# Patient Record
Sex: MALE | Race: WHITE | NOT HISPANIC OR LATINO | URBAN - METROPOLITAN AREA
[De-identification: names, ages, dates, MRNs, and addresses within clinical notes are randomized per-mention and may not be internally consistent; named-entity substitution may affect disease eponyms.]

---

## 2018-04-21 ENCOUNTER — EMERGENCY (EMERGENCY)
Facility: HOSPITAL | Age: 58
LOS: 1 days | Discharge: HOME | End: 2018-04-21
Attending: EMERGENCY MEDICINE
Payer: MEDICARE

## 2018-04-21 VITALS
OXYGEN SATURATION: 97 % | SYSTOLIC BLOOD PRESSURE: 158 MMHG | HEART RATE: 61 BPM | RESPIRATION RATE: 20 BRPM | HEIGHT: 67 IN | WEIGHT: 179.9 LBS | TEMPERATURE: 98 F | DIASTOLIC BLOOD PRESSURE: 90 MMHG

## 2018-04-21 VITALS
HEART RATE: 56 BPM | SYSTOLIC BLOOD PRESSURE: 158 MMHG | TEMPERATURE: 98 F | DIASTOLIC BLOOD PRESSURE: 90 MMHG | OXYGEN SATURATION: 100 % | RESPIRATION RATE: 18 BRPM

## 2018-04-21 LAB
ALBUMIN SERPL ELPH-MCNC: 3.9 G/DL — SIGNIFICANT CHANGE UP (ref 3.3–5.2)
ALP SERPL-CCNC: 53 U/L — SIGNIFICANT CHANGE UP (ref 40–120)
ALT FLD-CCNC: 14 U/L — SIGNIFICANT CHANGE UP
ANION GAP SERPL CALC-SCNC: 10 MMOL/L — SIGNIFICANT CHANGE UP (ref 5–17)
AST SERPL-CCNC: 18 U/L — SIGNIFICANT CHANGE UP
BASOPHILS # BLD AUTO: 0 K/UL — SIGNIFICANT CHANGE UP (ref 0–0.2)
BASOPHILS NFR BLD AUTO: 0.3 % — SIGNIFICANT CHANGE UP (ref 0–2)
BILIRUB SERPL-MCNC: 0.3 MG/DL — LOW (ref 0.4–2)
BUN SERPL-MCNC: 9 MG/DL — SIGNIFICANT CHANGE UP (ref 8–20)
CALCIUM SERPL-MCNC: 9.7 MG/DL — SIGNIFICANT CHANGE UP (ref 8.6–10.2)
CHLORIDE SERPL-SCNC: 101 MMOL/L — SIGNIFICANT CHANGE UP (ref 98–107)
CO2 SERPL-SCNC: 29 MMOL/L — SIGNIFICANT CHANGE UP (ref 22–29)
CREAT SERPL-MCNC: 1.13 MG/DL — SIGNIFICANT CHANGE UP (ref 0.5–1.3)
EOSINOPHIL # BLD AUTO: 0.1 K/UL — SIGNIFICANT CHANGE UP (ref 0–0.5)
EOSINOPHIL NFR BLD AUTO: 1.6 % — SIGNIFICANT CHANGE UP (ref 0–5)
GLUCOSE SERPL-MCNC: 87 MG/DL — SIGNIFICANT CHANGE UP (ref 70–115)
HCT VFR BLD CALC: 38.6 % — LOW (ref 42–52)
HGB BLD-MCNC: 13.1 G/DL — LOW (ref 14–18)
LYMPHOCYTES # BLD AUTO: 2 K/UL — SIGNIFICANT CHANGE UP (ref 1–4.8)
LYMPHOCYTES # BLD AUTO: 29 % — SIGNIFICANT CHANGE UP (ref 20–55)
MAGNESIUM SERPL-MCNC: 1.5 MG/DL — LOW (ref 1.6–2.6)
MCHC RBC-ENTMCNC: 31.5 PG — HIGH (ref 27–31)
MCHC RBC-ENTMCNC: 33.9 G/DL — SIGNIFICANT CHANGE UP (ref 32–36)
MCV RBC AUTO: 92.8 FL — SIGNIFICANT CHANGE UP (ref 80–94)
MONOCYTES # BLD AUTO: 0.7 K/UL — SIGNIFICANT CHANGE UP (ref 0–0.8)
MONOCYTES NFR BLD AUTO: 9.8 % — SIGNIFICANT CHANGE UP (ref 3–10)
NEUTROPHILS # BLD AUTO: 4 K/UL — SIGNIFICANT CHANGE UP (ref 1.8–8)
NEUTROPHILS NFR BLD AUTO: 58.9 % — SIGNIFICANT CHANGE UP (ref 37–73)
PLATELET # BLD AUTO: 169 K/UL — SIGNIFICANT CHANGE UP (ref 150–400)
POTASSIUM SERPL-MCNC: 4.5 MMOL/L — SIGNIFICANT CHANGE UP (ref 3.5–5.3)
POTASSIUM SERPL-SCNC: 4.5 MMOL/L — SIGNIFICANT CHANGE UP (ref 3.5–5.3)
PROT SERPL-MCNC: 6.2 G/DL — LOW (ref 6.6–8.7)
RBC # BLD: 4.16 M/UL — LOW (ref 4.6–6.2)
RBC # FLD: 12.7 % — SIGNIFICANT CHANGE UP (ref 11–15.6)
SODIUM SERPL-SCNC: 140 MMOL/L — SIGNIFICANT CHANGE UP (ref 135–145)
TSH SERPL-MCNC: 1.3 UIU/ML — SIGNIFICANT CHANGE UP (ref 0.27–4.2)
WBC # BLD: 6.7 K/UL — SIGNIFICANT CHANGE UP (ref 4.8–10.8)
WBC # FLD AUTO: 6.7 K/UL — SIGNIFICANT CHANGE UP (ref 4.8–10.8)

## 2018-04-21 PROCEDURE — 36415 COLL VENOUS BLD VENIPUNCTURE: CPT

## 2018-04-21 PROCEDURE — 99285 EMERGENCY DEPT VISIT HI MDM: CPT

## 2018-04-21 PROCEDURE — 73110 X-RAY EXAM OF WRIST: CPT

## 2018-04-21 PROCEDURE — 84443 ASSAY THYROID STIM HORMONE: CPT

## 2018-04-21 PROCEDURE — 73110 X-RAY EXAM OF WRIST: CPT | Mod: 26,RT

## 2018-04-21 PROCEDURE — 85027 COMPLETE CBC AUTOMATED: CPT

## 2018-04-21 PROCEDURE — 70450 CT HEAD/BRAIN W/O DYE: CPT

## 2018-04-21 PROCEDURE — 83735 ASSAY OF MAGNESIUM: CPT

## 2018-04-21 PROCEDURE — 80053 COMPREHEN METABOLIC PANEL: CPT

## 2018-04-21 PROCEDURE — 99284 EMERGENCY DEPT VISIT MOD MDM: CPT

## 2018-04-21 PROCEDURE — 70450 CT HEAD/BRAIN W/O DYE: CPT | Mod: 26

## 2018-04-21 NOTE — ED PROVIDER NOTE - PMH
Anxiety    BPH (benign prostatic hyperplasia)    GERD (gastroesophageal reflux disease)    HTN (hypertension)    Hyperlipidemia    Mood disorder    Psychosis

## 2018-04-21 NOTE — ED ADULT NURSE NOTE - OBJECTIVE STATEMENT
58 yom presents to ed complaining of left arm numbness denies cp denies sob. denies weakness. moves all extremities denies changes in vision pt fell 3 days ago with dropped wrist.

## 2018-04-21 NOTE — ED PROVIDER NOTE - MUSCULOSKELETAL, MLM
R wrist [id number] weak, however LEANDRO wrist flexes, deltic, triceps, and biceps normal ROM. Spine appears normal, range of motion is not limited, no muscle or joint tenderness R wrist extension weak 2/5, however left wrist extension normal; LEANDRO wrist flexion, deltoids, triceps, and biceps normal strength 5/5. Spine appears normal, range of motion is not limited, no muscle or joint tenderness

## 2018-04-21 NOTE — ED ADULT TRIAGE NOTE - CHIEF COMPLAINT QUOTE
Patient brought in by ambulance A/Ox3, sent from [facility] for adults for right arm pain and numbness/tingling to right finger tips.

## 2018-04-21 NOTE — ED PROVIDER NOTE - OBJECTIVE STATEMENT
57 y/o M pt with hx of GERD, HLD, HTN, BPH, and mood disorder presents to ED BIBA sent from [facility] for adults for right wrist numbness and tingling for the past 2 days. Pt at first thought it was from a stroke. He reports that he fell of his bed and hit floor a few days ago, but no pain was noted. His ROM is limited according to him, he does feel any touch. Denies dizziness. No further complaints at this time. 59 y/o M pt with hx of GERD, HLD, HTN, BPH, and mood disorder presents to ED BIBA sent from [facility] for adults for right wrist numbness and tingling for the past 2 days. He reports that he fell of his bed and hit floor a few days ago, but no pain was noted. His ROM is limited according to him, he does feel any touch. Denies dizziness. No further complaints at this time. 59 y/o M pt with hx of GERD, HLD, HTN, BPH, and mood disorder presents to ED BIBA sent from [facility] for adults for right wrist numbness and tingling for the past 2 days. He reports that he fell of his bed and hit floor a few days ago, but no pain was noted. His ROM is limited according to him, he does feel all touch. Denies dizziness. No further complaints at this time.

## 2018-04-21 NOTE — ED ADULT NURSE NOTE - CHIEF COMPLAINT QUOTE
Patient brought in by ambulance A/Ox3, sent from [facility] for adults for right arm pain and numbness/tingling to right finger tips.

## 2018-04-21 NOTE — ED PROCEDURE NOTE - NS ED PERI NEURO POS
pre-and post right wrist drop/Pre-application: Motor, sensory, and vascular responses NOT intact in the injured extremity.

## 2018-04-21 NOTE — ED ADULT NURSE NOTE - NSSISCREENINGQ4_ED_A_ED
Chief Complaint   Patient presents with     Recheck Medication     schizoaffective disorder, depressive type     Reviewed allergies, smoking status, and pharmacy preference  Administered abuse screening questions   Obtained weight, blood pressure and heart rate      No

## 2018-04-21 NOTE — ED PROVIDER NOTE - MEDICAL DECISION MAKING DETAILS
Pt order basic labs, and CT scan, more likely wrist drop, will put splint in place, and discuss neuro followup.

## 2019-07-18 ENCOUNTER — EMERGENCY (EMERGENCY)
Facility: HOSPITAL | Age: 59
LOS: 1 days | Discharge: OTHER INSTITUTION | End: 2019-07-18
Attending: EMERGENCY MEDICINE
Payer: MEDICARE

## 2019-07-18 VITALS
HEIGHT: 68 IN | OXYGEN SATURATION: 97 % | WEIGHT: 160.06 LBS | RESPIRATION RATE: 18 BRPM | DIASTOLIC BLOOD PRESSURE: 88 MMHG | SYSTOLIC BLOOD PRESSURE: 142 MMHG | TEMPERATURE: 99 F | HEART RATE: 68 BPM

## 2019-07-18 DIAGNOSIS — F20.0 PARANOID SCHIZOPHRENIA: ICD-10-CM

## 2019-07-18 DIAGNOSIS — R69 ILLNESS, UNSPECIFIED: ICD-10-CM

## 2019-07-18 LAB
ALBUMIN SERPL ELPH-MCNC: 4.5 G/DL — SIGNIFICANT CHANGE UP (ref 3.3–5.2)
ALP SERPL-CCNC: 67 U/L — SIGNIFICANT CHANGE UP (ref 40–120)
ALT FLD-CCNC: 31 U/L — SIGNIFICANT CHANGE UP
AMPHET UR-MCNC: NEGATIVE — SIGNIFICANT CHANGE UP
ANION GAP SERPL CALC-SCNC: 12 MMOL/L — SIGNIFICANT CHANGE UP (ref 5–17)
APAP SERPL-MCNC: <7.5 UG/ML — LOW (ref 10–26)
APPEARANCE UR: CLEAR — SIGNIFICANT CHANGE UP
AST SERPL-CCNC: 29 U/L — SIGNIFICANT CHANGE UP
BARBITURATES UR SCN-MCNC: NEGATIVE — SIGNIFICANT CHANGE UP
BENZODIAZ UR-MCNC: NEGATIVE — SIGNIFICANT CHANGE UP
BILIRUB SERPL-MCNC: 0.4 MG/DL — SIGNIFICANT CHANGE UP (ref 0.4–2)
BILIRUB UR-MCNC: NEGATIVE — SIGNIFICANT CHANGE UP
BUN SERPL-MCNC: 8 MG/DL — SIGNIFICANT CHANGE UP (ref 8–20)
CALCIUM SERPL-MCNC: 10.4 MG/DL — HIGH (ref 8.6–10.2)
CHLORIDE SERPL-SCNC: 100 MMOL/L — SIGNIFICANT CHANGE UP (ref 98–107)
CO2 SERPL-SCNC: 25 MMOL/L — SIGNIFICANT CHANGE UP (ref 22–29)
COCAINE METAB.OTHER UR-MCNC: NEGATIVE — SIGNIFICANT CHANGE UP
COLOR SPEC: YELLOW — SIGNIFICANT CHANGE UP
CREAT SERPL-MCNC: 1.21 MG/DL — SIGNIFICANT CHANGE UP (ref 0.5–1.3)
DIFF PNL FLD: NEGATIVE — SIGNIFICANT CHANGE UP
ETHANOL SERPL-MCNC: <10 MG/DL — SIGNIFICANT CHANGE UP
GLUCOSE SERPL-MCNC: 75 MG/DL — SIGNIFICANT CHANGE UP (ref 70–115)
GLUCOSE UR QL: NEGATIVE MG/DL — SIGNIFICANT CHANGE UP
HCT VFR BLD CALC: 40.2 % — SIGNIFICANT CHANGE UP (ref 39–50)
HGB BLD-MCNC: 13.5 G/DL — SIGNIFICANT CHANGE UP (ref 13–17)
KETONES UR-MCNC: NEGATIVE — SIGNIFICANT CHANGE UP
LEUKOCYTE ESTERASE UR-ACNC: NEGATIVE — SIGNIFICANT CHANGE UP
MCHC RBC-ENTMCNC: 31.3 PG — SIGNIFICANT CHANGE UP (ref 27–34)
MCHC RBC-ENTMCNC: 33.6 GM/DL — SIGNIFICANT CHANGE UP (ref 32–36)
MCV RBC AUTO: 93.3 FL — SIGNIFICANT CHANGE UP (ref 80–100)
METHADONE UR-MCNC: NEGATIVE — SIGNIFICANT CHANGE UP
NITRITE UR-MCNC: NEGATIVE — SIGNIFICANT CHANGE UP
OPIATES UR-MCNC: NEGATIVE — SIGNIFICANT CHANGE UP
PCP SPEC-MCNC: SIGNIFICANT CHANGE UP
PCP UR-MCNC: NEGATIVE — SIGNIFICANT CHANGE UP
PH UR: 6 — SIGNIFICANT CHANGE UP (ref 5–8)
PLATELET # BLD AUTO: 153 K/UL — SIGNIFICANT CHANGE UP (ref 150–400)
POTASSIUM SERPL-MCNC: 4.3 MMOL/L — SIGNIFICANT CHANGE UP (ref 3.5–5.3)
POTASSIUM SERPL-SCNC: 4.3 MMOL/L — SIGNIFICANT CHANGE UP (ref 3.5–5.3)
PROT SERPL-MCNC: 7 G/DL — SIGNIFICANT CHANGE UP (ref 6.6–8.7)
PROT UR-MCNC: NEGATIVE MG/DL — SIGNIFICANT CHANGE UP
RBC # BLD: 4.31 M/UL — SIGNIFICANT CHANGE UP (ref 4.2–5.8)
RBC # FLD: 12.1 % — SIGNIFICANT CHANGE UP (ref 10.3–14.5)
SALICYLATES SERPL-MCNC: <0.6 MG/DL — LOW (ref 10–20)
SODIUM SERPL-SCNC: 137 MMOL/L — SIGNIFICANT CHANGE UP (ref 135–145)
SP GR SPEC: 1.01 — SIGNIFICANT CHANGE UP (ref 1.01–1.02)
THC UR QL: NEGATIVE — SIGNIFICANT CHANGE UP
UROBILINOGEN FLD QL: NEGATIVE MG/DL — SIGNIFICANT CHANGE UP
WBC # BLD: 5.44 K/UL — SIGNIFICANT CHANGE UP (ref 3.8–10.5)
WBC # FLD AUTO: 5.44 K/UL — SIGNIFICANT CHANGE UP (ref 3.8–10.5)

## 2019-07-18 PROCEDURE — 93005 ELECTROCARDIOGRAM TRACING: CPT

## 2019-07-18 PROCEDURE — 93010 ELECTROCARDIOGRAM REPORT: CPT

## 2019-07-18 PROCEDURE — 85027 COMPLETE CBC AUTOMATED: CPT

## 2019-07-18 PROCEDURE — 99285 EMERGENCY DEPT VISIT HI MDM: CPT

## 2019-07-18 PROCEDURE — 80307 DRUG TEST PRSMV CHEM ANLYZR: CPT

## 2019-07-18 PROCEDURE — 80053 COMPREHEN METABOLIC PANEL: CPT

## 2019-07-18 PROCEDURE — 81003 URINALYSIS AUTO W/O SCOPE: CPT

## 2019-07-18 PROCEDURE — 36415 COLL VENOUS BLD VENIPUNCTURE: CPT

## 2019-07-18 RX ORDER — CLONAZEPAM 1 MG
0.5 TABLET ORAL ONCE
Refills: 0 | Status: DISCONTINUED | OUTPATIENT
Start: 2019-07-18 | End: 2019-07-18

## 2019-07-18 RX ADMIN — Medication 0.5 MILLIGRAM(S): at 16:03

## 2019-07-18 RX ADMIN — Medication 1 MILLIGRAM(S): at 20:40

## 2019-07-18 NOTE — ED ADULT NURSE NOTE - OBJECTIVE STATEMENT
pt awake, alert and oriented x3 c/o voices in his head telling him to jump in front of the train.  pt states he often hears voices telling him to do this although he has never acted upon them.  pt states he believes he is having these voices in his head because he is afraid of his constant urge to use derogatory terms such as the "n" word and the "s" word and is afraid that he will say it to someone who will hurt him.  pt lives in adult home who helps to manage his medications.  pt admits to drinking one can of beer per day and smoking "reefer" every two weeks.  denies smoking cigarettes. one to one at bedside for SI.  Denies HI.

## 2019-07-18 NOTE — ED BEHAVIORAL HEALTH NOTE - BEHAVIORAL HEALTH NOTE
SERENA NOTE: Pt accepted to [facility] by Dr. Hernandez on 9.37 status. Bed offered by [facility]. Pt has medicare and medicaid - auth is not required. RN notified. SW will arrange transport

## 2019-07-18 NOTE — ED PROVIDER NOTE - OBJECTIVE STATEMENT
58 y/o M with h/o htn, dementia, hld , schizophrenia, elevated lft, bph sent rom group home for suicidal ideation and pt states that he wants to kill himself by jumping in front of the car. Pt has prior suicidal attempts and oversode on apsiirn 5 yrs ago. Pt smokes weed and smokes cigarette and had one can of beer today . No other drugs.

## 2019-07-18 NOTE — ED ADULT NURSE NOTE - CHIEF COMPLAINT QUOTE
Patient BIBA to ED today from adult group [facility] with c/o hallucinations that are telling him to jump in front of a car.  Patient reports he smoked "pot" today and drank alcohol also.

## 2019-07-18 NOTE — ED PROVIDER NOTE - CLINICAL SUMMARY MEDICAL DECISION MAKING FREE TEXT BOX
pt is clinically sober, will check for labs, call psych consult, no medical intervention required at this time

## 2019-07-18 NOTE — ED BEHAVIORAL HEALTH ASSESSMENT NOTE - CURRENT MEDICATION
Metoprolol 25mg bid, Gabapentin 100 mg tid, Cogentin 1 mg bid, Flomax 0.4 mg hs, perphenazine, 16 mg bid, Lexapro 10 mg hs, Lipitor 10 mg daily, Zyprexa 20 mg hs, Zyprexa 10 mg daily, ASA 81 mg daily, Creon 1200 units daily

## 2019-07-18 NOTE — ED ADULT NURSE REASSESSMENT NOTE - NS ED NURSE REASSESS COMMENT FT1
received pt in room laying in bed watching tv.  no c/o offered.  pt stating I think I am going to an outpt.  program. plan of care explained to pt.  pt made aware to go in pt.  awaiting soh return call for conformation.  pt states well it is getting late pt made aware transferred do occur all night.  pt stating but I am tired.  redirected pt. offered night snack and stating Oreo cookies and milk would be good right.   snack provided pt smiling .

## 2019-07-18 NOTE — ED BEHAVIORAL HEALTH ASSESSMENT NOTE - HPI (INCLUDE ILLNESS QUALITY, SEVERITY, DURATION, TIMING, CONTEXT, MODIFYING FACTORS, ASSOCIATED SIGNS AND SYMPTOMS)
Patient is a 60 y/o single male with PMh of HDL, dementia, BPH, elevated LFT's PPH of schizophrenia in current treatment with [facility], many previous inpatient psychiatric hospitalizations ( last [facility] several weeks ago), no known suicide attempts, h/o of Cannabis use, biba from adult home for suicidal ideation with a plan to jump in front of a car.   Patient reports worsening A/H making him yell at people and scream derogatory statements at people. Patient reports he was recently discharged at [facility] and at time of discharge told the staff he was no longer suicidal because he wanted to be discharge. Since discharge he has been experiencing increased A/H which is triggering his suicidality. Patient stated, " If I didn't hear these voices I would feel better." Patient also reports paranoia stating " I feel like people are out to get me." Patient reports he drinks 1 beer daily, and smokes Cannabis every two weeks ( last 2 weeks ago). He denies thoughts of hurting others. Patient reports hypersomnia and decreased appetite. He attends a day program and is eligible to attend daily however states he goes once a week " because they are all crazy there." Patient reports if discharged today he will attempt suicide by jumping in front of a car.  Received collateral from brother Shoaib Godoy who received a call from patient this morning stating he was paranoid and suicidal. Patient brother then spoke to staff at residence and advised them to call 911. He feels his brother has an addiction to Cannabis and would benefit from rehab once psychiatrically stable.   Received professional collateral from [facility]. Patient has been hospitalized several times in the past several months for suicidal ideation. Patient has no known suicide attempts and often feels better in several days. [facility] did not change antipsychotics during last visit and discontinued Klonopin.   Received additional collateral from house supervisor Noe [phone] who feels patient has decompensated since discharge from [facility] and has concerns he may hurt himself. Yesterday patient reported worsening A/H and suicidal ideation  to staff and stated, " I am going to try to stay out of the hospital." Today patient asked to be brought to the hospital stating, " I want to kill myslef."

## 2019-07-18 NOTE — ED BEHAVIORAL HEALTH ASSESSMENT NOTE - DETAILS
discharged from [facility] several weeks ago. tremor treated with cogentin n/a see hpi accepting facility will receive handoff attending

## 2019-07-18 NOTE — ED BEHAVIORAL HEALTH ASSESSMENT NOTE - RISK ASSESSMENT
high risk, although patient has been recently discharged from [facility] he reports worsening suicidal ideation in the context of A/H. He is not able to engage in safety plan.

## 2019-07-18 NOTE — ED ADULT TRIAGE NOTE - CHIEF COMPLAINT QUOTE
Patient BIBA to ED today from adult group [facility] with c/o hallucinations that are telling him to jump in front of a car.  Patient reports he smoked "pot" today and drank alcohol also.

## 2019-07-18 NOTE — ED BEHAVIORAL HEALTH ASSESSMENT NOTE - SUMMARY
Patient is a 58 y/o single male with PMh of HDL, dementia, BPH, elevated LFT's PPH of schizophrenia in current treatment with [facility], many previous inpatient psychiatric hospitalizations ( last [facility] several weeks ago), no known suicide attempts, h/o of Cannabis use, biba from adult home for suicidal ideation with a plan to jump in front of a car.   Patient seen and evaluated, reports A/H, and suicidal ideation with plan. Patient is preoccupied with thought blocking. Collateral supports hospitalizations  for safety and sx medication management.   Patient will be admitted on a voluntary status.

## 2019-07-19 VITALS
SYSTOLIC BLOOD PRESSURE: 131 MMHG | RESPIRATION RATE: 18 BRPM | DIASTOLIC BLOOD PRESSURE: 76 MMHG | TEMPERATURE: 98 F | HEART RATE: 60 BPM | OXYGEN SATURATION: 98 %

## 2019-08-05 ENCOUNTER — INPATIENT (INPATIENT)
Facility: HOSPITAL | Age: 59
LOS: 23 days | Discharge: HOME | End: 2019-08-29
Attending: PSYCHIATRY & NEUROLOGY | Admitting: PSYCHIATRY & NEUROLOGY
Payer: MEDICARE

## 2019-08-05 ENCOUNTER — EMERGENCY (EMERGENCY)
Facility: HOSPITAL | Age: 59
LOS: 1 days | Discharge: OTHER INSTITUTION | End: 2019-08-05
Attending: EMERGENCY MEDICINE
Payer: MEDICARE

## 2019-08-05 VITALS — RESPIRATION RATE: 18 BRPM | TEMPERATURE: 98 F | OXYGEN SATURATION: 98 %

## 2019-08-05 VITALS
HEART RATE: 56 BPM | TEMPERATURE: 98 F | OXYGEN SATURATION: 98 % | RESPIRATION RATE: 18 BRPM | DIASTOLIC BLOOD PRESSURE: 94 MMHG | SYSTOLIC BLOOD PRESSURE: 160 MMHG

## 2019-08-05 VITALS
TEMPERATURE: 99 F | OXYGEN SATURATION: 98 % | RESPIRATION RATE: 18 BRPM | SYSTOLIC BLOOD PRESSURE: 152 MMHG | DIASTOLIC BLOOD PRESSURE: 89 MMHG | HEART RATE: 84 BPM

## 2019-08-05 DIAGNOSIS — R45.851 SUICIDAL IDEATIONS: ICD-10-CM

## 2019-08-05 LAB
ALBUMIN SERPL ELPH-MCNC: 4.3 G/DL — SIGNIFICANT CHANGE UP (ref 3.3–5.2)
ALP SERPL-CCNC: 60 U/L — SIGNIFICANT CHANGE UP (ref 40–120)
ALT FLD-CCNC: 26 U/L — SIGNIFICANT CHANGE UP
AMPHET UR-MCNC: NEGATIVE — SIGNIFICANT CHANGE UP
ANION GAP SERPL CALC-SCNC: 10 MMOL/L — SIGNIFICANT CHANGE UP (ref 5–17)
APAP SERPL-MCNC: <7.5 UG/ML — LOW (ref 10–26)
APPEARANCE UR: CLEAR — SIGNIFICANT CHANGE UP
AST SERPL-CCNC: 23 U/L — SIGNIFICANT CHANGE UP
BARBITURATES UR SCN-MCNC: NEGATIVE — SIGNIFICANT CHANGE UP
BASOPHILS # BLD AUTO: 0.03 K/UL — SIGNIFICANT CHANGE UP (ref 0–0.2)
BASOPHILS NFR BLD AUTO: 0.6 % — SIGNIFICANT CHANGE UP (ref 0–2)
BENZODIAZ UR-MCNC: NEGATIVE — SIGNIFICANT CHANGE UP
BILIRUB SERPL-MCNC: 0.3 MG/DL — LOW (ref 0.4–2)
BILIRUB UR-MCNC: NEGATIVE — SIGNIFICANT CHANGE UP
BUN SERPL-MCNC: 7 MG/DL — LOW (ref 8–20)
CALCIUM SERPL-MCNC: 8.9 MG/DL — SIGNIFICANT CHANGE UP (ref 8.6–10.2)
CHLORIDE SERPL-SCNC: 101 MMOL/L — SIGNIFICANT CHANGE UP (ref 98–107)
CO2 SERPL-SCNC: 25 MMOL/L — SIGNIFICANT CHANGE UP (ref 22–29)
COCAINE METAB.OTHER UR-MCNC: NEGATIVE — SIGNIFICANT CHANGE UP
COLOR SPEC: YELLOW — SIGNIFICANT CHANGE UP
CREAT SERPL-MCNC: 1.02 MG/DL — SIGNIFICANT CHANGE UP (ref 0.5–1.3)
DIFF PNL FLD: NEGATIVE — SIGNIFICANT CHANGE UP
EOSINOPHIL # BLD AUTO: 0.14 K/UL — SIGNIFICANT CHANGE UP (ref 0–0.5)
EOSINOPHIL NFR BLD AUTO: 2.8 % — SIGNIFICANT CHANGE UP (ref 0–6)
GLUCOSE SERPL-MCNC: 66 MG/DL — LOW (ref 70–115)
GLUCOSE UR QL: NEGATIVE MG/DL — SIGNIFICANT CHANGE UP
HCT VFR BLD CALC: 37.6 % — LOW (ref 39–50)
HGB BLD-MCNC: 12.7 G/DL — LOW (ref 13–17)
IMM GRANULOCYTES NFR BLD AUTO: 0.2 % — SIGNIFICANT CHANGE UP (ref 0–1.5)
KETONES UR-MCNC: NEGATIVE — SIGNIFICANT CHANGE UP
LEUKOCYTE ESTERASE UR-ACNC: NEGATIVE — SIGNIFICANT CHANGE UP
LYMPHOCYTES # BLD AUTO: 1.68 K/UL — SIGNIFICANT CHANGE UP (ref 1–3.3)
LYMPHOCYTES # BLD AUTO: 33.1 % — SIGNIFICANT CHANGE UP (ref 13–44)
MCHC RBC-ENTMCNC: 31.1 PG — SIGNIFICANT CHANGE UP (ref 27–34)
MCHC RBC-ENTMCNC: 33.8 GM/DL — SIGNIFICANT CHANGE UP (ref 32–36)
MCV RBC AUTO: 92.2 FL — SIGNIFICANT CHANGE UP (ref 80–100)
METHADONE UR-MCNC: NEGATIVE — SIGNIFICANT CHANGE UP
MONOCYTES # BLD AUTO: 0.62 K/UL — SIGNIFICANT CHANGE UP (ref 0–0.9)
MONOCYTES NFR BLD AUTO: 12.2 % — SIGNIFICANT CHANGE UP (ref 2–14)
NEUTROPHILS # BLD AUTO: 2.6 K/UL — SIGNIFICANT CHANGE UP (ref 1.8–7.4)
NEUTROPHILS NFR BLD AUTO: 51.1 % — SIGNIFICANT CHANGE UP (ref 43–77)
NITRITE UR-MCNC: NEGATIVE — SIGNIFICANT CHANGE UP
OPIATES UR-MCNC: NEGATIVE — SIGNIFICANT CHANGE UP
PCP SPEC-MCNC: SIGNIFICANT CHANGE UP
PCP UR-MCNC: NEGATIVE — SIGNIFICANT CHANGE UP
PH UR: 6.5 — SIGNIFICANT CHANGE UP (ref 5–8)
PLATELET # BLD AUTO: 150 K/UL — SIGNIFICANT CHANGE UP (ref 150–400)
POTASSIUM SERPL-MCNC: 4.2 MMOL/L — SIGNIFICANT CHANGE UP (ref 3.5–5.3)
POTASSIUM SERPL-SCNC: 4.2 MMOL/L — SIGNIFICANT CHANGE UP (ref 3.5–5.3)
PROT SERPL-MCNC: 6.4 G/DL — LOW (ref 6.6–8.7)
PROT UR-MCNC: NEGATIVE MG/DL — SIGNIFICANT CHANGE UP
RBC # BLD: 4.08 M/UL — LOW (ref 4.2–5.8)
RBC # FLD: 12 % — SIGNIFICANT CHANGE UP (ref 10.3–14.5)
SALICYLATES SERPL-MCNC: <0.6 MG/DL — LOW (ref 10–20)
SODIUM SERPL-SCNC: 136 MMOL/L — SIGNIFICANT CHANGE UP (ref 135–145)
SP GR SPEC: 1.01 — SIGNIFICANT CHANGE UP (ref 1.01–1.02)
THC UR QL: NEGATIVE — SIGNIFICANT CHANGE UP
UROBILINOGEN FLD QL: NEGATIVE MG/DL — SIGNIFICANT CHANGE UP
WBC # BLD: 5.08 K/UL — SIGNIFICANT CHANGE UP (ref 3.8–10.5)
WBC # FLD AUTO: 5.08 K/UL — SIGNIFICANT CHANGE UP (ref 3.8–10.5)

## 2019-08-05 PROCEDURE — 99285 EMERGENCY DEPT VISIT HI MDM: CPT

## 2019-08-05 PROCEDURE — 85027 COMPLETE CBC AUTOMATED: CPT

## 2019-08-05 PROCEDURE — 81003 URINALYSIS AUTO W/O SCOPE: CPT

## 2019-08-05 PROCEDURE — 93005 ELECTROCARDIOGRAM TRACING: CPT

## 2019-08-05 PROCEDURE — 80307 DRUG TEST PRSMV CHEM ANLYZR: CPT

## 2019-08-05 PROCEDURE — 80053 COMPREHEN METABOLIC PANEL: CPT

## 2019-08-05 PROCEDURE — 36415 COLL VENOUS BLD VENIPUNCTURE: CPT

## 2019-08-05 PROCEDURE — 93010 ELECTROCARDIOGRAM REPORT: CPT

## 2019-08-05 RX ORDER — GABAPENTIN 400 MG/1
300 CAPSULE ORAL ONCE
Refills: 0 | Status: COMPLETED | OUTPATIENT
Start: 2019-08-05 | End: 2019-08-05

## 2019-08-05 RX ORDER — THIAMINE MONONITRATE (VIT B1) 100 MG
100 TABLET ORAL DAILY
Refills: 0 | Status: DISCONTINUED | OUTPATIENT
Start: 2019-08-05 | End: 2019-08-29

## 2019-08-05 RX ORDER — OLANZAPINE 15 MG/1
5 TABLET, FILM COATED ORAL EVERY 6 HOURS
Refills: 0 | Status: DISCONTINUED | OUTPATIENT
Start: 2019-08-05 | End: 2019-08-29

## 2019-08-05 RX ORDER — GABAPENTIN 400 MG/1
300 CAPSULE ORAL THREE TIMES A DAY
Refills: 0 | Status: DISCONTINUED | OUTPATIENT
Start: 2019-08-05 | End: 2019-08-14

## 2019-08-05 RX ORDER — NICOTINE POLACRILEX 2 MG
1 GUM BUCCAL DAILY
Refills: 0 | Status: DISCONTINUED | OUTPATIENT
Start: 2019-08-05 | End: 2019-08-29

## 2019-08-05 RX ORDER — METOPROLOL TARTRATE 50 MG
25 TABLET ORAL DAILY
Refills: 0 | Status: DISCONTINUED | OUTPATIENT
Start: 2019-08-05 | End: 2019-08-12

## 2019-08-05 RX ORDER — LIPASE/PROTEASE/AMYLASE 16-48-48K
1 CAPSULE,DELAYED RELEASE (ENTERIC COATED) ORAL
Refills: 0 | Status: DISCONTINUED | OUTPATIENT
Start: 2019-08-05 | End: 2019-08-29

## 2019-08-05 RX ORDER — GABAPENTIN 400 MG/1
1 CAPSULE ORAL
Qty: 0 | Refills: 0 | DISCHARGE

## 2019-08-05 RX ORDER — FOLIC ACID 0.8 MG
1 TABLET ORAL DAILY
Refills: 0 | Status: DISCONTINUED | OUTPATIENT
Start: 2019-08-05 | End: 2019-08-29

## 2019-08-05 RX ORDER — BENZTROPINE MESYLATE 1 MG
1 TABLET ORAL
Refills: 0 | Status: DISCONTINUED | OUTPATIENT
Start: 2019-08-05 | End: 2019-08-29

## 2019-08-05 RX ORDER — TAMSULOSIN HYDROCHLORIDE 0.4 MG/1
0.4 CAPSULE ORAL AT BEDTIME
Refills: 0 | Status: DISCONTINUED | OUTPATIENT
Start: 2019-08-05 | End: 2019-08-29

## 2019-08-05 RX ORDER — BENZTROPINE MESYLATE 1 MG
1 TABLET ORAL ONCE
Refills: 0 | Status: COMPLETED | OUTPATIENT
Start: 2019-08-05 | End: 2019-08-05

## 2019-08-05 RX ORDER — ESCITALOPRAM OXALATE 10 MG/1
10 TABLET, FILM COATED ORAL AT BEDTIME
Refills: 0 | Status: DISCONTINUED | OUTPATIENT
Start: 2019-08-05 | End: 2019-08-06

## 2019-08-05 RX ORDER — ATORVASTATIN CALCIUM 80 MG/1
10 TABLET, FILM COATED ORAL DAILY
Refills: 0 | Status: DISCONTINUED | OUTPATIENT
Start: 2019-08-05 | End: 2019-08-06

## 2019-08-05 RX ORDER — TAMSULOSIN HYDROCHLORIDE 0.4 MG/1
1 CAPSULE ORAL
Qty: 0 | Refills: 0 | DISCHARGE

## 2019-08-05 RX ORDER — DIVALPROEX SODIUM 500 MG/1
500 TABLET, DELAYED RELEASE ORAL
Refills: 0 | Status: DISCONTINUED | OUTPATIENT
Start: 2019-08-05 | End: 2019-08-29

## 2019-08-05 RX ORDER — METOPROLOL TARTRATE 50 MG
25 TABLET ORAL
Refills: 0 | Status: DISCONTINUED | OUTPATIENT
Start: 2019-08-05 | End: 2019-08-29

## 2019-08-05 RX ORDER — BENZTROPINE MESYLATE 1 MG
1 TABLET ORAL
Qty: 0 | Refills: 0 | DISCHARGE

## 2019-08-05 RX ORDER — NICOTINE POLACRILEX 2 MG
4 GUM BUCCAL
Refills: 0 | Status: DISCONTINUED | OUTPATIENT
Start: 2019-08-05 | End: 2019-08-29

## 2019-08-05 RX ORDER — ASPIRIN/CALCIUM CARB/MAGNESIUM 324 MG
81 TABLET ORAL DAILY
Refills: 0 | Status: DISCONTINUED | OUTPATIENT
Start: 2019-08-05 | End: 2019-08-29

## 2019-08-05 RX ORDER — DIVALPROEX SODIUM 500 MG/1
500 TABLET, DELAYED RELEASE ORAL ONCE
Refills: 0 | Status: COMPLETED | OUTPATIENT
Start: 2019-08-05 | End: 2019-08-05

## 2019-08-05 RX ORDER — OLANZAPINE 15 MG/1
20 TABLET, FILM COATED ORAL AT BEDTIME
Refills: 0 | Status: DISCONTINUED | OUTPATIENT
Start: 2019-08-05 | End: 2019-08-08

## 2019-08-05 RX ORDER — ALBUTEROL 90 UG/1
2 AEROSOL, METERED ORAL EVERY 4 HOURS
Refills: 0 | Status: DISCONTINUED | OUTPATIENT
Start: 2019-08-05 | End: 2019-08-29

## 2019-08-05 RX ADMIN — OLANZAPINE 20 MILLIGRAM(S): 15 TABLET, FILM COATED ORAL at 22:42

## 2019-08-05 RX ADMIN — Medication 25 MILLIGRAM(S): at 20:00

## 2019-08-05 RX ADMIN — Medication 1 APPLICATION(S): at 23:39

## 2019-08-05 RX ADMIN — Medication 1 MILLIGRAM(S): at 20:00

## 2019-08-05 RX ADMIN — DIVALPROEX SODIUM 500 MILLIGRAM(S): 500 TABLET, DELAYED RELEASE ORAL at 20:00

## 2019-08-05 RX ADMIN — TAMSULOSIN HYDROCHLORIDE 0.4 MILLIGRAM(S): 0.4 CAPSULE ORAL at 22:42

## 2019-08-05 RX ADMIN — GABAPENTIN 300 MILLIGRAM(S): 400 CAPSULE ORAL at 22:42

## 2019-08-05 RX ADMIN — GABAPENTIN 300 MILLIGRAM(S): 400 CAPSULE ORAL at 20:00

## 2019-08-05 NOTE — ED BEHAVIORAL HEALTH ASSESSMENT NOTE - CURRENT MEDICATION
Metoprolol 25mg bid, Gabapentin 100 mg tid, Cogentin 1 mg bid, Flomax 0.4 mg hs, perphenazine, 16 mg bid, Lexapro 10 mg hs, Lipitor 10 mg daily, Zyprexa 20 mg hs, Zyprexa 10 mg daily, ASA 81 mg daily, Creon 1200 units daily Metoprolol 25mg bid, Gabapentin 3 00 mg tid (increased during last hospitalization), Cogentin 1 mg bid,  depakote 500 mg BID, Flomax 0.4 mg hs, Lexapro 10 mg hs, Lipitor 10 mg daily, Zyprexa 20 mg hs,  ASA 81 mg daily, Creon 1200 units daily, Thiamine 100mg daily, MVI daily, Folic acid 1 mg dialy, Ventolin 90 mcg 2 puff Q 4hrs PRN SOB (confirmed with group home on 8/5/19)

## 2019-08-05 NOTE — ED BEHAVIORAL HEALTH ASSESSMENT NOTE - DIFFERENTIAL
Faxed refill request for Triamterene-HCTZ 37.5- 25 mg. Chart review shows pt last visit was 7/29/16. Will send in 1 month w/ 1 refill and attempt to reach pt to schedule /reinforce need for follow up.
r/o schizoaffective,

## 2019-08-05 NOTE — ED BEHAVIORAL HEALTH ASSESSMENT NOTE - DESCRIPTION
Patient was sitting in chair, exhibited bilateral tremor, he was calm.  He did not appear to be in any physical distress. ABEL see hpi, brother supportive, never , childless

## 2019-08-05 NOTE — ED BEHAVIORAL HEALTH ASSESSMENT NOTE - SUMMARY
Patient is a 60 y/o single male with PMh of HDL, dementia, BPH, elevated LFT's PPH of schizophrenia in current treatment with [facility], many previous inpatient psychiatric hospitalizations ( discharged from [facility] last week), no known suicide attempts, h/o of Cannabis and ETOH use, biba from adult home for suicidal ideation with a plan to overdose on Aspirin and alcohol.   Patient seen and evaluated, initially disclosed A/H telling him to hurt himself and then denied.  Patient feels he was discharged too early from hospital.   He did report feeling depressed with suicidal ideation intent and plan  to overdose on ASA and ETOH with plan to do so today if he returned home.  He did disclosed some paranoia about gangs after and appeared to have some thought blocking at times.   a Collateral supports hospitalizations  for safety and sx medication management.  Patient will be admitted on a voluntary status.

## 2019-08-05 NOTE — ED ADULT NURSE NOTE - PMH
Anxiety    BPH (benign prostatic hyperplasia)    GERD (gastroesophageal reflux disease)    HTN (hypertension)    Hyperlipidemia    Mood disorder    Paranoid schizophrenia    Psychosis Anxiety    BPH (benign prostatic hyperplasia)    Elevated liver enzymes    GERD (gastroesophageal reflux disease)    HTN (hypertension)    Hyperlipidemia    Mood disorder    Paranoid schizophrenia    Psychosis

## 2019-08-05 NOTE — ED BEHAVIORAL HEALTH ASSESSMENT NOTE - RISK ASSESSMENT
high risk, although patient has been recently discharged from [facility] he reports worsening suicidal ideation with intent and plan, possible AH (pt intially endorsed than denies), some paranoia and he is not able to engage in safety plan.

## 2019-08-05 NOTE — ED ADULT NURSE NOTE - NSSUSCREENINGQ5_ED_ALL_ED
Joe Grimaldo MD, FAASM, [facility]  Abby Tsang, MSN, RN, CNP     [address] [place] [address] SLEEP MEDICINE  [address] [place] [id number]  Dept: [phone]  Dept Fax: : Maira [phone] [facility] 18 [facility]  [address] [facility] [id number] [phone]    Subjective:     Patient ID: Minh Miranda is a 67 y.o. male. Chief Complaint   Patient presents with    Sleep Apnea       HPI:      Machine Present today:  Yes    Machine Modem/Download Info:  Compliance (hours/night): 6.5 hrs/night  Download AHI (/hour): 2.2 /HR     Average IPAP Pressure: 19.3 cmH2O  Average EPAP Pressure: 14.4 cmH2O         AUTO BIPAP - Settings (Gurdeep)  IPAP Max: 25 cmH2O  EPAP Min: 14 cmH2O  Pressure Support Min: 4  Pressure Support Max: 6             Comfort Settings  Humidity Level (0-8): 3  Heated Tubing (Yes/No): Yes  Flex/EPR (0-3): 3 PAP Mask  Mask Type: Full Face mask  Mask Model: Theodora View  Mask Size: M     [place] - Total score: 3    Follow-up :     Last Visit : May 2018      Patient reports the listed chronic Co-morbidities: Obesity, HTN   are well controlled and stable at this time. Subjective Health Changes: None     Over Night Oximetry: [] Yes  [] No  [x] NA   Using O2: [] Yes  [] No  [x] NA   Patient is compliant with the machine  [x] Yes  [] No   Feeling rested when using the machine   [x] Yes  [] No     Pressure is comfortable with inspiration and expiration  [x] Yes  [] No     Noticed changes in pressure   [] Yes  [] No  [x] NA   Mask is fitting well  [x] Yes  [] No   Noting Mask Air Leak  [] Yes  [x] No   Having painful Aerophagia  [] Yes  [x] No   Nocturia   0-2  per night.    Having  HA upon waking  [] Yes  [x] No   Dry mouth upon waking   Dry Nose  Dry Eyes  [x] Yes  [] No   Congestion upon waking   [] Yes  [x] No    Nose Bleeds  [] Yes  [x] No   Using Sleep Aides    [x] NA Understands how to change humidification and/or tubing temperature for comfort while at home  [x] Yes  [] No     Difficulties falling asleep  [] Yes  [x] No   Difficulties staying asleep  [] Yes  [x] No   Approximate time to bed  11pm-12:30am   Approximate wake time  5-7am   Taking Naps  no   If taking naps usual length    [x] NA   If taking naps using the machine  [] Yes  [] No  [x] NA   Drowsy when driving  [] Yes  [x] No     Does patient carry a DOT/CDL  [] Yes  [x] No     Does patient carry FAA/Pilots License   [] Yes  [x] No      Any concerns noted with the machine at this time  [] Yes  [x] No        Diagnosis Orders   1. Obstructive sleep apnea     2. Essential hypertension     3. Class 2 obesity with alveolar hypoventilation without serious comorbidity with body mass index (BMI) of 37.0 to 37.9 in [facility])         The chronic medical conditions listed are directly related to the primary diagnosis listed above. The management of the primary diagnosis affects the secondary diagnosis and vice versa. Review of Systems   Constitutional: Negative for appetite change, chills, fatigue and fever. HENT: Negative for congestion, nosebleeds, rhinorrhea and sinus pressure. Eyes: Negative for pain and redness. Respiratory: Negative for apnea, cough and shortness of breath. Cardiovascular: Negative for chest pain and palpitations. Gastrointestinal: Negative for abdominal distention and abdominal pain. Neurological: Negative for dizziness and headaches. Psychiatric/Behavioral: Negative for sleep disturbance.        Social History     Socioeconomic History    Marital status:      Spouse name: Not on file    Number of children: Not on file    Years of education: Not on file    Highest education level: Not on file   Occupational History    Not on file   Social Needs    Financial resource strain: Not on file    Food insecurity:     Worry: Not on file     Inability: Not on file   [place] Transportation needs:     Medical: Not on file     Non-medical: Not on file   Tobacco Use    Smoking status: Former Smoker     Packs/day: 1.00     Years: 25.00     Pack years: 25.00     Last attempt to quit: 1983     Years since quittin.0    Smokeless tobacco: Never Used   Substance and Sexual Activity    Alcohol use: Yes     Comment: rare    Drug use: No    Sexual activity: Not on file   Lifestyle    Physical activity:     Days per week: Not on file     Minutes per session: Not on file    Stress: Not on file   Relationships    Social connections:     Talks on phone: Not on file     Gets together: Not on file     Attends Nondenominational service: Not on file     Active member of club or organization: Not on file     Attends meetings of clubs or organizations: Not on file     Relationship status: Not on file    Intimate partner violence:     Fear of current or ex partner: Not on file     Emotionally abused: Not on file     Physically abused: Not on file     Forced sexual activity: Not on file   Other Topics Concern    Not on file   Social History Narrative    Not on file       Prior to Admission medications    Medication Sig Start Date End Date Taking? Authorizing Provider   lisinopril-hydrochlorothiazide (PRINZIDE;ZESTORETIC) 20-12.5 MG per tablet Take 1 tablet by mouth daily 18  Yes Lydia Love MD   Multiple Vitamins-Minerals (THERAPEUTIC MULTIVITAMIN-MINERALS) tablet Take 1 tablet by mouth daily. Yes Historical Provider, MD   aspirin 81 MG EC tablet Take 81 mg by mouth daily.      Yes Historical Provider, MD       Allergies as of 2019 - Review Complete 2019   Allergen Reaction Noted    Codeine  2016       Patient Active Problem List   Diagnosis    Hypertension    Osteoarthritis of left knee    Obstructive sleep apnea    Class 2 obesity with alveolar hypoventilation without serious comorbidity with body mass index (BMI) of 37.0 to 37.9 in adult [facility])       Past Medical History:   Diagnosis Date    Abdominal pain, RUQ     Cervical strain     Gastrointestinal bleed     History of shingles     dx Robles Lyons CNP    Hypertension     Obstructive apnea     Obstructive sleep apnea (adult) (pediatric)     Screening PSA (prostate specific antigen) 10/22/2010    Stool blood 9/18/2009       Past Surgical History:   Procedure Laterality Date    HERNIA REPAIR      KNEE SURGERY  06/2016    TOOTH EXTRACTION  10/25/2016       Family History   Problem Relation Age of Onset    Heart Disease Mother     Cancer Father        Vitals:  Weight BMI   Wt Readings from Last 3 Encounters:   05/20/19 259 lb (117.5 kg)   12/27/18 263 lb (119.3 kg)   06/25/18 267 lb (121.1 kg)    Body mass index is 37.7 kg/m². BP HR SaO2   BP Readings from Last 3 Encounters:   05/20/19 118/70   12/27/18 122/60   06/25/18 130/68    Pulse Readings from Last 3 Encounters:   05/20/19 61   12/27/18 72   06/25/18 72    SpO2 Readings from Last 3 Encounters:   05/20/19 98%   05/14/18 95%   05/15/17 97%        Assessment/Plan:     Hypertension  Chronic- Stable. Cont meds per PCP and other physicians. Class 2 obesity with alveolar hypoventilation without serious comorbidity with body mass index (BMI) of 37.0 to 37.9 in adult ([facility])  Chronic-Stable. Encouraged him to work on weight loss through diet and exercise. Obstructive sleep apnea  Reviewed compliance download with pt. Supplies and parts as needed for his machine. These are medically necessary. Continue medications per his PCP and other physicians. Limit caffeine use after 3pm.  Encouraged him to work on weight loss through diet and exercise. Diagnoses of Essential hypertension and Class 2 obesity with alveolar hypoventilation without serious comorbidity with body mass index (BMI) of 37.0 to 37.9 in adult [facility]) were pertinent to this visit. The chronic medical conditions listed are directly related to the primary diagnosis listed above.   The management of the primary diagnosis affects the secondary diagnosis and vice versa. The primary encounter diagnosis was Obstructive sleep apnea. Diagnoses of Essential hypertension and Class 2 obesity with alveolar hypoventilation without serious comorbidity with body mass index (BMI) of 37.0 to 37.9 in [facility]) were also pertinent to this visit. The chronic medical conditions listed are directly related to the primary diagnosis listed above. The management of the primary diagnosis affects the secondary diagnosis and vice versa. - Educated patient and reviewed compliance download with pt.    -Supplies and parts as needed for his machine, these are medically necessary.    - Patient using [employer] for supplies  -Continue medications per his PCP and other physicians.   -Limit caffeine use after 3pm.    -Encouraged him to work on weight loss through diet and exercise. -F/U: 12 month. No orders of the defined types were placed in this encounter. No orders of the defined types were placed in this encounter. No orders of the defined types were placed in this encounter.       Ingrid Manriquez, MSN, RN, CNP No

## 2019-08-05 NOTE — ED ADULT TRIAGE NOTE - CHIEF COMPLAINT QUOTE
Pt brought in by ambulance from [facility] home for eval of auditory hallucinations and suicidal ideations. Pt states that the voices are telling him to harm himself and he has a plan to take pills and alcohol. Pt sates that he had 1 beer this am before 9am. Pt currently calm and cooperative.

## 2019-08-05 NOTE — ED PROVIDER NOTE - PHYSICAL EXAMINATION
Gen: Well appearing in NAD  Head: NC/AT  Neck: trachea midline  CV: RRR  Resp:  No distress, CTAB  Ext: no deformities  Neuro:  A&O appears non focal  Skin:  Warm and dry as visualized  Psych:  +SI

## 2019-08-05 NOTE — ED PROVIDER NOTE - OBJECTIVE STATEMENT
59M h/o schizophrenia, anxiety, HTN presents from group home for suicidal ideations with plan to "take alcohol and aspirin". Pt drank one can of beer this morning. Was recently in [facility] for SI but believes he was discharged too early. Denies drug use, CP, SOB, nausea, vomiting, hallucinations.

## 2019-08-05 NOTE — ED BEHAVIORAL HEALTH NOTE - BEHAVIORAL HEALTH NOTE
SERENA Note: Plan is to transfer pt for inpt psychiatric tx. Called [facility], spoke with Parul [phone]. Pt has no inpt medicare days left. Current ins for inpt tx is straight medicaid. Pt can not be referred to [facility] or [facility]. Left [facility] after hour referral form for Dr. Littlejohn to complete. Also still waiting medical clearance, EKG pending.  RN aware EKG needed. SW to follow

## 2019-08-05 NOTE — ED BEHAVIORAL HEALTH ASSESSMENT NOTE - DETAILS
discharged from [facility] last week. n/a accepting facility will receive handoff attending unavailable see hpi tremor treated with cogentin

## 2019-08-05 NOTE — ED ADULT NURSE REASSESSMENT NOTE - NS ED NURSE REASSESS COMMENT FT1
pt received awake alert and oriented x3 resting on stretcher. pt c/o depression with suicidal ideations, pt has a blunted affect and is calm and cooperative, pt contracts for safety and was made aware of the plan of care to transfer for inpatient tx, pt has been accepted by [facility] [place].

## 2019-08-05 NOTE — ED ADULT NURSE NOTE - CHIEF COMPLAINT QUOTE
Pt brought in by ambulance from [facility] home for eval of auditory hallucinations and suicidal ideations. Pt states that the voices are telling him to harm himself and he has a plan to take pills and alcohol. Pt sates that he had 1 beer this am before 9am. Pt currently calm and cooperative.

## 2019-08-05 NOTE — ED BEHAVIORAL HEALTH NOTE - BEHAVIORAL HEALTH NOTE
SW NOTE: Pt accepted to [facility] by JD Oliver. Pt accepted to [facility] unit. SW will arrange transport. Pt will not require auth. SW will let RN know to complete report to [facility] unit.

## 2019-08-05 NOTE — ED ADULT NURSE NOTE - OBJECTIVE STATEMENT
pt reports he has been depressed for a few months. reports he was recently hospitalized for this issue and feels he was discharged to soon. 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 pt reports his plan is to overdose with aspirin and alcohol. pt reports hx of 2 suicide attempts in past, reports he overdose on similar drugs. a and o x3. calm and cooperative. pt does not make eye contact when he speaks. breathing even and unlabored. pt reports he lives in group home. reports one beer today, denies drug use. pt reports he regularly drinks alcohol. denies a/v/t hallucinations. will continue to monitor.

## 2019-08-06 PROBLEM — F20.0 PARANOID SCHIZOPHRENIA: Chronic | Status: ACTIVE | Noted: 2019-08-05

## 2019-08-06 LAB
CHOLEST SERPL-MCNC: 119 MG/DL — LOW (ref 120–199)
HBA1C BLD-MCNC: 5.8 % — HIGH (ref 4–5.6)
HDLC SERPL-MCNC: 35 MG/DL — SIGNIFICANT CHANGE UP (ref 35–55)
LIPID PNL WITH DIRECT LDL SERPL: 59 MG/DL — SIGNIFICANT CHANGE UP
TRIGL SERPL-MCNC: 148 MG/DL — SIGNIFICANT CHANGE UP (ref 10–149)
TSH SERPL-MCNC: 2.17 UIU/ML — SIGNIFICANT CHANGE UP (ref 0.27–4.2)

## 2019-08-06 PROCEDURE — 99232 SBSQ HOSP IP/OBS MODERATE 35: CPT

## 2019-08-06 RX ORDER — LANOLIN ALCOHOL/MO/W.PET/CERES
3 CREAM (GRAM) TOPICAL ONCE
Refills: 0 | Status: COMPLETED | OUTPATIENT
Start: 2019-08-06 | End: 2019-08-06

## 2019-08-06 RX ORDER — ATORVASTATIN CALCIUM 80 MG/1
10 TABLET, FILM COATED ORAL AT BEDTIME
Refills: 0 | Status: DISCONTINUED | OUTPATIENT
Start: 2019-08-07 | End: 2019-08-29

## 2019-08-06 RX ORDER — ESCITALOPRAM OXALATE 10 MG/1
10 TABLET, FILM COATED ORAL DAILY
Refills: 0 | Status: DISCONTINUED | OUTPATIENT
Start: 2019-08-06 | End: 2019-08-07

## 2019-08-06 RX ADMIN — Medication 1 PATCH: at 08:58

## 2019-08-06 RX ADMIN — Medication 25 MILLIGRAM(S): at 20:56

## 2019-08-06 RX ADMIN — TAMSULOSIN HYDROCHLORIDE 0.4 MILLIGRAM(S): 0.4 CAPSULE ORAL at 20:55

## 2019-08-06 RX ADMIN — Medication 1 PATCH: at 20:38

## 2019-08-06 RX ADMIN — ATORVASTATIN CALCIUM 10 MILLIGRAM(S): 80 TABLET, FILM COATED ORAL at 08:58

## 2019-08-06 RX ADMIN — OLANZAPINE 20 MILLIGRAM(S): 15 TABLET, FILM COATED ORAL at 20:55

## 2019-08-06 RX ADMIN — DIVALPROEX SODIUM 500 MILLIGRAM(S): 500 TABLET, DELAYED RELEASE ORAL at 20:56

## 2019-08-06 RX ADMIN — Medication 100 MILLIGRAM(S): at 08:58

## 2019-08-06 RX ADMIN — Medication 1 MILLIGRAM(S): at 08:58

## 2019-08-06 RX ADMIN — Medication 81 MILLIGRAM(S): at 08:58

## 2019-08-06 RX ADMIN — Medication 25 MILLIGRAM(S): at 08:58

## 2019-08-06 RX ADMIN — ESCITALOPRAM OXALATE 10 MILLIGRAM(S): 10 TABLET, FILM COATED ORAL at 08:58

## 2019-08-06 RX ADMIN — Medication 1 MILLIGRAM(S): at 20:55

## 2019-08-06 RX ADMIN — Medication 1 CAPSULE(S): at 16:24

## 2019-08-06 RX ADMIN — GABAPENTIN 300 MILLIGRAM(S): 400 CAPSULE ORAL at 08:58

## 2019-08-06 RX ADMIN — GABAPENTIN 300 MILLIGRAM(S): 400 CAPSULE ORAL at 20:56

## 2019-08-06 RX ADMIN — Medication 1 TABLET(S): at 08:58

## 2019-08-06 RX ADMIN — DIVALPROEX SODIUM 500 MILLIGRAM(S): 500 TABLET, DELAYED RELEASE ORAL at 08:58

## 2019-08-06 RX ADMIN — GABAPENTIN 300 MILLIGRAM(S): 400 CAPSULE ORAL at 12:10

## 2019-08-06 RX ADMIN — Medication 1 APPLICATION(S): at 08:59

## 2019-08-06 RX ADMIN — Medication 1 CAPSULE(S): at 08:46

## 2019-08-06 NOTE — CHART NOTE - NSCHARTNOTEFT_GEN_A_CORE
Screening Medical Evaluation  Patient Admitted from: [facility] ED    [facility] admitting diagnosis:  Suicidal ideation    PAST MEDICAL & SURGICAL HISTORY:  Elevated liver enzymes  Paranoid schizophrenia  Hyperlipidemia  BPH (benign prostatic hyperplasia)  GERD (gastroesophageal reflux disease)  HTN (hypertension)  Psychosis  Mood disorder  Anxiety  No significant past surgical history        Allergies    haloperidol (Other)    Intolerances        Social History:     FAMILY HISTORY:      MEDICATIONS  (STANDING):  aspirin  chewable 81 milliGRAM(s) Oral daily  atorvastatin 10 milliGRAM(s) Oral daily  benztropine 1 milliGRAM(s) Oral two times a day  betamethasone valerate 0.1% Cream 1 Application(s) Topical two times a day  diVALproex  milliGRAM(s) Oral two times a day  escitalopram 10 milliGRAM(s) Oral at bedtime  folic acid 1 milliGRAM(s) Oral daily  gabapentin 300 milliGRAM(s) Oral three times a day  metoprolol tartrate 25 milliGRAM(s) Oral two times a day  multivitamin 1 Tablet(s) Oral daily  nicotine - 21 mG/24Hr(s) Patch 1 patch Transdermal daily  OLANZapine 20 milliGRAM(s) Oral at bedtime  pancrelipase  (CREON 12,000 Lipase Units) 1 Capsule(s) Oral two times a day with meals  tamsulosin 0.4 milliGRAM(s) Oral at bedtime  thiamine 100 milliGRAM(s) Oral daily    MEDICATIONS  (PRN):  ALBUTerol    90 MICROgram(s) HFA Inhaler 2 Puff(s) Inhalation every 4 hours PRN shortness of breath/wheezing  LORazepam     Tablet 2 milliGRAM(s) Oral every 2 hours PRN CIWA score increase by 2 points and current CIWA score GREATER THAN 9  LORazepam   Injectable 2 milliGRAM(s) IntraMuscular every 2 hours PRN CIWA score increase by 2 points and current CIWA score GREATER THAN 9  nicotine  Polacrilex Gum 4 milliGRAM(s) Oral every 2 hours PRN nicotine replacement  OLANZapine 5 milliGRAM(s) Oral every 6 hours PRN agitaiton  OLANZapine Injectable 5 milliGRAM(s) IntraMuscular every 6 hours PRN agitation      Vital Signs Last 24 Hrs  T(C): 36.5 (05 Aug 2019 21:15), Max: 37 (05 Aug 2019 19:12)  T(F): 97.7 (05 Aug 2019 21:15), Max: 98.6 (05 Aug 2019 19:12)  HR: 84 (05 Aug 2019 19:12) (56 - 86)  BP: 152/89 (05 Aug 2019 19:12) (152/89 - 160/94)  BP(mean): --  RR: 18 (05 Aug 2019 21:15) (18 - 20)  SpO2: 98% (05 Aug 2019 21:15) (98% - 100%)  CAPILLARY BLOOD GLUCOSE            PHYSICAL EXAM:  GENERAL: NAD, well-developed  HEAD:  Atraumatic, Normocephalic  EYES: EOMI, PERRLA, conjunctiva and sclera clear  NECK: Supple, No JVD  CHEST/LUNG: Clear to auscultation bilaterally; No wheeze  HEART: Regular rate and rhythm; No murmurs, rubs, or gallops  ABDOMEN: Soft, Nontender, Nondistended; Bowel sounds present  EXTREMITIES:  2+ Peripheral Pulses, No cyanosis, or edema  PSYCH: AAOx3  NEUROLOGY: non-focal  SKIN: see hpi.    LABS:                        12.7   5.08  )-----------( 150      ( 05 Aug 2019 12:10 )             37.6     08-05    136  |  101  |  7.0<L>  ----------------------------<  66<L>  4.2   |  25.0  |  1.02    Ca    8.9      05 Aug 2019 12:10    TPro  6.4<L>  /  Alb  4.3  /  TBili  0.3<L>  /  DBili  x   /  AST  23  /  ALT  26  /  AlkPhos  60  08-05          Urinalysis Basic - ( 05 Aug 2019 12:36 )    Color: Yellow / Appearance: Clear / S.010 / pH: x  Gluc: x / Ketone: Negative  / Bili: Negative / Urobili: Negative mg/dL   Blood: x / Protein: Negative mg/dL / Nitrite: Negative   Leuk Esterase: Negative / RBC: x / WBC x   Sq Epi: x / Non Sq Epi: x / Bacteria: x        RADIOLOGY & ADDITIONAL TESTS:    Assessment and Plan:  59 year old male presenting today from [facility] ED to [facility] with admitting diagnosis of Suicidal ideation with PMH of asthma, HTN, HLD, pancreatic insufficiency, and BPH. Pt noted to have dry, scaly plaques consistent with psoriasis on upper back and chest. Denies any fever, chills, headache, chest pain, SOB, abdominal pain, N/V/D/C, dysuria.  1) Psoriasis: Continue Valisone 0.1% Cream BID.  2) HLD: Continue Lipitor 10mg oral daily.  3) HTN: Continue Lopressor 25mg BID.  4) BPH: Continue Flomax 0.4mg oral at bedtime.  5) Pancreatic insufficiency: Continue Creon 12,000 Lipase units oral BID.  6) Suicidal ideation: Follow care plan as per primary psych team.

## 2019-08-07 PROCEDURE — 99232 SBSQ HOSP IP/OBS MODERATE 35: CPT

## 2019-08-07 RX ORDER — ESCITALOPRAM OXALATE 10 MG/1
20 TABLET, FILM COATED ORAL DAILY
Refills: 0 | Status: DISCONTINUED | OUTPATIENT
Start: 2019-08-07 | End: 2019-08-29

## 2019-08-07 RX ADMIN — Medication 25 MILLIGRAM(S): at 08:11

## 2019-08-07 RX ADMIN — Medication 1 PATCH: at 06:23

## 2019-08-07 RX ADMIN — Medication 1 PATCH: at 08:12

## 2019-08-07 RX ADMIN — GABAPENTIN 300 MILLIGRAM(S): 400 CAPSULE ORAL at 12:36

## 2019-08-07 RX ADMIN — Medication 25 MILLIGRAM(S): at 20:53

## 2019-08-07 RX ADMIN — Medication 1 PATCH: at 19:27

## 2019-08-07 RX ADMIN — ATORVASTATIN CALCIUM 10 MILLIGRAM(S): 80 TABLET, FILM COATED ORAL at 20:53

## 2019-08-07 RX ADMIN — Medication 1 CAPSULE(S): at 08:49

## 2019-08-07 RX ADMIN — Medication 1 PATCH: at 08:11

## 2019-08-07 RX ADMIN — Medication 100 MILLIGRAM(S): at 08:49

## 2019-08-07 RX ADMIN — OLANZAPINE 20 MILLIGRAM(S): 15 TABLET, FILM COATED ORAL at 20:53

## 2019-08-07 RX ADMIN — Medication 1 CAPSULE(S): at 16:38

## 2019-08-07 RX ADMIN — Medication 81 MILLIGRAM(S): at 08:11

## 2019-08-07 RX ADMIN — Medication 1 MILLIGRAM(S): at 08:11

## 2019-08-07 RX ADMIN — ESCITALOPRAM OXALATE 20 MILLIGRAM(S): 10 TABLET, FILM COATED ORAL at 08:51

## 2019-08-07 RX ADMIN — DIVALPROEX SODIUM 500 MILLIGRAM(S): 500 TABLET, DELAYED RELEASE ORAL at 08:11

## 2019-08-07 RX ADMIN — GABAPENTIN 300 MILLIGRAM(S): 400 CAPSULE ORAL at 20:25

## 2019-08-07 RX ADMIN — TAMSULOSIN HYDROCHLORIDE 0.4 MILLIGRAM(S): 0.4 CAPSULE ORAL at 20:54

## 2019-08-07 RX ADMIN — Medication 3 MILLIGRAM(S): at 00:09

## 2019-08-07 RX ADMIN — Medication 1 MILLIGRAM(S): at 20:53

## 2019-08-07 RX ADMIN — Medication 1 TABLET(S): at 08:11

## 2019-08-07 RX ADMIN — GABAPENTIN 300 MILLIGRAM(S): 400 CAPSULE ORAL at 08:11

## 2019-08-07 RX ADMIN — DIVALPROEX SODIUM 500 MILLIGRAM(S): 500 TABLET, DELAYED RELEASE ORAL at 20:53

## 2019-08-08 PROCEDURE — 99232 SBSQ HOSP IP/OBS MODERATE 35: CPT

## 2019-08-08 RX ORDER — OLANZAPINE 15 MG/1
25 TABLET, FILM COATED ORAL AT BEDTIME
Refills: 0 | Status: DISCONTINUED | OUTPATIENT
Start: 2019-08-08 | End: 2019-08-09

## 2019-08-08 RX ADMIN — GABAPENTIN 300 MILLIGRAM(S): 400 CAPSULE ORAL at 12:53

## 2019-08-08 RX ADMIN — Medication 1 PATCH: at 09:11

## 2019-08-08 RX ADMIN — Medication 1 CAPSULE(S): at 09:11

## 2019-08-08 RX ADMIN — ESCITALOPRAM OXALATE 20 MILLIGRAM(S): 10 TABLET, FILM COATED ORAL at 09:13

## 2019-08-08 RX ADMIN — Medication 1 MILLIGRAM(S): at 21:17

## 2019-08-08 RX ADMIN — Medication 1 CAPSULE(S): at 17:21

## 2019-08-08 RX ADMIN — Medication 1 PATCH: at 08:11

## 2019-08-08 RX ADMIN — DIVALPROEX SODIUM 500 MILLIGRAM(S): 500 TABLET, DELAYED RELEASE ORAL at 21:17

## 2019-08-08 RX ADMIN — Medication 100 MILLIGRAM(S): at 09:10

## 2019-08-08 RX ADMIN — Medication 1 TABLET(S): at 09:13

## 2019-08-08 RX ADMIN — Medication 81 MILLIGRAM(S): at 09:13

## 2019-08-08 RX ADMIN — Medication 1 MILLIGRAM(S): at 09:13

## 2019-08-08 RX ADMIN — TAMSULOSIN HYDROCHLORIDE 0.4 MILLIGRAM(S): 0.4 CAPSULE ORAL at 21:17

## 2019-08-08 RX ADMIN — DIVALPROEX SODIUM 500 MILLIGRAM(S): 500 TABLET, DELAYED RELEASE ORAL at 09:13

## 2019-08-08 RX ADMIN — Medication 25 MILLIGRAM(S): at 09:13

## 2019-08-08 RX ADMIN — GABAPENTIN 300 MILLIGRAM(S): 400 CAPSULE ORAL at 09:13

## 2019-08-08 RX ADMIN — ATORVASTATIN CALCIUM 10 MILLIGRAM(S): 80 TABLET, FILM COATED ORAL at 21:17

## 2019-08-08 RX ADMIN — OLANZAPINE 25 MILLIGRAM(S): 15 TABLET, FILM COATED ORAL at 21:17

## 2019-08-08 RX ADMIN — Medication 1 PATCH: at 20:35

## 2019-08-08 RX ADMIN — GABAPENTIN 300 MILLIGRAM(S): 400 CAPSULE ORAL at 21:17

## 2019-08-08 RX ADMIN — Medication 25 MILLIGRAM(S): at 21:17

## 2019-08-08 RX ADMIN — Medication 1 PATCH: at 07:28

## 2019-08-09 PROCEDURE — 99232 SBSQ HOSP IP/OBS MODERATE 35: CPT

## 2019-08-09 RX ORDER — OLANZAPINE 15 MG/1
5 TABLET, FILM COATED ORAL AT BEDTIME
Refills: 0 | Status: DISCONTINUED | OUTPATIENT
Start: 2019-08-09 | End: 2019-08-12

## 2019-08-09 RX ADMIN — Medication 1 MILLIGRAM(S): at 09:49

## 2019-08-09 RX ADMIN — GABAPENTIN 300 MILLIGRAM(S): 400 CAPSULE ORAL at 12:55

## 2019-08-09 RX ADMIN — Medication 81 MILLIGRAM(S): at 09:49

## 2019-08-09 RX ADMIN — DIVALPROEX SODIUM 500 MILLIGRAM(S): 500 TABLET, DELAYED RELEASE ORAL at 09:49

## 2019-08-09 RX ADMIN — DIVALPROEX SODIUM 500 MILLIGRAM(S): 500 TABLET, DELAYED RELEASE ORAL at 20:35

## 2019-08-09 RX ADMIN — Medication 100 MILLIGRAM(S): at 09:51

## 2019-08-09 RX ADMIN — Medication 1 PATCH: at 20:54

## 2019-08-09 RX ADMIN — Medication 1 CAPSULE(S): at 17:34

## 2019-08-09 RX ADMIN — ESCITALOPRAM OXALATE 20 MILLIGRAM(S): 10 TABLET, FILM COATED ORAL at 09:49

## 2019-08-09 RX ADMIN — TAMSULOSIN HYDROCHLORIDE 0.4 MILLIGRAM(S): 0.4 CAPSULE ORAL at 20:35

## 2019-08-09 RX ADMIN — Medication 1 TABLET(S): at 09:50

## 2019-08-09 RX ADMIN — GABAPENTIN 300 MILLIGRAM(S): 400 CAPSULE ORAL at 19:37

## 2019-08-09 RX ADMIN — Medication 25 MILLIGRAM(S): at 09:49

## 2019-08-09 RX ADMIN — Medication 1 PATCH: at 09:11

## 2019-08-09 RX ADMIN — Medication 25 MILLIGRAM(S): at 22:54

## 2019-08-09 RX ADMIN — Medication 1 CAPSULE(S): at 09:50

## 2019-08-09 RX ADMIN — Medication 1 PATCH: at 09:50

## 2019-08-09 RX ADMIN — OLANZAPINE 5 MILLIGRAM(S): 15 TABLET, FILM COATED ORAL at 20:35

## 2019-08-09 RX ADMIN — GABAPENTIN 300 MILLIGRAM(S): 400 CAPSULE ORAL at 09:49

## 2019-08-09 RX ADMIN — ATORVASTATIN CALCIUM 10 MILLIGRAM(S): 80 TABLET, FILM COATED ORAL at 20:35

## 2019-08-09 RX ADMIN — Medication 1 MILLIGRAM(S): at 20:35

## 2019-08-10 PROCEDURE — 99232 SBSQ HOSP IP/OBS MODERATE 35: CPT

## 2019-08-10 RX ADMIN — ESCITALOPRAM OXALATE 20 MILLIGRAM(S): 10 TABLET, FILM COATED ORAL at 08:48

## 2019-08-10 RX ADMIN — Medication 1 MILLIGRAM(S): at 20:34

## 2019-08-10 RX ADMIN — Medication 100 MILLIGRAM(S): at 08:49

## 2019-08-10 RX ADMIN — Medication 1 MILLIGRAM(S): at 08:48

## 2019-08-10 RX ADMIN — DIVALPROEX SODIUM 500 MILLIGRAM(S): 500 TABLET, DELAYED RELEASE ORAL at 08:48

## 2019-08-10 RX ADMIN — OLANZAPINE 5 MILLIGRAM(S): 15 TABLET, FILM COATED ORAL at 20:34

## 2019-08-10 RX ADMIN — DIVALPROEX SODIUM 500 MILLIGRAM(S): 500 TABLET, DELAYED RELEASE ORAL at 20:34

## 2019-08-10 RX ADMIN — GABAPENTIN 300 MILLIGRAM(S): 400 CAPSULE ORAL at 12:16

## 2019-08-10 RX ADMIN — ATORVASTATIN CALCIUM 10 MILLIGRAM(S): 80 TABLET, FILM COATED ORAL at 20:34

## 2019-08-10 RX ADMIN — Medication 1 PATCH: at 08:49

## 2019-08-10 RX ADMIN — Medication 25 MILLIGRAM(S): at 20:34

## 2019-08-10 RX ADMIN — Medication 1 TABLET(S): at 08:49

## 2019-08-10 RX ADMIN — Medication 1 CAPSULE(S): at 17:06

## 2019-08-10 RX ADMIN — Medication 25 MILLIGRAM(S): at 08:49

## 2019-08-10 RX ADMIN — TAMSULOSIN HYDROCHLORIDE 0.4 MILLIGRAM(S): 0.4 CAPSULE ORAL at 20:34

## 2019-08-10 RX ADMIN — Medication 1 PATCH: at 21:26

## 2019-08-10 RX ADMIN — Medication 81 MILLIGRAM(S): at 08:48

## 2019-08-10 RX ADMIN — GABAPENTIN 300 MILLIGRAM(S): 400 CAPSULE ORAL at 08:48

## 2019-08-10 RX ADMIN — GABAPENTIN 300 MILLIGRAM(S): 400 CAPSULE ORAL at 20:34

## 2019-08-10 RX ADMIN — Medication 1 CAPSULE(S): at 08:49

## 2019-08-10 RX ADMIN — Medication 1 PATCH: at 08:28

## 2019-08-11 PROCEDURE — 99232 SBSQ HOSP IP/OBS MODERATE 35: CPT

## 2019-08-11 RX ADMIN — Medication 25 MILLIGRAM(S): at 20:29

## 2019-08-11 RX ADMIN — Medication 1 PATCH: at 09:16

## 2019-08-11 RX ADMIN — Medication 25 MILLIGRAM(S): at 09:15

## 2019-08-11 RX ADMIN — Medication 100 MILLIGRAM(S): at 09:15

## 2019-08-11 RX ADMIN — OLANZAPINE 5 MILLIGRAM(S): 15 TABLET, FILM COATED ORAL at 22:17

## 2019-08-11 RX ADMIN — GABAPENTIN 300 MILLIGRAM(S): 400 CAPSULE ORAL at 09:15

## 2019-08-11 RX ADMIN — Medication 1 PATCH: at 20:29

## 2019-08-11 RX ADMIN — ESCITALOPRAM OXALATE 20 MILLIGRAM(S): 10 TABLET, FILM COATED ORAL at 09:15

## 2019-08-11 RX ADMIN — GABAPENTIN 300 MILLIGRAM(S): 400 CAPSULE ORAL at 12:25

## 2019-08-11 RX ADMIN — Medication 1 CAPSULE(S): at 16:56

## 2019-08-11 RX ADMIN — Medication 1 MILLIGRAM(S): at 09:15

## 2019-08-11 RX ADMIN — TAMSULOSIN HYDROCHLORIDE 0.4 MILLIGRAM(S): 0.4 CAPSULE ORAL at 22:17

## 2019-08-11 RX ADMIN — DIVALPROEX SODIUM 500 MILLIGRAM(S): 500 TABLET, DELAYED RELEASE ORAL at 20:29

## 2019-08-11 RX ADMIN — Medication 1 TABLET(S): at 09:15

## 2019-08-11 RX ADMIN — Medication 81 MILLIGRAM(S): at 09:15

## 2019-08-11 RX ADMIN — ATORVASTATIN CALCIUM 10 MILLIGRAM(S): 80 TABLET, FILM COATED ORAL at 20:29

## 2019-08-11 RX ADMIN — GABAPENTIN 300 MILLIGRAM(S): 400 CAPSULE ORAL at 20:18

## 2019-08-11 RX ADMIN — DIVALPROEX SODIUM 500 MILLIGRAM(S): 500 TABLET, DELAYED RELEASE ORAL at 09:15

## 2019-08-11 RX ADMIN — Medication 1 MILLIGRAM(S): at 20:29

## 2019-08-11 RX ADMIN — Medication 1 CAPSULE(S): at 09:15

## 2019-08-12 LAB — VALPROATE SERPL-MCNC: 54.6 UG/ML — SIGNIFICANT CHANGE UP (ref 50–100)

## 2019-08-12 PROCEDURE — 99232 SBSQ HOSP IP/OBS MODERATE 35: CPT

## 2019-08-12 RX ORDER — OLANZAPINE 15 MG/1
10 TABLET, FILM COATED ORAL AT BEDTIME
Refills: 0 | Status: DISCONTINUED | OUTPATIENT
Start: 2019-08-12 | End: 2019-08-16

## 2019-08-12 RX ADMIN — DIVALPROEX SODIUM 500 MILLIGRAM(S): 500 TABLET, DELAYED RELEASE ORAL at 09:26

## 2019-08-12 RX ADMIN — Medication 1 CAPSULE(S): at 17:53

## 2019-08-12 RX ADMIN — Medication 81 MILLIGRAM(S): at 09:26

## 2019-08-12 RX ADMIN — Medication 1 MILLIGRAM(S): at 09:26

## 2019-08-12 RX ADMIN — Medication 1 PATCH: at 09:27

## 2019-08-12 RX ADMIN — Medication 100 MILLIGRAM(S): at 09:26

## 2019-08-12 RX ADMIN — Medication 25 MILLIGRAM(S): at 09:26

## 2019-08-12 RX ADMIN — ESCITALOPRAM OXALATE 20 MILLIGRAM(S): 10 TABLET, FILM COATED ORAL at 09:26

## 2019-08-12 RX ADMIN — Medication 1 PATCH: at 09:28

## 2019-08-12 RX ADMIN — Medication 1 MILLIGRAM(S): at 22:00

## 2019-08-12 RX ADMIN — ATORVASTATIN CALCIUM 10 MILLIGRAM(S): 80 TABLET, FILM COATED ORAL at 22:00

## 2019-08-12 RX ADMIN — Medication 25 MILLIGRAM(S): at 22:00

## 2019-08-12 RX ADMIN — Medication 1 CAPSULE(S): at 09:26

## 2019-08-12 RX ADMIN — GABAPENTIN 300 MILLIGRAM(S): 400 CAPSULE ORAL at 12:54

## 2019-08-12 RX ADMIN — Medication 1 TABLET(S): at 09:26

## 2019-08-12 RX ADMIN — GABAPENTIN 300 MILLIGRAM(S): 400 CAPSULE ORAL at 09:26

## 2019-08-12 RX ADMIN — GABAPENTIN 300 MILLIGRAM(S): 400 CAPSULE ORAL at 22:00

## 2019-08-12 RX ADMIN — DIVALPROEX SODIUM 500 MILLIGRAM(S): 500 TABLET, DELAYED RELEASE ORAL at 22:00

## 2019-08-12 RX ADMIN — OLANZAPINE 10 MILLIGRAM(S): 15 TABLET, FILM COATED ORAL at 21:59

## 2019-08-12 RX ADMIN — TAMSULOSIN HYDROCHLORIDE 0.4 MILLIGRAM(S): 0.4 CAPSULE ORAL at 21:59

## 2019-08-13 PROCEDURE — 99232 SBSQ HOSP IP/OBS MODERATE 35: CPT

## 2019-08-13 RX ADMIN — Medication 1 MILLIGRAM(S): at 08:52

## 2019-08-13 RX ADMIN — Medication 1 CAPSULE(S): at 08:52

## 2019-08-13 RX ADMIN — Medication 1 PATCH: at 09:01

## 2019-08-13 RX ADMIN — Medication 1 PATCH: at 20:45

## 2019-08-13 RX ADMIN — ESCITALOPRAM OXALATE 20 MILLIGRAM(S): 10 TABLET, FILM COATED ORAL at 08:52

## 2019-08-13 RX ADMIN — Medication 1 CAPSULE(S): at 17:10

## 2019-08-13 RX ADMIN — DIVALPROEX SODIUM 500 MILLIGRAM(S): 500 TABLET, DELAYED RELEASE ORAL at 08:52

## 2019-08-13 RX ADMIN — Medication 1 TABLET(S): at 08:52

## 2019-08-13 RX ADMIN — GABAPENTIN 300 MILLIGRAM(S): 400 CAPSULE ORAL at 08:52

## 2019-08-13 RX ADMIN — ATORVASTATIN CALCIUM 10 MILLIGRAM(S): 80 TABLET, FILM COATED ORAL at 22:08

## 2019-08-13 RX ADMIN — TAMSULOSIN HYDROCHLORIDE 0.4 MILLIGRAM(S): 0.4 CAPSULE ORAL at 20:45

## 2019-08-13 RX ADMIN — Medication 81 MILLIGRAM(S): at 08:52

## 2019-08-13 RX ADMIN — Medication 100 MILLIGRAM(S): at 08:53

## 2019-08-13 RX ADMIN — DIVALPROEX SODIUM 500 MILLIGRAM(S): 500 TABLET, DELAYED RELEASE ORAL at 22:08

## 2019-08-13 RX ADMIN — Medication 1 PATCH: at 09:00

## 2019-08-13 RX ADMIN — GABAPENTIN 300 MILLIGRAM(S): 400 CAPSULE ORAL at 19:55

## 2019-08-13 RX ADMIN — OLANZAPINE 10 MILLIGRAM(S): 15 TABLET, FILM COATED ORAL at 20:45

## 2019-08-13 RX ADMIN — GABAPENTIN 300 MILLIGRAM(S): 400 CAPSULE ORAL at 13:08

## 2019-08-13 RX ADMIN — Medication 1 MILLIGRAM(S): at 22:08

## 2019-08-13 RX ADMIN — Medication 1 PATCH: at 08:52

## 2019-08-13 RX ADMIN — Medication 25 MILLIGRAM(S): at 08:52

## 2019-08-13 RX ADMIN — Medication 25 MILLIGRAM(S): at 22:08

## 2019-08-14 PROCEDURE — 99232 SBSQ HOSP IP/OBS MODERATE 35: CPT

## 2019-08-14 PROCEDURE — 99223 1ST HOSP IP/OBS HIGH 75: CPT

## 2019-08-14 RX ORDER — GABAPENTIN 400 MG/1
400 CAPSULE ORAL THREE TIMES A DAY
Refills: 0 | Status: DISCONTINUED | OUTPATIENT
Start: 2019-08-14 | End: 2019-08-29

## 2019-08-14 RX ADMIN — Medication 1 PATCH: at 18:53

## 2019-08-14 RX ADMIN — OLANZAPINE 10 MILLIGRAM(S): 15 TABLET, FILM COATED ORAL at 20:46

## 2019-08-14 RX ADMIN — GABAPENTIN 400 MILLIGRAM(S): 400 CAPSULE ORAL at 20:46

## 2019-08-14 RX ADMIN — Medication 1 PATCH: at 07:47

## 2019-08-14 RX ADMIN — Medication 1 PATCH: at 09:48

## 2019-08-14 RX ADMIN — Medication 1 MILLIGRAM(S): at 09:47

## 2019-08-14 RX ADMIN — DIVALPROEX SODIUM 500 MILLIGRAM(S): 500 TABLET, DELAYED RELEASE ORAL at 20:46

## 2019-08-14 RX ADMIN — Medication 81 MILLIGRAM(S): at 09:47

## 2019-08-14 RX ADMIN — Medication 25 MILLIGRAM(S): at 20:46

## 2019-08-14 RX ADMIN — Medication 25 MILLIGRAM(S): at 09:47

## 2019-08-14 RX ADMIN — DIVALPROEX SODIUM 500 MILLIGRAM(S): 500 TABLET, DELAYED RELEASE ORAL at 09:47

## 2019-08-14 RX ADMIN — Medication 100 MILLIGRAM(S): at 09:48

## 2019-08-14 RX ADMIN — TAMSULOSIN HYDROCHLORIDE 0.4 MILLIGRAM(S): 0.4 CAPSULE ORAL at 20:46

## 2019-08-14 RX ADMIN — GABAPENTIN 400 MILLIGRAM(S): 400 CAPSULE ORAL at 13:15

## 2019-08-14 RX ADMIN — ATORVASTATIN CALCIUM 10 MILLIGRAM(S): 80 TABLET, FILM COATED ORAL at 20:46

## 2019-08-14 RX ADMIN — Medication 1 CAPSULE(S): at 17:50

## 2019-08-14 RX ADMIN — GABAPENTIN 400 MILLIGRAM(S): 400 CAPSULE ORAL at 09:47

## 2019-08-14 RX ADMIN — ESCITALOPRAM OXALATE 20 MILLIGRAM(S): 10 TABLET, FILM COATED ORAL at 09:47

## 2019-08-14 RX ADMIN — Medication 1 CAPSULE(S): at 09:48

## 2019-08-14 RX ADMIN — Medication 1 TABLET(S): at 09:47

## 2019-08-14 RX ADMIN — Medication 1 MILLIGRAM(S): at 20:46

## 2019-08-14 NOTE — CONSULT NOTE ADULT - SUBJECTIVE AND OBJECTIVE BOX
59 M with HTN, BPH currently at [facility] was asked to see for red face,. unclear from staff how long thi is going on for. pt states that he always had a flushed look on his face, is not sure what is the cause but he feels no diff than his usual. he denies swelling, difficulty breathing or swallowing is resting comfortably. he is allergic to haldol but hasn't received any and doesn't report any other allergy. denies any rash or chronic skin problems on other parts of his body, redness on face is not very itchy but is dry, skin doesn't come off.     Allergies: Haldol    PMHX: BPH, HTN  Social Hx: denies  FHX: nc as relates to this encounter    ROS:  No HA/DZ  No Vision changes   No CP, SOB  No N/V/D  No Edema  NO weakness, numbness    MEDICATIONS  (STANDING):  aspirin  chewable 81 milliGRAM(s) Oral daily  atorvastatin 10 milliGRAM(s) Oral at bedtime  benztropine 1 milliGRAM(s) Oral two times a day  diVALproex  milliGRAM(s) Oral two times a day  escitalopram 20 milliGRAM(s) Oral daily  folic acid 1 milliGRAM(s) Oral daily  gabapentin 400 milliGRAM(s) Oral three times a day  metoprolol tartrate 25 milliGRAM(s) Oral two times a day  multivitamin 1 Tablet(s) Oral daily  nicotine - 21 mG/24Hr(s) Patch 1 patch Transdermal daily  OLANZapine 10 milliGRAM(s) Oral at bedtime  pancrelipase  (CREON 12,000 Lipase Units) 1 Capsule(s) Oral two times a day with meals  tamsulosin 0.4 milliGRAM(s) Oral at bedtime  thiamine 100 milliGRAM(s) Oral daily    MEDICATIONS  (PRN):  ALBUTerol    90 MICROgram(s) HFA Inhaler 2 Puff(s) Inhalation every 4 hours PRN shortness of breath/wheezing  nicotine  Polacrilex Gum 4 milliGRAM(s) Oral every 2 hours PRN nicotine replacement  OLANZapine 5 milliGRAM(s) Oral every 6 hours PRN agitaiton  OLANZapine Injectable 5 milliGRAM(s) IntraMuscular every 6 hours PRN agitation      T(C): 37 (08-14-19 @ 08:07)  HR: --70  BP: --104/71  RR: 12 (08-13-19 @ 20:27)  SpO2: --  CAPILLARY BLOOD GLUCOSE        I&O's Summary      PHYSICAL EXAM:  GENERAL: NAD, well-developed, AOx3  HEAD:  Atraumatic, Normocephalic  EYES: EOMI, PERRL, conjunctiva and sclera clear  NECK: Supple, No JVD  CHEST/LUNG: Clear to auscultation bilaterally  HEART: Regular rate and rhythm; No murmurs, rubs, or gallops, No Edema  ABDOMEN: Soft, Nontender, Nondistended; Bowel sounds present  EXTREMITIES:  2+ Peripheral Pulses, No clubbing, cyanosis  PSYCH: No SI/HI  NEUROLOGY: non-focal  SKIN: macular rash nasal folds, cheek bones, forehead - eczema     LABS:            Cr 1.02            RADIOLOGY & ADDITIONAL TESTS:    Imaging Personally Reviewed:    Consultant(s) Notes Reviewed:      Care Discussed with Consultants/Other Providers: Psych - Yousuf

## 2019-08-14 NOTE — CONSULT NOTE ADULT - ASSESSMENT
59 M with BPH, HTN, with likely chronic facial eczema    1- rash - appears to be eczema and pt reports chronic - only new med started is Neurontin today - can have low incidence of facial rash / flushed skin. will defer to psych on continuing. consider steroid cream if pt desires so    2- BPH - asymptomatic, cw Flomax     3 - HTN - controlled     4- psych - per primary team - monitor QTC on psychotropics

## 2019-08-15 PROCEDURE — 99232 SBSQ HOSP IP/OBS MODERATE 35: CPT

## 2019-08-15 RX ADMIN — Medication 1 CAPSULE(S): at 08:32

## 2019-08-15 RX ADMIN — TAMSULOSIN HYDROCHLORIDE 0.4 MILLIGRAM(S): 0.4 CAPSULE ORAL at 20:42

## 2019-08-15 RX ADMIN — ESCITALOPRAM OXALATE 20 MILLIGRAM(S): 10 TABLET, FILM COATED ORAL at 08:33

## 2019-08-15 RX ADMIN — Medication 1 PATCH: at 20:42

## 2019-08-15 RX ADMIN — Medication 1 PATCH: at 08:33

## 2019-08-15 RX ADMIN — Medication 25 MILLIGRAM(S): at 08:33

## 2019-08-15 RX ADMIN — GABAPENTIN 400 MILLIGRAM(S): 400 CAPSULE ORAL at 08:33

## 2019-08-15 RX ADMIN — GABAPENTIN 400 MILLIGRAM(S): 400 CAPSULE ORAL at 20:42

## 2019-08-15 RX ADMIN — Medication 100 MILLIGRAM(S): at 08:32

## 2019-08-15 RX ADMIN — Medication 1 TABLET(S): at 08:33

## 2019-08-15 RX ADMIN — Medication 1 MILLIGRAM(S): at 08:33

## 2019-08-15 RX ADMIN — Medication 1 MILLIGRAM(S): at 20:41

## 2019-08-15 RX ADMIN — Medication 81 MILLIGRAM(S): at 08:33

## 2019-08-15 RX ADMIN — Medication 1 CAPSULE(S): at 17:02

## 2019-08-15 RX ADMIN — ATORVASTATIN CALCIUM 10 MILLIGRAM(S): 80 TABLET, FILM COATED ORAL at 20:41

## 2019-08-15 RX ADMIN — Medication 25 MILLIGRAM(S): at 20:42

## 2019-08-15 RX ADMIN — GABAPENTIN 400 MILLIGRAM(S): 400 CAPSULE ORAL at 13:21

## 2019-08-15 RX ADMIN — OLANZAPINE 10 MILLIGRAM(S): 15 TABLET, FILM COATED ORAL at 20:42

## 2019-08-15 RX ADMIN — Medication 1 PATCH: at 08:32

## 2019-08-15 RX ADMIN — DIVALPROEX SODIUM 500 MILLIGRAM(S): 500 TABLET, DELAYED RELEASE ORAL at 08:33

## 2019-08-15 RX ADMIN — DIVALPROEX SODIUM 500 MILLIGRAM(S): 500 TABLET, DELAYED RELEASE ORAL at 20:41

## 2019-08-16 PROCEDURE — 99232 SBSQ HOSP IP/OBS MODERATE 35: CPT

## 2019-08-16 RX ORDER — OLANZAPINE 15 MG/1
15 TABLET, FILM COATED ORAL AT BEDTIME
Refills: 0 | Status: DISCONTINUED | OUTPATIENT
Start: 2019-08-16 | End: 2019-08-19

## 2019-08-16 RX ADMIN — Medication 1 PATCH: at 09:57

## 2019-08-16 RX ADMIN — Medication 1 CAPSULE(S): at 17:12

## 2019-08-16 RX ADMIN — TAMSULOSIN HYDROCHLORIDE 0.4 MILLIGRAM(S): 0.4 CAPSULE ORAL at 20:41

## 2019-08-16 RX ADMIN — Medication 1 MILLIGRAM(S): at 20:41

## 2019-08-16 RX ADMIN — Medication 81 MILLIGRAM(S): at 09:59

## 2019-08-16 RX ADMIN — Medication 25 MILLIGRAM(S): at 09:57

## 2019-08-16 RX ADMIN — Medication 100 MILLIGRAM(S): at 09:59

## 2019-08-16 RX ADMIN — Medication 25 MILLIGRAM(S): at 20:41

## 2019-08-16 RX ADMIN — GABAPENTIN 400 MILLIGRAM(S): 400 CAPSULE ORAL at 14:08

## 2019-08-16 RX ADMIN — Medication 1 MILLIGRAM(S): at 09:59

## 2019-08-16 RX ADMIN — DIVALPROEX SODIUM 500 MILLIGRAM(S): 500 TABLET, DELAYED RELEASE ORAL at 20:41

## 2019-08-16 RX ADMIN — Medication 1 TABLET(S): at 09:57

## 2019-08-16 RX ADMIN — Medication 1 PATCH: at 09:59

## 2019-08-16 RX ADMIN — GABAPENTIN 400 MILLIGRAM(S): 400 CAPSULE ORAL at 20:35

## 2019-08-16 RX ADMIN — Medication 1 CAPSULE(S): at 09:53

## 2019-08-16 RX ADMIN — DIVALPROEX SODIUM 500 MILLIGRAM(S): 500 TABLET, DELAYED RELEASE ORAL at 09:59

## 2019-08-16 RX ADMIN — OLANZAPINE 15 MILLIGRAM(S): 15 TABLET, FILM COATED ORAL at 20:41

## 2019-08-16 RX ADMIN — ESCITALOPRAM OXALATE 20 MILLIGRAM(S): 10 TABLET, FILM COATED ORAL at 09:57

## 2019-08-16 RX ADMIN — Medication 1 MILLIGRAM(S): at 09:57

## 2019-08-16 RX ADMIN — GABAPENTIN 400 MILLIGRAM(S): 400 CAPSULE ORAL at 09:57

## 2019-08-16 RX ADMIN — Medication 1 PATCH: at 20:42

## 2019-08-16 RX ADMIN — ATORVASTATIN CALCIUM 10 MILLIGRAM(S): 80 TABLET, FILM COATED ORAL at 20:41

## 2019-08-17 RX ADMIN — GABAPENTIN 400 MILLIGRAM(S): 400 CAPSULE ORAL at 21:17

## 2019-08-17 RX ADMIN — DIVALPROEX SODIUM 500 MILLIGRAM(S): 500 TABLET, DELAYED RELEASE ORAL at 09:50

## 2019-08-17 RX ADMIN — Medication 81 MILLIGRAM(S): at 09:50

## 2019-08-17 RX ADMIN — Medication 1 PATCH: at 09:46

## 2019-08-17 RX ADMIN — ESCITALOPRAM OXALATE 20 MILLIGRAM(S): 10 TABLET, FILM COATED ORAL at 09:50

## 2019-08-17 RX ADMIN — Medication 25 MILLIGRAM(S): at 09:50

## 2019-08-17 RX ADMIN — OLANZAPINE 15 MILLIGRAM(S): 15 TABLET, FILM COATED ORAL at 21:17

## 2019-08-17 RX ADMIN — GABAPENTIN 400 MILLIGRAM(S): 400 CAPSULE ORAL at 09:50

## 2019-08-17 RX ADMIN — Medication 1 PATCH: at 09:49

## 2019-08-17 RX ADMIN — TAMSULOSIN HYDROCHLORIDE 0.4 MILLIGRAM(S): 0.4 CAPSULE ORAL at 21:17

## 2019-08-17 RX ADMIN — Medication 100 MILLIGRAM(S): at 09:49

## 2019-08-17 RX ADMIN — Medication 1 MILLIGRAM(S): at 09:50

## 2019-08-17 RX ADMIN — Medication 1 CAPSULE(S): at 09:49

## 2019-08-17 RX ADMIN — Medication 25 MILLIGRAM(S): at 21:17

## 2019-08-17 RX ADMIN — GABAPENTIN 400 MILLIGRAM(S): 400 CAPSULE ORAL at 12:52

## 2019-08-17 RX ADMIN — Medication 1 MILLIGRAM(S): at 21:17

## 2019-08-17 RX ADMIN — Medication 1 TABLET(S): at 09:50

## 2019-08-17 RX ADMIN — DIVALPROEX SODIUM 500 MILLIGRAM(S): 500 TABLET, DELAYED RELEASE ORAL at 21:17

## 2019-08-17 RX ADMIN — Medication 1 CAPSULE(S): at 17:22

## 2019-08-17 RX ADMIN — ATORVASTATIN CALCIUM 10 MILLIGRAM(S): 80 TABLET, FILM COATED ORAL at 21:17

## 2019-08-18 RX ADMIN — OLANZAPINE 15 MILLIGRAM(S): 15 TABLET, FILM COATED ORAL at 20:48

## 2019-08-18 RX ADMIN — Medication 1 MILLIGRAM(S): at 08:38

## 2019-08-18 RX ADMIN — GABAPENTIN 400 MILLIGRAM(S): 400 CAPSULE ORAL at 12:35

## 2019-08-18 RX ADMIN — Medication 1 CAPSULE(S): at 08:39

## 2019-08-18 RX ADMIN — GABAPENTIN 400 MILLIGRAM(S): 400 CAPSULE ORAL at 08:38

## 2019-08-18 RX ADMIN — Medication 1 PATCH: at 08:39

## 2019-08-18 RX ADMIN — Medication 81 MILLIGRAM(S): at 08:38

## 2019-08-18 RX ADMIN — ESCITALOPRAM OXALATE 20 MILLIGRAM(S): 10 TABLET, FILM COATED ORAL at 08:38

## 2019-08-18 RX ADMIN — Medication 1 PATCH: at 08:38

## 2019-08-18 RX ADMIN — DIVALPROEX SODIUM 500 MILLIGRAM(S): 500 TABLET, DELAYED RELEASE ORAL at 08:38

## 2019-08-18 RX ADMIN — Medication 1 TABLET(S): at 08:38

## 2019-08-18 RX ADMIN — Medication 1 CAPSULE(S): at 16:54

## 2019-08-18 RX ADMIN — ATORVASTATIN CALCIUM 10 MILLIGRAM(S): 80 TABLET, FILM COATED ORAL at 20:48

## 2019-08-18 RX ADMIN — Medication 100 MILLIGRAM(S): at 08:39

## 2019-08-18 RX ADMIN — Medication 1 PATCH: at 20:49

## 2019-08-18 RX ADMIN — Medication 25 MILLIGRAM(S): at 20:48

## 2019-08-18 RX ADMIN — GABAPENTIN 400 MILLIGRAM(S): 400 CAPSULE ORAL at 20:48

## 2019-08-18 RX ADMIN — TAMSULOSIN HYDROCHLORIDE 0.4 MILLIGRAM(S): 0.4 CAPSULE ORAL at 20:48

## 2019-08-18 RX ADMIN — Medication 1 MILLIGRAM(S): at 20:48

## 2019-08-18 RX ADMIN — DIVALPROEX SODIUM 500 MILLIGRAM(S): 500 TABLET, DELAYED RELEASE ORAL at 20:48

## 2019-08-19 PROCEDURE — 90853 GROUP PSYCHOTHERAPY: CPT

## 2019-08-19 RX ORDER — OLANZAPINE 15 MG/1
20 TABLET, FILM COATED ORAL AT BEDTIME
Refills: 0 | Status: DISCONTINUED | OUTPATIENT
Start: 2019-08-19 | End: 2019-08-29

## 2019-08-19 RX ADMIN — TAMSULOSIN HYDROCHLORIDE 0.4 MILLIGRAM(S): 0.4 CAPSULE ORAL at 20:33

## 2019-08-19 RX ADMIN — Medication 1 PATCH: at 08:17

## 2019-08-19 RX ADMIN — Medication 1 CAPSULE(S): at 16:40

## 2019-08-19 RX ADMIN — Medication 100 MILLIGRAM(S): at 08:17

## 2019-08-19 RX ADMIN — Medication 25 MILLIGRAM(S): at 08:16

## 2019-08-19 RX ADMIN — ESCITALOPRAM OXALATE 20 MILLIGRAM(S): 10 TABLET, FILM COATED ORAL at 08:16

## 2019-08-19 RX ADMIN — GABAPENTIN 400 MILLIGRAM(S): 400 CAPSULE ORAL at 20:33

## 2019-08-19 RX ADMIN — Medication 1 CAPSULE(S): at 08:17

## 2019-08-19 RX ADMIN — GABAPENTIN 400 MILLIGRAM(S): 400 CAPSULE ORAL at 08:16

## 2019-08-19 RX ADMIN — Medication 1 PATCH: at 08:16

## 2019-08-19 RX ADMIN — Medication 1 MILLIGRAM(S): at 08:16

## 2019-08-19 RX ADMIN — Medication 1 MILLIGRAM(S): at 20:33

## 2019-08-19 RX ADMIN — Medication 1 PATCH: at 22:17

## 2019-08-19 RX ADMIN — ATORVASTATIN CALCIUM 10 MILLIGRAM(S): 80 TABLET, FILM COATED ORAL at 20:33

## 2019-08-19 RX ADMIN — OLANZAPINE 20 MILLIGRAM(S): 15 TABLET, FILM COATED ORAL at 20:33

## 2019-08-19 RX ADMIN — Medication 25 MILLIGRAM(S): at 20:33

## 2019-08-19 RX ADMIN — DIVALPROEX SODIUM 500 MILLIGRAM(S): 500 TABLET, DELAYED RELEASE ORAL at 20:33

## 2019-08-19 RX ADMIN — DIVALPROEX SODIUM 500 MILLIGRAM(S): 500 TABLET, DELAYED RELEASE ORAL at 08:16

## 2019-08-19 RX ADMIN — Medication 81 MILLIGRAM(S): at 08:16

## 2019-08-19 RX ADMIN — Medication 1 TABLET(S): at 08:16

## 2019-08-19 RX ADMIN — GABAPENTIN 400 MILLIGRAM(S): 400 CAPSULE ORAL at 12:22

## 2019-08-20 PROCEDURE — 99232 SBSQ HOSP IP/OBS MODERATE 35: CPT

## 2019-08-20 RX ADMIN — Medication 1 PATCH: at 08:10

## 2019-08-20 RX ADMIN — Medication 25 MILLIGRAM(S): at 21:30

## 2019-08-20 RX ADMIN — Medication 1 PATCH: at 09:25

## 2019-08-20 RX ADMIN — Medication 1 TABLET(S): at 09:25

## 2019-08-20 RX ADMIN — GABAPENTIN 400 MILLIGRAM(S): 400 CAPSULE ORAL at 09:25

## 2019-08-20 RX ADMIN — Medication 1 MILLIGRAM(S): at 09:25

## 2019-08-20 RX ADMIN — ATORVASTATIN CALCIUM 10 MILLIGRAM(S): 80 TABLET, FILM COATED ORAL at 21:30

## 2019-08-20 RX ADMIN — Medication 81 MILLIGRAM(S): at 09:24

## 2019-08-20 RX ADMIN — ESCITALOPRAM OXALATE 20 MILLIGRAM(S): 10 TABLET, FILM COATED ORAL at 09:24

## 2019-08-20 RX ADMIN — Medication 1 PATCH: at 21:31

## 2019-08-20 RX ADMIN — Medication 1 CAPSULE(S): at 09:25

## 2019-08-20 RX ADMIN — Medication 1 MILLIGRAM(S): at 09:24

## 2019-08-20 RX ADMIN — TAMSULOSIN HYDROCHLORIDE 0.4 MILLIGRAM(S): 0.4 CAPSULE ORAL at 21:30

## 2019-08-20 RX ADMIN — GABAPENTIN 400 MILLIGRAM(S): 400 CAPSULE ORAL at 21:30

## 2019-08-20 RX ADMIN — Medication 100 MILLIGRAM(S): at 09:25

## 2019-08-20 RX ADMIN — Medication 1 CAPSULE(S): at 17:06

## 2019-08-20 RX ADMIN — Medication 25 MILLIGRAM(S): at 09:25

## 2019-08-20 RX ADMIN — GABAPENTIN 400 MILLIGRAM(S): 400 CAPSULE ORAL at 12:30

## 2019-08-20 RX ADMIN — DIVALPROEX SODIUM 500 MILLIGRAM(S): 500 TABLET, DELAYED RELEASE ORAL at 09:24

## 2019-08-20 RX ADMIN — Medication 1 MILLIGRAM(S): at 21:30

## 2019-08-20 RX ADMIN — DIVALPROEX SODIUM 500 MILLIGRAM(S): 500 TABLET, DELAYED RELEASE ORAL at 21:30

## 2019-08-20 RX ADMIN — OLANZAPINE 20 MILLIGRAM(S): 15 TABLET, FILM COATED ORAL at 21:30

## 2019-08-21 PROCEDURE — 99232 SBSQ HOSP IP/OBS MODERATE 35: CPT

## 2019-08-21 RX ADMIN — DIVALPROEX SODIUM 500 MILLIGRAM(S): 500 TABLET, DELAYED RELEASE ORAL at 22:01

## 2019-08-21 RX ADMIN — Medication 1 PATCH: at 05:31

## 2019-08-21 RX ADMIN — Medication 1 TABLET(S): at 09:53

## 2019-08-21 RX ADMIN — Medication 1 MILLIGRAM(S): at 09:53

## 2019-08-21 RX ADMIN — TAMSULOSIN HYDROCHLORIDE 0.4 MILLIGRAM(S): 0.4 CAPSULE ORAL at 22:01

## 2019-08-21 RX ADMIN — Medication 81 MILLIGRAM(S): at 09:53

## 2019-08-21 RX ADMIN — OLANZAPINE 20 MILLIGRAM(S): 15 TABLET, FILM COATED ORAL at 22:01

## 2019-08-21 RX ADMIN — Medication 1 CAPSULE(S): at 18:03

## 2019-08-21 RX ADMIN — Medication 25 MILLIGRAM(S): at 09:53

## 2019-08-21 RX ADMIN — Medication 1 MILLIGRAM(S): at 22:01

## 2019-08-21 RX ADMIN — Medication 1 PATCH: at 09:53

## 2019-08-21 RX ADMIN — DIVALPROEX SODIUM 500 MILLIGRAM(S): 500 TABLET, DELAYED RELEASE ORAL at 09:53

## 2019-08-21 RX ADMIN — ESCITALOPRAM OXALATE 20 MILLIGRAM(S): 10 TABLET, FILM COATED ORAL at 09:53

## 2019-08-21 RX ADMIN — Medication 100 MILLIGRAM(S): at 09:54

## 2019-08-21 RX ADMIN — GABAPENTIN 400 MILLIGRAM(S): 400 CAPSULE ORAL at 12:42

## 2019-08-21 RX ADMIN — ATORVASTATIN CALCIUM 10 MILLIGRAM(S): 80 TABLET, FILM COATED ORAL at 22:01

## 2019-08-21 RX ADMIN — GABAPENTIN 400 MILLIGRAM(S): 400 CAPSULE ORAL at 22:01

## 2019-08-21 RX ADMIN — GABAPENTIN 400 MILLIGRAM(S): 400 CAPSULE ORAL at 09:53

## 2019-08-21 RX ADMIN — Medication 1 CAPSULE(S): at 09:54

## 2019-08-21 RX ADMIN — Medication 1 PATCH: at 22:01

## 2019-08-21 RX ADMIN — Medication 25 MILLIGRAM(S): at 22:01

## 2019-08-22 PROCEDURE — 99232 SBSQ HOSP IP/OBS MODERATE 35: CPT

## 2019-08-22 RX ADMIN — Medication 1 CAPSULE(S): at 16:24

## 2019-08-22 RX ADMIN — TAMSULOSIN HYDROCHLORIDE 0.4 MILLIGRAM(S): 0.4 CAPSULE ORAL at 21:20

## 2019-08-22 RX ADMIN — Medication 1 MILLIGRAM(S): at 21:20

## 2019-08-22 RX ADMIN — GABAPENTIN 400 MILLIGRAM(S): 400 CAPSULE ORAL at 21:20

## 2019-08-22 RX ADMIN — Medication 81 MILLIGRAM(S): at 08:30

## 2019-08-22 RX ADMIN — ATORVASTATIN CALCIUM 10 MILLIGRAM(S): 80 TABLET, FILM COATED ORAL at 21:20

## 2019-08-22 RX ADMIN — Medication 1 PATCH: at 08:30

## 2019-08-22 RX ADMIN — Medication 25 MILLIGRAM(S): at 21:20

## 2019-08-22 RX ADMIN — Medication 1 MILLIGRAM(S): at 08:30

## 2019-08-22 RX ADMIN — Medication 1 PATCH: at 19:21

## 2019-08-22 RX ADMIN — DIVALPROEX SODIUM 500 MILLIGRAM(S): 500 TABLET, DELAYED RELEASE ORAL at 08:30

## 2019-08-22 RX ADMIN — Medication 25 MILLIGRAM(S): at 08:30

## 2019-08-22 RX ADMIN — DIVALPROEX SODIUM 500 MILLIGRAM(S): 500 TABLET, DELAYED RELEASE ORAL at 21:20

## 2019-08-22 RX ADMIN — GABAPENTIN 400 MILLIGRAM(S): 400 CAPSULE ORAL at 13:19

## 2019-08-22 RX ADMIN — GABAPENTIN 400 MILLIGRAM(S): 400 CAPSULE ORAL at 08:30

## 2019-08-22 RX ADMIN — OLANZAPINE 20 MILLIGRAM(S): 15 TABLET, FILM COATED ORAL at 21:20

## 2019-08-22 RX ADMIN — ESCITALOPRAM OXALATE 20 MILLIGRAM(S): 10 TABLET, FILM COATED ORAL at 08:30

## 2019-08-22 RX ADMIN — Medication 1 PATCH: at 08:31

## 2019-08-22 RX ADMIN — Medication 1 TABLET(S): at 08:30

## 2019-08-22 RX ADMIN — Medication 100 MILLIGRAM(S): at 08:31

## 2019-08-22 RX ADMIN — Medication 1 CAPSULE(S): at 08:31

## 2019-08-23 PROCEDURE — 99232 SBSQ HOSP IP/OBS MODERATE 35: CPT

## 2019-08-23 RX ADMIN — OLANZAPINE 20 MILLIGRAM(S): 15 TABLET, FILM COATED ORAL at 20:38

## 2019-08-23 RX ADMIN — Medication 25 MILLIGRAM(S): at 08:05

## 2019-08-23 RX ADMIN — Medication 1 PATCH: at 20:40

## 2019-08-23 RX ADMIN — DIVALPROEX SODIUM 500 MILLIGRAM(S): 500 TABLET, DELAYED RELEASE ORAL at 08:05

## 2019-08-23 RX ADMIN — Medication 25 MILLIGRAM(S): at 20:38

## 2019-08-23 RX ADMIN — Medication 1 PATCH: at 08:06

## 2019-08-23 RX ADMIN — Medication 1 MILLIGRAM(S): at 08:05

## 2019-08-23 RX ADMIN — Medication 81 MILLIGRAM(S): at 08:05

## 2019-08-23 RX ADMIN — GABAPENTIN 400 MILLIGRAM(S): 400 CAPSULE ORAL at 20:38

## 2019-08-23 RX ADMIN — DIVALPROEX SODIUM 500 MILLIGRAM(S): 500 TABLET, DELAYED RELEASE ORAL at 20:38

## 2019-08-23 RX ADMIN — Medication 100 MILLIGRAM(S): at 08:07

## 2019-08-23 RX ADMIN — Medication 1 MILLIGRAM(S): at 20:38

## 2019-08-23 RX ADMIN — TAMSULOSIN HYDROCHLORIDE 0.4 MILLIGRAM(S): 0.4 CAPSULE ORAL at 20:38

## 2019-08-23 RX ADMIN — GABAPENTIN 400 MILLIGRAM(S): 400 CAPSULE ORAL at 13:07

## 2019-08-23 RX ADMIN — GABAPENTIN 400 MILLIGRAM(S): 400 CAPSULE ORAL at 08:05

## 2019-08-23 RX ADMIN — Medication 1 CAPSULE(S): at 17:08

## 2019-08-23 RX ADMIN — ESCITALOPRAM OXALATE 20 MILLIGRAM(S): 10 TABLET, FILM COATED ORAL at 08:05

## 2019-08-23 RX ADMIN — Medication 1 CAPSULE(S): at 08:07

## 2019-08-23 RX ADMIN — ATORVASTATIN CALCIUM 10 MILLIGRAM(S): 80 TABLET, FILM COATED ORAL at 20:38

## 2019-08-23 RX ADMIN — Medication 1 PATCH: at 08:05

## 2019-08-23 RX ADMIN — Medication 1 TABLET(S): at 08:05

## 2019-08-24 RX ADMIN — Medication 25 MILLIGRAM(S): at 21:18

## 2019-08-24 RX ADMIN — Medication 1 PATCH: at 10:16

## 2019-08-24 RX ADMIN — DIVALPROEX SODIUM 500 MILLIGRAM(S): 500 TABLET, DELAYED RELEASE ORAL at 10:16

## 2019-08-24 RX ADMIN — Medication 1 CAPSULE(S): at 17:13

## 2019-08-24 RX ADMIN — DIVALPROEX SODIUM 500 MILLIGRAM(S): 500 TABLET, DELAYED RELEASE ORAL at 21:18

## 2019-08-24 RX ADMIN — Medication 1 MILLIGRAM(S): at 10:16

## 2019-08-24 RX ADMIN — GABAPENTIN 400 MILLIGRAM(S): 400 CAPSULE ORAL at 10:16

## 2019-08-24 RX ADMIN — OLANZAPINE 20 MILLIGRAM(S): 15 TABLET, FILM COATED ORAL at 21:18

## 2019-08-24 RX ADMIN — GABAPENTIN 400 MILLIGRAM(S): 400 CAPSULE ORAL at 21:18

## 2019-08-24 RX ADMIN — Medication 100 MILLIGRAM(S): at 10:17

## 2019-08-24 RX ADMIN — Medication 81 MILLIGRAM(S): at 10:16

## 2019-08-24 RX ADMIN — Medication 1 CAPSULE(S): at 10:17

## 2019-08-24 RX ADMIN — Medication 1 MILLIGRAM(S): at 21:18

## 2019-08-24 RX ADMIN — Medication 1 TABLET(S): at 10:16

## 2019-08-24 RX ADMIN — Medication 1 PATCH: at 21:18

## 2019-08-24 RX ADMIN — ESCITALOPRAM OXALATE 20 MILLIGRAM(S): 10 TABLET, FILM COATED ORAL at 10:16

## 2019-08-24 RX ADMIN — ATORVASTATIN CALCIUM 10 MILLIGRAM(S): 80 TABLET, FILM COATED ORAL at 21:17

## 2019-08-24 RX ADMIN — TAMSULOSIN HYDROCHLORIDE 0.4 MILLIGRAM(S): 0.4 CAPSULE ORAL at 21:18

## 2019-08-24 RX ADMIN — GABAPENTIN 400 MILLIGRAM(S): 400 CAPSULE ORAL at 12:08

## 2019-08-24 RX ADMIN — Medication 1 PATCH: at 10:17

## 2019-08-25 RX ADMIN — Medication 1 CAPSULE(S): at 08:59

## 2019-08-25 RX ADMIN — GABAPENTIN 400 MILLIGRAM(S): 400 CAPSULE ORAL at 09:00

## 2019-08-25 RX ADMIN — Medication 25 MILLIGRAM(S): at 09:00

## 2019-08-25 RX ADMIN — GABAPENTIN 400 MILLIGRAM(S): 400 CAPSULE ORAL at 12:21

## 2019-08-25 RX ADMIN — Medication 1 TABLET(S): at 09:00

## 2019-08-25 RX ADMIN — Medication 1 PATCH: at 08:59

## 2019-08-25 RX ADMIN — Medication 100 MILLIGRAM(S): at 08:59

## 2019-08-25 RX ADMIN — Medication 1 CAPSULE(S): at 17:15

## 2019-08-25 RX ADMIN — GABAPENTIN 400 MILLIGRAM(S): 400 CAPSULE ORAL at 21:40

## 2019-08-25 RX ADMIN — Medication 1 PATCH: at 06:07

## 2019-08-25 RX ADMIN — Medication 1 MILLIGRAM(S): at 09:00

## 2019-08-25 RX ADMIN — DIVALPROEX SODIUM 500 MILLIGRAM(S): 500 TABLET, DELAYED RELEASE ORAL at 09:00

## 2019-08-25 RX ADMIN — Medication 25 MILLIGRAM(S): at 21:40

## 2019-08-25 RX ADMIN — Medication 1 MILLIGRAM(S): at 21:40

## 2019-08-25 RX ADMIN — Medication 1 PATCH: at 10:19

## 2019-08-25 RX ADMIN — DIVALPROEX SODIUM 500 MILLIGRAM(S): 500 TABLET, DELAYED RELEASE ORAL at 21:40

## 2019-08-25 RX ADMIN — Medication 81 MILLIGRAM(S): at 09:00

## 2019-08-25 RX ADMIN — Medication 1 PATCH: at 21:27

## 2019-08-25 RX ADMIN — ATORVASTATIN CALCIUM 10 MILLIGRAM(S): 80 TABLET, FILM COATED ORAL at 21:40

## 2019-08-25 RX ADMIN — OLANZAPINE 20 MILLIGRAM(S): 15 TABLET, FILM COATED ORAL at 21:40

## 2019-08-25 RX ADMIN — ESCITALOPRAM OXALATE 20 MILLIGRAM(S): 10 TABLET, FILM COATED ORAL at 09:00

## 2019-08-25 RX ADMIN — TAMSULOSIN HYDROCHLORIDE 0.4 MILLIGRAM(S): 0.4 CAPSULE ORAL at 21:40

## 2019-08-26 PROCEDURE — 90853 GROUP PSYCHOTHERAPY: CPT

## 2019-08-26 PROCEDURE — 99232 SBSQ HOSP IP/OBS MODERATE 35: CPT

## 2019-08-26 RX ADMIN — Medication 1 PATCH: at 08:20

## 2019-08-26 RX ADMIN — Medication 1 TABLET(S): at 08:20

## 2019-08-26 RX ADMIN — OLANZAPINE 20 MILLIGRAM(S): 15 TABLET, FILM COATED ORAL at 20:51

## 2019-08-26 RX ADMIN — Medication 25 MILLIGRAM(S): at 20:51

## 2019-08-26 RX ADMIN — GABAPENTIN 400 MILLIGRAM(S): 400 CAPSULE ORAL at 12:30

## 2019-08-26 RX ADMIN — Medication 81 MILLIGRAM(S): at 08:20

## 2019-08-26 RX ADMIN — Medication 1 PATCH: at 19:03

## 2019-08-26 RX ADMIN — GABAPENTIN 400 MILLIGRAM(S): 400 CAPSULE ORAL at 08:20

## 2019-08-26 RX ADMIN — ATORVASTATIN CALCIUM 10 MILLIGRAM(S): 80 TABLET, FILM COATED ORAL at 20:51

## 2019-08-26 RX ADMIN — ESCITALOPRAM OXALATE 20 MILLIGRAM(S): 10 TABLET, FILM COATED ORAL at 08:20

## 2019-08-26 RX ADMIN — Medication 1 CAPSULE(S): at 08:21

## 2019-08-26 RX ADMIN — Medication 1 MILLIGRAM(S): at 08:20

## 2019-08-26 RX ADMIN — Medication 100 MILLIGRAM(S): at 10:00

## 2019-08-26 RX ADMIN — Medication 1 PATCH: at 08:21

## 2019-08-26 RX ADMIN — DIVALPROEX SODIUM 500 MILLIGRAM(S): 500 TABLET, DELAYED RELEASE ORAL at 08:20

## 2019-08-26 RX ADMIN — Medication 25 MILLIGRAM(S): at 08:20

## 2019-08-26 RX ADMIN — GABAPENTIN 400 MILLIGRAM(S): 400 CAPSULE ORAL at 20:51

## 2019-08-26 RX ADMIN — TAMSULOSIN HYDROCHLORIDE 0.4 MILLIGRAM(S): 0.4 CAPSULE ORAL at 20:51

## 2019-08-26 RX ADMIN — DIVALPROEX SODIUM 500 MILLIGRAM(S): 500 TABLET, DELAYED RELEASE ORAL at 20:51

## 2019-08-26 RX ADMIN — Medication 1 CAPSULE(S): at 17:59

## 2019-08-26 RX ADMIN — Medication 1 MILLIGRAM(S): at 20:51

## 2019-08-27 PROCEDURE — 99232 SBSQ HOSP IP/OBS MODERATE 35: CPT

## 2019-08-27 RX ADMIN — GABAPENTIN 400 MILLIGRAM(S): 400 CAPSULE ORAL at 08:30

## 2019-08-27 RX ADMIN — Medication 1 PATCH: at 19:03

## 2019-08-27 RX ADMIN — Medication 1 PATCH: at 08:31

## 2019-08-27 RX ADMIN — Medication 81 MILLIGRAM(S): at 08:30

## 2019-08-27 RX ADMIN — Medication 1 MILLIGRAM(S): at 21:03

## 2019-08-27 RX ADMIN — Medication 1 CAPSULE(S): at 08:31

## 2019-08-27 RX ADMIN — ESCITALOPRAM OXALATE 20 MILLIGRAM(S): 10 TABLET, FILM COATED ORAL at 08:30

## 2019-08-27 RX ADMIN — DIVALPROEX SODIUM 500 MILLIGRAM(S): 500 TABLET, DELAYED RELEASE ORAL at 08:30

## 2019-08-27 RX ADMIN — GABAPENTIN 400 MILLIGRAM(S): 400 CAPSULE ORAL at 21:03

## 2019-08-27 RX ADMIN — GABAPENTIN 400 MILLIGRAM(S): 400 CAPSULE ORAL at 12:23

## 2019-08-27 RX ADMIN — Medication 1 MILLIGRAM(S): at 08:30

## 2019-08-27 RX ADMIN — Medication 100 MILLIGRAM(S): at 08:31

## 2019-08-27 RX ADMIN — Medication 25 MILLIGRAM(S): at 21:03

## 2019-08-27 RX ADMIN — TAMSULOSIN HYDROCHLORIDE 0.4 MILLIGRAM(S): 0.4 CAPSULE ORAL at 21:03

## 2019-08-27 RX ADMIN — ATORVASTATIN CALCIUM 10 MILLIGRAM(S): 80 TABLET, FILM COATED ORAL at 21:02

## 2019-08-27 RX ADMIN — DIVALPROEX SODIUM 500 MILLIGRAM(S): 500 TABLET, DELAYED RELEASE ORAL at 21:03

## 2019-08-27 RX ADMIN — Medication 1 CAPSULE(S): at 16:34

## 2019-08-27 RX ADMIN — OLANZAPINE 20 MILLIGRAM(S): 15 TABLET, FILM COATED ORAL at 21:03

## 2019-08-27 RX ADMIN — Medication 25 MILLIGRAM(S): at 08:36

## 2019-08-27 RX ADMIN — Medication 1 TABLET(S): at 08:31

## 2019-08-28 PROCEDURE — 99232 SBSQ HOSP IP/OBS MODERATE 35: CPT

## 2019-08-28 PROCEDURE — 90853 GROUP PSYCHOTHERAPY: CPT

## 2019-08-28 RX ORDER — TAMSULOSIN HYDROCHLORIDE 0.4 MG/1
1 CAPSULE ORAL
Qty: 30 | Refills: 0
Start: 2019-08-28 | End: 2019-09-26

## 2019-08-28 RX ORDER — ESCITALOPRAM OXALATE 10 MG/1
1 TABLET, FILM COATED ORAL
Qty: 30 | Refills: 0
Start: 2019-08-28 | End: 2019-09-26

## 2019-08-28 RX ORDER — METOPROLOL TARTRATE 50 MG
1 TABLET ORAL
Qty: 0 | Refills: 0 | DISCHARGE

## 2019-08-28 RX ORDER — TAMSULOSIN HYDROCHLORIDE 0.4 MG/1
1 CAPSULE ORAL
Qty: 0 | Refills: 0 | DISCHARGE

## 2019-08-28 RX ORDER — ESCITALOPRAM OXALATE 10 MG/1
1 TABLET, FILM COATED ORAL
Qty: 0 | Refills: 0 | DISCHARGE

## 2019-08-28 RX ORDER — OLANZAPINE 15 MG/1
1 TABLET, FILM COATED ORAL
Qty: 30 | Refills: 0
Start: 2019-08-28 | End: 2019-09-26

## 2019-08-28 RX ORDER — FOLIC ACID 0.8 MG
1 TABLET ORAL
Qty: 30 | Refills: 0
Start: 2019-08-28 | End: 2019-09-26

## 2019-08-28 RX ORDER — THIAMINE MONONITRATE (VIT B1) 100 MG
1 TABLET ORAL
Qty: 0 | Refills: 0 | DISCHARGE

## 2019-08-28 RX ORDER — ASPIRIN/CALCIUM CARB/MAGNESIUM 324 MG
1 TABLET ORAL
Qty: 30 | Refills: 0
Start: 2019-08-28 | End: 2019-09-26

## 2019-08-28 RX ORDER — BENZTROPINE MESYLATE 1 MG
1 TABLET ORAL
Qty: 0 | Refills: 0 | DISCHARGE

## 2019-08-28 RX ORDER — LIPASE/PROTEASE/AMYLASE 16-48-48K
1 CAPSULE,DELAYED RELEASE (ENTERIC COATED) ORAL
Qty: 0 | Refills: 0 | DISCHARGE

## 2019-08-28 RX ORDER — LIPASE/PROTEASE/AMYLASE 16-48-48K
1 CAPSULE,DELAYED RELEASE (ENTERIC COATED) ORAL
Qty: 60 | Refills: 0
Start: 2019-08-28 | End: 2019-09-26

## 2019-08-28 RX ORDER — FOLIC ACID 0.8 MG
1 TABLET ORAL
Qty: 0 | Refills: 0 | DISCHARGE

## 2019-08-28 RX ORDER — ATORVASTATIN CALCIUM 80 MG/1
1 TABLET, FILM COATED ORAL
Qty: 30 | Refills: 0
Start: 2019-08-28 | End: 2019-09-26

## 2019-08-28 RX ORDER — GABAPENTIN 400 MG/1
3 CAPSULE ORAL
Qty: 0 | Refills: 0 | DISCHARGE

## 2019-08-28 RX ORDER — METOPROLOL TARTRATE 50 MG
1 TABLET ORAL
Qty: 60 | Refills: 0
Start: 2019-08-28 | End: 2019-09-26

## 2019-08-28 RX ORDER — BENZTROPINE MESYLATE 1 MG
1 TABLET ORAL
Qty: 60 | Refills: 0
Start: 2019-08-28 | End: 2019-09-26

## 2019-08-28 RX ORDER — DIVALPROEX SODIUM 500 MG/1
1 TABLET, DELAYED RELEASE ORAL
Qty: 60 | Refills: 0
Start: 2019-08-28 | End: 2019-09-26

## 2019-08-28 RX ORDER — OLANZAPINE 15 MG/1
1 TABLET, FILM COATED ORAL
Qty: 0 | Refills: 0 | DISCHARGE

## 2019-08-28 RX ORDER — DIVALPROEX SODIUM 500 MG/1
1 TABLET, DELAYED RELEASE ORAL
Qty: 0 | Refills: 0 | DISCHARGE

## 2019-08-28 RX ORDER — THIAMINE MONONITRATE (VIT B1) 100 MG
1 TABLET ORAL
Qty: 30 | Refills: 0
Start: 2019-08-28 | End: 2019-09-26

## 2019-08-28 RX ORDER — ALBUTEROL 90 UG/1
2 AEROSOL, METERED ORAL
Qty: 0 | Refills: 0 | DISCHARGE

## 2019-08-28 RX ORDER — GABAPENTIN 400 MG/1
1 CAPSULE ORAL
Qty: 90 | Refills: 0
Start: 2019-08-28 | End: 2019-09-26

## 2019-08-28 RX ADMIN — Medication 81 MILLIGRAM(S): at 08:50

## 2019-08-28 RX ADMIN — Medication 1 MILLIGRAM(S): at 20:58

## 2019-08-28 RX ADMIN — Medication 1 PATCH: at 09:16

## 2019-08-28 RX ADMIN — DIVALPROEX SODIUM 500 MILLIGRAM(S): 500 TABLET, DELAYED RELEASE ORAL at 08:50

## 2019-08-28 RX ADMIN — Medication 25 MILLIGRAM(S): at 20:58

## 2019-08-28 RX ADMIN — Medication 1 CAPSULE(S): at 08:51

## 2019-08-28 RX ADMIN — GABAPENTIN 400 MILLIGRAM(S): 400 CAPSULE ORAL at 20:58

## 2019-08-28 RX ADMIN — GABAPENTIN 400 MILLIGRAM(S): 400 CAPSULE ORAL at 08:50

## 2019-08-28 RX ADMIN — Medication 1 CAPSULE(S): at 16:08

## 2019-08-28 RX ADMIN — ATORVASTATIN CALCIUM 10 MILLIGRAM(S): 80 TABLET, FILM COATED ORAL at 20:58

## 2019-08-28 RX ADMIN — ESCITALOPRAM OXALATE 20 MILLIGRAM(S): 10 TABLET, FILM COATED ORAL at 08:50

## 2019-08-28 RX ADMIN — OLANZAPINE 20 MILLIGRAM(S): 15 TABLET, FILM COATED ORAL at 20:58

## 2019-08-28 RX ADMIN — DIVALPROEX SODIUM 500 MILLIGRAM(S): 500 TABLET, DELAYED RELEASE ORAL at 20:58

## 2019-08-28 RX ADMIN — Medication 1 MILLIGRAM(S): at 08:50

## 2019-08-28 RX ADMIN — Medication 1 PATCH: at 19:48

## 2019-08-28 RX ADMIN — TAMSULOSIN HYDROCHLORIDE 0.4 MILLIGRAM(S): 0.4 CAPSULE ORAL at 20:58

## 2019-08-28 RX ADMIN — Medication 25 MILLIGRAM(S): at 08:50

## 2019-08-28 RX ADMIN — Medication 100 MILLIGRAM(S): at 08:51

## 2019-08-28 RX ADMIN — GABAPENTIN 400 MILLIGRAM(S): 400 CAPSULE ORAL at 12:07

## 2019-08-28 RX ADMIN — Medication 1 TABLET(S): at 08:50

## 2019-08-29 VITALS — TEMPERATURE: 98 F

## 2019-08-29 PROCEDURE — 99238 HOSP IP/OBS DSCHRG MGMT 30/<: CPT

## 2019-08-29 RX ORDER — LANOLIN ALCOHOL/MO/W.PET/CERES
3 CREAM (GRAM) TOPICAL ONCE
Refills: 0 | Status: COMPLETED | OUTPATIENT
Start: 2019-08-29 | End: 2019-08-29

## 2019-08-29 RX ADMIN — Medication 1 MILLIGRAM(S): at 08:24

## 2019-08-29 RX ADMIN — Medication 1 PATCH: at 07:24

## 2019-08-29 RX ADMIN — Medication 100 MILLIGRAM(S): at 08:25

## 2019-08-29 RX ADMIN — Medication 25 MILLIGRAM(S): at 08:24

## 2019-08-29 RX ADMIN — Medication 1 PATCH: at 08:24

## 2019-08-29 RX ADMIN — DIVALPROEX SODIUM 500 MILLIGRAM(S): 500 TABLET, DELAYED RELEASE ORAL at 08:24

## 2019-08-29 RX ADMIN — Medication 3 MILLIGRAM(S): at 01:30

## 2019-08-29 RX ADMIN — ESCITALOPRAM OXALATE 20 MILLIGRAM(S): 10 TABLET, FILM COATED ORAL at 08:24

## 2019-08-29 RX ADMIN — Medication 81 MILLIGRAM(S): at 08:24

## 2019-08-29 RX ADMIN — GABAPENTIN 400 MILLIGRAM(S): 400 CAPSULE ORAL at 08:24

## 2019-08-29 RX ADMIN — Medication 1 TABLET(S): at 08:24

## 2019-08-29 RX ADMIN — Medication 1 CAPSULE(S): at 08:24

## 2019-09-10 ENCOUNTER — EMERGENCY (EMERGENCY)
Facility: HOSPITAL | Age: 59
LOS: 1 days | Discharge: OTHER INSTITUTION | End: 2019-09-10
Attending: EMERGENCY MEDICINE
Payer: MEDICARE

## 2019-09-10 VITALS
WEIGHT: 175.05 LBS | TEMPERATURE: 98 F | DIASTOLIC BLOOD PRESSURE: 85 MMHG | RESPIRATION RATE: 15 BRPM | OXYGEN SATURATION: 98 % | HEART RATE: 76 BPM | SYSTOLIC BLOOD PRESSURE: 170 MMHG | HEIGHT: 66 IN

## 2019-09-10 VITALS
DIASTOLIC BLOOD PRESSURE: 94 MMHG | RESPIRATION RATE: 18 BRPM | SYSTOLIC BLOOD PRESSURE: 172 MMHG | HEART RATE: 81 BPM | TEMPERATURE: 98 F

## 2019-09-10 PROBLEM — R74.8 ABNORMAL LEVELS OF OTHER SERUM ENZYMES: Chronic | Status: ACTIVE | Noted: 2019-08-05

## 2019-09-10 LAB
ALBUMIN SERPL ELPH-MCNC: 4.4 G/DL — SIGNIFICANT CHANGE UP (ref 3.3–5.2)
ALP SERPL-CCNC: 78 U/L — SIGNIFICANT CHANGE UP (ref 40–120)
ALT FLD-CCNC: 28 U/L — SIGNIFICANT CHANGE UP
AMPHET UR-MCNC: NEGATIVE — SIGNIFICANT CHANGE UP
ANION GAP SERPL CALC-SCNC: 15 MMOL/L — SIGNIFICANT CHANGE UP (ref 5–17)
APAP SERPL-MCNC: <7.5 UG/ML — LOW (ref 10–26)
APPEARANCE UR: CLEAR — SIGNIFICANT CHANGE UP
AST SERPL-CCNC: 23 U/L — SIGNIFICANT CHANGE UP
BARBITURATES UR SCN-MCNC: NEGATIVE — SIGNIFICANT CHANGE UP
BASOPHILS # BLD AUTO: 0.03 K/UL — SIGNIFICANT CHANGE UP (ref 0–0.2)
BASOPHILS NFR BLD AUTO: 0.5 % — SIGNIFICANT CHANGE UP (ref 0–2)
BENZODIAZ UR-MCNC: NEGATIVE — SIGNIFICANT CHANGE UP
BILIRUB SERPL-MCNC: 0.4 MG/DL — SIGNIFICANT CHANGE UP (ref 0.4–2)
BILIRUB UR-MCNC: NEGATIVE — SIGNIFICANT CHANGE UP
BUN SERPL-MCNC: 8 MG/DL — SIGNIFICANT CHANGE UP (ref 8–20)
CALCIUM SERPL-MCNC: 9.9 MG/DL — SIGNIFICANT CHANGE UP (ref 8.6–10.2)
CHLORIDE SERPL-SCNC: 102 MMOL/L — SIGNIFICANT CHANGE UP (ref 98–107)
CO2 SERPL-SCNC: 24 MMOL/L — SIGNIFICANT CHANGE UP (ref 22–29)
COCAINE METAB.OTHER UR-MCNC: NEGATIVE — SIGNIFICANT CHANGE UP
COLOR SPEC: YELLOW — SIGNIFICANT CHANGE UP
CREAT SERPL-MCNC: 1.14 MG/DL — SIGNIFICANT CHANGE UP (ref 0.5–1.3)
DIFF PNL FLD: NEGATIVE — SIGNIFICANT CHANGE UP
EOSINOPHIL # BLD AUTO: 0.06 K/UL — SIGNIFICANT CHANGE UP (ref 0–0.5)
EOSINOPHIL NFR BLD AUTO: 1 % — SIGNIFICANT CHANGE UP (ref 0–6)
ETHANOL SERPL-MCNC: <10 MG/DL — SIGNIFICANT CHANGE UP
GLUCOSE SERPL-MCNC: 143 MG/DL — HIGH (ref 70–115)
GLUCOSE UR QL: NEGATIVE MG/DL — SIGNIFICANT CHANGE UP
HCT VFR BLD CALC: 39.2 % — SIGNIFICANT CHANGE UP (ref 39–50)
HGB BLD-MCNC: 13.2 G/DL — SIGNIFICANT CHANGE UP (ref 13–17)
IMM GRANULOCYTES NFR BLD AUTO: 0.3 % — SIGNIFICANT CHANGE UP (ref 0–1.5)
KETONES UR-MCNC: NEGATIVE — SIGNIFICANT CHANGE UP
LEUKOCYTE ESTERASE UR-ACNC: NEGATIVE — SIGNIFICANT CHANGE UP
LYMPHOCYTES # BLD AUTO: 1.65 K/UL — SIGNIFICANT CHANGE UP (ref 1–3.3)
LYMPHOCYTES # BLD AUTO: 28.4 % — SIGNIFICANT CHANGE UP (ref 13–44)
MCHC RBC-ENTMCNC: 32 PG — SIGNIFICANT CHANGE UP (ref 27–34)
MCHC RBC-ENTMCNC: 33.7 GM/DL — SIGNIFICANT CHANGE UP (ref 32–36)
MCV RBC AUTO: 95.1 FL — SIGNIFICANT CHANGE UP (ref 80–100)
METHADONE UR-MCNC: NEGATIVE — SIGNIFICANT CHANGE UP
MONOCYTES # BLD AUTO: 0.62 K/UL — SIGNIFICANT CHANGE UP (ref 0–0.9)
MONOCYTES NFR BLD AUTO: 10.7 % — SIGNIFICANT CHANGE UP (ref 2–14)
NEUTROPHILS # BLD AUTO: 3.43 K/UL — SIGNIFICANT CHANGE UP (ref 1.8–7.4)
NEUTROPHILS NFR BLD AUTO: 59.1 % — SIGNIFICANT CHANGE UP (ref 43–77)
NITRITE UR-MCNC: NEGATIVE — SIGNIFICANT CHANGE UP
OPIATES UR-MCNC: NEGATIVE — SIGNIFICANT CHANGE UP
PCP SPEC-MCNC: SIGNIFICANT CHANGE UP
PCP UR-MCNC: NEGATIVE — SIGNIFICANT CHANGE UP
PH UR: 6 — SIGNIFICANT CHANGE UP (ref 5–8)
PLATELET # BLD AUTO: 204 K/UL — SIGNIFICANT CHANGE UP (ref 150–400)
POTASSIUM SERPL-MCNC: 4.6 MMOL/L — SIGNIFICANT CHANGE UP (ref 3.5–5.3)
POTASSIUM SERPL-SCNC: 4.6 MMOL/L — SIGNIFICANT CHANGE UP (ref 3.5–5.3)
PROT SERPL-MCNC: 7.2 G/DL — SIGNIFICANT CHANGE UP (ref 6.6–8.7)
PROT UR-MCNC: NEGATIVE MG/DL — SIGNIFICANT CHANGE UP
RBC # BLD: 4.12 M/UL — LOW (ref 4.2–5.8)
RBC # FLD: 12.6 % — SIGNIFICANT CHANGE UP (ref 10.3–14.5)
SALICYLATES SERPL-MCNC: <0.6 MG/DL — LOW (ref 10–20)
SODIUM SERPL-SCNC: 141 MMOL/L — SIGNIFICANT CHANGE UP (ref 135–145)
SP GR SPEC: 1 — LOW (ref 1.01–1.02)
THC UR QL: NEGATIVE — SIGNIFICANT CHANGE UP
UROBILINOGEN FLD QL: NEGATIVE MG/DL — SIGNIFICANT CHANGE UP
VALPROATE SERPL-MCNC: 83.2 UG/ML — SIGNIFICANT CHANGE UP (ref 50–100)
WBC # BLD: 5.81 K/UL — SIGNIFICANT CHANGE UP (ref 3.8–10.5)
WBC # FLD AUTO: 5.81 K/UL — SIGNIFICANT CHANGE UP (ref 3.8–10.5)

## 2019-09-10 PROCEDURE — 93010 ELECTROCARDIOGRAM REPORT: CPT

## 2019-09-10 PROCEDURE — 99285 EMERGENCY DEPT VISIT HI MDM: CPT

## 2019-09-10 PROCEDURE — 85027 COMPLETE CBC AUTOMATED: CPT

## 2019-09-10 PROCEDURE — 81003 URINALYSIS AUTO W/O SCOPE: CPT

## 2019-09-10 PROCEDURE — 80053 COMPREHEN METABOLIC PANEL: CPT

## 2019-09-10 PROCEDURE — 80164 ASSAY DIPROPYLACETIC ACD TOT: CPT

## 2019-09-10 PROCEDURE — 36415 COLL VENOUS BLD VENIPUNCTURE: CPT

## 2019-09-10 PROCEDURE — 93005 ELECTROCARDIOGRAM TRACING: CPT

## 2019-09-10 PROCEDURE — 80307 DRUG TEST PRSMV CHEM ANLYZR: CPT

## 2019-09-10 RX ORDER — LIPASE/PROTEASE/AMYLASE 16-48-48K
1 CAPSULE,DELAYED RELEASE (ENTERIC COATED) ORAL
Refills: 0 | Status: DISCONTINUED | OUTPATIENT
Start: 2019-09-10 | End: 2019-09-15

## 2019-09-10 RX ORDER — ATORVASTATIN CALCIUM 80 MG/1
20 TABLET, FILM COATED ORAL AT BEDTIME
Refills: 0 | Status: DISCONTINUED | OUTPATIENT
Start: 2019-09-10 | End: 2019-09-15

## 2019-09-10 RX ORDER — DIVALPROEX SODIUM 500 MG/1
500 TABLET, DELAYED RELEASE ORAL
Refills: 0 | Status: DISCONTINUED | OUTPATIENT
Start: 2019-09-10 | End: 2019-09-15

## 2019-09-10 RX ORDER — TAMSULOSIN HYDROCHLORIDE 0.4 MG/1
0.4 CAPSULE ORAL AT BEDTIME
Refills: 0 | Status: DISCONTINUED | OUTPATIENT
Start: 2019-09-10 | End: 2019-09-15

## 2019-09-10 RX ORDER — GABAPENTIN 400 MG/1
400 CAPSULE ORAL THREE TIMES A DAY
Refills: 0 | Status: DISCONTINUED | OUTPATIENT
Start: 2019-09-10 | End: 2019-09-15

## 2019-09-10 RX ORDER — OLANZAPINE 15 MG/1
10 TABLET, FILM COATED ORAL AT BEDTIME
Refills: 0 | Status: DISCONTINUED | OUTPATIENT
Start: 2019-09-10 | End: 2019-09-15

## 2019-09-10 RX ORDER — BENZTROPINE MESYLATE 1 MG
1 TABLET ORAL
Refills: 0 | Status: DISCONTINUED | OUTPATIENT
Start: 2019-09-10 | End: 2019-09-15

## 2019-09-10 RX ADMIN — Medication 1 CAPSULE(S): at 17:01

## 2019-09-10 RX ADMIN — DIVALPROEX SODIUM 500 MILLIGRAM(S): 500 TABLET, DELAYED RELEASE ORAL at 17:01

## 2019-09-10 RX ADMIN — Medication 1 MILLIGRAM(S): at 17:01

## 2019-09-10 RX ADMIN — ATORVASTATIN CALCIUM 20 MILLIGRAM(S): 80 TABLET, FILM COATED ORAL at 17:01

## 2019-09-10 RX ADMIN — TAMSULOSIN HYDROCHLORIDE 0.4 MILLIGRAM(S): 0.4 CAPSULE ORAL at 17:01

## 2019-09-10 RX ADMIN — GABAPENTIN 400 MILLIGRAM(S): 400 CAPSULE ORAL at 17:01

## 2019-09-10 NOTE — ED ADULT NURSE NOTE - NSIMPLEMENTINTERV_GEN_ALL_ED
Implemented All Universal Safety Interventions:  [place] to call system. Call bell, personal items and telephone within reach. Instruct patient to call for assistance. Room bathroom lighting operational. Non-slip footwear when patient is off stretcher. Physically safe environment: no spills, clutter or unnecessary equipment. Stretcher in lowest position, wheels locked, appropriate side rails in place.

## 2019-09-10 NOTE — ED BEHAVIORAL HEALTH ASSESSMENT NOTE - DETAILS
accepting facility will receive handoff NA unavailable discharged from [facility] last week n/a 20 years ago took pills & 7 years ago took bottle of aspirin tremor treated with cogentin mom had what was described as "nervous breakdown" tremors

## 2019-09-10 NOTE — ED ADULT NURSE NOTE - NSFALLRSKASSESASSIST_ED_ALL_ED
no
For information on Fall & Injury Prevention, visit [email or web address]

## 2019-09-10 NOTE — ED BEHAVIORAL HEALTH ASSESSMENT NOTE - HPI (INCLUDE ILLNESS QUALITY, SEVERITY, DURATION, TIMING, CONTEXT, MODIFYING FACTORS, ASSOCIATED SIGNS AND SYMPTOMS)
Patient seen after being brought by ambulance from [facility] for suicidal ideation. He states that after breakup with girlfriend and being discharged from [facility] a little over a week ago, he has not felt himself. Patient stated that he is thinking of "throwing himself in front of a car" and having difficulty coping since discharge. Past history or multiple psychiatric hospitalizations and two prior suicide attempts. Patient diagnosed with Paranoid Schizophrenia. Denies thoughts of harming others and current hallucinations. Patient asking to be admitted for inpatient treatment.

## 2019-09-10 NOTE — ED ADULT NURSE REASSESSMENT NOTE - CONDITION
unchanged/Pt in and out of room a few times this afternoon. Ate dinner, ambulated well to rest room. Makes eye contact with staff and is pleasant on approach.  Denies difficulties. Has  agreed to admit voluntarily to [facility] for treatment. Awaiting transport

## 2019-09-10 NOTE — ED BEHAVIORAL HEALTH ASSESSMENT NOTE - DIFFERENTIAL
C-SSRS Screener  1. Have you ever wished to be dead or wished you could go to sleep and not wake up?  [ x ]Yes, [  ]No, [  ]Unable to Assess  Details: wants to "jump in front of car"  2. Have you actually had any thoughts of killing yourself?   [x  ]Yes, [  ]No, [  ]Unable to Assess  Details: see above   If answer is “No” for 1 and 2, stop here. If answer is “Yes” to 1 or 2, proceed to 3.  3. Have you been thinking about how you might kill yourself?  [ x ]Yes, [  ]No, [  ]Unable to Assess  Details: jump in front of car   4. Have you had these thoughts and had some intention of acting on them?  [x  ]Yes, [  ]No, [  ]Unable to Assess  Details: actively thinking of suicide   5. Have you started to work out or worked out the details of how to kill yourself? Do you intend to carry out this plan?  [ x ]Yes, [  ]No, [  ]Unable to Assess  Details: currently suicidal    6. Have you ever done anything, started to do anything, or prepared to do anything to end your life? If so, was it in the past 3 months?  [ x ]Yes, [  ]No, [  ]Unable to Assess  Details: 2 prior suicide attempts  Additional Suicide Risk Factors (select all that apply)  [  ]Access to lethal means including firearms  [  ]Family history of suicide  [  ]Impulsivity  [  ] Current or past mood disorder  [ x ] Current or past psychotic disorder  [  ] Current or past PTSD  [  ] Current or past ADHD  [  ] Current or past TBI  [  ] Current or past cluster B personality disorder or traits  [  ] Current or past conduct problems  [  x] Recent onset of current or past psychiatric disorder  [  ] Family history of psychiatric diagnoses requiring hospitalization  Additional Activating Events (select all that apply)  [  ]Perceived burden on family or others  [  ]Current sexual or physical abuse  [  ]Substance intoxication or withdrawal  [  ]Inadequate social supports  [  ]Hopeless about or dissatisfied with current provider or treatment   Additional Protective Factors (select all that apply)  [  ] Future plans  [  ] Holiness beliefs  [  ] Beloved pets

## 2019-09-10 NOTE — ED ADULT TRIAGE NOTE - CHIEF COMPLAINT QUOTE
Pt BIBA from group home for hearing voices and suicidal ideation. Pt also c/o depression after his girlfriend broke up with him. Pt cooperative in triage.

## 2019-09-10 NOTE — ED ADULT NURSE NOTE - NSSUSCREENINGSIGNS_ED_ALL_ED
purposeless- no reason for living/anxiety/agitation/hopelessness/talking about suicide/command hallucinations to hurt self/triggering event  (ex. pending homelessness / incarceration)

## 2019-09-10 NOTE — ED PROVIDER NOTE - OBJECTIVE STATEMENT
59 year old male with PMH schizophrenia presents with depression and paranoid thoughts. pt states that he was just discharged from [facility] approximately 2 weeks ago, and states that ever since then he has been feeling progressively more depressed and paranoid, and now stating that he is suicidal and wishes to kil himself. He reports drinking 1 beer per day, no illicit drug use, no HI, no fever, chills, headache, SOB, chest pain, nausea, vomiting.

## 2019-09-10 NOTE — ED BEHAVIORAL HEALTH ASSESSMENT NOTE - RISK ASSESSMENT
high risk, patient has been recently discharged from [facility], he reports suicidal ideation with intent and plan

## 2019-09-10 NOTE — ED BEHAVIORAL HEALTH ASSESSMENT NOTE - CURRENT MEDICATION
Metoprolol 25mg bid, Gabapentin 3 00 mg tid (increased during last hospitalization), Cogentin 1 mg bid,  depakote 500 mg BID, Flomax 0.4 mg hs, Lexapro 10 mg hs, Lipitor 10 mg daily, Zyprexa 20 mg hs,  ASA 81 mg daily, Creon 1200 units daily, Thiamine 100mg daily, MVI daily, Folic acid 1 mg dialy, Ventolin 90 mcg 2 puff Q 4hrs PRN SOB (confirmed with group home on 8/5/19)

## 2019-09-10 NOTE — ED BEHAVIORAL HEALTH ASSESSMENT NOTE - SUMMARY
58 yo male with history of prior suicide attempts and diagnosis of Paranoid Schizophrenia admitting to current suicidal ideation and plan. States was "anxious to leave" when last admitted for inpatient treatment because of roommate "urinating on the floor," but feels discharge was premature. Patient signed for voluntary inpatient treatment. SW aware, looking to place patient.

## 2019-09-10 NOTE — ED PROVIDER NOTE - NEUROLOGICAL, MLM
resting tremor, no fasiculations, Alert and oriented, no focal deficits, no motor or sensory deficits.

## 2019-09-10 NOTE — ED BEHAVIORAL HEALTH NOTE - BEHAVIORAL HEALTH NOTE
SW met with patient at bedside to gain further information on admission. Patient currently resides in an adult home. Patient has hx of [facility] admissions and previously attended [facility] inpatient. Patient reported he has suicidal ideation. Patient stated he and his girlfriend have recently broken up which has contributed to his depression. The patient currently lives in the home with his girlfriend which has been difficult for him. Patient has attempted suicide in the past by taking an excessive amount of aspirin. Patient stated he will drink one to two beers a day. Patient denies current substance use. Patient reported having a pleasant experience at [facility] and prefers to go to the facility if recommended for inpatient treatment. Patient's discharge plan is pending Psychiatry recommendations.

## 2019-09-10 NOTE — ED BEHAVIORAL HEALTH ASSESSMENT NOTE - OTHER PAST PSYCHIATRIC HISTORY (INCLUDE DETAILS REGARDING ONSET, COURSE OF ILLNESS, INPATIENT/OUTPATIENT TREATMENT)
Multiple prior hospitalizations (pt reports seven).  [facility]

## 2019-09-10 NOTE — ED PROVIDER NOTE - PMH
Anxiety    BPH (benign prostatic hyperplasia)    Elevated liver enzymes    GERD (gastroesophageal reflux disease)    HTN (hypertension)    Hyperlipidemia    Mood disorder    Paranoid schizophrenia    Psychosis

## 2019-09-10 NOTE — ED ADULT NURSE NOTE - OBJECTIVE STATEMENT
Pt reports his girlfriend broke up with him last week. Since that time he is paranoid , feeling suicidal and hopeless. Pt presents with tremors of the hands he says always occurs with high anxiety. Pt further states he is hearing voices. Pt states he was recently discharged he was in [facility] but thinks he may have been discharged too early

## 2019-09-10 NOTE — ED BEHAVIORAL HEALTH ASSESSMENT NOTE - DESCRIPTION
T(C): 36.9 (10 Sep 2019 15:30), Max: 36.9 (10 Sep 2019 15:30)  T(F): 98.5 (10 Sep 2019 15:30), Max: 98.5 (10 Sep 2019 15:30)  HR: 71 (10 Sep 2019 15:30) (62 - 76)  BP: 145/89 (10 Sep 2019 15:30) (145/89 - 170/85)  BP(mean): --  RR: 18 (10 Sep 2019 15:30) (15 - 18)  SpO2: 99% (10 Sep 2019 15:30) (98% - 100%)                   13.2   5.81  )-----------( 204      ( 10 Sep 2019 14:56 )             39.2   09-10  141  |  102  |  8.0  ----------------------------<  143<H>  4.6   |  24.0  |  1.14  Ca    9.9      10 Sep 2019 14:56  TPro  7.2  /  Alb  4.4  /  TBili  0.4  /  DBili  x   /  AST  23  /  ALT  28  /  AlkPhos  78  09-10  LIVER FUNCTIONS - ( 10 Sep 2019 14:56 )  Alb: 4.4 g/dL / Pro: 7.2 g/dL / ALK PHOS: 78 U/L / ALT: 28 U/L / AST: 23 U/L / GGT: x         Urinalysis Basic - ( 10 Sep 2019 13:55 )  Color: Yellow / Appearance: Clear / S.005 / pH: x  Gluc: x / Ketone: Negative  / Bili: Negative / Urobili: Negative mg/dL   Blood: x / Protein: Negative mg/dL / Nitrite: Negative   Leuk Esterase: Negative / RBC: x / WBC x   Sq Epi: x / Non Sq Epi: x / Bacteria: x ABEL 58 yo male never  no children

## 2019-09-27 ENCOUNTER — EMERGENCY (EMERGENCY)
Facility: HOSPITAL | Age: 59
LOS: 1 days | Discharge: OTHER INSTITUTION | End: 2019-09-27
Attending: EMERGENCY MEDICINE
Payer: MEDICARE

## 2019-09-27 VITALS
TEMPERATURE: 98 F | HEART RATE: 60 BPM | RESPIRATION RATE: 18 BRPM | SYSTOLIC BLOOD PRESSURE: 172 MMHG | OXYGEN SATURATION: 98 % | DIASTOLIC BLOOD PRESSURE: 89 MMHG

## 2019-09-27 VITALS
OXYGEN SATURATION: 100 % | HEIGHT: 67 IN | DIASTOLIC BLOOD PRESSURE: 89 MMHG | WEIGHT: 164.91 LBS | HEART RATE: 62 BPM | TEMPERATURE: 98 F | SYSTOLIC BLOOD PRESSURE: 155 MMHG | RESPIRATION RATE: 18 BRPM

## 2019-09-27 LAB
AMPHET UR-MCNC: NEGATIVE — SIGNIFICANT CHANGE UP
ANION GAP SERPL CALC-SCNC: 10 MMOL/L — SIGNIFICANT CHANGE UP (ref 5–17)
APPEARANCE UR: CLEAR — SIGNIFICANT CHANGE UP
BARBITURATES UR SCN-MCNC: NEGATIVE — SIGNIFICANT CHANGE UP
BASOPHILS # BLD AUTO: 0.04 K/UL — SIGNIFICANT CHANGE UP (ref 0–0.2)
BASOPHILS NFR BLD AUTO: 0.6 % — SIGNIFICANT CHANGE UP (ref 0–2)
BENZODIAZ UR-MCNC: NEGATIVE — SIGNIFICANT CHANGE UP
BILIRUB UR-MCNC: NEGATIVE — SIGNIFICANT CHANGE UP
BUN SERPL-MCNC: 7 MG/DL — LOW (ref 8–20)
CALCIUM SERPL-MCNC: 10.1 MG/DL — SIGNIFICANT CHANGE UP (ref 8.6–10.2)
CHLORIDE SERPL-SCNC: 103 MMOL/L — SIGNIFICANT CHANGE UP (ref 98–107)
CO2 SERPL-SCNC: 28 MMOL/L — SIGNIFICANT CHANGE UP (ref 22–29)
COCAINE METAB.OTHER UR-MCNC: NEGATIVE — SIGNIFICANT CHANGE UP
COLOR SPEC: YELLOW — SIGNIFICANT CHANGE UP
CREAT SERPL-MCNC: 0.97 MG/DL — SIGNIFICANT CHANGE UP (ref 0.5–1.3)
DIFF PNL FLD: NEGATIVE — SIGNIFICANT CHANGE UP
EOSINOPHIL # BLD AUTO: 0.13 K/UL — SIGNIFICANT CHANGE UP (ref 0–0.5)
EOSINOPHIL NFR BLD AUTO: 2 % — SIGNIFICANT CHANGE UP (ref 0–6)
ETHANOL SERPL-MCNC: <10 MG/DL — SIGNIFICANT CHANGE UP
GLUCOSE SERPL-MCNC: 96 MG/DL — SIGNIFICANT CHANGE UP (ref 70–115)
GLUCOSE UR QL: NEGATIVE MG/DL — SIGNIFICANT CHANGE UP
HCT VFR BLD CALC: 40.2 % — SIGNIFICANT CHANGE UP (ref 39–50)
HGB BLD-MCNC: 13.6 G/DL — SIGNIFICANT CHANGE UP (ref 13–17)
IMM GRANULOCYTES NFR BLD AUTO: 0.2 % — SIGNIFICANT CHANGE UP (ref 0–1.5)
KETONES UR-MCNC: NEGATIVE — SIGNIFICANT CHANGE UP
LEUKOCYTE ESTERASE UR-ACNC: NEGATIVE — SIGNIFICANT CHANGE UP
LYMPHOCYTES # BLD AUTO: 2.18 K/UL — SIGNIFICANT CHANGE UP (ref 1–3.3)
LYMPHOCYTES # BLD AUTO: 34 % — SIGNIFICANT CHANGE UP (ref 13–44)
MCHC RBC-ENTMCNC: 32 PG — SIGNIFICANT CHANGE UP (ref 27–34)
MCHC RBC-ENTMCNC: 33.8 GM/DL — SIGNIFICANT CHANGE UP (ref 32–36)
MCV RBC AUTO: 94.6 FL — SIGNIFICANT CHANGE UP (ref 80–100)
METHADONE UR-MCNC: NEGATIVE — SIGNIFICANT CHANGE UP
MONOCYTES # BLD AUTO: 0.67 K/UL — SIGNIFICANT CHANGE UP (ref 0–0.9)
MONOCYTES NFR BLD AUTO: 10.5 % — SIGNIFICANT CHANGE UP (ref 2–14)
NEUTROPHILS # BLD AUTO: 3.38 K/UL — SIGNIFICANT CHANGE UP (ref 1.8–7.4)
NEUTROPHILS NFR BLD AUTO: 52.7 % — SIGNIFICANT CHANGE UP (ref 43–77)
NITRITE UR-MCNC: NEGATIVE — SIGNIFICANT CHANGE UP
OPIATES UR-MCNC: NEGATIVE — SIGNIFICANT CHANGE UP
PCP SPEC-MCNC: SIGNIFICANT CHANGE UP
PCP UR-MCNC: NEGATIVE — SIGNIFICANT CHANGE UP
PH UR: 6.5 — SIGNIFICANT CHANGE UP (ref 5–8)
PLATELET # BLD AUTO: 201 K/UL — SIGNIFICANT CHANGE UP (ref 150–400)
POTASSIUM SERPL-MCNC: 5 MMOL/L — SIGNIFICANT CHANGE UP (ref 3.5–5.3)
POTASSIUM SERPL-SCNC: 5 MMOL/L — SIGNIFICANT CHANGE UP (ref 3.5–5.3)
PROT UR-MCNC: NEGATIVE MG/DL — SIGNIFICANT CHANGE UP
RBC # BLD: 4.25 M/UL — SIGNIFICANT CHANGE UP (ref 4.2–5.8)
RBC # FLD: 12.5 % — SIGNIFICANT CHANGE UP (ref 10.3–14.5)
SODIUM SERPL-SCNC: 141 MMOL/L — SIGNIFICANT CHANGE UP (ref 135–145)
SP GR SPEC: 1 — LOW (ref 1.01–1.02)
THC UR QL: NEGATIVE — SIGNIFICANT CHANGE UP
UROBILINOGEN FLD QL: NEGATIVE MG/DL — SIGNIFICANT CHANGE UP
VALPROATE SERPL-MCNC: 78.8 UG/ML — SIGNIFICANT CHANGE UP (ref 50–100)
WBC # BLD: 6.41 K/UL — SIGNIFICANT CHANGE UP (ref 3.8–10.5)
WBC # FLD AUTO: 6.41 K/UL — SIGNIFICANT CHANGE UP (ref 3.8–10.5)

## 2019-09-27 PROCEDURE — 99285 EMERGENCY DEPT VISIT HI MDM: CPT

## 2019-09-27 PROCEDURE — 80048 BASIC METABOLIC PNL TOTAL CA: CPT

## 2019-09-27 PROCEDURE — 36415 COLL VENOUS BLD VENIPUNCTURE: CPT

## 2019-09-27 PROCEDURE — 80307 DRUG TEST PRSMV CHEM ANLYZR: CPT

## 2019-09-27 PROCEDURE — 80164 ASSAY DIPROPYLACETIC ACD TOT: CPT

## 2019-09-27 PROCEDURE — 85027 COMPLETE CBC AUTOMATED: CPT

## 2019-09-27 PROCEDURE — 93005 ELECTROCARDIOGRAM TRACING: CPT

## 2019-09-27 PROCEDURE — 93010 ELECTROCARDIOGRAM REPORT: CPT

## 2019-09-27 PROCEDURE — 81003 URINALYSIS AUTO W/O SCOPE: CPT

## 2019-09-27 RX ORDER — GABAPENTIN 400 MG/1
400 CAPSULE ORAL ONCE
Refills: 0 | Status: COMPLETED | OUTPATIENT
Start: 2019-09-27 | End: 2019-09-27

## 2019-09-27 RX ADMIN — GABAPENTIN 400 MILLIGRAM(S): 400 CAPSULE ORAL at 16:08

## 2019-09-27 RX ADMIN — Medication 0.1 MILLIGRAM(S): at 21:36

## 2019-09-27 NOTE — ED PROVIDER NOTE - CLINICAL SUMMARY MEDICAL DECISION MAKING FREE TEXT BOX
SI after break up with girlfriend  + past SI and psych admission as per pt  psych eval sig needs admission  awaiting bed

## 2019-09-27 NOTE — ED BEHAVIORAL HEALTH ASSESSMENT NOTE - OTHER PAST PSYCHIATRIC HISTORY (INCLUDE DETAILS REGARDING ONSET, COURSE OF ILLNESS, INPATIENT/OUTPATIENT TREATMENT)
Multiple prior hospitalizations (pt reports eight).  Most recently discharged from [facility].

## 2019-09-27 NOTE — ED ADULT NURSE REASSESSMENT NOTE - CONDITION
unchanged/Pt received in . Tremelous. Reports he is suicidal due to his girlfriend leaving him. Pt states he drinks 1/2 can a day, every day. Drank this am. Pt reports Neurotin helps him relax and he is due for tid afternoon dose. ARNP made aware.

## 2019-09-27 NOTE — ED BEHAVIORAL HEALTH ASSESSMENT NOTE - HPI (INCLUDE ILLNESS QUALITY, SEVERITY, DURATION, TIMING, CONTEXT, MODIFYING FACTORS, ASSOCIATED SIGNS AND SYMPTOMS)
Patient seen after being brought by ambulance from [facility] for suicidal ideation. He states that after being discharged from [facility] a little over a week ago, he has been severely depressed. Feels he was not ready to leave. He reports that break up with girlfriend was initial trigger earlier this month; they reconciled but she left him again. He reports that appetite has declined. He is sleeping well. He endorses CAH to jump in front of a moving vehicle. He is fairly related. Affect blunted. Reports general paranoia that "nobody likes me, the whole world hates me". He has history of multiple psychiatric hospitalizations and two prior suicide attempts. Patient diagnosed with Paranoid Schizophrenia. Denies thoughts of harming others. Patient asking to be admitted for inpatient treatment.

## 2019-09-27 NOTE — ED ADULT NURSE NOTE - NS ED BHA OTHER STREET DRUGS MEDICATION
Edmundo Pitts is a 68 year old male presenting for   Chief Complaint   Patient presents with   • Other     DMV paperwork for license      Denies Latex allergy or sensitivity.    Medication verified and med list updated  Refills needed today: No    Health Maintenance Summary     Topic Due On Due Status Completed On    Colorectal Cancer Screening - Blood in Stool Test Mar 8, 2017 Overdue Mar 8, 2016    Immunization-Zoster  Completed Oct 1, 2010    Immunization - Pneumococcal  Completed Feb 29, 2016    Medicare Wellness Visit Sep 15, 2017 Due Soon Sep 15, 2016    IMMUNIZATION - DTaP/Tdap/Td Sep 23, 2025 Not Due Sep 23, 2015    Immunization-Influenza  Completed Sep 15, 2016        Patient is due for topics as listed above, he wishes to proceed at this time, order (s) placed and patient given information.    Last lab results:   No results found for: HGBA1C  CHOLESTEROL (mg/dL)   Date Value   09/17/2016 139     HDL (mg/dL)   Date Value   09/17/2016 42     TRIGLYCERIDE (mg/dL)   Date Value   09/17/2016 62     CALCULATED LDL (mg/dL)   Date Value   09/17/2016 85     No results found for: URMIC, UCR, MALBCR  Lab Results   Component Value Date/Time    IFOB NEGATIVE 03/08/2016 02:48 PM            None known

## 2019-09-27 NOTE — ED BEHAVIORAL HEALTH NOTE - BEHAVIORAL HEALTH NOTE
SWNote: pt seen by psych NP(Shaneka) found to benefit from inpatient hospitalization . [facility] called ([facility]) bed available ,paperwork will be faxed asap. SW to follow.

## 2019-09-27 NOTE — ED BEHAVIORAL HEALTH NOTE - BEHAVIORAL HEALTH NOTE
SWNote: p/c from [facility]) pt accepted by Dr Hernandez on a 9.27 status. Worker called [facility] ([facility]) macie arranged, ETA 60-90 min. No auth needed pt is [facility] and [facility] recipient. No other concerns reported at this moment.

## 2019-09-27 NOTE — ED ADULT NURSE REASSESSMENT NOTE - GENERAL PATIENT STATE
comfortable appearance/cooperative/doing word search puzzle
Goal met. 4. Patient will return to ADLs without increased symptoms or restriction. - Progressing towards goal  5. Patient will report working with 2/10 or less pain. - Progressing towards goal    Progression Towards Functional goals:   [x] Patient is progressing as expected towards functional goals listed. [] Progression is slowed due to complexities listed. [] Progression has been slowed due to co-morbidities. [] Plan just implemented, too soon to assess goals progression  [] Other:     ASSESSMENT:      Treatment/Activity Tolerance:   [x] Patient tolerated treatment well [] Patient limited by fatique  [] Patient limited by pain  [] Patient limited by other medical complications  [] Other: Patient tolerated session well. Patient able to perform exercises without pain today. Patient noting that todays exercises helped decrease pain. Patient recommended to continue skilled physical therapy to improve ROM, trunk control, and functional mobility. 7/18    Patient education: Patient education on PT and plan of care including diagnosis, prognosis, treatment goals and options. Patient agrees with discussed POC and treatment and is aware of rehab process. 6/4      Prognosis: [x] Good [] Fair  [] Poor    Patient Requires Follow-up: [x] Yes  [] No    Next Session:     PLAN: 2x/week (7/1-7/29)  [x] Continue per plan of care [] Alter current plan (see comments)  [] Plan of care initiated [] Hold pending MD visit [] Discharge    Electronically signed by: Kristopher Muller, Physical Therapist Student  Therapist was present, directed the patient's care, made skilled judgement, and was responsible for assessment and treatment of the patient.       Mikayla Arango PT, DPT

## 2019-09-27 NOTE — ED PROVIDER NOTE - OBJECTIVE STATEMENT
60 y/o M with PMHx of Anxiety, BPH, Mood disorder, Paranoid schizophrenia, Psychosis, GERD, HTN, HLD presents to the ED c/o suicidal thoughts. Pt started having suicidal thoughts 1x month ago after breaking up with his girlfriend. Pt does not have a plan. Pt states that he is depressed and want to speak to a psychotic. Pt smokes 2x pack of cigarets a day and drinks half a beer. No medical complaints and denies pain.

## 2019-09-27 NOTE — ED ADULT NURSE NOTE - OBJECTIVE STATEMENT
Pt reports his girlfriend broke up with him . Since that time he is paranoid , feeling suicidal and hopeless. Pt presents with tremors of the hands he says always occurs with high anxiety. Pt further states he is hearing voices. Pt states he was recently discharged he was in [facility] but thinks he may have been discharged too early

## 2019-09-27 NOTE — ED BEHAVIORAL HEALTH ASSESSMENT NOTE - DETAILS
tremors unavailable discharged from [facility] last week n/a 20 years ago took pills & 7 years ago took bottle of aspirin; recently contemplating running into traffic tremor treated with congentin mom had what was described as "nervous breakdown" to harm self accepting facility will receive handoff NA

## 2019-09-27 NOTE — ED BEHAVIORAL HEALTH ASSESSMENT NOTE - RISK ASSESSMENT
high risk, patient has been recently discharged from [facility], he reports suicidal ideation with intent and plan High Acute Suicide Risk

## 2019-09-27 NOTE — ED ADULT NURSE NOTE - EXTENSIONS OF SELF_ADULT
None
Notify MD if experiencing fever, nausea, vomiting, increased pain, bleeding, redness or drainage from incision site.

## 2019-09-27 NOTE — ED PROVIDER NOTE - CARE PLAN
Principal Discharge DX:	Psychosis, unspecified psychosis type  Secondary Diagnosis:	Paranoid schizophrenia

## 2019-09-27 NOTE — ED BEHAVIORAL HEALTH ASSESSMENT NOTE - CURRENT MEDICATION
Metoprolol 25mg bid, Gabapentin 300 mg tid (increased during last hospitalization), Cogentin 1 mg bid,  depakote 500 mg BID, Flomax 0.4 mg hs, Lexapro 10 mg hs, Lipitor 10 mg daily, Zyprexa 20 mg hs,  ASA 81 mg daily, Creon 1200 units daily, Thiamine 100mg daily, MVI daily, Folic acid 1 mg dialy, Ventolin 90 mcg 2 puff Q 4hrs PRN SOB (confirmed with group home on 8/5/19)

## 2019-09-27 NOTE — ED ADULT TRIAGE NOTE - CHIEF COMPLAINT QUOTE
pt states he is hear voicing telling to kill himself jump in front of a car. paranoid depressed. pt reports 1/2 can or beer today. pt denies HI.

## 2019-09-27 NOTE — ED ADULT NURSE NOTE - HPI (INCLUDE ILLNESS QUALITY, SEVERITY, DURATION, TIMING, CONTEXT, MODIFYING FACTORS, ASSOCIATED SIGNS AND SYMPTOMS)
Flat intense affect. Maintains eye contact. Multiple hospitalizations. Reports suicidal ideations. no specific plan

## 2019-09-27 NOTE — ED BEHAVIORAL HEALTH ASSESSMENT NOTE - SUMMARY
60 yo male with history of prior suicide attempts and diagnosis of Paranoid Schizophrenia admitting to current suicidal ideation and plan. He has hx of suicide attempts before. He feels discharge from [facility] last week was premature. Patient signed for voluntary inpatient treatment. SW aware, looking to place patient.

## 2019-10-24 ENCOUNTER — EMERGENCY (EMERGENCY)
Facility: HOSPITAL | Age: 59
LOS: 1 days | Discharge: OTHER INSTITUTION | End: 2019-10-24
Attending: EMERGENCY MEDICINE
Payer: MEDICARE

## 2019-10-24 ENCOUNTER — INPATIENT (INPATIENT)
Facility: HOSPITAL | Age: 59
LOS: 18 days | Discharge: HOME | End: 2019-11-12
Attending: PSYCHIATRY & NEUROLOGY | Admitting: PSYCHIATRY & NEUROLOGY
Payer: MEDICARE

## 2019-10-24 VITALS
TEMPERATURE: 98 F | RESPIRATION RATE: 18 BRPM | OXYGEN SATURATION: 98 % | DIASTOLIC BLOOD PRESSURE: 88 MMHG | SYSTOLIC BLOOD PRESSURE: 143 MMHG | HEART RATE: 68 BPM

## 2019-10-24 VITALS
OXYGEN SATURATION: 99 % | HEIGHT: 66 IN | WEIGHT: 160.06 LBS | DIASTOLIC BLOOD PRESSURE: 95 MMHG | TEMPERATURE: 98 F | HEART RATE: 53 BPM | RESPIRATION RATE: 18 BRPM | SYSTOLIC BLOOD PRESSURE: 172 MMHG

## 2019-10-24 DIAGNOSIS — F20.9 SCHIZOPHRENIA, UNSPECIFIED: ICD-10-CM

## 2019-10-24 LAB
AMPHET UR-MCNC: NEGATIVE — SIGNIFICANT CHANGE UP
ANION GAP SERPL CALC-SCNC: 11 MMOL/L — SIGNIFICANT CHANGE UP (ref 5–17)
APAP SERPL-MCNC: <7.5 UG/ML — LOW (ref 10–26)
APPEARANCE UR: CLEAR — SIGNIFICANT CHANGE UP
BARBITURATES UR SCN-MCNC: NEGATIVE — SIGNIFICANT CHANGE UP
BASOPHILS # BLD AUTO: 0.03 K/UL — SIGNIFICANT CHANGE UP (ref 0–0.2)
BASOPHILS NFR BLD AUTO: 0.5 % — SIGNIFICANT CHANGE UP (ref 0–2)
BENZODIAZ UR-MCNC: NEGATIVE — SIGNIFICANT CHANGE UP
BILIRUB UR-MCNC: NEGATIVE — SIGNIFICANT CHANGE UP
BUN SERPL-MCNC: 10 MG/DL — SIGNIFICANT CHANGE UP (ref 8–20)
CALCIUM SERPL-MCNC: 10.2 MG/DL — SIGNIFICANT CHANGE UP (ref 8.6–10.2)
CHLORIDE SERPL-SCNC: 103 MMOL/L — SIGNIFICANT CHANGE UP (ref 98–107)
CO2 SERPL-SCNC: 30 MMOL/L — HIGH (ref 22–29)
COCAINE METAB.OTHER UR-MCNC: NEGATIVE — SIGNIFICANT CHANGE UP
COLOR SPEC: YELLOW — SIGNIFICANT CHANGE UP
CREAT SERPL-MCNC: 1.06 MG/DL — SIGNIFICANT CHANGE UP (ref 0.5–1.3)
DIFF PNL FLD: NEGATIVE — SIGNIFICANT CHANGE UP
EOSINOPHIL # BLD AUTO: 0.13 K/UL — SIGNIFICANT CHANGE UP (ref 0–0.5)
EOSINOPHIL NFR BLD AUTO: 2.2 % — SIGNIFICANT CHANGE UP (ref 0–6)
ETHANOL SERPL-MCNC: <10 MG/DL — SIGNIFICANT CHANGE UP
GLUCOSE SERPL-MCNC: 80 MG/DL — SIGNIFICANT CHANGE UP (ref 70–115)
GLUCOSE UR QL: NEGATIVE MG/DL — SIGNIFICANT CHANGE UP
HCT VFR BLD CALC: 42.8 % — SIGNIFICANT CHANGE UP (ref 39–50)
HGB BLD-MCNC: 13.9 G/DL — SIGNIFICANT CHANGE UP (ref 13–17)
IMM GRANULOCYTES NFR BLD AUTO: 0.3 % — SIGNIFICANT CHANGE UP (ref 0–1.5)
KETONES UR-MCNC: NEGATIVE — SIGNIFICANT CHANGE UP
LEUKOCYTE ESTERASE UR-ACNC: NEGATIVE — SIGNIFICANT CHANGE UP
LYMPHOCYTES # BLD AUTO: 2 K/UL — SIGNIFICANT CHANGE UP (ref 1–3.3)
LYMPHOCYTES # BLD AUTO: 33.8 % — SIGNIFICANT CHANGE UP (ref 13–44)
MCHC RBC-ENTMCNC: 31.4 PG — SIGNIFICANT CHANGE UP (ref 27–34)
MCHC RBC-ENTMCNC: 32.5 GM/DL — SIGNIFICANT CHANGE UP (ref 32–36)
MCV RBC AUTO: 96.6 FL — SIGNIFICANT CHANGE UP (ref 80–100)
METHADONE UR-MCNC: NEGATIVE — SIGNIFICANT CHANGE UP
MONOCYTES # BLD AUTO: 0.62 K/UL — SIGNIFICANT CHANGE UP (ref 0–0.9)
MONOCYTES NFR BLD AUTO: 10.5 % — SIGNIFICANT CHANGE UP (ref 2–14)
NEUTROPHILS # BLD AUTO: 3.12 K/UL — SIGNIFICANT CHANGE UP (ref 1.8–7.4)
NEUTROPHILS NFR BLD AUTO: 52.7 % — SIGNIFICANT CHANGE UP (ref 43–77)
NITRITE UR-MCNC: NEGATIVE — SIGNIFICANT CHANGE UP
OPIATES UR-MCNC: NEGATIVE — SIGNIFICANT CHANGE UP
PCP SPEC-MCNC: SIGNIFICANT CHANGE UP
PCP UR-MCNC: NEGATIVE — SIGNIFICANT CHANGE UP
PH UR: 6.5 — SIGNIFICANT CHANGE UP (ref 5–8)
PLATELET # BLD AUTO: 186 K/UL — SIGNIFICANT CHANGE UP (ref 150–400)
POTASSIUM SERPL-MCNC: 4.2 MMOL/L — SIGNIFICANT CHANGE UP (ref 3.5–5.3)
POTASSIUM SERPL-SCNC: 4.2 MMOL/L — SIGNIFICANT CHANGE UP (ref 3.5–5.3)
PROT UR-MCNC: NEGATIVE MG/DL — SIGNIFICANT CHANGE UP
RBC # BLD: 4.43 M/UL — SIGNIFICANT CHANGE UP (ref 4.2–5.8)
RBC # FLD: 12.2 % — SIGNIFICANT CHANGE UP (ref 10.3–14.5)
SALICYLATES SERPL-MCNC: <0.6 MG/DL — LOW (ref 10–20)
SODIUM SERPL-SCNC: 144 MMOL/L — SIGNIFICANT CHANGE UP (ref 135–145)
SP GR SPEC: 1 — LOW (ref 1.01–1.02)
THC UR QL: NEGATIVE — SIGNIFICANT CHANGE UP
UROBILINOGEN FLD QL: NEGATIVE MG/DL — SIGNIFICANT CHANGE UP
WBC # BLD: 5.92 K/UL — SIGNIFICANT CHANGE UP (ref 3.8–10.5)
WBC # FLD AUTO: 5.92 K/UL — SIGNIFICANT CHANGE UP (ref 3.8–10.5)

## 2019-10-24 PROCEDURE — 93005 ELECTROCARDIOGRAM TRACING: CPT

## 2019-10-24 PROCEDURE — 85027 COMPLETE CBC AUTOMATED: CPT

## 2019-10-24 PROCEDURE — 81003 URINALYSIS AUTO W/O SCOPE: CPT

## 2019-10-24 PROCEDURE — 99285 EMERGENCY DEPT VISIT HI MDM: CPT

## 2019-10-24 PROCEDURE — 80307 DRUG TEST PRSMV CHEM ANLYZR: CPT

## 2019-10-24 PROCEDURE — 80048 BASIC METABOLIC PNL TOTAL CA: CPT

## 2019-10-24 PROCEDURE — 36415 COLL VENOUS BLD VENIPUNCTURE: CPT

## 2019-10-24 PROCEDURE — 93010 ELECTROCARDIOGRAM REPORT: CPT

## 2019-10-24 RX ORDER — ALBUTEROL 90 UG/1
2 AEROSOL, METERED ORAL EVERY 4 HOURS
Refills: 0 | Status: DISCONTINUED | OUTPATIENT
Start: 2019-10-24 | End: 2019-11-12

## 2019-10-24 RX ORDER — OLANZAPINE 15 MG/1
10 TABLET, FILM COATED ORAL
Refills: 0 | Status: DISCONTINUED | OUTPATIENT
Start: 2019-10-24 | End: 2019-11-04

## 2019-10-24 RX ORDER — TAMSULOSIN HYDROCHLORIDE 0.4 MG/1
0.4 CAPSULE ORAL AT BEDTIME
Refills: 0 | Status: DISCONTINUED | OUTPATIENT
Start: 2019-10-24 | End: 2019-11-12

## 2019-10-24 RX ORDER — DIVALPROEX SODIUM 500 MG/1
500 TABLET, DELAYED RELEASE ORAL
Refills: 0 | Status: DISCONTINUED | OUTPATIENT
Start: 2019-10-24 | End: 2019-11-12

## 2019-10-24 RX ORDER — ESCITALOPRAM OXALATE 10 MG/1
10 TABLET, FILM COATED ORAL DAILY
Refills: 0 | Status: DISCONTINUED | OUTPATIENT
Start: 2019-10-24 | End: 2019-11-04

## 2019-10-24 RX ORDER — FOLIC ACID 0.8 MG
1 TABLET ORAL DAILY
Refills: 0 | Status: DISCONTINUED | OUTPATIENT
Start: 2019-10-24 | End: 2019-11-12

## 2019-10-24 RX ORDER — GABAPENTIN 400 MG/1
300 CAPSULE ORAL THREE TIMES A DAY
Refills: 0 | Status: DISCONTINUED | OUTPATIENT
Start: 2019-10-24 | End: 2019-11-12

## 2019-10-24 RX ORDER — ESCITALOPRAM OXALATE 10 MG/1
10 TABLET, FILM COATED ORAL DAILY
Refills: 0 | Status: DISCONTINUED | OUTPATIENT
Start: 2019-10-24 | End: 2019-11-12

## 2019-10-24 RX ORDER — BENZTROPINE MESYLATE 1 MG
1 TABLET ORAL AT BEDTIME
Refills: 0 | Status: DISCONTINUED | OUTPATIENT
Start: 2019-10-24 | End: 2019-11-04

## 2019-10-24 RX ORDER — DIVALPROEX SODIUM 500 MG/1
500 TABLET, DELAYED RELEASE ORAL
Refills: 0 | Status: DISCONTINUED | OUTPATIENT
Start: 2019-10-24 | End: 2019-11-04

## 2019-10-24 RX ORDER — LIPASE/PROTEASE/AMYLASE 16-48-48K
1 CAPSULE,DELAYED RELEASE (ENTERIC COATED) ORAL
Refills: 0 | Status: DISCONTINUED | OUTPATIENT
Start: 2019-10-24 | End: 2019-11-12

## 2019-10-24 RX ORDER — BENZTROPINE MESYLATE 1 MG
1 TABLET ORAL
Refills: 0 | Status: DISCONTINUED | OUTPATIENT
Start: 2019-10-24 | End: 2019-11-12

## 2019-10-24 RX ORDER — OLANZAPINE 15 MG/1
5 TABLET, FILM COATED ORAL EVERY 6 HOURS
Refills: 0 | Status: DISCONTINUED | OUTPATIENT
Start: 2019-10-24 | End: 2019-11-12

## 2019-10-24 RX ORDER — ASPIRIN/CALCIUM CARB/MAGNESIUM 324 MG
81 TABLET ORAL DAILY
Refills: 0 | Status: DISCONTINUED | OUTPATIENT
Start: 2019-10-24 | End: 2019-11-12

## 2019-10-24 RX ORDER — METOPROLOL TARTRATE 50 MG
25 TABLET ORAL
Refills: 0 | Status: DISCONTINUED | OUTPATIENT
Start: 2019-10-24 | End: 2019-11-12

## 2019-10-24 RX ORDER — THIAMINE MONONITRATE (VIT B1) 100 MG
100 TABLET ORAL DAILY
Refills: 0 | Status: DISCONTINUED | OUTPATIENT
Start: 2019-10-24 | End: 2019-11-12

## 2019-10-24 RX ORDER — OLANZAPINE 15 MG/1
10 TABLET, FILM COATED ORAL
Refills: 0 | Status: DISCONTINUED | OUTPATIENT
Start: 2019-10-24 | End: 2019-11-12

## 2019-10-24 RX ORDER — ATORVASTATIN CALCIUM 80 MG/1
10 TABLET, FILM COATED ORAL AT BEDTIME
Refills: 0 | Status: DISCONTINUED | OUTPATIENT
Start: 2019-10-24 | End: 2019-11-12

## 2019-10-24 RX ORDER — OLANZAPINE 15 MG/1
5 TABLET, FILM COATED ORAL ONCE
Refills: 0 | Status: DISCONTINUED | OUTPATIENT
Start: 2019-10-24 | End: 2019-11-12

## 2019-10-24 RX ORDER — HYDRALAZINE HCL 50 MG
10 TABLET ORAL ONCE
Refills: 0 | Status: COMPLETED | OUTPATIENT
Start: 2019-10-24 | End: 2019-10-24

## 2019-10-24 RX ORDER — GABAPENTIN 400 MG/1
300 CAPSULE ORAL THREE TIMES A DAY
Refills: 0 | Status: DISCONTINUED | OUTPATIENT
Start: 2019-10-24 | End: 2019-11-04

## 2019-10-24 RX ADMIN — GABAPENTIN 300 MILLIGRAM(S): 400 CAPSULE ORAL at 21:10

## 2019-10-24 RX ADMIN — Medication 1 MILLIGRAM(S): at 21:10

## 2019-10-24 RX ADMIN — OLANZAPINE 10 MILLIGRAM(S): 15 TABLET, FILM COATED ORAL at 19:01

## 2019-10-24 RX ADMIN — DIVALPROEX SODIUM 500 MILLIGRAM(S): 500 TABLET, DELAYED RELEASE ORAL at 19:01

## 2019-10-24 RX ADMIN — Medication 10 MILLIGRAM(S): at 14:57

## 2019-10-24 NOTE — ED ADULT NURSE REASSESSMENT NOTE - GENERAL PATIENT STATE
comfortable appearance/cooperative
comfortable appearance/cooperative
resting/sleeping/comfortable appearance/cooperative

## 2019-10-24 NOTE — ED BEHAVIORAL HEALTH ASSESSMENT NOTE - RISK ASSESSMENT
high risk, patient has been recently discharged from [facility], he reports suicidal ideation with intent and plan High Acute Suicide Risk

## 2019-10-24 NOTE — ED ADULT NURSE NOTE - SUICIDE RISK FACTORS
Hopelessness or despair/Mood Disorder current/past/Current mood episode/Access to lethal methods (pills, firearm, etc.: Ask specifically about presence or absence of a firearm in the home or ease of accessing/Psychotic disorder current/past

## 2019-10-24 NOTE — ED BEHAVIORAL HEALTH ASSESSMENT NOTE - DESCRIPTION (FIRST USE, LAST USE, QUANTITY, FREQUENCY, DURATION)
2 packs per day drinks beer, last beer earlier today smokes a few times a month reports drinking a half of can of beer yesterday.

## 2019-10-24 NOTE — ED PROVIDER NOTE - CLINICAL SUMMARY MEDICAL DECISION MAKING FREE TEXT BOX
60 yo M p/w suicidal ideation, will do blood work, ekg, libium 50 mg PO, and psych consult. 58 yo M p/w suicidal ideation,  blood work wnl, ekg without ischemic changes, libium 50 mg PO. Patient seen by psych, will  need inpatient transfer.

## 2019-10-24 NOTE — ED BEHAVIORAL HEALTH ASSESSMENT NOTE - OTHER PAST PSYCHIATRIC HISTORY (INCLUDE DETAILS REGARDING ONSET, COURSE OF ILLNESS, INPATIENT/OUTPATIENT TREATMENT)
Multiple prior hospitalizations (pt reports eight).  Most recently discharged from [facility].

## 2019-10-24 NOTE — ED BEHAVIORAL HEALTH NOTE - BEHAVIORAL HEALTH NOTE
SERENA Note: Per psych team, pt requiring inpatient psych trx. SERENA paged [facility] and spoke to EARL Cedillo. Nguyen reports there are beds available, and advised SERENA to fax clinicals to [facility]. [facility] Garcia accepting pt. Pt going to [facility] 6 unit, MD is Dr. Mcnamara. pt has medicaid insurance, no auth needed. SW arranged transport. no further SW services identified at this time.

## 2019-10-24 NOTE — ED BEHAVIORAL HEALTH ASSESSMENT NOTE - CURRENT MEDICATION
Metoprolol 25mg bid, Gabapentin 300 mg tid (increased during last hospitalization), Cogentin 1 mg bid,  depakote 500 mg BID, Flomax 0.4 mg hs, Lexapro 10 mg hs, Lipitor 10 mg daily, Zyprexa 20 mg hs,  ASA 81 mg daily, Creon 1200 units daily, Thiamine 100mg daily, MVI daily, Folic acid 1 mg dialy, Ventolin 90 mcg 2 puff Q 4hrs PRN SOB (confirmed with group home on 8/5/19) lopressor  25mg bid, Gabapentin 300 mg tid (increased during last hospitalization), Cogentin 1 mg bid,  depakote 500 mg BID, Flomax 0.4 mg hs, Lexapro 10 mg hs, Lipitor 10 mg daily, Zyprexa 10 mg bid,  ASA 81 mg daily, Creon ER 1200 units bid,  MVI daily, Folic acid 1 mg daily, Benadryl 50 mg hs. Ventolin 90 mcg 2 puff Q 4hrs PRN SOB (confirmed with group home on 8/5/19)

## 2019-10-24 NOTE — ED BEHAVIORAL HEALTH ASSESSMENT NOTE - DETAILS
discharged from 3 weeks ago n/a 20 years ago took pills & 7 years ago took bottle of aspirin; recently contemplating running into traffic tremor treated with congentin mom had what was described as "nervous breakdown" tremors to harm self accepting facility will receive handoff NA unavailable discharged from [facility] 3 weeks ago 20 years ago took p & 6 years ago took bottle of aspirin; Current plan to overdose on ASA tremor treated with cogentin

## 2019-10-24 NOTE — ED PROVIDER NOTE - NS_EDPROVIDERDISPOUSERTYPE_ED_A_ED
Attending Attestation (For Attendings USE Only)... risk reduction/prevention/anticipated discharge recommendation/impairments found/therapy frequency/predicted duration of therapy intervention/functional limitations in following categories/rehab potential/anticipated equipment needs at discharge

## 2019-10-24 NOTE — ED ADULT NURSE NOTE - OBJECTIVE STATEMENT
Patient presented in  area dressed in yellow gowns.  Patient seeking inpatient hospitalization secondary having suicidal ideations with a plan to overdose on ASA and ETOH.  Patient reports he didn't feel well when he was discharged from [facility] 3 weeks ago and depression has worsened each day since.  Patient reports life long feeling of paranoid thoughts that people don't like him.  Denies homicidal ideations, auditory and visual hallucinations.  Patient reports he is drinking 1/2 can of beer daily and smoking 2 packs of cigarettes a day.

## 2019-10-24 NOTE — ED BEHAVIORAL HEALTH ASSESSMENT NOTE - HPI (INCLUDE ILLNESS QUALITY, SEVERITY, DURATION, TIMING, CONTEXT, MODIFYING FACTORS, ASSOCIATED SIGNS AND SYMPTOMS)
Patient is a 58 y/o single male domiciled at [facility] in [place], PMH of BPH, Gerd, HTN, and HLD, PPH of schizophrenia, mutiple inpatient psychiatric hospitalizations, multiple suicide attempts, unknown h/o of substance abuse, biba, call by group home with worsening suicidal ideation and plan to drink alcohol and take 50 asa.  He states that after being discharged from [facility] a little over 3  weeks ago, he has been severely depressed with worsening suicidal ideation. Patient reports if discharged home today he will drink alcohol and take 50 aspirin. Patient identifies current residence and girl friend as a stressor. He also reports increased paranoia stating " I think everyone is out to get me." Patient denies current or recent A/V/H. He reports good sleep but recent weight loss stating " I am afraid to eat in front of people." Patient is a 60 y/o single male domiciled at [facility] in [place], PMH of BPH, Gerd, HTN, and HLD, PPH of schizophrenia, mutiple inpatient psychiatric hospitalizations, multiple suicide attempts, unknown h/o of substance abuse, biba, call by group home with worsening suicidal ideation and plan to drink alcohol and take 50 asa. Patient reports he will walk to the local store and buy aspirin.  He states that after being discharged from [facility] a little over 3  weeks ago, he has been severely depressed with worsening suicidal ideation. Patient reports if discharged home today he will drink alcohol and take 50 aspirin. Patient identifies current residence and girl friend as a stressor. He also reports increased paranoia stating " I think everyone is out to get me." Patient denies current or recent A/V/H. He reports good sleep but recent weight loss stating " I am afraid to eat in front of people."   Received collateral from Dr Russell who reports patient has h/o reported suicidal ideation and then claims he is no longer suicidal once admitted. Patient is diagnosed with schizophrenia and does not like his current residence. Patient is a 60 y/o single male domiciled at [facility] in [place], PMH of BPH, Gerd, HTN, and HLD, PPH of schizophrenia, mutiple inpatient psychiatric hospitalizations, multiple suicide attempts, unknown h/o of substance abuse, biba, call by group home with worsening suicidal ideation and plan to drink alcohol and take 50 asa. Patient reports he will walk to the local store and buy aspirin.  He states that after being discharged from [facility] a little over 3  weeks ago, he has been severely depressed with worsening suicidal ideation. Patient reports if discharged home today he will drink alcohol and take 50 aspirin. Patient identifies current residence and girl friend as a stressor. He also reports increased paranoia stating " I think everyone is out to get me." Patient denies current or recent A/V/H. He reports good sleep but recent weight loss stating " I am afraid to eat in front of people."   Received collateral from Dr Russell who reports patient has h/o reported suicidal ideation and then claims he is no longer suicidal once admitted. Patient is diagnosed with schizophrenia and does not like his current residence. Dr Farris recommends holding in ED and reevaluating in AM.

## 2019-10-24 NOTE — ED BEHAVIORAL HEALTH ASSESSMENT NOTE - THOUGHT CONTENT
Suicidality/Hopelessness/Ruminations/Preoccupations Preoccupations/Ruminations/Delusions/Hopelessness/Suicidality

## 2019-10-24 NOTE — ED BEHAVIORAL HEALTH ASSESSMENT NOTE - SUICIDE RISK FACTORS
Unable to engage in safety planning/Hopelessness or despair Impulsivity/Hopelessness or despair/Access to lethal methods (pills, firearm, etc.: Ask specifically about presence or absence of a firearm in the home or ease of accessing/Unable to engage in safety planning/Current mood episode

## 2019-10-24 NOTE — ED ADULT NURSE REASSESSMENT NOTE - NS ED NURSE REASSESS COMMENT FT1
received report.  received pt resting quietly in room.  when asked how he is feeling states still the same as I came in.  when asked what that meant pt stating I am still suicidal.  pt is comfortable appearing cooperative at this time. awaiting transfer. pt aware of plan of care.
Patient ate 100% of his dinner.  No attempts to harm self or others and safety maintained.
Patient resting in his room sitting on bed and working on word searches.  Patient agrees with plan of care for pending transfer.  No attempts to harm self or others and safety maintained.

## 2019-10-24 NOTE — ED ADULT NURSE NOTE - CHIEF COMPLAINT QUOTE
a&ox3 BIBA from [place] for SI/ST. pt. states he is hearing voices. pt. states he is going to take "aspirins and liquids" to hurt himself. pt. states he has been having paranoia for x3 weeks and just got out of [place] x3 weeks ago. denies alcohol/ drug use today. MD. Tolentino called to bedside.

## 2019-10-24 NOTE — ED PROVIDER NOTE - NS ED ROS FT
Review of Systems  •	CONSTITUTIONAL - no  fever, no diaphoresis, no weight change  •	SKIN - no rash  •	HEMATOLOGIC - no bleeding, no bruising  •	EYES - no eye pain, no blurred vision  •	ENT - no change in hearing, no pain  •	RESPIRATORY - no shortness of breath, no cough  •	CARDIAC - no chest pain, no palpitations  •	GI - no abd pain, no nausea, no vomiting, no diarrhea, no constipation, no bleeding  •	GENITO-URINARY - no discharge, no dysuria; no hematuria,   •	ENDO - no polydipsia, no polyuria, no heat/no cold intolerance  •	MUSCULOSKELETAL - no joint pain, no swelling, no redness  •	NEUROLOGIC - no weakness, no headache, no anesthesia, no paresthesias  •	PSYCH - no anxiety, (+)  suicidal, non homicidal, no hallucination, no depression

## 2019-10-24 NOTE — ED ADULT TRIAGE NOTE - CHIEF COMPLAINT QUOTE
a&ox3 BIBA from [place] [facility] for SI/ST. pt. states he is hearing voices. pt. states he is going to take "aspirins and liquids" to hurt himself. pt. states he has been having paranoia for x3 weeks and just got out of [place] x3 weeks ago. denies alcohol/ drug use today. a&ox3 BIBA from [place] [facility] for SI/ST. pt. states he is hearing voices. pt. states he is going to take "aspirins and liquids" to hurt himself. pt. states he has been having paranoia for x3 weeks and just got out of [facility] x3 weeks ago. denies alcohol/ drug use today. MD. Tolentino called to bedside.

## 2019-10-24 NOTE — ED BEHAVIORAL HEALTH ASSESSMENT NOTE - SUMMARY
58 yo male with history of prior suicide attempts and diagnosis of Paranoid Schizophrenia admitting to current suicidal ideation and plan. He has hx of suicide attempts before. He feels discharge from [facility] last week was premature. Patient signed for voluntary inpatient treatment. SW aware, looking to place patient. 60 yo male with history of prior suicide attempts and diagnosis of Paranoid Schizophrenia admitting to current suicidal ideation and plan. He has hx of suicide attempts before. He feels discharge from [facility] 3weeks ago was premature. Patient will sign for voluntary inpatient treatment. SW made aware. 58 yo male with history of prior suicide attempts and diagnosis of Paranoid Schizophrenia admitting to current suicidal ideation and plan. He has hx of suicide attempts before. He feels discharge from [facility] 3weeks ago was premature. Patient will sign for voluntary inpatient treatment. SW made aware. Discussed discussion with Dr. Garner and patient states he " will attempt suicide if discharged home."

## 2019-10-24 NOTE — ED PROVIDER NOTE - OBJECTIVE STATEMENT
60 yo M hx of Anxiety, BPH, Mood disorder, Paranoid schizophrenia, Psychosis, GERD, HTN, HLD presents to the ED c/o suicidal thoughts. He was discharged from [facility] 3 weeks prior. now report he is suicidal, plans to take asa 50 pills with vodka. He denies taking pills today. He report drinking "half a beer" daily. Last alcohol intact was yesterday. Denies chest pain, sob, abdominal pain.

## 2019-10-25 VITALS — TEMPERATURE: 98 F | HEIGHT: 66 IN | WEIGHT: 315 LBS

## 2019-10-25 PROCEDURE — 99232 SBSQ HOSP IP/OBS MODERATE 35: CPT

## 2019-10-25 RX ORDER — NICOTINE POLACRILEX 2 MG
1 GUM BUCCAL DAILY
Refills: 0 | Status: DISCONTINUED | OUTPATIENT
Start: 2019-10-25 | End: 2019-10-25

## 2019-10-25 RX ORDER — NICOTINE POLACRILEX 2 MG
1 GUM BUCCAL DAILY
Refills: 0 | Status: DISCONTINUED | OUTPATIENT
Start: 2019-10-25 | End: 2019-11-12

## 2019-10-25 RX ADMIN — GABAPENTIN 300 MILLIGRAM(S): 400 CAPSULE ORAL at 09:02

## 2019-10-25 RX ADMIN — OLANZAPINE 10 MILLIGRAM(S): 15 TABLET, FILM COATED ORAL at 09:03

## 2019-10-25 RX ADMIN — Medication 1 MILLIGRAM(S): at 09:02

## 2019-10-25 RX ADMIN — DIVALPROEX SODIUM 500 MILLIGRAM(S): 500 TABLET, DELAYED RELEASE ORAL at 09:02

## 2019-10-25 RX ADMIN — TAMSULOSIN HYDROCHLORIDE 0.4 MILLIGRAM(S): 0.4 CAPSULE ORAL at 20:35

## 2019-10-25 RX ADMIN — ATORVASTATIN CALCIUM 10 MILLIGRAM(S): 80 TABLET, FILM COATED ORAL at 20:35

## 2019-10-25 RX ADMIN — ESCITALOPRAM OXALATE 10 MILLIGRAM(S): 10 TABLET, FILM COATED ORAL at 09:02

## 2019-10-25 RX ADMIN — Medication 1 TABLET(S): at 09:02

## 2019-10-25 RX ADMIN — Medication 1 MILLIGRAM(S): at 20:35

## 2019-10-25 RX ADMIN — OLANZAPINE 10 MILLIGRAM(S): 15 TABLET, FILM COATED ORAL at 20:35

## 2019-10-25 RX ADMIN — Medication 1 CAPSULE(S): at 09:03

## 2019-10-25 RX ADMIN — DIVALPROEX SODIUM 500 MILLIGRAM(S): 500 TABLET, DELAYED RELEASE ORAL at 20:35

## 2019-10-25 RX ADMIN — Medication 100 MILLIGRAM(S): at 09:03

## 2019-10-25 RX ADMIN — Medication 25 MILLIGRAM(S): at 09:02

## 2019-10-25 RX ADMIN — Medication 81 MILLIGRAM(S): at 09:02

## 2019-10-25 RX ADMIN — Medication 25 MILLIGRAM(S): at 20:35

## 2019-10-25 RX ADMIN — Medication 1 PATCH: at 13:17

## 2019-10-25 RX ADMIN — GABAPENTIN 300 MILLIGRAM(S): 400 CAPSULE ORAL at 20:35

## 2019-10-25 NOTE — CHART NOTE - NSCHARTNOTEFT_GEN_A_CORE
Screening Medical Evaluation  Patient Admitted from: [facility] ED    [facility] admitting diagnosis: Schizophrenia    PAST MEDICAL & SURGICAL HISTORY:  Elevated liver enzymes  Paranoid schizophrenia  Hyperlipidemia  BPH (benign prostatic hyperplasia)  GERD (gastroesophageal reflux disease)  HTN (hypertension)  Psychosis  Mood disorder  Anxiety  No significant past surgical history        Allergies    haloperidol (Other)    Intolerances        Social History:     FAMILY HISTORY:      MEDICATIONS  (STANDING):  aspirin  chewable 81 milliGRAM(s) Oral daily  atorvastatin 10 milliGRAM(s) Oral at bedtime  benztropine 1 milliGRAM(s) Oral two times a day  diVALproex  milliGRAM(s) Oral two times a day  escitalopram 10 milliGRAM(s) Oral daily  folic acid 1 milliGRAM(s) Oral daily  gabapentin 300 milliGRAM(s) Oral three times a day  metoprolol tartrate 25 milliGRAM(s) Oral two times a day  multivitamin 1 Tablet(s) Oral daily  OLANZapine 10 milliGRAM(s) Oral two times a day  pancrelipase  (CREON 12,000 Lipase Units) 1 Capsule(s) Oral two times a day with meals  tamsulosin 0.4 milliGRAM(s) Oral at bedtime  thiamine 100 milliGRAM(s) Oral daily    MEDICATIONS  (PRN):  ALBUTerol    90 MICROgram(s) HFA Inhaler 2 Puff(s) Inhalation every 4 hours PRN sob  OLANZapine 5 milliGRAM(s) Oral every 6 hours PRN agitation  OLANZapine Injectable 5 milliGRAM(s) IntraMuscular once PRN agitation      Vital Signs Last 24 Hrs  T(C): 36.4 (25 Oct 2019 00:01), Max: 36.6 (24 Oct 2019 11:03)  T(F): 97.6 (25 Oct 2019 00:01), Max: 97.8 (24 Oct 2019 11:03)  HR: 68 (24 Oct 2019 21:12) (53 - 71)  BP: 143/88 (24 Oct 2019 21:12) (108/76 - 172/95)  BP(mean): --  RR: 18 (24 Oct 2019 21:12) (18 - 18)  SpO2: 98% (24 Oct 2019 21:12) (98% - 99%)  CAPILLARY BLOOD GLUCOSE            PHYSICAL EXAM:  GENERAL: NAD, well-developed  HEAD:  Atraumatic, Normocephalic  EYES: EOMI, PERRLA, conjunctiva and sclera clear  NECK: Supple.  CHEST/LUNG: Clear to auscultation bilaterally; No wheeze  HEART: Regular rate and rhythm; No murmurs, rubs, or gallops  ABDOMEN: Soft, Nontender, Nondistended; Bowel sounds present  EXTREMITIES:  2+ Peripheral Pulses, No cyanosis, or edema  PSYCH: AAOx3  NEUROLOGY: non-focal  SKIN: No rashes or lesions    LABS:                        13.9   5.92  )-----------( 186      ( 24 Oct 2019 12:34 )             42.8     10    144  |  103  |  10.0  ----------------------------<  80  4.2   |  30.0<H>  |  1.06    Ca    10.2      24 Oct 2019 12:34            Urinalysis Basic - ( 24 Oct 2019 12:33 )    Color: Yellow / Appearance: Clear / S.005 / pH: x  Gluc: x / Ketone: Negative  / Bili: Negative / Urobili: Negative mg/dL   Blood: x / Protein: Negative mg/dL / Nitrite: Negative   Leuk Esterase: Negative / RBC: x / WBC x   Sq Epi: x / Non Sq Epi: x / Bacteria: x        RADIOLOGY & ADDITIONAL TESTS:    Assessment and Plan:  59 year old male presenting today from [facility] ED to [facility] with admitting diagnosis of Schizophrenia with PMH of asthma, HTN, and BPH. Denies any medical concerns at this time. Denies any pain, fever, chills, headache, chest pain, SOB, abdominal pain, N/V/D/C, dysuria. Physical exam unremarkable.  1) Asthma: Albuterol inhlaer prn for SOB and/or wheezing.  2) HTN: Continue lopressor 25mg BID.  3) Pancreatic insufficiency: Continue Creon 69610 units BID with meals.  4) BPH: Continue Flomax 0.4mg oral at bedtime.   5) Schizophrenia: Follow care plan as per primary team.

## 2019-10-26 PROCEDURE — 99232 SBSQ HOSP IP/OBS MODERATE 35: CPT

## 2019-10-26 RX ADMIN — TAMSULOSIN HYDROCHLORIDE 0.4 MILLIGRAM(S): 0.4 CAPSULE ORAL at 21:34

## 2019-10-26 RX ADMIN — Medication 1 MILLIGRAM(S): at 09:57

## 2019-10-26 RX ADMIN — Medication 81 MILLIGRAM(S): at 09:57

## 2019-10-26 RX ADMIN — Medication 1 CAPSULE(S): at 10:20

## 2019-10-26 RX ADMIN — Medication 1 CAPSULE(S): at 17:15

## 2019-10-26 RX ADMIN — ESCITALOPRAM OXALATE 10 MILLIGRAM(S): 10 TABLET, FILM COATED ORAL at 09:57

## 2019-10-26 RX ADMIN — Medication 25 MILLIGRAM(S): at 21:34

## 2019-10-26 RX ADMIN — Medication 1 MILLIGRAM(S): at 21:34

## 2019-10-26 RX ADMIN — OLANZAPINE 10 MILLIGRAM(S): 15 TABLET, FILM COATED ORAL at 10:20

## 2019-10-26 RX ADMIN — DIVALPROEX SODIUM 500 MILLIGRAM(S): 500 TABLET, DELAYED RELEASE ORAL at 21:34

## 2019-10-26 RX ADMIN — Medication 1 TABLET(S): at 10:20

## 2019-10-26 RX ADMIN — OLANZAPINE 10 MILLIGRAM(S): 15 TABLET, FILM COATED ORAL at 21:34

## 2019-10-26 RX ADMIN — GABAPENTIN 300 MILLIGRAM(S): 400 CAPSULE ORAL at 21:34

## 2019-10-26 RX ADMIN — GABAPENTIN 300 MILLIGRAM(S): 400 CAPSULE ORAL at 09:57

## 2019-10-26 RX ADMIN — DIVALPROEX SODIUM 500 MILLIGRAM(S): 500 TABLET, DELAYED RELEASE ORAL at 09:57

## 2019-10-26 RX ADMIN — Medication 1 PATCH: at 10:20

## 2019-10-26 RX ADMIN — Medication 25 MILLIGRAM(S): at 10:20

## 2019-10-26 RX ADMIN — Medication 1 MILLIGRAM(S): at 10:16

## 2019-10-26 RX ADMIN — ATORVASTATIN CALCIUM 10 MILLIGRAM(S): 80 TABLET, FILM COATED ORAL at 21:34

## 2019-10-26 RX ADMIN — GABAPENTIN 300 MILLIGRAM(S): 400 CAPSULE ORAL at 13:00

## 2019-10-26 RX ADMIN — Medication 100 MILLIGRAM(S): at 10:21

## 2019-10-27 PROCEDURE — 99232 SBSQ HOSP IP/OBS MODERATE 35: CPT

## 2019-10-27 RX ADMIN — ESCITALOPRAM OXALATE 10 MILLIGRAM(S): 10 TABLET, FILM COATED ORAL at 09:58

## 2019-10-27 RX ADMIN — DIVALPROEX SODIUM 500 MILLIGRAM(S): 500 TABLET, DELAYED RELEASE ORAL at 09:56

## 2019-10-27 RX ADMIN — OLANZAPINE 10 MILLIGRAM(S): 15 TABLET, FILM COATED ORAL at 20:51

## 2019-10-27 RX ADMIN — GABAPENTIN 300 MILLIGRAM(S): 400 CAPSULE ORAL at 14:39

## 2019-10-27 RX ADMIN — Medication 1 MILLIGRAM(S): at 20:52

## 2019-10-27 RX ADMIN — Medication 1 MILLIGRAM(S): at 09:56

## 2019-10-27 RX ADMIN — Medication 81 MILLIGRAM(S): at 09:56

## 2019-10-27 RX ADMIN — Medication 25 MILLIGRAM(S): at 20:51

## 2019-10-27 RX ADMIN — TAMSULOSIN HYDROCHLORIDE 0.4 MILLIGRAM(S): 0.4 CAPSULE ORAL at 20:51

## 2019-10-27 RX ADMIN — GABAPENTIN 300 MILLIGRAM(S): 400 CAPSULE ORAL at 20:51

## 2019-10-27 RX ADMIN — OLANZAPINE 10 MILLIGRAM(S): 15 TABLET, FILM COATED ORAL at 10:02

## 2019-10-27 RX ADMIN — Medication 1 CAPSULE(S): at 10:02

## 2019-10-27 RX ADMIN — GABAPENTIN 300 MILLIGRAM(S): 400 CAPSULE ORAL at 09:56

## 2019-10-27 RX ADMIN — DIVALPROEX SODIUM 500 MILLIGRAM(S): 500 TABLET, DELAYED RELEASE ORAL at 20:52

## 2019-10-27 RX ADMIN — Medication 1 MILLIGRAM(S): at 09:58

## 2019-10-27 RX ADMIN — Medication 100 MILLIGRAM(S): at 10:02

## 2019-10-27 RX ADMIN — Medication 25 MILLIGRAM(S): at 10:01

## 2019-10-27 RX ADMIN — Medication 1 CAPSULE(S): at 17:53

## 2019-10-27 RX ADMIN — Medication 1 PATCH: at 10:01

## 2019-10-27 RX ADMIN — ATORVASTATIN CALCIUM 10 MILLIGRAM(S): 80 TABLET, FILM COATED ORAL at 20:52

## 2019-10-27 RX ADMIN — Medication 1 TABLET(S): at 10:01

## 2019-10-28 PROCEDURE — 99232 SBSQ HOSP IP/OBS MODERATE 35: CPT

## 2019-10-28 RX ADMIN — GABAPENTIN 300 MILLIGRAM(S): 400 CAPSULE ORAL at 09:26

## 2019-10-28 RX ADMIN — TAMSULOSIN HYDROCHLORIDE 0.4 MILLIGRAM(S): 0.4 CAPSULE ORAL at 20:52

## 2019-10-28 RX ADMIN — Medication 1 PATCH: at 19:00

## 2019-10-28 RX ADMIN — Medication 1 MILLIGRAM(S): at 09:26

## 2019-10-28 RX ADMIN — ESCITALOPRAM OXALATE 10 MILLIGRAM(S): 10 TABLET, FILM COATED ORAL at 09:26

## 2019-10-28 RX ADMIN — DIVALPROEX SODIUM 500 MILLIGRAM(S): 500 TABLET, DELAYED RELEASE ORAL at 20:52

## 2019-10-28 RX ADMIN — Medication 1 CAPSULE(S): at 09:27

## 2019-10-28 RX ADMIN — OLANZAPINE 10 MILLIGRAM(S): 15 TABLET, FILM COATED ORAL at 20:52

## 2019-10-28 RX ADMIN — DIVALPROEX SODIUM 500 MILLIGRAM(S): 500 TABLET, DELAYED RELEASE ORAL at 09:26

## 2019-10-28 RX ADMIN — OLANZAPINE 10 MILLIGRAM(S): 15 TABLET, FILM COATED ORAL at 09:27

## 2019-10-28 RX ADMIN — Medication 81 MILLIGRAM(S): at 09:26

## 2019-10-28 RX ADMIN — Medication 25 MILLIGRAM(S): at 09:26

## 2019-10-28 RX ADMIN — Medication 1 MILLIGRAM(S): at 20:52

## 2019-10-28 RX ADMIN — ATORVASTATIN CALCIUM 10 MILLIGRAM(S): 80 TABLET, FILM COATED ORAL at 20:52

## 2019-10-28 RX ADMIN — Medication 1 TABLET(S): at 09:26

## 2019-10-28 RX ADMIN — GABAPENTIN 300 MILLIGRAM(S): 400 CAPSULE ORAL at 20:52

## 2019-10-28 RX ADMIN — Medication 25 MILLIGRAM(S): at 20:52

## 2019-10-28 RX ADMIN — Medication 1 CAPSULE(S): at 15:30

## 2019-10-28 RX ADMIN — Medication 1 PATCH: at 09:26

## 2019-10-28 RX ADMIN — Medication 100 MILLIGRAM(S): at 09:27

## 2019-10-28 RX ADMIN — GABAPENTIN 300 MILLIGRAM(S): 400 CAPSULE ORAL at 15:10

## 2019-10-29 PROCEDURE — 99231 SBSQ HOSP IP/OBS SF/LOW 25: CPT

## 2019-10-29 RX ADMIN — GABAPENTIN 300 MILLIGRAM(S): 400 CAPSULE ORAL at 13:21

## 2019-10-29 RX ADMIN — DIVALPROEX SODIUM 500 MILLIGRAM(S): 500 TABLET, DELAYED RELEASE ORAL at 20:59

## 2019-10-29 RX ADMIN — Medication 1 MILLIGRAM(S): at 09:53

## 2019-10-29 RX ADMIN — TAMSULOSIN HYDROCHLORIDE 0.4 MILLIGRAM(S): 0.4 CAPSULE ORAL at 21:01

## 2019-10-29 RX ADMIN — Medication 25 MILLIGRAM(S): at 21:00

## 2019-10-29 RX ADMIN — DIVALPROEX SODIUM 500 MILLIGRAM(S): 500 TABLET, DELAYED RELEASE ORAL at 09:53

## 2019-10-29 RX ADMIN — Medication 25 MILLIGRAM(S): at 09:53

## 2019-10-29 RX ADMIN — ATORVASTATIN CALCIUM 10 MILLIGRAM(S): 80 TABLET, FILM COATED ORAL at 20:59

## 2019-10-29 RX ADMIN — OLANZAPINE 10 MILLIGRAM(S): 15 TABLET, FILM COATED ORAL at 21:01

## 2019-10-29 RX ADMIN — OLANZAPINE 10 MILLIGRAM(S): 15 TABLET, FILM COATED ORAL at 09:54

## 2019-10-29 RX ADMIN — Medication 100 MILLIGRAM(S): at 09:54

## 2019-10-29 RX ADMIN — Medication 1 CAPSULE(S): at 16:28

## 2019-10-29 RX ADMIN — ESCITALOPRAM OXALATE 10 MILLIGRAM(S): 10 TABLET, FILM COATED ORAL at 09:53

## 2019-10-29 RX ADMIN — Medication 1 MILLIGRAM(S): at 20:59

## 2019-10-29 RX ADMIN — Medication 1 PATCH: at 09:54

## 2019-10-29 RX ADMIN — Medication 1 PATCH: at 09:53

## 2019-10-29 RX ADMIN — Medication 81 MILLIGRAM(S): at 09:53

## 2019-10-29 RX ADMIN — GABAPENTIN 300 MILLIGRAM(S): 400 CAPSULE ORAL at 09:53

## 2019-10-29 RX ADMIN — GABAPENTIN 300 MILLIGRAM(S): 400 CAPSULE ORAL at 21:00

## 2019-10-29 RX ADMIN — Medication 1 CAPSULE(S): at 09:54

## 2019-10-29 RX ADMIN — Medication 1 TABLET(S): at 09:53

## 2019-10-30 PROCEDURE — 99232 SBSQ HOSP IP/OBS MODERATE 35: CPT

## 2019-10-30 RX ADMIN — Medication 1 PATCH: at 08:53

## 2019-10-30 RX ADMIN — Medication 1 MILLIGRAM(S): at 08:53

## 2019-10-30 RX ADMIN — Medication 25 MILLIGRAM(S): at 21:35

## 2019-10-30 RX ADMIN — GABAPENTIN 300 MILLIGRAM(S): 400 CAPSULE ORAL at 08:53

## 2019-10-30 RX ADMIN — OLANZAPINE 10 MILLIGRAM(S): 15 TABLET, FILM COATED ORAL at 08:54

## 2019-10-30 RX ADMIN — GABAPENTIN 300 MILLIGRAM(S): 400 CAPSULE ORAL at 15:35

## 2019-10-30 RX ADMIN — Medication 81 MILLIGRAM(S): at 08:53

## 2019-10-30 RX ADMIN — ESCITALOPRAM OXALATE 10 MILLIGRAM(S): 10 TABLET, FILM COATED ORAL at 08:53

## 2019-10-30 RX ADMIN — Medication 1 PATCH: at 08:54

## 2019-10-30 RX ADMIN — Medication 1 MILLIGRAM(S): at 21:35

## 2019-10-30 RX ADMIN — OLANZAPINE 10 MILLIGRAM(S): 15 TABLET, FILM COATED ORAL at 21:35

## 2019-10-30 RX ADMIN — GABAPENTIN 300 MILLIGRAM(S): 400 CAPSULE ORAL at 21:35

## 2019-10-30 RX ADMIN — Medication 1 CAPSULE(S): at 08:54

## 2019-10-30 RX ADMIN — DIVALPROEX SODIUM 500 MILLIGRAM(S): 500 TABLET, DELAYED RELEASE ORAL at 21:35

## 2019-10-30 RX ADMIN — Medication 1 CAPSULE(S): at 15:30

## 2019-10-30 RX ADMIN — Medication 25 MILLIGRAM(S): at 08:53

## 2019-10-30 RX ADMIN — ATORVASTATIN CALCIUM 10 MILLIGRAM(S): 80 TABLET, FILM COATED ORAL at 21:35

## 2019-10-30 RX ADMIN — DIVALPROEX SODIUM 500 MILLIGRAM(S): 500 TABLET, DELAYED RELEASE ORAL at 08:53

## 2019-10-30 RX ADMIN — Medication 1 TABLET(S): at 08:53

## 2019-10-30 RX ADMIN — Medication 100 MILLIGRAM(S): at 08:54

## 2019-10-30 RX ADMIN — TAMSULOSIN HYDROCHLORIDE 0.4 MILLIGRAM(S): 0.4 CAPSULE ORAL at 21:35

## 2019-10-31 PROCEDURE — 99232 SBSQ HOSP IP/OBS MODERATE 35: CPT

## 2019-10-31 RX ORDER — LANOLIN ALCOHOL/MO/W.PET/CERES
3 CREAM (GRAM) TOPICAL ONCE
Refills: 0 | Status: COMPLETED | OUTPATIENT
Start: 2019-10-31 | End: 2019-11-01

## 2019-10-31 RX ADMIN — TAMSULOSIN HYDROCHLORIDE 0.4 MILLIGRAM(S): 0.4 CAPSULE ORAL at 21:15

## 2019-10-31 RX ADMIN — OLANZAPINE 10 MILLIGRAM(S): 15 TABLET, FILM COATED ORAL at 21:15

## 2019-10-31 RX ADMIN — Medication 1 CAPSULE(S): at 10:04

## 2019-10-31 RX ADMIN — Medication 1 MILLIGRAM(S): at 10:04

## 2019-10-31 RX ADMIN — DIVALPROEX SODIUM 500 MILLIGRAM(S): 500 TABLET, DELAYED RELEASE ORAL at 21:15

## 2019-10-31 RX ADMIN — OLANZAPINE 10 MILLIGRAM(S): 15 TABLET, FILM COATED ORAL at 10:04

## 2019-10-31 RX ADMIN — Medication 1 CAPSULE(S): at 16:18

## 2019-10-31 RX ADMIN — Medication 1 MILLIGRAM(S): at 21:15

## 2019-10-31 RX ADMIN — Medication 1 TABLET(S): at 10:04

## 2019-10-31 RX ADMIN — ESCITALOPRAM OXALATE 10 MILLIGRAM(S): 10 TABLET, FILM COATED ORAL at 10:04

## 2019-10-31 RX ADMIN — GABAPENTIN 300 MILLIGRAM(S): 400 CAPSULE ORAL at 13:10

## 2019-10-31 RX ADMIN — DIVALPROEX SODIUM 500 MILLIGRAM(S): 500 TABLET, DELAYED RELEASE ORAL at 10:04

## 2019-10-31 RX ADMIN — Medication 100 MILLIGRAM(S): at 10:04

## 2019-10-31 RX ADMIN — Medication 81 MILLIGRAM(S): at 10:04

## 2019-10-31 RX ADMIN — GABAPENTIN 300 MILLIGRAM(S): 400 CAPSULE ORAL at 10:04

## 2019-10-31 RX ADMIN — ATORVASTATIN CALCIUM 10 MILLIGRAM(S): 80 TABLET, FILM COATED ORAL at 21:15

## 2019-10-31 RX ADMIN — Medication 25 MILLIGRAM(S): at 21:15

## 2019-10-31 RX ADMIN — GABAPENTIN 300 MILLIGRAM(S): 400 CAPSULE ORAL at 21:15

## 2019-10-31 RX ADMIN — Medication 25 MILLIGRAM(S): at 10:04

## 2019-11-01 PROCEDURE — 99232 SBSQ HOSP IP/OBS MODERATE 35: CPT

## 2019-11-01 RX ADMIN — Medication 1 MILLIGRAM(S): at 20:58

## 2019-11-01 RX ADMIN — Medication 1 CAPSULE(S): at 09:22

## 2019-11-01 RX ADMIN — Medication 3 MILLIGRAM(S): at 00:10

## 2019-11-01 RX ADMIN — Medication 1 CAPSULE(S): at 17:21

## 2019-11-01 RX ADMIN — Medication 81 MILLIGRAM(S): at 09:23

## 2019-11-01 RX ADMIN — OLANZAPINE 10 MILLIGRAM(S): 15 TABLET, FILM COATED ORAL at 20:58

## 2019-11-01 RX ADMIN — ATORVASTATIN CALCIUM 10 MILLIGRAM(S): 80 TABLET, FILM COATED ORAL at 20:58

## 2019-11-01 RX ADMIN — Medication 100 MILLIGRAM(S): at 09:23

## 2019-11-01 RX ADMIN — DIVALPROEX SODIUM 500 MILLIGRAM(S): 500 TABLET, DELAYED RELEASE ORAL at 09:23

## 2019-11-01 RX ADMIN — GABAPENTIN 300 MILLIGRAM(S): 400 CAPSULE ORAL at 20:58

## 2019-11-01 RX ADMIN — Medication 25 MILLIGRAM(S): at 20:58

## 2019-11-01 RX ADMIN — OLANZAPINE 10 MILLIGRAM(S): 15 TABLET, FILM COATED ORAL at 09:22

## 2019-11-01 RX ADMIN — ESCITALOPRAM OXALATE 10 MILLIGRAM(S): 10 TABLET, FILM COATED ORAL at 09:23

## 2019-11-01 RX ADMIN — Medication 1 PATCH: at 20:58

## 2019-11-01 RX ADMIN — Medication 25 MILLIGRAM(S): at 09:23

## 2019-11-01 RX ADMIN — Medication 1 MILLIGRAM(S): at 09:23

## 2019-11-01 RX ADMIN — GABAPENTIN 300 MILLIGRAM(S): 400 CAPSULE ORAL at 09:23

## 2019-11-01 RX ADMIN — Medication 1 TABLET(S): at 09:22

## 2019-11-01 RX ADMIN — TAMSULOSIN HYDROCHLORIDE 0.4 MILLIGRAM(S): 0.4 CAPSULE ORAL at 20:58

## 2019-11-01 RX ADMIN — GABAPENTIN 300 MILLIGRAM(S): 400 CAPSULE ORAL at 14:06

## 2019-11-01 RX ADMIN — Medication 1 PATCH: at 09:22

## 2019-11-01 RX ADMIN — DIVALPROEX SODIUM 500 MILLIGRAM(S): 500 TABLET, DELAYED RELEASE ORAL at 20:58

## 2019-11-02 RX ADMIN — GABAPENTIN 300 MILLIGRAM(S): 400 CAPSULE ORAL at 09:03

## 2019-11-02 RX ADMIN — GABAPENTIN 300 MILLIGRAM(S): 400 CAPSULE ORAL at 21:01

## 2019-11-02 RX ADMIN — Medication 1 CAPSULE(S): at 09:03

## 2019-11-02 RX ADMIN — TAMSULOSIN HYDROCHLORIDE 0.4 MILLIGRAM(S): 0.4 CAPSULE ORAL at 21:00

## 2019-11-02 RX ADMIN — Medication 1 TABLET(S): at 09:03

## 2019-11-02 RX ADMIN — Medication 1 MILLIGRAM(S): at 09:02

## 2019-11-02 RX ADMIN — OLANZAPINE 10 MILLIGRAM(S): 15 TABLET, FILM COATED ORAL at 09:13

## 2019-11-02 RX ADMIN — Medication 25 MILLIGRAM(S): at 09:03

## 2019-11-02 RX ADMIN — ESCITALOPRAM OXALATE 10 MILLIGRAM(S): 10 TABLET, FILM COATED ORAL at 09:02

## 2019-11-02 RX ADMIN — Medication 1 CAPSULE(S): at 16:23

## 2019-11-02 RX ADMIN — Medication 1 PATCH: at 09:12

## 2019-11-02 RX ADMIN — OLANZAPINE 10 MILLIGRAM(S): 15 TABLET, FILM COATED ORAL at 21:01

## 2019-11-02 RX ADMIN — DIVALPROEX SODIUM 500 MILLIGRAM(S): 500 TABLET, DELAYED RELEASE ORAL at 21:00

## 2019-11-02 RX ADMIN — ATORVASTATIN CALCIUM 10 MILLIGRAM(S): 80 TABLET, FILM COATED ORAL at 21:00

## 2019-11-02 RX ADMIN — Medication 25 MILLIGRAM(S): at 21:01

## 2019-11-02 RX ADMIN — Medication 81 MILLIGRAM(S): at 09:02

## 2019-11-02 RX ADMIN — Medication 100 MILLIGRAM(S): at 09:03

## 2019-11-02 RX ADMIN — Medication 1 MILLIGRAM(S): at 21:00

## 2019-11-02 RX ADMIN — GABAPENTIN 300 MILLIGRAM(S): 400 CAPSULE ORAL at 14:09

## 2019-11-02 RX ADMIN — DIVALPROEX SODIUM 500 MILLIGRAM(S): 500 TABLET, DELAYED RELEASE ORAL at 09:02

## 2019-11-03 RX ADMIN — TAMSULOSIN HYDROCHLORIDE 0.4 MILLIGRAM(S): 0.4 CAPSULE ORAL at 20:54

## 2019-11-03 RX ADMIN — GABAPENTIN 300 MILLIGRAM(S): 400 CAPSULE ORAL at 20:54

## 2019-11-03 RX ADMIN — GABAPENTIN 300 MILLIGRAM(S): 400 CAPSULE ORAL at 09:18

## 2019-11-03 RX ADMIN — Medication 1 CAPSULE(S): at 09:17

## 2019-11-03 RX ADMIN — Medication 1 PATCH: at 09:17

## 2019-11-03 RX ADMIN — GABAPENTIN 300 MILLIGRAM(S): 400 CAPSULE ORAL at 12:00

## 2019-11-03 RX ADMIN — Medication 1 MILLIGRAM(S): at 20:54

## 2019-11-03 RX ADMIN — Medication 1 PATCH: at 20:53

## 2019-11-03 RX ADMIN — Medication 25 MILLIGRAM(S): at 09:18

## 2019-11-03 RX ADMIN — Medication 81 MILLIGRAM(S): at 09:18

## 2019-11-03 RX ADMIN — Medication 1 MILLIGRAM(S): at 09:18

## 2019-11-03 RX ADMIN — Medication 1 TABLET(S): at 09:18

## 2019-11-03 RX ADMIN — ESCITALOPRAM OXALATE 10 MILLIGRAM(S): 10 TABLET, FILM COATED ORAL at 09:18

## 2019-11-03 RX ADMIN — DIVALPROEX SODIUM 500 MILLIGRAM(S): 500 TABLET, DELAYED RELEASE ORAL at 09:18

## 2019-11-03 RX ADMIN — Medication 100 MILLIGRAM(S): at 09:17

## 2019-11-03 RX ADMIN — OLANZAPINE 10 MILLIGRAM(S): 15 TABLET, FILM COATED ORAL at 09:17

## 2019-11-03 RX ADMIN — Medication 25 MILLIGRAM(S): at 20:54

## 2019-11-03 RX ADMIN — ATORVASTATIN CALCIUM 10 MILLIGRAM(S): 80 TABLET, FILM COATED ORAL at 20:54

## 2019-11-03 RX ADMIN — Medication 1 CAPSULE(S): at 15:52

## 2019-11-03 RX ADMIN — DIVALPROEX SODIUM 500 MILLIGRAM(S): 500 TABLET, DELAYED RELEASE ORAL at 20:54

## 2019-11-03 RX ADMIN — OLANZAPINE 10 MILLIGRAM(S): 15 TABLET, FILM COATED ORAL at 20:54

## 2019-11-04 PROCEDURE — 99232 SBSQ HOSP IP/OBS MODERATE 35: CPT

## 2019-11-04 RX ADMIN — Medication 1 MILLIGRAM(S): at 08:54

## 2019-11-04 RX ADMIN — ESCITALOPRAM OXALATE 10 MILLIGRAM(S): 10 TABLET, FILM COATED ORAL at 08:54

## 2019-11-04 RX ADMIN — Medication 1 PATCH: at 08:54

## 2019-11-04 RX ADMIN — Medication 25 MILLIGRAM(S): at 20:33

## 2019-11-04 RX ADMIN — Medication 1 PATCH: at 08:55

## 2019-11-04 RX ADMIN — GABAPENTIN 300 MILLIGRAM(S): 400 CAPSULE ORAL at 13:13

## 2019-11-04 RX ADMIN — Medication 1 CAPSULE(S): at 08:55

## 2019-11-04 RX ADMIN — Medication 1 TABLET(S): at 08:54

## 2019-11-04 RX ADMIN — Medication 100 MILLIGRAM(S): at 08:55

## 2019-11-04 RX ADMIN — DIVALPROEX SODIUM 500 MILLIGRAM(S): 500 TABLET, DELAYED RELEASE ORAL at 20:33

## 2019-11-04 RX ADMIN — Medication 1 MILLIGRAM(S): at 20:33

## 2019-11-04 RX ADMIN — ATORVASTATIN CALCIUM 10 MILLIGRAM(S): 80 TABLET, FILM COATED ORAL at 20:33

## 2019-11-04 RX ADMIN — Medication 1 CAPSULE(S): at 17:20

## 2019-11-04 RX ADMIN — OLANZAPINE 10 MILLIGRAM(S): 15 TABLET, FILM COATED ORAL at 08:55

## 2019-11-04 RX ADMIN — TAMSULOSIN HYDROCHLORIDE 0.4 MILLIGRAM(S): 0.4 CAPSULE ORAL at 20:35

## 2019-11-04 RX ADMIN — GABAPENTIN 300 MILLIGRAM(S): 400 CAPSULE ORAL at 08:54

## 2019-11-04 RX ADMIN — Medication 25 MILLIGRAM(S): at 08:54

## 2019-11-04 RX ADMIN — Medication 1 PATCH: at 19:33

## 2019-11-04 RX ADMIN — OLANZAPINE 10 MILLIGRAM(S): 15 TABLET, FILM COATED ORAL at 20:34

## 2019-11-04 RX ADMIN — Medication 81 MILLIGRAM(S): at 08:54

## 2019-11-04 RX ADMIN — DIVALPROEX SODIUM 500 MILLIGRAM(S): 500 TABLET, DELAYED RELEASE ORAL at 08:54

## 2019-11-04 RX ADMIN — GABAPENTIN 300 MILLIGRAM(S): 400 CAPSULE ORAL at 20:33

## 2019-11-05 PROCEDURE — 99231 SBSQ HOSP IP/OBS SF/LOW 25: CPT

## 2019-11-05 RX ADMIN — Medication 100 MILLIGRAM(S): at 08:21

## 2019-11-05 RX ADMIN — ESCITALOPRAM OXALATE 10 MILLIGRAM(S): 10 TABLET, FILM COATED ORAL at 08:21

## 2019-11-05 RX ADMIN — TAMSULOSIN HYDROCHLORIDE 0.4 MILLIGRAM(S): 0.4 CAPSULE ORAL at 20:58

## 2019-11-05 RX ADMIN — Medication 1 CAPSULE(S): at 17:14

## 2019-11-05 RX ADMIN — Medication 1 CAPSULE(S): at 08:21

## 2019-11-05 RX ADMIN — GABAPENTIN 300 MILLIGRAM(S): 400 CAPSULE ORAL at 12:40

## 2019-11-05 RX ADMIN — Medication 1 MILLIGRAM(S): at 08:21

## 2019-11-05 RX ADMIN — OLANZAPINE 10 MILLIGRAM(S): 15 TABLET, FILM COATED ORAL at 08:21

## 2019-11-05 RX ADMIN — GABAPENTIN 300 MILLIGRAM(S): 400 CAPSULE ORAL at 08:21

## 2019-11-05 RX ADMIN — DIVALPROEX SODIUM 500 MILLIGRAM(S): 500 TABLET, DELAYED RELEASE ORAL at 08:21

## 2019-11-05 RX ADMIN — Medication 1 PATCH: at 08:21

## 2019-11-05 RX ADMIN — ATORVASTATIN CALCIUM 10 MILLIGRAM(S): 80 TABLET, FILM COATED ORAL at 20:58

## 2019-11-05 RX ADMIN — Medication 1 MILLIGRAM(S): at 20:58

## 2019-11-05 RX ADMIN — OLANZAPINE 10 MILLIGRAM(S): 15 TABLET, FILM COATED ORAL at 20:58

## 2019-11-05 RX ADMIN — Medication 1 PATCH: at 19:53

## 2019-11-05 RX ADMIN — Medication 1 TABLET(S): at 08:20

## 2019-11-05 RX ADMIN — Medication 25 MILLIGRAM(S): at 20:58

## 2019-11-05 RX ADMIN — DIVALPROEX SODIUM 500 MILLIGRAM(S): 500 TABLET, DELAYED RELEASE ORAL at 20:58

## 2019-11-05 RX ADMIN — Medication 1 PATCH: at 08:20

## 2019-11-05 RX ADMIN — Medication 81 MILLIGRAM(S): at 08:21

## 2019-11-05 RX ADMIN — Medication 25 MILLIGRAM(S): at 08:21

## 2019-11-05 RX ADMIN — GABAPENTIN 300 MILLIGRAM(S): 400 CAPSULE ORAL at 20:58

## 2019-11-06 PROCEDURE — 99231 SBSQ HOSP IP/OBS SF/LOW 25: CPT

## 2019-11-06 RX ADMIN — Medication 1 PATCH: at 20:44

## 2019-11-06 RX ADMIN — TAMSULOSIN HYDROCHLORIDE 0.4 MILLIGRAM(S): 0.4 CAPSULE ORAL at 20:44

## 2019-11-06 RX ADMIN — Medication 25 MILLIGRAM(S): at 20:43

## 2019-11-06 RX ADMIN — DIVALPROEX SODIUM 500 MILLIGRAM(S): 500 TABLET, DELAYED RELEASE ORAL at 20:43

## 2019-11-06 RX ADMIN — Medication 1 PATCH: at 08:57

## 2019-11-06 RX ADMIN — GABAPENTIN 300 MILLIGRAM(S): 400 CAPSULE ORAL at 09:05

## 2019-11-06 RX ADMIN — Medication 25 MILLIGRAM(S): at 08:57

## 2019-11-06 RX ADMIN — Medication 1 TABLET(S): at 08:57

## 2019-11-06 RX ADMIN — OLANZAPINE 10 MILLIGRAM(S): 15 TABLET, FILM COATED ORAL at 08:58

## 2019-11-06 RX ADMIN — GABAPENTIN 300 MILLIGRAM(S): 400 CAPSULE ORAL at 13:00

## 2019-11-06 RX ADMIN — DIVALPROEX SODIUM 500 MILLIGRAM(S): 500 TABLET, DELAYED RELEASE ORAL at 09:05

## 2019-11-06 RX ADMIN — OLANZAPINE 10 MILLIGRAM(S): 15 TABLET, FILM COATED ORAL at 20:44

## 2019-11-06 RX ADMIN — Medication 1 CAPSULE(S): at 16:09

## 2019-11-06 RX ADMIN — ESCITALOPRAM OXALATE 10 MILLIGRAM(S): 10 TABLET, FILM COATED ORAL at 09:05

## 2019-11-06 RX ADMIN — Medication 1 MILLIGRAM(S): at 09:05

## 2019-11-06 RX ADMIN — ATORVASTATIN CALCIUM 10 MILLIGRAM(S): 80 TABLET, FILM COATED ORAL at 20:43

## 2019-11-06 RX ADMIN — Medication 1 MILLIGRAM(S): at 20:43

## 2019-11-06 RX ADMIN — Medication 81 MILLIGRAM(S): at 09:05

## 2019-11-06 RX ADMIN — Medication 100 MILLIGRAM(S): at 08:58

## 2019-11-06 RX ADMIN — Medication 1 CAPSULE(S): at 08:58

## 2019-11-06 RX ADMIN — GABAPENTIN 300 MILLIGRAM(S): 400 CAPSULE ORAL at 20:43

## 2019-11-07 PROCEDURE — 99231 SBSQ HOSP IP/OBS SF/LOW 25: CPT

## 2019-11-07 RX ADMIN — Medication 1 MILLIGRAM(S): at 08:50

## 2019-11-07 RX ADMIN — GABAPENTIN 300 MILLIGRAM(S): 400 CAPSULE ORAL at 08:50

## 2019-11-07 RX ADMIN — DIVALPROEX SODIUM 500 MILLIGRAM(S): 500 TABLET, DELAYED RELEASE ORAL at 08:50

## 2019-11-07 RX ADMIN — Medication 1 PATCH: at 10:21

## 2019-11-07 RX ADMIN — Medication 1 TABLET(S): at 08:50

## 2019-11-07 RX ADMIN — TAMSULOSIN HYDROCHLORIDE 0.4 MILLIGRAM(S): 0.4 CAPSULE ORAL at 20:49

## 2019-11-07 RX ADMIN — Medication 81 MILLIGRAM(S): at 08:50

## 2019-11-07 RX ADMIN — OLANZAPINE 10 MILLIGRAM(S): 15 TABLET, FILM COATED ORAL at 20:49

## 2019-11-07 RX ADMIN — Medication 25 MILLIGRAM(S): at 08:50

## 2019-11-07 RX ADMIN — Medication 100 MILLIGRAM(S): at 10:22

## 2019-11-07 RX ADMIN — GABAPENTIN 300 MILLIGRAM(S): 400 CAPSULE ORAL at 20:49

## 2019-11-07 RX ADMIN — Medication 1 MILLIGRAM(S): at 20:49

## 2019-11-07 RX ADMIN — ATORVASTATIN CALCIUM 10 MILLIGRAM(S): 80 TABLET, FILM COATED ORAL at 20:49

## 2019-11-07 RX ADMIN — ESCITALOPRAM OXALATE 10 MILLIGRAM(S): 10 TABLET, FILM COATED ORAL at 08:50

## 2019-11-07 RX ADMIN — Medication 1 CAPSULE(S): at 17:03

## 2019-11-07 RX ADMIN — GABAPENTIN 300 MILLIGRAM(S): 400 CAPSULE ORAL at 14:52

## 2019-11-07 RX ADMIN — OLANZAPINE 10 MILLIGRAM(S): 15 TABLET, FILM COATED ORAL at 10:21

## 2019-11-07 RX ADMIN — DIVALPROEX SODIUM 500 MILLIGRAM(S): 500 TABLET, DELAYED RELEASE ORAL at 20:49

## 2019-11-07 RX ADMIN — Medication 1 CAPSULE(S): at 10:22

## 2019-11-07 RX ADMIN — Medication 25 MILLIGRAM(S): at 20:49

## 2019-11-08 PROCEDURE — 99231 SBSQ HOSP IP/OBS SF/LOW 25: CPT

## 2019-11-08 RX ADMIN — Medication 1 MILLIGRAM(S): at 20:19

## 2019-11-08 RX ADMIN — Medication 1 MILLIGRAM(S): at 09:07

## 2019-11-08 RX ADMIN — Medication 1 CAPSULE(S): at 18:00

## 2019-11-08 RX ADMIN — Medication 1 TABLET(S): at 09:07

## 2019-11-08 RX ADMIN — ATORVASTATIN CALCIUM 10 MILLIGRAM(S): 80 TABLET, FILM COATED ORAL at 20:19

## 2019-11-08 RX ADMIN — OLANZAPINE 10 MILLIGRAM(S): 15 TABLET, FILM COATED ORAL at 09:07

## 2019-11-08 RX ADMIN — Medication 100 MILLIGRAM(S): at 09:07

## 2019-11-08 RX ADMIN — OLANZAPINE 10 MILLIGRAM(S): 15 TABLET, FILM COATED ORAL at 20:19

## 2019-11-08 RX ADMIN — DIVALPROEX SODIUM 500 MILLIGRAM(S): 500 TABLET, DELAYED RELEASE ORAL at 09:07

## 2019-11-08 RX ADMIN — Medication 1 PATCH: at 09:07

## 2019-11-08 RX ADMIN — GABAPENTIN 300 MILLIGRAM(S): 400 CAPSULE ORAL at 09:07

## 2019-11-08 RX ADMIN — ESCITALOPRAM OXALATE 10 MILLIGRAM(S): 10 TABLET, FILM COATED ORAL at 09:07

## 2019-11-08 RX ADMIN — Medication 25 MILLIGRAM(S): at 20:19

## 2019-11-08 RX ADMIN — GABAPENTIN 300 MILLIGRAM(S): 400 CAPSULE ORAL at 12:43

## 2019-11-08 RX ADMIN — DIVALPROEX SODIUM 500 MILLIGRAM(S): 500 TABLET, DELAYED RELEASE ORAL at 20:19

## 2019-11-08 RX ADMIN — Medication 25 MILLIGRAM(S): at 09:07

## 2019-11-08 RX ADMIN — TAMSULOSIN HYDROCHLORIDE 0.4 MILLIGRAM(S): 0.4 CAPSULE ORAL at 20:19

## 2019-11-08 RX ADMIN — Medication 81 MILLIGRAM(S): at 09:07

## 2019-11-08 RX ADMIN — Medication 1 CAPSULE(S): at 09:07

## 2019-11-08 RX ADMIN — GABAPENTIN 300 MILLIGRAM(S): 400 CAPSULE ORAL at 20:19

## 2019-11-09 RX ADMIN — OLANZAPINE 10 MILLIGRAM(S): 15 TABLET, FILM COATED ORAL at 09:59

## 2019-11-09 RX ADMIN — Medication 1 CAPSULE(S): at 17:05

## 2019-11-09 RX ADMIN — Medication 1 MILLIGRAM(S): at 09:59

## 2019-11-09 RX ADMIN — Medication 1 PATCH: at 18:41

## 2019-11-09 RX ADMIN — Medication 1 PATCH: at 07:44

## 2019-11-09 RX ADMIN — GABAPENTIN 300 MILLIGRAM(S): 400 CAPSULE ORAL at 13:12

## 2019-11-09 RX ADMIN — OLANZAPINE 10 MILLIGRAM(S): 15 TABLET, FILM COATED ORAL at 20:32

## 2019-11-09 RX ADMIN — TAMSULOSIN HYDROCHLORIDE 0.4 MILLIGRAM(S): 0.4 CAPSULE ORAL at 20:32

## 2019-11-09 RX ADMIN — Medication 1 TABLET(S): at 09:58

## 2019-11-09 RX ADMIN — Medication 81 MILLIGRAM(S): at 09:59

## 2019-11-09 RX ADMIN — Medication 1 PATCH: at 10:00

## 2019-11-09 RX ADMIN — DIVALPROEX SODIUM 500 MILLIGRAM(S): 500 TABLET, DELAYED RELEASE ORAL at 20:32

## 2019-11-09 RX ADMIN — GABAPENTIN 300 MILLIGRAM(S): 400 CAPSULE ORAL at 20:32

## 2019-11-09 RX ADMIN — ESCITALOPRAM OXALATE 10 MILLIGRAM(S): 10 TABLET, FILM COATED ORAL at 09:58

## 2019-11-09 RX ADMIN — DIVALPROEX SODIUM 500 MILLIGRAM(S): 500 TABLET, DELAYED RELEASE ORAL at 09:58

## 2019-11-09 RX ADMIN — Medication 25 MILLIGRAM(S): at 20:32

## 2019-11-09 RX ADMIN — ATORVASTATIN CALCIUM 10 MILLIGRAM(S): 80 TABLET, FILM COATED ORAL at 20:32

## 2019-11-09 RX ADMIN — Medication 1 MILLIGRAM(S): at 20:32

## 2019-11-09 RX ADMIN — Medication 25 MILLIGRAM(S): at 10:00

## 2019-11-09 RX ADMIN — Medication 100 MILLIGRAM(S): at 09:58

## 2019-11-09 RX ADMIN — Medication 1 CAPSULE(S): at 10:00

## 2019-11-09 RX ADMIN — GABAPENTIN 300 MILLIGRAM(S): 400 CAPSULE ORAL at 09:59

## 2019-11-09 RX ADMIN — Medication 1 PATCH: at 07:45

## 2019-11-10 RX ADMIN — Medication 1 PATCH: at 07:33

## 2019-11-10 RX ADMIN — Medication 1 PATCH: at 09:00

## 2019-11-10 RX ADMIN — OLANZAPINE 10 MILLIGRAM(S): 15 TABLET, FILM COATED ORAL at 20:59

## 2019-11-10 RX ADMIN — Medication 1 CAPSULE(S): at 09:07

## 2019-11-10 RX ADMIN — Medication 100 MILLIGRAM(S): at 09:09

## 2019-11-10 RX ADMIN — DIVALPROEX SODIUM 500 MILLIGRAM(S): 500 TABLET, DELAYED RELEASE ORAL at 20:59

## 2019-11-10 RX ADMIN — DIVALPROEX SODIUM 500 MILLIGRAM(S): 500 TABLET, DELAYED RELEASE ORAL at 09:07

## 2019-11-10 RX ADMIN — OLANZAPINE 10 MILLIGRAM(S): 15 TABLET, FILM COATED ORAL at 09:09

## 2019-11-10 RX ADMIN — Medication 81 MILLIGRAM(S): at 09:08

## 2019-11-10 RX ADMIN — Medication 1 PATCH: at 18:40

## 2019-11-10 RX ADMIN — GABAPENTIN 300 MILLIGRAM(S): 400 CAPSULE ORAL at 09:09

## 2019-11-10 RX ADMIN — Medication 25 MILLIGRAM(S): at 09:08

## 2019-11-10 RX ADMIN — Medication 1 MILLIGRAM(S): at 20:59

## 2019-11-10 RX ADMIN — ESCITALOPRAM OXALATE 10 MILLIGRAM(S): 10 TABLET, FILM COATED ORAL at 09:10

## 2019-11-10 RX ADMIN — Medication 1 TABLET(S): at 09:08

## 2019-11-10 RX ADMIN — TAMSULOSIN HYDROCHLORIDE 0.4 MILLIGRAM(S): 0.4 CAPSULE ORAL at 20:59

## 2019-11-10 RX ADMIN — Medication 1 CAPSULE(S): at 17:21

## 2019-11-10 RX ADMIN — Medication 1 MILLIGRAM(S): at 09:08

## 2019-11-10 RX ADMIN — Medication 25 MILLIGRAM(S): at 20:59

## 2019-11-10 RX ADMIN — GABAPENTIN 300 MILLIGRAM(S): 400 CAPSULE ORAL at 20:59

## 2019-11-10 RX ADMIN — GABAPENTIN 300 MILLIGRAM(S): 400 CAPSULE ORAL at 13:58

## 2019-11-10 RX ADMIN — Medication 1 PATCH: at 09:10

## 2019-11-10 RX ADMIN — ATORVASTATIN CALCIUM 10 MILLIGRAM(S): 80 TABLET, FILM COATED ORAL at 20:59

## 2019-11-11 PROCEDURE — 99231 SBSQ HOSP IP/OBS SF/LOW 25: CPT

## 2019-11-11 RX ADMIN — Medication 1 PATCH: at 09:09

## 2019-11-11 RX ADMIN — Medication 100 MILLIGRAM(S): at 09:02

## 2019-11-11 RX ADMIN — Medication 81 MILLIGRAM(S): at 09:01

## 2019-11-11 RX ADMIN — Medication 1 PATCH: at 19:00

## 2019-11-11 RX ADMIN — GABAPENTIN 300 MILLIGRAM(S): 400 CAPSULE ORAL at 20:51

## 2019-11-11 RX ADMIN — Medication 1 TABLET(S): at 09:02

## 2019-11-11 RX ADMIN — Medication 1 PATCH: at 09:02

## 2019-11-11 RX ADMIN — ATORVASTATIN CALCIUM 10 MILLIGRAM(S): 80 TABLET, FILM COATED ORAL at 20:51

## 2019-11-11 RX ADMIN — Medication 1 CAPSULE(S): at 17:01

## 2019-11-11 RX ADMIN — Medication 1 MILLIGRAM(S): at 09:01

## 2019-11-11 RX ADMIN — Medication 1 CAPSULE(S): at 09:02

## 2019-11-11 RX ADMIN — TAMSULOSIN HYDROCHLORIDE 0.4 MILLIGRAM(S): 0.4 CAPSULE ORAL at 20:51

## 2019-11-11 RX ADMIN — GABAPENTIN 300 MILLIGRAM(S): 400 CAPSULE ORAL at 08:40

## 2019-11-11 RX ADMIN — OLANZAPINE 10 MILLIGRAM(S): 15 TABLET, FILM COATED ORAL at 20:51

## 2019-11-11 RX ADMIN — DIVALPROEX SODIUM 500 MILLIGRAM(S): 500 TABLET, DELAYED RELEASE ORAL at 20:51

## 2019-11-11 RX ADMIN — OLANZAPINE 10 MILLIGRAM(S): 15 TABLET, FILM COATED ORAL at 09:02

## 2019-11-11 RX ADMIN — Medication 25 MILLIGRAM(S): at 20:51

## 2019-11-11 RX ADMIN — GABAPENTIN 300 MILLIGRAM(S): 400 CAPSULE ORAL at 12:28

## 2019-11-11 RX ADMIN — ESCITALOPRAM OXALATE 10 MILLIGRAM(S): 10 TABLET, FILM COATED ORAL at 09:01

## 2019-11-11 RX ADMIN — DIVALPROEX SODIUM 500 MILLIGRAM(S): 500 TABLET, DELAYED RELEASE ORAL at 09:01

## 2019-11-11 RX ADMIN — Medication 25 MILLIGRAM(S): at 09:01

## 2019-11-11 RX ADMIN — Medication 1 MILLIGRAM(S): at 20:51

## 2019-11-12 VITALS — SYSTOLIC BLOOD PRESSURE: 147 MMHG | DIASTOLIC BLOOD PRESSURE: 69 MMHG | TEMPERATURE: 97 F | HEART RATE: 62 BPM

## 2019-11-12 PROCEDURE — 99238 HOSP IP/OBS DSCHRG MGMT 30/<: CPT

## 2019-11-12 RX ORDER — LIPASE/PROTEASE/AMYLASE 16-48-48K
1 CAPSULE,DELAYED RELEASE (ENTERIC COATED) ORAL
Qty: 60 | Refills: 0
Start: 2019-11-12 | End: 2019-12-11

## 2019-11-12 RX ORDER — ESCITALOPRAM OXALATE 10 MG/1
1 TABLET, FILM COATED ORAL
Qty: 30 | Refills: 0
Start: 2019-11-12 | End: 2019-12-11

## 2019-11-12 RX ORDER — DIVALPROEX SODIUM 500 MG/1
1 TABLET, DELAYED RELEASE ORAL
Qty: 60 | Refills: 0
Start: 2019-11-12 | End: 2019-12-11

## 2019-11-12 RX ORDER — OLANZAPINE 15 MG/1
1 TABLET, FILM COATED ORAL
Qty: 60 | Refills: 0
Start: 2019-11-12 | End: 2019-12-11

## 2019-11-12 RX ORDER — ASPIRIN/CALCIUM CARB/MAGNESIUM 324 MG
1 TABLET ORAL
Qty: 30 | Refills: 0
Start: 2019-11-12 | End: 2019-12-11

## 2019-11-12 RX ORDER — FOLIC ACID 0.8 MG
1 TABLET ORAL
Qty: 30 | Refills: 0
Start: 2019-11-12 | End: 2019-12-11

## 2019-11-12 RX ORDER — GABAPENTIN 400 MG/1
1 CAPSULE ORAL
Qty: 90 | Refills: 0
Start: 2019-11-12 | End: 2019-12-11

## 2019-11-12 RX ORDER — THIAMINE MONONITRATE (VIT B1) 100 MG
1 TABLET ORAL
Qty: 30 | Refills: 0
Start: 2019-11-12 | End: 2019-12-11

## 2019-11-12 RX ORDER — METOPROLOL TARTRATE 50 MG
1 TABLET ORAL
Qty: 60 | Refills: 0
Start: 2019-11-12 | End: 2019-12-11

## 2019-11-12 RX ORDER — BENZTROPINE MESYLATE 1 MG
1 TABLET ORAL
Qty: 60 | Refills: 0
Start: 2019-11-12 | End: 2019-12-11

## 2019-11-12 RX ORDER — ATORVASTATIN CALCIUM 80 MG/1
1 TABLET, FILM COATED ORAL
Qty: 30 | Refills: 0
Start: 2019-11-12 | End: 2019-12-11

## 2019-11-12 RX ORDER — TAMSULOSIN HYDROCHLORIDE 0.4 MG/1
1 CAPSULE ORAL
Qty: 30 | Refills: 0
Start: 2019-11-12 | End: 2019-12-11

## 2019-11-12 RX ADMIN — Medication 1 MILLIGRAM(S): at 08:34

## 2019-11-12 RX ADMIN — Medication 1 PATCH: at 08:34

## 2019-11-12 RX ADMIN — DIVALPROEX SODIUM 500 MILLIGRAM(S): 500 TABLET, DELAYED RELEASE ORAL at 08:34

## 2019-11-12 RX ADMIN — GABAPENTIN 300 MILLIGRAM(S): 400 CAPSULE ORAL at 13:10

## 2019-11-12 RX ADMIN — ESCITALOPRAM OXALATE 10 MILLIGRAM(S): 10 TABLET, FILM COATED ORAL at 08:34

## 2019-11-12 RX ADMIN — Medication 100 MILLIGRAM(S): at 08:35

## 2019-11-12 RX ADMIN — OLANZAPINE 10 MILLIGRAM(S): 15 TABLET, FILM COATED ORAL at 08:35

## 2019-11-12 RX ADMIN — Medication 1 PATCH: at 08:35

## 2019-11-12 RX ADMIN — Medication 1 CAPSULE(S): at 08:35

## 2019-11-12 RX ADMIN — GABAPENTIN 300 MILLIGRAM(S): 400 CAPSULE ORAL at 08:41

## 2019-11-12 RX ADMIN — Medication 81 MILLIGRAM(S): at 08:34

## 2019-11-12 RX ADMIN — Medication 1 TABLET(S): at 08:34

## 2019-11-12 RX ADMIN — Medication 25 MILLIGRAM(S): at 08:34

## 2020-01-16 ENCOUNTER — EMERGENCY (EMERGENCY)
Facility: HOSPITAL | Age: 60
LOS: 1 days | Discharge: OTHER INSTITUTION | End: 2020-01-16
Attending: EMERGENCY MEDICINE
Payer: MEDICARE

## 2020-01-16 VITALS
SYSTOLIC BLOOD PRESSURE: 174 MMHG | OXYGEN SATURATION: 100 % | HEART RATE: 58 BPM | RESPIRATION RATE: 18 BRPM | HEIGHT: 66 IN | WEIGHT: 164.91 LBS | TEMPERATURE: 98 F | DIASTOLIC BLOOD PRESSURE: 90 MMHG

## 2020-01-16 DIAGNOSIS — F20.0 PARANOID SCHIZOPHRENIA: ICD-10-CM

## 2020-01-16 LAB
ALBUMIN SERPL ELPH-MCNC: 4.6 G/DL — SIGNIFICANT CHANGE UP (ref 3.3–5.2)
ALP SERPL-CCNC: 95 U/L — SIGNIFICANT CHANGE UP (ref 40–120)
ALT FLD-CCNC: 19 U/L — SIGNIFICANT CHANGE UP
AMPHET UR-MCNC: NEGATIVE — SIGNIFICANT CHANGE UP
ANION GAP SERPL CALC-SCNC: 15 MMOL/L — SIGNIFICANT CHANGE UP (ref 5–17)
APAP SERPL-MCNC: <7.5 UG/ML — LOW (ref 10–26)
APPEARANCE UR: CLEAR — SIGNIFICANT CHANGE UP
AST SERPL-CCNC: 28 U/L — SIGNIFICANT CHANGE UP
BARBITURATES UR SCN-MCNC: NEGATIVE — SIGNIFICANT CHANGE UP
BASOPHILS # BLD AUTO: 0.03 K/UL — SIGNIFICANT CHANGE UP (ref 0–0.2)
BASOPHILS NFR BLD AUTO: 0.5 % — SIGNIFICANT CHANGE UP (ref 0–2)
BENZODIAZ UR-MCNC: NEGATIVE — SIGNIFICANT CHANGE UP
BILIRUB SERPL-MCNC: 0.3 MG/DL — LOW (ref 0.4–2)
BILIRUB UR-MCNC: NEGATIVE — SIGNIFICANT CHANGE UP
BUN SERPL-MCNC: 14 MG/DL — SIGNIFICANT CHANGE UP (ref 8–20)
CALCIUM SERPL-MCNC: 10 MG/DL — SIGNIFICANT CHANGE UP (ref 8.6–10.2)
CHLORIDE SERPL-SCNC: 104 MMOL/L — SIGNIFICANT CHANGE UP (ref 98–107)
CO2 SERPL-SCNC: 25 MMOL/L — SIGNIFICANT CHANGE UP (ref 22–29)
COCAINE METAB.OTHER UR-MCNC: NEGATIVE — SIGNIFICANT CHANGE UP
COLOR SPEC: YELLOW — SIGNIFICANT CHANGE UP
CREAT SERPL-MCNC: 1.04 MG/DL — SIGNIFICANT CHANGE UP (ref 0.5–1.3)
DIFF PNL FLD: NEGATIVE — SIGNIFICANT CHANGE UP
EOSINOPHIL # BLD AUTO: 0.08 K/UL — SIGNIFICANT CHANGE UP (ref 0–0.5)
EOSINOPHIL NFR BLD AUTO: 1.3 % — SIGNIFICANT CHANGE UP (ref 0–6)
ETHANOL SERPL-MCNC: <10 MG/DL — SIGNIFICANT CHANGE UP
GLUCOSE SERPL-MCNC: 81 MG/DL — SIGNIFICANT CHANGE UP (ref 70–115)
GLUCOSE UR QL: NEGATIVE MG/DL — SIGNIFICANT CHANGE UP
HCT VFR BLD CALC: 43.3 % — SIGNIFICANT CHANGE UP (ref 39–50)
HGB BLD-MCNC: 14.3 G/DL — SIGNIFICANT CHANGE UP (ref 13–17)
IMM GRANULOCYTES NFR BLD AUTO: 0.3 % — SIGNIFICANT CHANGE UP (ref 0–1.5)
KETONES UR-MCNC: NEGATIVE — SIGNIFICANT CHANGE UP
LEUKOCYTE ESTERASE UR-ACNC: NEGATIVE — SIGNIFICANT CHANGE UP
LYMPHOCYTES # BLD AUTO: 1.47 K/UL — SIGNIFICANT CHANGE UP (ref 1–3.3)
LYMPHOCYTES # BLD AUTO: 24.1 % — SIGNIFICANT CHANGE UP (ref 13–44)
MCHC RBC-ENTMCNC: 31.2 PG — SIGNIFICANT CHANGE UP (ref 27–34)
MCHC RBC-ENTMCNC: 33 GM/DL — SIGNIFICANT CHANGE UP (ref 32–36)
MCV RBC AUTO: 94.5 FL — SIGNIFICANT CHANGE UP (ref 80–100)
METHADONE UR-MCNC: NEGATIVE — SIGNIFICANT CHANGE UP
MONOCYTES # BLD AUTO: 0.68 K/UL — SIGNIFICANT CHANGE UP (ref 0–0.9)
MONOCYTES NFR BLD AUTO: 11.1 % — SIGNIFICANT CHANGE UP (ref 2–14)
NEUTROPHILS # BLD AUTO: 3.82 K/UL — SIGNIFICANT CHANGE UP (ref 1.8–7.4)
NEUTROPHILS NFR BLD AUTO: 62.7 % — SIGNIFICANT CHANGE UP (ref 43–77)
NITRITE UR-MCNC: NEGATIVE — SIGNIFICANT CHANGE UP
OPIATES UR-MCNC: NEGATIVE — SIGNIFICANT CHANGE UP
PCP SPEC-MCNC: SIGNIFICANT CHANGE UP
PCP UR-MCNC: NEGATIVE — SIGNIFICANT CHANGE UP
PH UR: 6 — SIGNIFICANT CHANGE UP (ref 5–8)
PLATELET # BLD AUTO: 182 K/UL — SIGNIFICANT CHANGE UP (ref 150–400)
POTASSIUM SERPL-MCNC: 4.4 MMOL/L — SIGNIFICANT CHANGE UP (ref 3.5–5.3)
POTASSIUM SERPL-SCNC: 4.4 MMOL/L — SIGNIFICANT CHANGE UP (ref 3.5–5.3)
PROT SERPL-MCNC: 8.1 G/DL — SIGNIFICANT CHANGE UP (ref 6.6–8.7)
PROT UR-MCNC: NEGATIVE MG/DL — SIGNIFICANT CHANGE UP
RBC # BLD: 4.58 M/UL — SIGNIFICANT CHANGE UP (ref 4.2–5.8)
RBC # FLD: 12.9 % — SIGNIFICANT CHANGE UP (ref 10.3–14.5)
SALICYLATES SERPL-MCNC: <0.6 MG/DL — LOW (ref 10–20)
SODIUM SERPL-SCNC: 144 MMOL/L — SIGNIFICANT CHANGE UP (ref 135–145)
SP GR SPEC: 1 — LOW (ref 1.01–1.02)
THC UR QL: NEGATIVE — SIGNIFICANT CHANGE UP
UROBILINOGEN FLD QL: NEGATIVE MG/DL — SIGNIFICANT CHANGE UP
VALPROATE SERPL-MCNC: 68.9 UG/ML — SIGNIFICANT CHANGE UP (ref 50–100)
WBC # BLD: 6.1 K/UL — SIGNIFICANT CHANGE UP (ref 3.8–10.5)
WBC # FLD AUTO: 6.1 K/UL — SIGNIFICANT CHANGE UP (ref 3.8–10.5)

## 2020-01-16 PROCEDURE — 93010 ELECTROCARDIOGRAM REPORT: CPT

## 2020-01-16 PROCEDURE — 99285 EMERGENCY DEPT VISIT HI MDM: CPT

## 2020-01-16 PROCEDURE — 90792 PSYCH DIAG EVAL W/MED SRVCS: CPT

## 2020-01-16 RX ORDER — METOPROLOL TARTRATE 50 MG
25 TABLET ORAL
Refills: 0 | Status: DISCONTINUED | OUTPATIENT
Start: 2020-01-16 | End: 2020-02-04

## 2020-01-16 RX ORDER — DIVALPROEX SODIUM 500 MG/1
500 TABLET, DELAYED RELEASE ORAL DAILY
Refills: 0 | Status: DISCONTINUED | OUTPATIENT
Start: 2020-01-16 | End: 2020-02-04

## 2020-01-16 RX ORDER — ALBUTEROL 90 UG/1
2 AEROSOL, METERED ORAL EVERY 4 HOURS
Refills: 0 | Status: DISCONTINUED | OUTPATIENT
Start: 2020-01-16 | End: 2020-02-04

## 2020-01-16 RX ORDER — GABAPENTIN 400 MG/1
300 CAPSULE ORAL THREE TIMES A DAY
Refills: 0 | Status: DISCONTINUED | OUTPATIENT
Start: 2020-01-16 | End: 2020-02-04

## 2020-01-16 RX ORDER — ESCITALOPRAM OXALATE 10 MG/1
10 TABLET, FILM COATED ORAL DAILY
Refills: 0 | Status: DISCONTINUED | OUTPATIENT
Start: 2020-01-16 | End: 2020-02-04

## 2020-01-16 RX ORDER — BENZTROPINE MESYLATE 1 MG
1 TABLET ORAL
Refills: 0 | Status: DISCONTINUED | OUTPATIENT
Start: 2020-01-16 | End: 2020-02-04

## 2020-01-16 RX ORDER — TAMSULOSIN HYDROCHLORIDE 0.4 MG/1
0.4 CAPSULE ORAL AT BEDTIME
Refills: 0 | Status: DISCONTINUED | OUTPATIENT
Start: 2020-01-16 | End: 2020-02-04

## 2020-01-16 RX ORDER — ASPIRIN/CALCIUM CARB/MAGNESIUM 324 MG
81 TABLET ORAL DAILY
Refills: 0 | Status: DISCONTINUED | OUTPATIENT
Start: 2020-01-16 | End: 2020-02-04

## 2020-01-16 RX ORDER — FOLIC ACID 0.8 MG
1 TABLET ORAL DAILY
Refills: 0 | Status: DISCONTINUED | OUTPATIENT
Start: 2020-01-16 | End: 2020-02-04

## 2020-01-16 RX ORDER — ATORVASTATIN CALCIUM 80 MG/1
10 TABLET, FILM COATED ORAL AT BEDTIME
Refills: 0 | Status: DISCONTINUED | OUTPATIENT
Start: 2020-01-16 | End: 2020-02-04

## 2020-01-16 RX ORDER — OLANZAPINE 15 MG/1
20 TABLET, FILM COATED ORAL AT BEDTIME
Refills: 0 | Status: DISCONTINUED | OUTPATIENT
Start: 2020-01-16 | End: 2020-02-04

## 2020-01-16 RX ORDER — THIAMINE MONONITRATE (VIT B1) 100 MG
100 TABLET ORAL DAILY
Refills: 0 | Status: DISCONTINUED | OUTPATIENT
Start: 2020-01-16 | End: 2020-02-04

## 2020-01-16 RX ORDER — LIPASE/PROTEASE/AMYLASE 16-48-48K
12000 CAPSULE,DELAYED RELEASE (ENTERIC COATED) ORAL
Refills: 0 | Status: DISCONTINUED | OUTPATIENT
Start: 2020-01-16 | End: 2020-02-04

## 2020-01-16 RX ADMIN — Medication 81 MILLIGRAM(S): at 22:14

## 2020-01-16 RX ADMIN — Medication 1 MILLIGRAM(S): at 22:14

## 2020-01-16 RX ADMIN — Medication 25 MILLIGRAM(S): at 22:13

## 2020-01-16 RX ADMIN — Medication 1 TABLET(S): at 22:13

## 2020-01-16 RX ADMIN — GABAPENTIN 300 MILLIGRAM(S): 400 CAPSULE ORAL at 22:14

## 2020-01-16 RX ADMIN — ATORVASTATIN CALCIUM 10 MILLIGRAM(S): 80 TABLET, FILM COATED ORAL at 22:14

## 2020-01-16 RX ADMIN — Medication 0.5 MILLIGRAM(S): at 16:40

## 2020-01-16 RX ADMIN — OLANZAPINE 20 MILLIGRAM(S): 15 TABLET, FILM COATED ORAL at 22:13

## 2020-01-16 RX ADMIN — TAMSULOSIN HYDROCHLORIDE 0.4 MILLIGRAM(S): 0.4 CAPSULE ORAL at 22:13

## 2020-01-16 NOTE — ED ADULT NURSE NOTE - OBJECTIVE STATEMENT
Patient presented in  area dressed in hospital gowns.  Patient is awake and oriented times four and speaking with clear speech.  Patient endorses he has been feeling suicidal due to stress of living in Adult Home and being bothered for his money and cigarettes by other residents.  Patient reports he drank a "small cup" of "cholrax" last week and did not seek medically treatment with the intent to end his life.  Patient reports he has been having these thoughts to attempt suicide again by this method.  Patient reports past suicide attempt 6 years after overdose of ASA and vitamins.  Patient denies auditory or visual hallucinations.  Patient give delayed response to direct questions and does not maintain eye contact throughout interview.  Patient reports he complies with his medications and is treated by Nguyen Arteaga NP.  Cooperative with security contraband assessment.

## 2020-01-16 NOTE — ED BEHAVIORAL HEALTH ASSESSMENT NOTE - OTHER PAST PSYCHIATRIC HISTORY (INCLUDE DETAILS REGARDING ONSET, COURSE OF ILLNESS, INPATIENT/OUTPATIENT TREATMENT)
Multiple prior hospitalizations (pt reports eight).  Most recently discharged from [facility] Oct 2019

## 2020-01-16 NOTE — ED PROVIDER NOTE - PROGRESS NOTE DETAILS
ekg unchangedf rom 10/24/2019, labs normal and in the absence of cardiac sx, pt is medically cleared

## 2020-01-16 NOTE — ED BEHAVIORAL HEALTH ASSESSMENT NOTE - HPI (INCLUDE ILLNESS QUALITY, SEVERITY, DURATION, TIMING, CONTEXT, MODIFYING FACTORS, ASSOCIATED SIGNS AND SYMPTOMS)
Patient is a 60 y/o single male domiciled at [facility] in [place], PPH Paranoid Schizophrenia, PMH of BPH, Gerd, HTN, and HLD,  multiple inpatient psychiatric hospitalizations, last time Oct 2019 2 reported prior suicide attempts, unknown h/o of substance abuse, [facility],  called by [facility] with worsening suicidal ideation and reported SA by ingestion of Clorox 1 week ago.  Pt reports past SA by ingestion of 50 ASA approx 6 yrs ago, but ststes he sis not seek help, nor did he go to hospital.  Pt reports another SA by OD of vitamins approx 15 yrs ago when stating in shelter, also not admitted or treated.  Pt came to ED with c/o depression SI but no current plan.  Pt reports wants to die and is actively suicidal.  Pt reports 1 week ago drinking 1 cup (1/2 mug) of Clorox, which he did not vomit, nor did he seek rescue.  Pt  reports nothing happened.  Pt states 3 days ago he told Mj, his  and was encouraged to come to ED but Pt states he refused, now agreeing for eval.  Pt reports in tx with LILIYA Martin, but could not give meds.  Pt also endorsing paranoid ideation, believes a house may hurt him by stabbing, even though they are friends.  Attempted collateral with LILIYA Martin and message left for return call.  Radhake with brother who reports Pt behavior is chronic with regard to recurrent psych admissions and SI, but brother Shoaib reports this is the first time he was told by Pt of an ingestion of cleaning product.  Brother states Pt is not happy at residence and is depressed.  Brother denies knowing of any prior SA.  Pt is well mannered, polite, good eye contact, fairly well related, speech is linear, with delayed responses, blocking, denies AH and denies h/o same, denies HIIP, and no h/o violence in past. Endorsing depression with SI but no current plan s/p ingestion of Bleach n1 cup 1 week ago.  Spoke with staff at residence who reports Pt told him he was hearing voices and was suicidal and asked for him to call 911.  Of note, Pt denied AH when asked in interview.

## 2020-01-16 NOTE — ED ADULT TRIAGE NOTE - NS_BH TRG Q3ATPIY_ED_ALL_ED
Referring Provider: Dr. Marie  Reason for Consultation: PJI    Subjective   History of present illness:    This is a very nice 65-year-old gentleman was admitted on 02/13/2017 with sepsis.  Infectious disease service was asked to provide evaluation for possible PJI.    Per the patient, he says he was in his usual state of health until about 3 or 4 weeks ago when he developed a left 1st toe ulcer and blister.  He did not have much pain on there as he has diabetic neuropathy in that foot, but had been visiting a podiatrist and had progression of his infection despite local wound care and antibiotic therapy.  On 02/12/2017 he developed acute onset of fevers and R knee pain/swelling, and this prompted [facility].  He could barely even bare any weight on his right knee 2/2 pain and arthrocentesis analysis revealed 39,000 white cells.   On 2/14, he was taken to the operating room by Dr. Brand for debridement and amputation of his 1st left toe.  I did review that note and there was gross pus on the distal phalanx and the nailbed.  Additionally, he was taken to the operating room and underwent incision and debridement and polyliner exchange of right TKA by Dr. Marie.  I did talk to Dr. Marie about this case and the TKA was quite involved.  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 Camacho in 2011.  He has been maintained on vancomycin and Zosyn and admission blood cultures grew MSSA.  The patient says that his sugars have been incompletely controlled with average readings in the 200s.    PAST MEDICAL HISTORY:  1.  Uncontrolled diabetes mellitus type 2.  2.  Hypertension.  3.  Hyperlipidemia.  4.  GERD.  5.  History of nephrolithiasis.  6.  Chronic pain.  7.  Osteoarthritis status post bilateral joint replacement with right PJI in 2011 treated with 2-stage replacement and  antibiotic therapy.  8.  Back hardware placement.  9.  Placement of an orthopedic screw on the right shoulder.  10.  Appendectomy.  11.  History of pancreatitis.    FAMILY HISTORY:  No family history of infection.    SOCIAL HISTORY:  Lives in [place], [place] with his girlfriend.  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  He is a former smoker, but quit about 3 decades ago.  He denies current tobacco, ethanol or drug use.    Review of Systems  Pertinent items are noted in HPI, all other systems reviewed and negative    Objective     Physical Exam:   Vital Signs   Temp:  [98.7 °F (37.1 °C)-100.3 °F (37.9 °C)] 98.9 °F (37.2 °C)  Heart Rate:  [] 120  Resp:  [14-20] 18  BP: (138-184)/() 151/86    GENERAL: Awake and alert, in no acute distress.   HEENT: Oropharynx is clear. Hearing is grossly normal.   EYES: PERRL. No conjunctival injection. No lid lag.   LYMPHATICS: No lymphadenopathy of the neck or axillary regions.   HEART: Regular rate and rhythm. No peripheral edema.   LUNGS: Clear to auscultation anteriorly with normal respiratory effort.   GI: Soft, nontender, nondistended. No appreciable organomegaly.   SKIN: Warm and dry without cutaneous eruptions; Left foot wrapped and R knee with drain and immobolizer  PSYCHIATRIC: Appropriate mood, affect, insight, and judgment.     Results Review:   I reviewed the patient's new clinical results.  WBC 13.6 (P79, L 10, m 11)  H/H 10.7/31    Cr 0.99  A1c 9.25  LFTs nl    Estimated Creatinine Clearance: 101.8 mL/min (by C-G formula based on Cr of 0.99).      Microbiology:  2/13 Bcx 2/2 MSSA  2/14 R knee fluid cx (WBC 14402 P78, L20, M1; RBC 5550  2/14 L toe two cultures P  2/14 R knee cultures: P    Radiology:  CXR negative    Assessment/Plan   MSSA septicemia  Left 1 toe osteo  R TKA PJI  Uncontrolled DM II, complicating above    MSSA septicemia, suspected from L toe osteo leading to hematogenous seeding of R knee and PJI.    Will  repeat bcx and check surface echo.  D/c vanc and zosyn and start cefazolin 2g IV q8.  Add rifampin once sure bcx are clear.  D/w Dr. Marie and we'll see how he does before deciding about two stage vs. One stage mgmt for PJI.  Will need 6 weeks IV abx with rifampin +/- chronic PO suppressive abx depending if TKA stays in place or has removal of hardware/abx space.  Ultimately not going to get better unless he gets serious about sugars     Thank you for this consult.  We will continue to follow along and tailor antibiotics as the patient's clinical course evolves.    Amor Mayberry MD  02/15/17  8:07 AM         24 hours

## 2020-01-16 NOTE — ED BEHAVIORAL HEALTH ASSESSMENT NOTE - DESCRIPTION
uneventful  ICU Vital Signs Last 24 Hrs  T(C): 36.7 (16 Jan 2020 15:10), Max: 36.7 (16 Jan 2020 15:10)  T(F): 98 (16 Jan 2020 15:10), Max: 98 (16 Jan 2020 15:10)  HR: 57 (16 Jan 2020 15:10) (57 - 58)  BP: 143/80 (16 Jan 2020 15:10) (143/80 - 174/90)  BP(mean): --  ABP: --  ABP(mean): --  RR: 18 (16 Jan 2020 15:10) (18 - 18)  SpO2: 100% (16 Jan 2020 15:10) (100% - 100%) HLD, HTN, BPH 60yo male never  no children

## 2020-01-16 NOTE — ED ADULT NURSE NOTE - PAIN: PRESENCE, MLM
Patient:   YANCI AGUIRRE            MRN: [id number]            FIN: [id number]               Age:   54 years     Sex:  FEMALE     :  61   Associated Diagnoses:   None   Author:   PATRICIA BAEZ      Plastic Surgery History and Physical      Date of history and physical:  2017    Chief complaint:  Biopsy-proven basal cell carcinoma, left upper lip    History of present illness:  This is a 54-year-old female referred to me by Dr. Hiram King with a biopsy-proven basal carcinoma of the left upper lip.  In the office, risks and benefits of excision with intraoperative frozen section analysis and subsequent adjacent tissue rearrangement were reviewed.  All preoperative questions were answered and the patient presents for operation.    Past medical history:  1.  As above  2.  Seasonal allergies    Past surgical history:  Unremarkable    Medications on presentation:  Allegra    Allergies:  No known drug allergies    Social history:  The patient denies tobacco use.  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    Family history:  Unremarkable    Review of systems:  Noncontributory    Physical examination:  Reveals an awake and alert female in no distress.  Vital signs are afebrile and stable.  The patient is 5 feet 9 inches tall and weighs 210 pounds.    The lung exam is clear to auscultation bilaterally.  The heart exam is regular rate and rhythm without murmurs.  The HEENT examination reveals a biopsy site in the left upper lip area.  There is a separate smaller lesion medial to the biopsy site at the vermillion border which is suspicious.    Assessment/Plan:  Biopsy-proven basal cell carcinoma, left upper lip.  The plan is to go to the operating room on Thursday, 2017 under local anesthesia at [facility].  I will first biopsy the 2nd smaller lesion medial to the biopsy site to determine the presence or absence of basal cell  carcinoma in this area.  This will then, in turn, determine the width of excision including the original biopsy site.                 Electronically Signed On 12/28/2017 07:32  __________________________________________________   PATRICIA BAEZ      Electronically Signed On 12/31/2017 17:08  __________________________________________________   PATRICIA BAEZ     denies pain/discomfort

## 2020-01-16 NOTE — ED BEHAVIORAL HEALTH ASSESSMENT NOTE - DETAILS
n/a 20 years ago took p & 6 years ago took bottle of aspirin; No Current plan.  Ingested 1 cup bleach 1 week ago in a SA tremor treated with cogentin mom had what was described as "nervous breakdown" tremors to follow Spoke with staff at residence who reports Pt was hearing voices and suicidal and to call 911.

## 2020-01-16 NOTE — ED BEHAVIORAL HEALTH ASSESSMENT NOTE - CURRENT MEDICATION
Metoprolol 25 bid, , Gabapentin 300 mg tid, Cogentin 1 mg bid,  depakote 500 mg qd, Flomax 0.4 mg hs, Lexapro 10 mg hs, Lipitor 10 mg daily, Zyprexa 20 mg hs,  ASA 81 mg daily, Creon ER 1200 units bid,  MVI daily, Folic acid 1 mg daily, Benadryl 50 mg hs. Ventolin 90 mcg 2 puff Q 4hrs PRN SOB (confirmed with group home on 8/5/19)

## 2020-01-16 NOTE — ED ADULT TRIAGE NOTE - NS_BH TRG Q3YESTPIY_ED_ALL_ED
Patient: Keeley Andrews    Procedure Summary     Date:  09/24/19 Room / Location:   OR  / SURGERY [facility]    Deven Mendoza M.D. Responsible Provider:  Tobey Gansert, M.D.    Anesthesia Type:  general ASA Status:  1          Final Anesthesia Type: general  Last vitals  BP   Blood Pressure: 122/70    Temp   36.2 °C (97.2 °F)    Pulse   Pulse: 75, Heart Rate (Monitored): 60   Resp   12    SpO2   100 %      Anesthesia Post Evaluation    Patient location during evaluation: PACU  Patient participation: complete - patient participated  Level of consciousness: awake and alert  Pain score: 2    Airway patency: patent  Anesthetic complications: no  Cardiovascular status: hemodynamically stable  Respiratory status: acceptable  Hydration status: euvolemic    PONV: none           Nurse Pain Score: 2 (NPRS)         24 hours

## 2020-01-16 NOTE — ED BEHAVIORAL HEALTH ASSESSMENT NOTE - RISK ASSESSMENT
RF Pt has maladaptive risk factor, impulsive behavior, past SA, poor insight.  WY:, Pt is agreeable to admissions and is compliant with meds High Acute Suicide Risk

## 2020-01-16 NOTE — ED PROVIDER NOTE - OBJECTIVE STATEMENT
59 year old male with PMH schizophrenia presents with depression and SI. Pt reports feeling more depressed and suicidal for the past 2 weeks. he reports that 1 week ago he drank 1 cup of household bleach. However, since then he has been able to eat with no difficulty or pain, no abd pain, no vomiting, fever, chills. He denies etoh or illicit drug use, denies HI, headache, blurry vision, chest pain, SOB.

## 2020-01-16 NOTE — ED BEHAVIORAL HEALTH ASSESSMENT NOTE - SUICIDE RISK FACTORS
Unable to engage in safety planning/Mood Disorder current/past/Psychotic disorder current/past/Hopelessness or despair/Impulsivity/Access to lethal methods (pills, firearm, etc.: Ask specifically about presence or absence of a firearm in the home or ease of accessing/Current mood episode

## 2020-01-16 NOTE — ED BEHAVIORAL HEALTH ASSESSMENT NOTE - OTHER
peers showered in ED secondary to reports of bedbugs in residence not assessed pending available bed

## 2020-01-16 NOTE — ED BEHAVIORAL HEALTH NOTE - BEHAVIORAL HEALTH NOTE
Social work note: Pt is recommended for inpatient treatment at this time. Pt signed 9.13 voluntary legals. Worker reached out to [facility] and spoke with Max in admissions. SW also faxed documents to [facility] for review. SW to follow for admission.

## 2020-01-16 NOTE — ED BEHAVIORAL HEALTH ASSESSMENT NOTE - SUMMARY
Patient is a 60 y/o single male domiciled at [facility] in [place], PPH Paranoid Schizophrenia, PMH of BPH, Gerd, HTN, and HLD,  multiple inpatient psychiatric hospitalizations, last time Oct 2019 2 reported prior suicide attempts, unknown h/o of substance abuse, [facility],  called by [facility] with worsening suicidal ideation and reported SA by ingestion of Clorox 1 week ago.  Pt reports past SA by ingestion of 50 ASA approx 6 yrs ago, but states he sis not seek help, nor did he go to hospital.  Pt reports another SA by OD of vitamins approx 15 yrs ago when stating in shelter, also not admitted or treated.  Pt reports depressed mood with paranoid ideation, SI denies plan s/p ingestion of bleach in SA approx 1 week ago.  Pt reported it to  2 days ago who referred Pt to ED for eval.  Pt continue with SI and wants to die, denies known plan as of yet.  States he tried to die with bleach ingestion but nothing happened.  Pt requires in pt psych admissions for safety and meds eval and will sign voluntary for admission.

## 2020-01-17 ENCOUNTER — INPATIENT (INPATIENT)
Facility: HOSPITAL | Age: 60
LOS: 23 days | Discharge: HOME | End: 2020-02-10
Attending: PSYCHIATRY & NEUROLOGY | Admitting: PSYCHIATRY & NEUROLOGY
Payer: MEDICARE

## 2020-01-17 VITALS — HEIGHT: 67 IN | WEIGHT: 165.35 LBS | TEMPERATURE: 96 F | RESPIRATION RATE: 17 BRPM

## 2020-01-17 VITALS
RESPIRATION RATE: 18 BRPM | TEMPERATURE: 98 F | OXYGEN SATURATION: 96 % | HEART RATE: 67 BPM | DIASTOLIC BLOOD PRESSURE: 67 MMHG | SYSTOLIC BLOOD PRESSURE: 145 MMHG

## 2020-01-17 DIAGNOSIS — F20.0 PARANOID SCHIZOPHRENIA: ICD-10-CM

## 2020-01-17 PROCEDURE — 36415 COLL VENOUS BLD VENIPUNCTURE: CPT

## 2020-01-17 PROCEDURE — 99285 EMERGENCY DEPT VISIT HI MDM: CPT | Mod: 25

## 2020-01-17 PROCEDURE — 93005 ELECTROCARDIOGRAM TRACING: CPT

## 2020-01-17 PROCEDURE — 80164 ASSAY DIPROPYLACETIC ACD TOT: CPT

## 2020-01-17 PROCEDURE — 80053 COMPREHEN METABOLIC PANEL: CPT

## 2020-01-17 PROCEDURE — 80307 DRUG TEST PRSMV CHEM ANLYZR: CPT

## 2020-01-17 PROCEDURE — 81003 URINALYSIS AUTO W/O SCOPE: CPT

## 2020-01-17 PROCEDURE — 85027 COMPLETE CBC AUTOMATED: CPT

## 2020-01-17 PROCEDURE — 99223 1ST HOSP IP/OBS HIGH 75: CPT

## 2020-01-17 RX ORDER — TAMSULOSIN HYDROCHLORIDE 0.4 MG/1
0.4 CAPSULE ORAL AT BEDTIME
Refills: 0 | Status: DISCONTINUED | OUTPATIENT
Start: 2020-01-17 | End: 2020-02-10

## 2020-01-17 RX ORDER — LIPASE/PROTEASE/AMYLASE 16-48-48K
1 CAPSULE,DELAYED RELEASE (ENTERIC COATED) ORAL
Refills: 0 | Status: DISCONTINUED | OUTPATIENT
Start: 2020-01-17 | End: 2020-02-10

## 2020-01-17 RX ORDER — THIAMINE MONONITRATE (VIT B1) 100 MG
100 TABLET ORAL DAILY
Refills: 0 | Status: DISCONTINUED | OUTPATIENT
Start: 2020-01-17 | End: 2020-02-10

## 2020-01-17 RX ORDER — METOPROLOL TARTRATE 50 MG
25 TABLET ORAL
Refills: 0 | Status: DISCONTINUED | OUTPATIENT
Start: 2020-01-17 | End: 2020-02-10

## 2020-01-17 RX ORDER — ASPIRIN/CALCIUM CARB/MAGNESIUM 324 MG
81 TABLET ORAL DAILY
Refills: 0 | Status: DISCONTINUED | OUTPATIENT
Start: 2020-01-17 | End: 2020-02-10

## 2020-01-17 RX ORDER — ALBUTEROL 90 UG/1
2 AEROSOL, METERED ORAL EVERY 6 HOURS
Refills: 0 | Status: DISCONTINUED | OUTPATIENT
Start: 2020-01-17 | End: 2020-02-10

## 2020-01-17 RX ORDER — DIVALPROEX SODIUM 500 MG/1
500 TABLET, DELAYED RELEASE ORAL DAILY
Refills: 0 | Status: DISCONTINUED | OUTPATIENT
Start: 2020-01-17 | End: 2020-02-10

## 2020-01-17 RX ORDER — ATORVASTATIN CALCIUM 80 MG/1
10 TABLET, FILM COATED ORAL AT BEDTIME
Refills: 0 | Status: DISCONTINUED | OUTPATIENT
Start: 2020-01-17 | End: 2020-02-10

## 2020-01-17 RX ORDER — OLANZAPINE 15 MG/1
20 TABLET, FILM COATED ORAL AT BEDTIME
Refills: 0 | Status: DISCONTINUED | OUTPATIENT
Start: 2020-01-17 | End: 2020-01-27

## 2020-01-17 RX ORDER — BENZTROPINE MESYLATE 1 MG
1 TABLET ORAL
Refills: 0 | Status: DISCONTINUED | OUTPATIENT
Start: 2020-01-17 | End: 2020-02-10

## 2020-01-17 RX ORDER — FOLIC ACID 0.8 MG
1 TABLET ORAL DAILY
Refills: 0 | Status: DISCONTINUED | OUTPATIENT
Start: 2020-01-17 | End: 2020-02-10

## 2020-01-17 RX ORDER — GABAPENTIN 400 MG/1
300 CAPSULE ORAL THREE TIMES A DAY
Refills: 0 | Status: DISCONTINUED | OUTPATIENT
Start: 2020-01-17 | End: 2020-02-10

## 2020-01-17 RX ORDER — METOPROLOL TARTRATE 50 MG
25 TABLET ORAL
Refills: 0 | Status: DISCONTINUED | OUTPATIENT
Start: 2020-01-17 | End: 2020-01-17

## 2020-01-17 RX ORDER — ESCITALOPRAM OXALATE 10 MG/1
10 TABLET, FILM COATED ORAL DAILY
Refills: 0 | Status: DISCONTINUED | OUTPATIENT
Start: 2020-01-17 | End: 2020-01-23

## 2020-01-17 RX ADMIN — Medication 1 CAPSULE(S): at 09:09

## 2020-01-17 RX ADMIN — Medication 1 TABLET(S): at 09:05

## 2020-01-17 RX ADMIN — DIVALPROEX SODIUM 500 MILLIGRAM(S): 500 TABLET, DELAYED RELEASE ORAL at 09:05

## 2020-01-17 RX ADMIN — ATORVASTATIN CALCIUM 10 MILLIGRAM(S): 80 TABLET, FILM COATED ORAL at 22:26

## 2020-01-17 RX ADMIN — ESCITALOPRAM OXALATE 10 MILLIGRAM(S): 10 TABLET, FILM COATED ORAL at 09:05

## 2020-01-17 RX ADMIN — Medication 81 MILLIGRAM(S): at 09:05

## 2020-01-17 RX ADMIN — Medication 1 MILLIGRAM(S): at 22:26

## 2020-01-17 RX ADMIN — GABAPENTIN 300 MILLIGRAM(S): 400 CAPSULE ORAL at 15:18

## 2020-01-17 RX ADMIN — OLANZAPINE 20 MILLIGRAM(S): 15 TABLET, FILM COATED ORAL at 22:27

## 2020-01-17 RX ADMIN — Medication 100 MILLIGRAM(S): at 09:05

## 2020-01-17 RX ADMIN — Medication 1 MILLIGRAM(S): at 15:18

## 2020-01-17 RX ADMIN — Medication 1 CAPSULE(S): at 16:30

## 2020-01-17 RX ADMIN — Medication 1 MILLIGRAM(S): at 09:05

## 2020-01-17 RX ADMIN — Medication 1 MILLIGRAM(S): at 05:44

## 2020-01-17 RX ADMIN — GABAPENTIN 300 MILLIGRAM(S): 400 CAPSULE ORAL at 22:26

## 2020-01-17 RX ADMIN — GABAPENTIN 300 MILLIGRAM(S): 400 CAPSULE ORAL at 05:44

## 2020-01-17 RX ADMIN — TAMSULOSIN HYDROCHLORIDE 0.4 MILLIGRAM(S): 0.4 CAPSULE ORAL at 22:27

## 2020-01-17 RX ADMIN — Medication 25 MILLIGRAM(S): at 05:44

## 2020-01-17 NOTE — ED BEHAVIORAL HEALTH NOTE - BEHAVIORAL HEALTH NOTE
SW Note: Plan is to transfer pt for inpt psychiatric care. pt has exhausted his medicare days. has active medicaid. Referral made to [facility]. Pt accepted and bed available on Low 6 [phone] , accepting MD Mc[facility]. faxed EKG and 9.13 legals to intake dept.  RN Shoaib aware of plan and has # for hand off report. Ambulance will be arranged with [facility].

## 2020-01-17 NOTE — ED ADULT NURSE REASSESSMENT NOTE - NS ED NURSE REASSESS COMMENT FT1
Pt direct to decon shower for decon from reported bedbug infestation at home. Patient undressed, belongings secured double bagged. Patient showered; per infection control (PW) patient does not require contact isolation and may go to  if cleared.
patient sleeping at long interval nad noted will maintain safe environment
patient sleeping at long interval patient tolerated night meds without difficulties.  nad noted will monitor or changes and maintain safe environment
patient woke for am med. patient polite and cooperative. tolerated medication. orientated to time and from breakfast time.  will monitor and chart further changes
Assumed care of patient at 0730.  Patient resting in bed with no distress.  Safety of patient maintained.
Patient ate 100% of his lunch.  No attempts to harm self or others and safety maintained.
Rosa post medication intervention for anxiety.  Patient ate 100% of his dinner.  Safety of patient maintained.
assumed care of patient. pt sleeping at long intervals room darkened patient remains in yellow gown nad noted. will monitor and chart changes will maintain safe environment
Clothing/belongings bagged by ED staff and placed in  locker.  Patient ate 100% of his lunch.  Educated about plan of care and pending consult.  Safety of patient maintained.
Patient ate 100% of his breakfast and complied with medications.  No attempts to harm self or others and safety maintained.

## 2020-01-17 NOTE — ED ADULT NURSE REASSESSMENT NOTE - GENERAL PATIENT STATE
comfortable appearance
comfortable appearance/cooperative
comfortable appearance/cooperative
comfortable appearance/resting/sleeping
cooperative/anxious
cooperative/comfortable appearance

## 2020-01-17 NOTE — ED ADULT NURSE REASSESSMENT NOTE - COMFORT CARE
darkened lights
meal provided
plan of care explained
plan of care explained/meal provided
side rails down
meal provided/plan of care explained

## 2020-01-18 PROCEDURE — 99231 SBSQ HOSP IP/OBS SF/LOW 25: CPT

## 2020-01-18 RX ORDER — NICOTINE POLACRILEX 2 MG
1 GUM BUCCAL DAILY
Refills: 0 | Status: DISCONTINUED | OUTPATIENT
Start: 2020-01-18 | End: 2020-01-19

## 2020-01-18 RX ADMIN — TAMSULOSIN HYDROCHLORIDE 0.4 MILLIGRAM(S): 0.4 CAPSULE ORAL at 22:18

## 2020-01-18 RX ADMIN — GABAPENTIN 300 MILLIGRAM(S): 400 CAPSULE ORAL at 09:24

## 2020-01-18 RX ADMIN — Medication 1 MILLIGRAM(S): at 22:18

## 2020-01-18 RX ADMIN — ESCITALOPRAM OXALATE 10 MILLIGRAM(S): 10 TABLET, FILM COATED ORAL at 09:25

## 2020-01-18 RX ADMIN — Medication 100 MILLIGRAM(S): at 09:25

## 2020-01-18 RX ADMIN — Medication 1 CAPSULE(S): at 18:05

## 2020-01-18 RX ADMIN — GABAPENTIN 300 MILLIGRAM(S): 400 CAPSULE ORAL at 22:18

## 2020-01-18 RX ADMIN — Medication 1 MILLIGRAM(S): at 09:24

## 2020-01-18 RX ADMIN — Medication 1 MILLIGRAM(S): at 15:25

## 2020-01-18 RX ADMIN — OLANZAPINE 20 MILLIGRAM(S): 15 TABLET, FILM COATED ORAL at 22:18

## 2020-01-18 RX ADMIN — Medication 81 MILLIGRAM(S): at 09:24

## 2020-01-18 RX ADMIN — Medication 1 CAPSULE(S): at 13:02

## 2020-01-18 RX ADMIN — GABAPENTIN 300 MILLIGRAM(S): 400 CAPSULE ORAL at 13:02

## 2020-01-18 RX ADMIN — Medication 1 CAPSULE(S): at 09:23

## 2020-01-18 RX ADMIN — ATORVASTATIN CALCIUM 10 MILLIGRAM(S): 80 TABLET, FILM COATED ORAL at 22:18

## 2020-01-18 RX ADMIN — Medication 1 MILLIGRAM(S): at 09:25

## 2020-01-18 RX ADMIN — Medication 25 MILLIGRAM(S): at 22:18

## 2020-01-18 RX ADMIN — Medication 1 TABLET(S): at 09:25

## 2020-01-18 RX ADMIN — DIVALPROEX SODIUM 500 MILLIGRAM(S): 500 TABLET, DELAYED RELEASE ORAL at 09:24

## 2020-01-18 RX ADMIN — Medication 25 MILLIGRAM(S): at 09:24

## 2020-01-19 LAB
CHOLEST SERPL-MCNC: 140 MG/DL — SIGNIFICANT CHANGE UP (ref 120–199)
HBA1C BLD-MCNC: 5.8 % — HIGH (ref 4–5.6)
HDLC SERPL-MCNC: 40 MG/DL — SIGNIFICANT CHANGE UP (ref 35–55)
LIPID PNL WITH DIRECT LDL SERPL: 80 MG/DL — SIGNIFICANT CHANGE UP
TRIGL SERPL-MCNC: 108 MG/DL — SIGNIFICANT CHANGE UP (ref 10–149)
TSH SERPL-MCNC: 4.36 UIU/ML — HIGH (ref 0.27–4.2)

## 2020-01-19 PROCEDURE — 99231 SBSQ HOSP IP/OBS SF/LOW 25: CPT

## 2020-01-19 RX ORDER — CLONAZEPAM 1 MG
1 TABLET ORAL EVERY 8 HOURS
Refills: 0 | Status: DISCONTINUED | OUTPATIENT
Start: 2020-01-19 | End: 2020-01-20

## 2020-01-19 RX ORDER — TRAZODONE HCL 50 MG
50 TABLET ORAL ONCE
Refills: 0 | Status: COMPLETED | OUTPATIENT
Start: 2020-01-19 | End: 2020-01-19

## 2020-01-19 RX ORDER — NICOTINE POLACRILEX 2 MG
1 GUM BUCCAL DAILY
Refills: 0 | Status: DISCONTINUED | OUTPATIENT
Start: 2020-01-19 | End: 2020-02-10

## 2020-01-19 RX ADMIN — Medication 1 MILLIGRAM(S): at 08:32

## 2020-01-19 RX ADMIN — Medication 1 MILLIGRAM(S): at 20:59

## 2020-01-19 RX ADMIN — Medication 1 MILLIGRAM(S): at 23:48

## 2020-01-19 RX ADMIN — GABAPENTIN 300 MILLIGRAM(S): 400 CAPSULE ORAL at 08:31

## 2020-01-19 RX ADMIN — Medication 25 MILLIGRAM(S): at 20:59

## 2020-01-19 RX ADMIN — Medication 1 PATCH: at 20:59

## 2020-01-19 RX ADMIN — GABAPENTIN 300 MILLIGRAM(S): 400 CAPSULE ORAL at 12:13

## 2020-01-19 RX ADMIN — Medication 1 PATCH: at 16:29

## 2020-01-19 RX ADMIN — DIVALPROEX SODIUM 500 MILLIGRAM(S): 500 TABLET, DELAYED RELEASE ORAL at 08:30

## 2020-01-19 RX ADMIN — Medication 1 PATCH: at 16:28

## 2020-01-19 RX ADMIN — GABAPENTIN 300 MILLIGRAM(S): 400 CAPSULE ORAL at 20:59

## 2020-01-19 RX ADMIN — Medication 1 MILLIGRAM(S): at 17:30

## 2020-01-19 RX ADMIN — Medication 1 CAPSULE(S): at 08:30

## 2020-01-19 RX ADMIN — ESCITALOPRAM OXALATE 10 MILLIGRAM(S): 10 TABLET, FILM COATED ORAL at 08:31

## 2020-01-19 RX ADMIN — Medication 1 PATCH: at 07:00

## 2020-01-19 RX ADMIN — Medication 1 MILLIGRAM(S): at 06:16

## 2020-01-19 RX ADMIN — Medication 50 MILLIGRAM(S): at 01:21

## 2020-01-19 RX ADMIN — Medication 100 MILLIGRAM(S): at 08:30

## 2020-01-19 RX ADMIN — ATORVASTATIN CALCIUM 10 MILLIGRAM(S): 80 TABLET, FILM COATED ORAL at 20:59

## 2020-01-19 RX ADMIN — Medication 1 TABLET(S): at 08:31

## 2020-01-19 RX ADMIN — Medication 1 PATCH: at 08:33

## 2020-01-19 RX ADMIN — TAMSULOSIN HYDROCHLORIDE 0.4 MILLIGRAM(S): 0.4 CAPSULE ORAL at 20:59

## 2020-01-19 RX ADMIN — OLANZAPINE 20 MILLIGRAM(S): 15 TABLET, FILM COATED ORAL at 20:59

## 2020-01-19 RX ADMIN — Medication 81 MILLIGRAM(S): at 08:31

## 2020-01-19 RX ADMIN — Medication 1 CAPSULE(S): at 17:28

## 2020-01-19 RX ADMIN — Medication 1 CAPSULE(S): at 12:12

## 2020-01-19 RX ADMIN — Medication 25 MILLIGRAM(S): at 08:32

## 2020-01-19 NOTE — CHART NOTE - NSCHARTNOTEFT_GEN_A_CORE
Screening Medical Evaluation  Patient Admitted from:  ED    [facility] admitting diagnosis: Paranoid schizophrenia    PAST MEDICAL & SURGICAL HISTORY:  Elevated liver enzymes  Paranoid schizophrenia  Hyperlipidemia  BPH (benign prostatic hyperplasia)---Pt denies  GERD (gastroesophageal reflux disease)---Pt denies  HTN (hypertension)  Psychosis  Mood disorder  Anxiety  No significant past surgical history    ALLERGIES  haloperidol (Other)    INTOLERANCES    SOCIAL HISTORY:   Admits EtOH—binged on Venita zahida  Admits Drug use—Marijuana (last used in November)  Current Tobacco product use—20 cigarettes/day x 35years  Single, lives at Group Home    FAMILY HISTORY:      MEDICATIONS  (STANDING):  aspirin  chewable 81 milliGRAM(s) Oral daily  atorvastatin 10 milliGRAM(s) Oral at bedtime  benztropine 1 milliGRAM(s) Oral two times a day  diVALproex  milliGRAM(s) Oral daily  escitalopram 10 milliGRAM(s) Oral daily  folic acid 1 milliGRAM(s) Oral daily  gabapentin 300 milliGRAM(s) Oral three times a day  LORazepam     Tablet 1 milliGRAM(s) Oral every 8 hours  metoprolol succinate ER 25 milliGRAM(s) Oral two times a day  multivitamin 1 Tablet(s) Oral daily  nicotine -   7 mG/24Hr(s) Patch 1 patch Transdermal daily  OLANZapine 20 milliGRAM(s) Oral at bedtime  pancrelipase  (CREON 12,000 Lipase Units) 1 Capsule(s) Oral three times a day with meals  tamsulosin 0.4 milliGRAM(s) Oral at bedtime  thiamine 100 milliGRAM(s) Oral daily    MEDICATIONS  (PRN):  ALBUTerol    90 MICROgram(s) HFA Inhaler 2 Puff(s) Inhalation every 6 hours PRN sob  LORazepam     Tablet 2 milliGRAM(s) Oral every 6 hours PRN CIWA score increase by 2 points and current CIWA score GREATER THAN 9  LORazepam   Injectable 1 milliGRAM(s) IntraMuscular every 6 hours PRN anxiety/agitation      Vital Signs Last 24 Hrs  T(C): 36.4 (2020 11:30), Max: 36.5 (2020 09:03)  T(F): 97.6 (2020 11:30), Max: 97.7 (2020 09:03)  HR: 68 (2020 09:03) (65 - 68)  BP: 131/73 (2020 09:03) (126/63 - 131/73)  RR: --  SpO2: --  CAPILLARY BLOOD GLUCOSE    PHYSICAL EXAM:  GENERAL: NAD, well-developed, well-nourished  HEAD:  Atraumatic, Normocephalic  EYES: EOMI, PERRLA, conjunctiva and sclera clear  NECK: Supple  CHEST/LUNG: Clear to auscultation bilaterally; No wheeze, crackles, rhonchi  HEART: Regular rate and rhythm; +s1 and +s2, No murmurs, rubs, or gallops  ABDOMEN: Soft, Nontender, Nondistended; normoactive Bowel sounds present  EXTREMITIES:  2+ Peripheral Pulses, No clubbing, or edema  PSYCH: AAOx3  NEUROLOGY: non-focal  SKIN: No rashes or lesions      LABS:               14.3   6.10  )-----------( 182      ( 2020 14:06 )             43.3     01-16    144  |  104  |  14.0  ----------------------------<  81  4.4   |  25.0  |  1.04  Ca    10.0      2020 14:06    TPro  8.1  /  Alb  4.6  /  TBili  0.3<L>  /  DBili  x   /  AST  28  /  ALT  19  /  AlkPhos  95  01-16      Urinalysis Basic - ( 2020 13:24 )    Color: Yellow / Appearance: Clear / S.005 / pH: x  Gluc: x / Ketone: Negative  / Bili: Negative / Urobili: Negative mg/dL   Blood: x / Protein: Negative mg/dL / Nitrite: Negative   Leuk Esterase: Negative / RBC: x / WBC x   Sq Epi: x / Non Sq Epi: x / Bacteria: x      RADIOLOGY & ADDITIONAL TESTS:    Assessment and Plan:  Pt is a 59 y.o male with PMHX significant for Hyperlipidemia, BPH (benign prostatic hyperplasia), GERD (gastroesophageal reflux disease), HTN (hypertension) who presents to [facility] from  ED for further evaluation and management of Paranoid schizophrenia. Pt currently denies any medical concern including fever, chills, visual changes (double vision, scotomas, vision loss), HA, SOB, CP, Abdominal pain, n/v, constipation, diarrhea, urinary symptoms (dysuria, hematuria, incontinence, frequency/urgency, urethral discharge) or changes in bowel movements. Physical Exam unremarkable.     Labs personally reviewed- CBC, CMP, UA, and UTox WNL. TSH, A1c, and Lipid profile ordered for tomorrow.    EKG personally reviewed showing--- Sinus Bradycardia 53bpm with QTc level of 431ms. T-wave inversion in lead V1. No evidence of ischemia/infarct or arrhythmias. No peaked/flattened t-waves.    #) Smoking Cessation: Nicotine patch  #) Paranoid schizophrenia: management as per psychiatry team

## 2020-01-20 PROCEDURE — 99231 SBSQ HOSP IP/OBS SF/LOW 25: CPT

## 2020-01-20 RX ADMIN — Medication 1 MILLIGRAM(S): at 08:17

## 2020-01-20 RX ADMIN — Medication 1 CAPSULE(S): at 08:17

## 2020-01-20 RX ADMIN — Medication 1 MILLIGRAM(S): at 20:52

## 2020-01-20 RX ADMIN — DIVALPROEX SODIUM 500 MILLIGRAM(S): 500 TABLET, DELAYED RELEASE ORAL at 08:17

## 2020-01-20 RX ADMIN — TAMSULOSIN HYDROCHLORIDE 0.4 MILLIGRAM(S): 0.4 CAPSULE ORAL at 20:51

## 2020-01-20 RX ADMIN — Medication 1 CAPSULE(S): at 17:02

## 2020-01-20 RX ADMIN — ATORVASTATIN CALCIUM 10 MILLIGRAM(S): 80 TABLET, FILM COATED ORAL at 20:51

## 2020-01-20 RX ADMIN — ESCITALOPRAM OXALATE 10 MILLIGRAM(S): 10 TABLET, FILM COATED ORAL at 08:17

## 2020-01-20 RX ADMIN — Medication 25 MILLIGRAM(S): at 20:51

## 2020-01-20 RX ADMIN — GABAPENTIN 300 MILLIGRAM(S): 400 CAPSULE ORAL at 13:08

## 2020-01-20 RX ADMIN — Medication 25 MILLIGRAM(S): at 08:17

## 2020-01-20 RX ADMIN — Medication 81 MILLIGRAM(S): at 08:16

## 2020-01-20 RX ADMIN — GABAPENTIN 300 MILLIGRAM(S): 400 CAPSULE ORAL at 20:51

## 2020-01-20 RX ADMIN — GABAPENTIN 300 MILLIGRAM(S): 400 CAPSULE ORAL at 08:17

## 2020-01-20 RX ADMIN — Medication 1 PATCH: at 08:17

## 2020-01-20 RX ADMIN — Medication 1 PATCH: at 20:51

## 2020-01-20 RX ADMIN — Medication 100 MILLIGRAM(S): at 08:17

## 2020-01-20 RX ADMIN — Medication 1 MILLIGRAM(S): at 20:51

## 2020-01-20 RX ADMIN — OLANZAPINE 20 MILLIGRAM(S): 15 TABLET, FILM COATED ORAL at 20:51

## 2020-01-20 RX ADMIN — Medication 1 TABLET(S): at 08:18

## 2020-01-20 RX ADMIN — Medication 1 CAPSULE(S): at 11:44

## 2020-01-20 RX ADMIN — Medication 1 MILLIGRAM(S): at 08:16

## 2020-01-21 PROCEDURE — 99231 SBSQ HOSP IP/OBS SF/LOW 25: CPT

## 2020-01-21 RX ADMIN — Medication 1 CAPSULE(S): at 12:27

## 2020-01-21 RX ADMIN — Medication 1 TABLET(S): at 08:57

## 2020-01-21 RX ADMIN — GABAPENTIN 300 MILLIGRAM(S): 400 CAPSULE ORAL at 20:35

## 2020-01-21 RX ADMIN — Medication 100 MILLIGRAM(S): at 08:56

## 2020-01-21 RX ADMIN — ESCITALOPRAM OXALATE 10 MILLIGRAM(S): 10 TABLET, FILM COATED ORAL at 08:57

## 2020-01-21 RX ADMIN — Medication 1 PATCH: at 08:57

## 2020-01-21 RX ADMIN — Medication 25 MILLIGRAM(S): at 08:57

## 2020-01-21 RX ADMIN — Medication 25 MILLIGRAM(S): at 20:35

## 2020-01-21 RX ADMIN — ATORVASTATIN CALCIUM 10 MILLIGRAM(S): 80 TABLET, FILM COATED ORAL at 20:35

## 2020-01-21 RX ADMIN — Medication 1 CAPSULE(S): at 08:56

## 2020-01-21 RX ADMIN — OLANZAPINE 20 MILLIGRAM(S): 15 TABLET, FILM COATED ORAL at 20:35

## 2020-01-21 RX ADMIN — Medication 1 MILLIGRAM(S): at 20:35

## 2020-01-21 RX ADMIN — GABAPENTIN 300 MILLIGRAM(S): 400 CAPSULE ORAL at 08:57

## 2020-01-21 RX ADMIN — Medication 1 MILLIGRAM(S): at 08:57

## 2020-01-21 RX ADMIN — DIVALPROEX SODIUM 500 MILLIGRAM(S): 500 TABLET, DELAYED RELEASE ORAL at 08:57

## 2020-01-21 RX ADMIN — TAMSULOSIN HYDROCHLORIDE 0.4 MILLIGRAM(S): 0.4 CAPSULE ORAL at 20:35

## 2020-01-21 RX ADMIN — GABAPENTIN 300 MILLIGRAM(S): 400 CAPSULE ORAL at 12:27

## 2020-01-21 RX ADMIN — Medication 81 MILLIGRAM(S): at 08:57

## 2020-01-22 PROCEDURE — 99231 SBSQ HOSP IP/OBS SF/LOW 25: CPT

## 2020-01-22 RX ADMIN — Medication 1 PATCH: at 09:00

## 2020-01-22 RX ADMIN — Medication 100 MILLIGRAM(S): at 09:01

## 2020-01-22 RX ADMIN — ESCITALOPRAM OXALATE 10 MILLIGRAM(S): 10 TABLET, FILM COATED ORAL at 09:00

## 2020-01-22 RX ADMIN — Medication 1 MILLIGRAM(S): at 21:44

## 2020-01-22 RX ADMIN — GABAPENTIN 300 MILLIGRAM(S): 400 CAPSULE ORAL at 09:00

## 2020-01-22 RX ADMIN — Medication 1 CAPSULE(S): at 16:34

## 2020-01-22 RX ADMIN — Medication 1 TABLET(S): at 09:00

## 2020-01-22 RX ADMIN — DIVALPROEX SODIUM 500 MILLIGRAM(S): 500 TABLET, DELAYED RELEASE ORAL at 09:00

## 2020-01-22 RX ADMIN — OLANZAPINE 20 MILLIGRAM(S): 15 TABLET, FILM COATED ORAL at 21:44

## 2020-01-22 RX ADMIN — Medication 1 PATCH: at 21:44

## 2020-01-22 RX ADMIN — Medication 1 MILLIGRAM(S): at 09:00

## 2020-01-22 RX ADMIN — TAMSULOSIN HYDROCHLORIDE 0.4 MILLIGRAM(S): 0.4 CAPSULE ORAL at 21:44

## 2020-01-22 RX ADMIN — Medication 25 MILLIGRAM(S): at 09:00

## 2020-01-22 RX ADMIN — Medication 1 CAPSULE(S): at 11:35

## 2020-01-22 RX ADMIN — Medication 1 PATCH: at 09:01

## 2020-01-22 RX ADMIN — Medication 81 MILLIGRAM(S): at 09:00

## 2020-01-22 RX ADMIN — GABAPENTIN 300 MILLIGRAM(S): 400 CAPSULE ORAL at 21:44

## 2020-01-22 RX ADMIN — ATORVASTATIN CALCIUM 10 MILLIGRAM(S): 80 TABLET, FILM COATED ORAL at 21:44

## 2020-01-22 RX ADMIN — Medication 1 CAPSULE(S): at 09:01

## 2020-01-22 RX ADMIN — GABAPENTIN 300 MILLIGRAM(S): 400 CAPSULE ORAL at 12:56

## 2020-01-23 PROCEDURE — 99231 SBSQ HOSP IP/OBS SF/LOW 25: CPT

## 2020-01-23 RX ORDER — ESCITALOPRAM OXALATE 10 MG/1
15 TABLET, FILM COATED ORAL DAILY
Refills: 0 | Status: DISCONTINUED | OUTPATIENT
Start: 2020-01-24 | End: 2020-02-03

## 2020-01-23 RX ADMIN — Medication 100 MILLIGRAM(S): at 09:24

## 2020-01-23 RX ADMIN — TAMSULOSIN HYDROCHLORIDE 0.4 MILLIGRAM(S): 0.4 CAPSULE ORAL at 21:35

## 2020-01-23 RX ADMIN — DIVALPROEX SODIUM 500 MILLIGRAM(S): 500 TABLET, DELAYED RELEASE ORAL at 09:24

## 2020-01-23 RX ADMIN — OLANZAPINE 20 MILLIGRAM(S): 15 TABLET, FILM COATED ORAL at 21:35

## 2020-01-23 RX ADMIN — Medication 25 MILLIGRAM(S): at 21:35

## 2020-01-23 RX ADMIN — Medication 1 PATCH: at 08:30

## 2020-01-23 RX ADMIN — Medication 1 MILLIGRAM(S): at 21:35

## 2020-01-23 RX ADMIN — Medication 81 MILLIGRAM(S): at 09:24

## 2020-01-23 RX ADMIN — GABAPENTIN 300 MILLIGRAM(S): 400 CAPSULE ORAL at 08:11

## 2020-01-23 RX ADMIN — Medication 1 CAPSULE(S): at 17:01

## 2020-01-23 RX ADMIN — ESCITALOPRAM OXALATE 10 MILLIGRAM(S): 10 TABLET, FILM COATED ORAL at 09:24

## 2020-01-23 RX ADMIN — Medication 1 MILLIGRAM(S): at 09:24

## 2020-01-23 RX ADMIN — Medication 25 MILLIGRAM(S): at 09:24

## 2020-01-23 RX ADMIN — Medication 1 TABLET(S): at 09:24

## 2020-01-23 RX ADMIN — GABAPENTIN 300 MILLIGRAM(S): 400 CAPSULE ORAL at 21:35

## 2020-01-23 RX ADMIN — GABAPENTIN 300 MILLIGRAM(S): 400 CAPSULE ORAL at 13:57

## 2020-01-23 RX ADMIN — Medication 1 PATCH: at 19:00

## 2020-01-23 RX ADMIN — Medication 1 CAPSULE(S): at 09:24

## 2020-01-23 RX ADMIN — Medication 1 PATCH: at 09:28

## 2020-01-23 RX ADMIN — Medication 1 CAPSULE(S): at 13:35

## 2020-01-23 RX ADMIN — ATORVASTATIN CALCIUM 10 MILLIGRAM(S): 80 TABLET, FILM COATED ORAL at 21:35

## 2020-01-24 PROCEDURE — 99231 SBSQ HOSP IP/OBS SF/LOW 25: CPT

## 2020-01-24 RX ADMIN — Medication 1 MILLIGRAM(S): at 09:06

## 2020-01-24 RX ADMIN — DIVALPROEX SODIUM 500 MILLIGRAM(S): 500 TABLET, DELAYED RELEASE ORAL at 09:06

## 2020-01-24 RX ADMIN — GABAPENTIN 300 MILLIGRAM(S): 400 CAPSULE ORAL at 21:25

## 2020-01-24 RX ADMIN — Medication 1 TABLET(S): at 09:06

## 2020-01-24 RX ADMIN — Medication 1 PATCH: at 09:06

## 2020-01-24 RX ADMIN — TAMSULOSIN HYDROCHLORIDE 0.4 MILLIGRAM(S): 0.4 CAPSULE ORAL at 21:25

## 2020-01-24 RX ADMIN — Medication 81 MILLIGRAM(S): at 09:06

## 2020-01-24 RX ADMIN — Medication 1 MILLIGRAM(S): at 21:25

## 2020-01-24 RX ADMIN — Medication 1 CAPSULE(S): at 18:27

## 2020-01-24 RX ADMIN — Medication 25 MILLIGRAM(S): at 21:25

## 2020-01-24 RX ADMIN — Medication 1 CAPSULE(S): at 09:07

## 2020-01-24 RX ADMIN — Medication 1 CAPSULE(S): at 11:05

## 2020-01-24 RX ADMIN — Medication 100 MILLIGRAM(S): at 09:07

## 2020-01-24 RX ADMIN — Medication 25 MILLIGRAM(S): at 09:06

## 2020-01-24 RX ADMIN — GABAPENTIN 300 MILLIGRAM(S): 400 CAPSULE ORAL at 09:06

## 2020-01-24 RX ADMIN — OLANZAPINE 20 MILLIGRAM(S): 15 TABLET, FILM COATED ORAL at 21:25

## 2020-01-24 RX ADMIN — ATORVASTATIN CALCIUM 10 MILLIGRAM(S): 80 TABLET, FILM COATED ORAL at 21:25

## 2020-01-24 RX ADMIN — Medication 1 PATCH: at 09:17

## 2020-01-24 RX ADMIN — ESCITALOPRAM OXALATE 15 MILLIGRAM(S): 10 TABLET, FILM COATED ORAL at 09:06

## 2020-01-24 RX ADMIN — GABAPENTIN 300 MILLIGRAM(S): 400 CAPSULE ORAL at 12:32

## 2020-01-25 PROCEDURE — 99232 SBSQ HOSP IP/OBS MODERATE 35: CPT

## 2020-01-25 RX ADMIN — Medication 1 MILLIGRAM(S): at 09:35

## 2020-01-25 RX ADMIN — DIVALPROEX SODIUM 500 MILLIGRAM(S): 500 TABLET, DELAYED RELEASE ORAL at 09:34

## 2020-01-25 RX ADMIN — Medication 25 MILLIGRAM(S): at 09:34

## 2020-01-25 RX ADMIN — GABAPENTIN 300 MILLIGRAM(S): 400 CAPSULE ORAL at 14:00

## 2020-01-25 RX ADMIN — Medication 1 CAPSULE(S): at 17:15

## 2020-01-25 RX ADMIN — GABAPENTIN 300 MILLIGRAM(S): 400 CAPSULE ORAL at 09:34

## 2020-01-25 RX ADMIN — Medication 81 MILLIGRAM(S): at 09:35

## 2020-01-25 RX ADMIN — ATORVASTATIN CALCIUM 10 MILLIGRAM(S): 80 TABLET, FILM COATED ORAL at 21:26

## 2020-01-25 RX ADMIN — Medication 25 MILLIGRAM(S): at 21:26

## 2020-01-25 RX ADMIN — ESCITALOPRAM OXALATE 15 MILLIGRAM(S): 10 TABLET, FILM COATED ORAL at 09:34

## 2020-01-25 RX ADMIN — Medication 100 MILLIGRAM(S): at 09:36

## 2020-01-25 RX ADMIN — OLANZAPINE 20 MILLIGRAM(S): 15 TABLET, FILM COATED ORAL at 21:26

## 2020-01-25 RX ADMIN — Medication 1 PATCH: at 09:36

## 2020-01-25 RX ADMIN — Medication 1 MILLIGRAM(S): at 21:26

## 2020-01-25 RX ADMIN — Medication 1 CAPSULE(S): at 09:33

## 2020-01-25 RX ADMIN — Medication 1 PATCH: at 09:25

## 2020-01-25 RX ADMIN — TAMSULOSIN HYDROCHLORIDE 0.4 MILLIGRAM(S): 0.4 CAPSULE ORAL at 21:26

## 2020-01-25 RX ADMIN — GABAPENTIN 300 MILLIGRAM(S): 400 CAPSULE ORAL at 21:26

## 2020-01-25 RX ADMIN — Medication 1 PATCH: at 18:46

## 2020-01-25 RX ADMIN — Medication 1 CAPSULE(S): at 14:00

## 2020-01-25 RX ADMIN — Medication 1 TABLET(S): at 09:35

## 2020-01-26 PROCEDURE — 99231 SBSQ HOSP IP/OBS SF/LOW 25: CPT

## 2020-01-26 RX ADMIN — DIVALPROEX SODIUM 500 MILLIGRAM(S): 500 TABLET, DELAYED RELEASE ORAL at 10:04

## 2020-01-26 RX ADMIN — Medication 1 PATCH: at 09:06

## 2020-01-26 RX ADMIN — GABAPENTIN 300 MILLIGRAM(S): 400 CAPSULE ORAL at 21:42

## 2020-01-26 RX ADMIN — GABAPENTIN 300 MILLIGRAM(S): 400 CAPSULE ORAL at 10:03

## 2020-01-26 RX ADMIN — Medication 81 MILLIGRAM(S): at 10:03

## 2020-01-26 RX ADMIN — Medication 1 MILLIGRAM(S): at 21:42

## 2020-01-26 RX ADMIN — Medication 1 MILLIGRAM(S): at 10:05

## 2020-01-26 RX ADMIN — Medication 100 MILLIGRAM(S): at 10:04

## 2020-01-26 RX ADMIN — Medication 1 CAPSULE(S): at 10:05

## 2020-01-26 RX ADMIN — Medication 1 CAPSULE(S): at 17:16

## 2020-01-26 RX ADMIN — GABAPENTIN 300 MILLIGRAM(S): 400 CAPSULE ORAL at 13:09

## 2020-01-26 RX ADMIN — TAMSULOSIN HYDROCHLORIDE 0.4 MILLIGRAM(S): 0.4 CAPSULE ORAL at 21:42

## 2020-01-26 RX ADMIN — Medication 25 MILLIGRAM(S): at 10:04

## 2020-01-26 RX ADMIN — Medication 1 TABLET(S): at 10:04

## 2020-01-26 RX ADMIN — Medication 1 PATCH: at 18:36

## 2020-01-26 RX ADMIN — Medication 1 CAPSULE(S): at 13:10

## 2020-01-26 RX ADMIN — OLANZAPINE 20 MILLIGRAM(S): 15 TABLET, FILM COATED ORAL at 21:42

## 2020-01-26 RX ADMIN — ATORVASTATIN CALCIUM 10 MILLIGRAM(S): 80 TABLET, FILM COATED ORAL at 21:42

## 2020-01-26 RX ADMIN — Medication 1 PATCH: at 10:06

## 2020-01-26 RX ADMIN — ESCITALOPRAM OXALATE 15 MILLIGRAM(S): 10 TABLET, FILM COATED ORAL at 10:05

## 2020-01-26 RX ADMIN — Medication 25 MILLIGRAM(S): at 21:42

## 2020-01-27 PROCEDURE — 99231 SBSQ HOSP IP/OBS SF/LOW 25: CPT

## 2020-01-27 RX ORDER — OLANZAPINE 15 MG/1
25 TABLET, FILM COATED ORAL AT BEDTIME
Refills: 0 | Status: DISCONTINUED | OUTPATIENT
Start: 2020-01-27 | End: 2020-01-30

## 2020-01-27 RX ADMIN — GABAPENTIN 300 MILLIGRAM(S): 400 CAPSULE ORAL at 20:42

## 2020-01-27 RX ADMIN — Medication 25 MILLIGRAM(S): at 08:23

## 2020-01-27 RX ADMIN — Medication 1 TABLET(S): at 08:23

## 2020-01-27 RX ADMIN — Medication 1 PATCH: at 20:42

## 2020-01-27 RX ADMIN — Medication 100 MILLIGRAM(S): at 08:24

## 2020-01-27 RX ADMIN — Medication 1 PATCH: at 08:59

## 2020-01-27 RX ADMIN — Medication 1 PATCH: at 08:24

## 2020-01-27 RX ADMIN — TAMSULOSIN HYDROCHLORIDE 0.4 MILLIGRAM(S): 0.4 CAPSULE ORAL at 20:42

## 2020-01-27 RX ADMIN — Medication 1 MILLIGRAM(S): at 20:42

## 2020-01-27 RX ADMIN — Medication 1 MILLIGRAM(S): at 08:23

## 2020-01-27 RX ADMIN — ESCITALOPRAM OXALATE 15 MILLIGRAM(S): 10 TABLET, FILM COATED ORAL at 08:24

## 2020-01-27 RX ADMIN — Medication 81 MILLIGRAM(S): at 08:23

## 2020-01-27 RX ADMIN — Medication 1 PATCH: at 08:23

## 2020-01-27 RX ADMIN — ATORVASTATIN CALCIUM 10 MILLIGRAM(S): 80 TABLET, FILM COATED ORAL at 20:42

## 2020-01-27 RX ADMIN — OLANZAPINE 25 MILLIGRAM(S): 15 TABLET, FILM COATED ORAL at 20:42

## 2020-01-27 RX ADMIN — Medication 1 CAPSULE(S): at 11:32

## 2020-01-27 RX ADMIN — Medication 1 CAPSULE(S): at 08:24

## 2020-01-27 RX ADMIN — DIVALPROEX SODIUM 500 MILLIGRAM(S): 500 TABLET, DELAYED RELEASE ORAL at 08:23

## 2020-01-27 RX ADMIN — Medication 25 MILLIGRAM(S): at 20:42

## 2020-01-27 RX ADMIN — GABAPENTIN 300 MILLIGRAM(S): 400 CAPSULE ORAL at 12:05

## 2020-01-27 RX ADMIN — GABAPENTIN 300 MILLIGRAM(S): 400 CAPSULE ORAL at 08:23

## 2020-01-27 RX ADMIN — Medication 1 CAPSULE(S): at 17:29

## 2020-01-28 PROCEDURE — 99231 SBSQ HOSP IP/OBS SF/LOW 25: CPT

## 2020-01-28 RX ADMIN — Medication 25 MILLIGRAM(S): at 08:47

## 2020-01-28 RX ADMIN — Medication 1 CAPSULE(S): at 11:29

## 2020-01-28 RX ADMIN — Medication 1 PATCH: at 08:47

## 2020-01-28 RX ADMIN — Medication 1 PATCH: at 08:48

## 2020-01-28 RX ADMIN — ATORVASTATIN CALCIUM 10 MILLIGRAM(S): 80 TABLET, FILM COATED ORAL at 20:46

## 2020-01-28 RX ADMIN — Medication 1 MILLIGRAM(S): at 08:47

## 2020-01-28 RX ADMIN — DIVALPROEX SODIUM 500 MILLIGRAM(S): 500 TABLET, DELAYED RELEASE ORAL at 08:47

## 2020-01-28 RX ADMIN — Medication 1 CAPSULE(S): at 08:48

## 2020-01-28 RX ADMIN — Medication 81 MILLIGRAM(S): at 08:47

## 2020-01-28 RX ADMIN — OLANZAPINE 25 MILLIGRAM(S): 15 TABLET, FILM COATED ORAL at 20:46

## 2020-01-28 RX ADMIN — Medication 100 MILLIGRAM(S): at 08:48

## 2020-01-28 RX ADMIN — GABAPENTIN 300 MILLIGRAM(S): 400 CAPSULE ORAL at 12:28

## 2020-01-28 RX ADMIN — GABAPENTIN 300 MILLIGRAM(S): 400 CAPSULE ORAL at 20:46

## 2020-01-28 RX ADMIN — Medication 1 MILLIGRAM(S): at 20:46

## 2020-01-28 RX ADMIN — Medication 1 TABLET(S): at 08:47

## 2020-01-28 RX ADMIN — Medication 25 MILLIGRAM(S): at 20:46

## 2020-01-28 RX ADMIN — Medication 1 CAPSULE(S): at 16:57

## 2020-01-28 RX ADMIN — ESCITALOPRAM OXALATE 15 MILLIGRAM(S): 10 TABLET, FILM COATED ORAL at 08:47

## 2020-01-28 RX ADMIN — TAMSULOSIN HYDROCHLORIDE 0.4 MILLIGRAM(S): 0.4 CAPSULE ORAL at 20:46

## 2020-01-28 RX ADMIN — GABAPENTIN 300 MILLIGRAM(S): 400 CAPSULE ORAL at 08:47

## 2020-01-28 RX ADMIN — Medication 1 PATCH: at 20:46

## 2020-01-29 PROCEDURE — 99231 SBSQ HOSP IP/OBS SF/LOW 25: CPT

## 2020-01-29 RX ADMIN — OLANZAPINE 25 MILLIGRAM(S): 15 TABLET, FILM COATED ORAL at 20:55

## 2020-01-29 RX ADMIN — GABAPENTIN 300 MILLIGRAM(S): 400 CAPSULE ORAL at 12:39

## 2020-01-29 RX ADMIN — ESCITALOPRAM OXALATE 15 MILLIGRAM(S): 10 TABLET, FILM COATED ORAL at 08:52

## 2020-01-29 RX ADMIN — Medication 1 CAPSULE(S): at 12:39

## 2020-01-29 RX ADMIN — TAMSULOSIN HYDROCHLORIDE 0.4 MILLIGRAM(S): 0.4 CAPSULE ORAL at 20:55

## 2020-01-29 RX ADMIN — Medication 1 CAPSULE(S): at 08:53

## 2020-01-29 RX ADMIN — Medication 1 MILLIGRAM(S): at 08:52

## 2020-01-29 RX ADMIN — GABAPENTIN 300 MILLIGRAM(S): 400 CAPSULE ORAL at 20:55

## 2020-01-29 RX ADMIN — Medication 1 PATCH: at 08:53

## 2020-01-29 RX ADMIN — Medication 1 TABLET(S): at 08:53

## 2020-01-29 RX ADMIN — Medication 25 MILLIGRAM(S): at 20:55

## 2020-01-29 RX ADMIN — DIVALPROEX SODIUM 500 MILLIGRAM(S): 500 TABLET, DELAYED RELEASE ORAL at 08:52

## 2020-01-29 RX ADMIN — Medication 1 CAPSULE(S): at 17:29

## 2020-01-29 RX ADMIN — Medication 100 MILLIGRAM(S): at 08:53

## 2020-01-29 RX ADMIN — Medication 81 MILLIGRAM(S): at 08:52

## 2020-01-29 RX ADMIN — GABAPENTIN 300 MILLIGRAM(S): 400 CAPSULE ORAL at 08:52

## 2020-01-29 RX ADMIN — Medication 1 MILLIGRAM(S): at 20:55

## 2020-01-29 RX ADMIN — ATORVASTATIN CALCIUM 10 MILLIGRAM(S): 80 TABLET, FILM COATED ORAL at 20:55

## 2020-01-29 RX ADMIN — Medication 25 MILLIGRAM(S): at 08:52

## 2020-01-30 PROCEDURE — 99231 SBSQ HOSP IP/OBS SF/LOW 25: CPT

## 2020-01-30 RX ORDER — OLANZAPINE 15 MG/1
30 TABLET, FILM COATED ORAL AT BEDTIME
Refills: 0 | Status: DISCONTINUED | OUTPATIENT
Start: 2020-01-30 | End: 2020-02-10

## 2020-01-30 RX ADMIN — GABAPENTIN 300 MILLIGRAM(S): 400 CAPSULE ORAL at 12:45

## 2020-01-30 RX ADMIN — Medication 1 CAPSULE(S): at 08:36

## 2020-01-30 RX ADMIN — Medication 100 MILLIGRAM(S): at 08:36

## 2020-01-30 RX ADMIN — DIVALPROEX SODIUM 500 MILLIGRAM(S): 500 TABLET, DELAYED RELEASE ORAL at 08:36

## 2020-01-30 RX ADMIN — ESCITALOPRAM OXALATE 15 MILLIGRAM(S): 10 TABLET, FILM COATED ORAL at 08:36

## 2020-01-30 RX ADMIN — Medication 25 MILLIGRAM(S): at 21:00

## 2020-01-30 RX ADMIN — OLANZAPINE 30 MILLIGRAM(S): 15 TABLET, FILM COATED ORAL at 21:00

## 2020-01-30 RX ADMIN — GABAPENTIN 300 MILLIGRAM(S): 400 CAPSULE ORAL at 08:28

## 2020-01-30 RX ADMIN — GABAPENTIN 300 MILLIGRAM(S): 400 CAPSULE ORAL at 21:00

## 2020-01-30 RX ADMIN — Medication 1 CAPSULE(S): at 16:55

## 2020-01-30 RX ADMIN — Medication 25 MILLIGRAM(S): at 08:36

## 2020-01-30 RX ADMIN — Medication 1 MILLIGRAM(S): at 08:36

## 2020-01-30 RX ADMIN — Medication 1 TABLET(S): at 08:36

## 2020-01-30 RX ADMIN — Medication 1 PATCH: at 08:36

## 2020-01-30 RX ADMIN — Medication 1 MILLIGRAM(S): at 21:00

## 2020-01-30 RX ADMIN — Medication 1 CAPSULE(S): at 12:45

## 2020-01-30 RX ADMIN — ATORVASTATIN CALCIUM 10 MILLIGRAM(S): 80 TABLET, FILM COATED ORAL at 21:00

## 2020-01-30 RX ADMIN — Medication 81 MILLIGRAM(S): at 09:11

## 2020-01-30 RX ADMIN — TAMSULOSIN HYDROCHLORIDE 0.4 MILLIGRAM(S): 0.4 CAPSULE ORAL at 21:00

## 2020-01-31 PROCEDURE — 99231 SBSQ HOSP IP/OBS SF/LOW 25: CPT

## 2020-01-31 RX ADMIN — ESCITALOPRAM OXALATE 15 MILLIGRAM(S): 10 TABLET, FILM COATED ORAL at 09:03

## 2020-01-31 RX ADMIN — DIVALPROEX SODIUM 500 MILLIGRAM(S): 500 TABLET, DELAYED RELEASE ORAL at 09:03

## 2020-01-31 RX ADMIN — Medication 1 PATCH: at 09:04

## 2020-01-31 RX ADMIN — Medication 25 MILLIGRAM(S): at 09:04

## 2020-01-31 RX ADMIN — TAMSULOSIN HYDROCHLORIDE 0.4 MILLIGRAM(S): 0.4 CAPSULE ORAL at 20:56

## 2020-01-31 RX ADMIN — OLANZAPINE 30 MILLIGRAM(S): 15 TABLET, FILM COATED ORAL at 20:56

## 2020-01-31 RX ADMIN — ATORVASTATIN CALCIUM 10 MILLIGRAM(S): 80 TABLET, FILM COATED ORAL at 20:56

## 2020-01-31 RX ADMIN — GABAPENTIN 300 MILLIGRAM(S): 400 CAPSULE ORAL at 12:18

## 2020-01-31 RX ADMIN — GABAPENTIN 300 MILLIGRAM(S): 400 CAPSULE ORAL at 09:03

## 2020-01-31 RX ADMIN — Medication 1 CAPSULE(S): at 12:18

## 2020-01-31 RX ADMIN — GABAPENTIN 300 MILLIGRAM(S): 400 CAPSULE ORAL at 20:56

## 2020-01-31 RX ADMIN — Medication 81 MILLIGRAM(S): at 09:03

## 2020-01-31 RX ADMIN — Medication 1 MILLIGRAM(S): at 09:03

## 2020-01-31 RX ADMIN — Medication 1 CAPSULE(S): at 16:33

## 2020-01-31 RX ADMIN — Medication 100 MILLIGRAM(S): at 09:04

## 2020-01-31 RX ADMIN — Medication 1 TABLET(S): at 09:04

## 2020-01-31 RX ADMIN — Medication 1 MILLIGRAM(S): at 20:56

## 2020-01-31 RX ADMIN — Medication 25 MILLIGRAM(S): at 20:56

## 2020-01-31 RX ADMIN — Medication 1 CAPSULE(S): at 09:04

## 2020-02-01 RX ADMIN — Medication 1 PATCH: at 09:57

## 2020-02-01 RX ADMIN — Medication 1 MILLIGRAM(S): at 09:56

## 2020-02-01 RX ADMIN — Medication 1 CAPSULE(S): at 09:55

## 2020-02-01 RX ADMIN — Medication 1 TABLET(S): at 09:55

## 2020-02-01 RX ADMIN — Medication 1 CAPSULE(S): at 12:45

## 2020-02-01 RX ADMIN — Medication 81 MILLIGRAM(S): at 09:56

## 2020-02-01 RX ADMIN — Medication 1 CAPSULE(S): at 17:59

## 2020-02-01 RX ADMIN — DIVALPROEX SODIUM 500 MILLIGRAM(S): 500 TABLET, DELAYED RELEASE ORAL at 09:55

## 2020-02-01 RX ADMIN — Medication 1 MILLIGRAM(S): at 09:55

## 2020-02-01 RX ADMIN — OLANZAPINE 30 MILLIGRAM(S): 15 TABLET, FILM COATED ORAL at 22:15

## 2020-02-01 RX ADMIN — ATORVASTATIN CALCIUM 10 MILLIGRAM(S): 80 TABLET, FILM COATED ORAL at 22:15

## 2020-02-01 RX ADMIN — TAMSULOSIN HYDROCHLORIDE 0.4 MILLIGRAM(S): 0.4 CAPSULE ORAL at 22:15

## 2020-02-01 RX ADMIN — GABAPENTIN 300 MILLIGRAM(S): 400 CAPSULE ORAL at 09:56

## 2020-02-01 RX ADMIN — GABAPENTIN 300 MILLIGRAM(S): 400 CAPSULE ORAL at 13:13

## 2020-02-01 RX ADMIN — ESCITALOPRAM OXALATE 15 MILLIGRAM(S): 10 TABLET, FILM COATED ORAL at 09:56

## 2020-02-01 RX ADMIN — Medication 1 MILLIGRAM(S): at 22:15

## 2020-02-01 RX ADMIN — Medication 25 MILLIGRAM(S): at 22:15

## 2020-02-01 RX ADMIN — Medication 1 PATCH: at 17:59

## 2020-02-01 RX ADMIN — Medication 100 MILLIGRAM(S): at 09:56

## 2020-02-01 RX ADMIN — Medication 1 PATCH: at 09:30

## 2020-02-01 RX ADMIN — Medication 25 MILLIGRAM(S): at 09:57

## 2020-02-01 RX ADMIN — GABAPENTIN 300 MILLIGRAM(S): 400 CAPSULE ORAL at 22:15

## 2020-02-02 RX ADMIN — GABAPENTIN 300 MILLIGRAM(S): 400 CAPSULE ORAL at 12:20

## 2020-02-02 RX ADMIN — Medication 1 CAPSULE(S): at 17:15

## 2020-02-02 RX ADMIN — Medication 1 PATCH: at 21:21

## 2020-02-02 RX ADMIN — Medication 1 CAPSULE(S): at 12:20

## 2020-02-02 RX ADMIN — TAMSULOSIN HYDROCHLORIDE 0.4 MILLIGRAM(S): 0.4 CAPSULE ORAL at 21:22

## 2020-02-02 RX ADMIN — GABAPENTIN 300 MILLIGRAM(S): 400 CAPSULE ORAL at 08:12

## 2020-02-02 RX ADMIN — OLANZAPINE 30 MILLIGRAM(S): 15 TABLET, FILM COATED ORAL at 21:21

## 2020-02-02 RX ADMIN — ESCITALOPRAM OXALATE 15 MILLIGRAM(S): 10 TABLET, FILM COATED ORAL at 08:12

## 2020-02-02 RX ADMIN — Medication 1 PATCH: at 09:46

## 2020-02-02 RX ADMIN — ATORVASTATIN CALCIUM 10 MILLIGRAM(S): 80 TABLET, FILM COATED ORAL at 21:20

## 2020-02-02 RX ADMIN — Medication 1 TABLET(S): at 08:12

## 2020-02-02 RX ADMIN — Medication 81 MILLIGRAM(S): at 08:12

## 2020-02-02 RX ADMIN — Medication 1 MILLIGRAM(S): at 08:12

## 2020-02-02 RX ADMIN — Medication 1 MILLIGRAM(S): at 21:20

## 2020-02-02 RX ADMIN — Medication 1 CAPSULE(S): at 08:12

## 2020-02-02 RX ADMIN — DIVALPROEX SODIUM 500 MILLIGRAM(S): 500 TABLET, DELAYED RELEASE ORAL at 08:12

## 2020-02-02 RX ADMIN — Medication 25 MILLIGRAM(S): at 21:21

## 2020-02-02 RX ADMIN — Medication 100 MILLIGRAM(S): at 08:12

## 2020-02-02 RX ADMIN — GABAPENTIN 300 MILLIGRAM(S): 400 CAPSULE ORAL at 21:21

## 2020-02-02 RX ADMIN — Medication 25 MILLIGRAM(S): at 08:12

## 2020-02-03 PROCEDURE — 99231 SBSQ HOSP IP/OBS SF/LOW 25: CPT

## 2020-02-03 RX ORDER — ESCITALOPRAM OXALATE 10 MG/1
20 TABLET, FILM COATED ORAL DAILY
Refills: 0 | Status: DISCONTINUED | OUTPATIENT
Start: 2020-02-03 | End: 2020-02-10

## 2020-02-03 RX ADMIN — Medication 1 PATCH: at 07:31

## 2020-02-03 RX ADMIN — TAMSULOSIN HYDROCHLORIDE 0.4 MILLIGRAM(S): 0.4 CAPSULE ORAL at 21:20

## 2020-02-03 RX ADMIN — ESCITALOPRAM OXALATE 15 MILLIGRAM(S): 10 TABLET, FILM COATED ORAL at 09:15

## 2020-02-03 RX ADMIN — Medication 100 MILLIGRAM(S): at 09:16

## 2020-02-03 RX ADMIN — DIVALPROEX SODIUM 500 MILLIGRAM(S): 500 TABLET, DELAYED RELEASE ORAL at 09:14

## 2020-02-03 RX ADMIN — Medication 25 MILLIGRAM(S): at 21:20

## 2020-02-03 RX ADMIN — GABAPENTIN 300 MILLIGRAM(S): 400 CAPSULE ORAL at 21:20

## 2020-02-03 RX ADMIN — Medication 1 TABLET(S): at 09:16

## 2020-02-03 RX ADMIN — Medication 1 MILLIGRAM(S): at 09:16

## 2020-02-03 RX ADMIN — Medication 25 MILLIGRAM(S): at 09:15

## 2020-02-03 RX ADMIN — Medication 1 PATCH: at 09:17

## 2020-02-03 RX ADMIN — Medication 1 CAPSULE(S): at 13:19

## 2020-02-03 RX ADMIN — GABAPENTIN 300 MILLIGRAM(S): 400 CAPSULE ORAL at 09:15

## 2020-02-03 RX ADMIN — Medication 1 MILLIGRAM(S): at 21:20

## 2020-02-03 RX ADMIN — Medication 1 PATCH: at 18:41

## 2020-02-03 RX ADMIN — Medication 1 CAPSULE(S): at 17:49

## 2020-02-03 RX ADMIN — Medication 1 PATCH: at 09:00

## 2020-02-03 RX ADMIN — OLANZAPINE 30 MILLIGRAM(S): 15 TABLET, FILM COATED ORAL at 21:20

## 2020-02-03 RX ADMIN — ATORVASTATIN CALCIUM 10 MILLIGRAM(S): 80 TABLET, FILM COATED ORAL at 21:20

## 2020-02-03 RX ADMIN — GABAPENTIN 300 MILLIGRAM(S): 400 CAPSULE ORAL at 13:19

## 2020-02-03 RX ADMIN — Medication 1 CAPSULE(S): at 09:15

## 2020-02-03 RX ADMIN — Medication 81 MILLIGRAM(S): at 09:16

## 2020-02-04 PROCEDURE — 99231 SBSQ HOSP IP/OBS SF/LOW 25: CPT

## 2020-02-04 RX ADMIN — Medication 1 CAPSULE(S): at 12:25

## 2020-02-04 RX ADMIN — Medication 1 PATCH: at 08:38

## 2020-02-04 RX ADMIN — Medication 100 MILLIGRAM(S): at 08:39

## 2020-02-04 RX ADMIN — Medication 1 CAPSULE(S): at 17:37

## 2020-02-04 RX ADMIN — Medication 81 MILLIGRAM(S): at 08:38

## 2020-02-04 RX ADMIN — GABAPENTIN 300 MILLIGRAM(S): 400 CAPSULE ORAL at 12:25

## 2020-02-04 RX ADMIN — Medication 1 PATCH: at 20:29

## 2020-02-04 RX ADMIN — TAMSULOSIN HYDROCHLORIDE 0.4 MILLIGRAM(S): 0.4 CAPSULE ORAL at 20:29

## 2020-02-04 RX ADMIN — Medication 1 TABLET(S): at 08:38

## 2020-02-04 RX ADMIN — OLANZAPINE 30 MILLIGRAM(S): 15 TABLET, FILM COATED ORAL at 20:29

## 2020-02-04 RX ADMIN — Medication 1 PATCH: at 08:39

## 2020-02-04 RX ADMIN — Medication 25 MILLIGRAM(S): at 08:38

## 2020-02-04 RX ADMIN — Medication 1 CAPSULE(S): at 08:39

## 2020-02-04 RX ADMIN — GABAPENTIN 300 MILLIGRAM(S): 400 CAPSULE ORAL at 20:29

## 2020-02-04 RX ADMIN — Medication 25 MILLIGRAM(S): at 20:29

## 2020-02-04 RX ADMIN — Medication 1 MILLIGRAM(S): at 20:29

## 2020-02-04 RX ADMIN — Medication 1 MILLIGRAM(S): at 08:38

## 2020-02-04 RX ADMIN — ESCITALOPRAM OXALATE 20 MILLIGRAM(S): 10 TABLET, FILM COATED ORAL at 08:38

## 2020-02-04 RX ADMIN — DIVALPROEX SODIUM 500 MILLIGRAM(S): 500 TABLET, DELAYED RELEASE ORAL at 08:38

## 2020-02-04 RX ADMIN — GABAPENTIN 300 MILLIGRAM(S): 400 CAPSULE ORAL at 08:38

## 2020-02-04 RX ADMIN — ATORVASTATIN CALCIUM 10 MILLIGRAM(S): 80 TABLET, FILM COATED ORAL at 20:29

## 2020-02-05 PROCEDURE — 99231 SBSQ HOSP IP/OBS SF/LOW 25: CPT

## 2020-02-05 RX ADMIN — Medication 1 CAPSULE(S): at 14:30

## 2020-02-05 RX ADMIN — DIVALPROEX SODIUM 500 MILLIGRAM(S): 500 TABLET, DELAYED RELEASE ORAL at 08:44

## 2020-02-05 RX ADMIN — ATORVASTATIN CALCIUM 10 MILLIGRAM(S): 80 TABLET, FILM COATED ORAL at 20:58

## 2020-02-05 RX ADMIN — Medication 1 PATCH: at 08:45

## 2020-02-05 RX ADMIN — Medication 1 MILLIGRAM(S): at 08:44

## 2020-02-05 RX ADMIN — GABAPENTIN 300 MILLIGRAM(S): 400 CAPSULE ORAL at 12:12

## 2020-02-05 RX ADMIN — Medication 1 CAPSULE(S): at 12:12

## 2020-02-05 RX ADMIN — GABAPENTIN 300 MILLIGRAM(S): 400 CAPSULE ORAL at 08:38

## 2020-02-05 RX ADMIN — GABAPENTIN 300 MILLIGRAM(S): 400 CAPSULE ORAL at 20:58

## 2020-02-05 RX ADMIN — Medication 25 MILLIGRAM(S): at 08:45

## 2020-02-05 RX ADMIN — OLANZAPINE 30 MILLIGRAM(S): 15 TABLET, FILM COATED ORAL at 20:58

## 2020-02-05 RX ADMIN — Medication 1 MILLIGRAM(S): at 20:58

## 2020-02-05 RX ADMIN — Medication 25 MILLIGRAM(S): at 20:58

## 2020-02-05 RX ADMIN — Medication 100 MILLIGRAM(S): at 08:44

## 2020-02-05 RX ADMIN — Medication 1 CAPSULE(S): at 08:44

## 2020-02-05 RX ADMIN — Medication 1 TABLET(S): at 08:45

## 2020-02-05 RX ADMIN — ESCITALOPRAM OXALATE 20 MILLIGRAM(S): 10 TABLET, FILM COATED ORAL at 08:44

## 2020-02-05 RX ADMIN — TAMSULOSIN HYDROCHLORIDE 0.4 MILLIGRAM(S): 0.4 CAPSULE ORAL at 20:58

## 2020-02-05 RX ADMIN — Medication 81 MILLIGRAM(S): at 08:53

## 2020-02-06 PROCEDURE — 99231 SBSQ HOSP IP/OBS SF/LOW 25: CPT

## 2020-02-06 RX ADMIN — Medication 25 MILLIGRAM(S): at 20:55

## 2020-02-06 RX ADMIN — TAMSULOSIN HYDROCHLORIDE 0.4 MILLIGRAM(S): 0.4 CAPSULE ORAL at 20:55

## 2020-02-06 RX ADMIN — DIVALPROEX SODIUM 500 MILLIGRAM(S): 500 TABLET, DELAYED RELEASE ORAL at 08:54

## 2020-02-06 RX ADMIN — Medication 25 MILLIGRAM(S): at 08:54

## 2020-02-06 RX ADMIN — Medication 1 PATCH: at 08:55

## 2020-02-06 RX ADMIN — Medication 100 MILLIGRAM(S): at 08:55

## 2020-02-06 RX ADMIN — ATORVASTATIN CALCIUM 10 MILLIGRAM(S): 80 TABLET, FILM COATED ORAL at 20:55

## 2020-02-06 RX ADMIN — OLANZAPINE 30 MILLIGRAM(S): 15 TABLET, FILM COATED ORAL at 20:55

## 2020-02-06 RX ADMIN — Medication 1 MILLIGRAM(S): at 08:54

## 2020-02-06 RX ADMIN — GABAPENTIN 300 MILLIGRAM(S): 400 CAPSULE ORAL at 12:19

## 2020-02-06 RX ADMIN — Medication 1 CAPSULE(S): at 08:55

## 2020-02-06 RX ADMIN — Medication 1 MILLIGRAM(S): at 20:55

## 2020-02-06 RX ADMIN — Medication 1 PATCH: at 08:54

## 2020-02-06 RX ADMIN — ESCITALOPRAM OXALATE 20 MILLIGRAM(S): 10 TABLET, FILM COATED ORAL at 08:54

## 2020-02-06 RX ADMIN — Medication 1 TABLET(S): at 08:54

## 2020-02-06 RX ADMIN — Medication 1 CAPSULE(S): at 16:52

## 2020-02-06 RX ADMIN — Medication 81 MILLIGRAM(S): at 08:54

## 2020-02-06 RX ADMIN — GABAPENTIN 300 MILLIGRAM(S): 400 CAPSULE ORAL at 08:54

## 2020-02-06 RX ADMIN — GABAPENTIN 300 MILLIGRAM(S): 400 CAPSULE ORAL at 20:55

## 2020-02-06 RX ADMIN — Medication 1 CAPSULE(S): at 12:20

## 2020-02-07 PROCEDURE — 99231 SBSQ HOSP IP/OBS SF/LOW 25: CPT

## 2020-02-07 RX ORDER — BENZTROPINE MESYLATE 1 MG
1 TABLET ORAL
Qty: 60 | Refills: 0
Start: 2020-02-07 | End: 2020-03-10

## 2020-02-07 RX ORDER — ESCITALOPRAM OXALATE 10 MG/1
1 TABLET, FILM COATED ORAL
Qty: 30 | Refills: 0
Start: 2020-02-07 | End: 2020-03-10

## 2020-02-07 RX ORDER — ALBUTEROL 90 UG/1
2 AEROSOL, METERED ORAL
Qty: 1 | Refills: 0
Start: 2020-02-07

## 2020-02-07 RX ORDER — THIAMINE MONONITRATE (VIT B1) 100 MG
1 TABLET ORAL
Qty: 0 | Refills: 0 | DISCHARGE
Start: 2020-02-07 | End: 2020-03-10

## 2020-02-07 RX ORDER — THIAMINE MONONITRATE (VIT B1) 100 MG
1 TABLET ORAL
Qty: 30 | Refills: 0
Start: 2020-02-07 | End: 2020-03-07

## 2020-02-07 RX ORDER — ASPIRIN/CALCIUM CARB/MAGNESIUM 324 MG
1 TABLET ORAL
Qty: 30 | Refills: 0
Start: 2020-02-07 | End: 2020-03-07

## 2020-02-07 RX ORDER — TAMSULOSIN HYDROCHLORIDE 0.4 MG/1
1 CAPSULE ORAL
Qty: 30 | Refills: 0
Start: 2020-02-07 | End: 2020-03-10

## 2020-02-07 RX ORDER — OLANZAPINE 15 MG/1
2 TABLET, FILM COATED ORAL
Qty: 60 | Refills: 0
Start: 2020-02-07 | End: 2020-03-10

## 2020-02-07 RX ORDER — THIAMINE MONONITRATE (VIT B1) 100 MG
1 TABLET ORAL
Qty: 30 | Refills: 0
Start: 2020-02-07 | End: 2020-03-10

## 2020-02-07 RX ORDER — ATORVASTATIN CALCIUM 80 MG/1
1 TABLET, FILM COATED ORAL
Qty: 30 | Refills: 0
Start: 2020-02-07 | End: 2020-03-10

## 2020-02-07 RX ORDER — ATORVASTATIN CALCIUM 80 MG/1
1 TABLET, FILM COATED ORAL
Qty: 30 | Refills: 0
Start: 2020-02-07 | End: 2020-03-07

## 2020-02-07 RX ORDER — TAMSULOSIN HYDROCHLORIDE 0.4 MG/1
1 CAPSULE ORAL
Qty: 30 | Refills: 0
Start: 2020-02-07 | End: 2020-03-07

## 2020-02-07 RX ORDER — LIPASE/PROTEASE/AMYLASE 16-48-48K
1 CAPSULE,DELAYED RELEASE (ENTERIC COATED) ORAL
Qty: 90 | Refills: 0
Start: 2020-02-07 | End: 2020-03-07

## 2020-02-07 RX ORDER — NICOTINE POLACRILEX 2 MG
1 GUM BUCCAL
Qty: 1 | Refills: 0
Start: 2020-02-07

## 2020-02-07 RX ORDER — BENZTROPINE MESYLATE 1 MG
1 TABLET ORAL
Qty: 60 | Refills: 0
Start: 2020-02-07 | End: 2020-03-07

## 2020-02-07 RX ORDER — METOPROLOL TARTRATE 50 MG
1 TABLET ORAL
Qty: 60 | Refills: 0
Start: 2020-02-07 | End: 2020-03-10

## 2020-02-07 RX ORDER — ESCITALOPRAM OXALATE 10 MG/1
1.5 TABLET, FILM COATED ORAL
Qty: 0 | Refills: 0 | DISCHARGE
Start: 2020-02-07 | End: 2020-03-10

## 2020-02-07 RX ORDER — OLANZAPINE 15 MG/1
2 TABLET, FILM COATED ORAL
Qty: 60 | Refills: 0
Start: 2020-02-07 | End: 2020-03-07

## 2020-02-07 RX ORDER — ESCITALOPRAM OXALATE 10 MG/1
1 TABLET, FILM COATED ORAL
Qty: 30 | Refills: 0
Start: 2020-02-07 | End: 2020-03-07

## 2020-02-07 RX ORDER — LIPASE/PROTEASE/AMYLASE 16-48-48K
1 CAPSULE,DELAYED RELEASE (ENTERIC COATED) ORAL
Qty: 90 | Refills: 0
Start: 2020-02-07 | End: 2020-03-10

## 2020-02-07 RX ORDER — METOPROLOL TARTRATE 50 MG
1 TABLET ORAL
Qty: 60 | Refills: 0
Start: 2020-02-07 | End: 2020-03-07

## 2020-02-07 RX ORDER — GABAPENTIN 400 MG/1
1 CAPSULE ORAL
Qty: 90 | Refills: 0
Start: 2020-02-07 | End: 2020-03-07

## 2020-02-07 RX ORDER — DIVALPROEX SODIUM 500 MG/1
1 TABLET, DELAYED RELEASE ORAL
Qty: 30 | Refills: 0
Start: 2020-02-07 | End: 2020-03-10

## 2020-02-07 RX ORDER — METOPROLOL TARTRATE 50 MG
1 TABLET ORAL
Qty: 0 | Refills: 0 | DISCHARGE
Start: 2020-02-07 | End: 2020-03-10

## 2020-02-07 RX ORDER — FOLIC ACID 0.8 MG
1 TABLET ORAL
Qty: 30 | Refills: 0
Start: 2020-02-07 | End: 2020-03-07

## 2020-02-07 RX ORDER — GABAPENTIN 400 MG/1
1 CAPSULE ORAL
Qty: 90 | Refills: 0
Start: 2020-02-07 | End: 2020-03-10

## 2020-02-07 RX ORDER — FOLIC ACID 0.8 MG
1 TABLET ORAL
Qty: 30 | Refills: 0
Start: 2020-02-07 | End: 2020-03-10

## 2020-02-07 RX ORDER — DIVALPROEX SODIUM 500 MG/1
1 TABLET, DELAYED RELEASE ORAL
Qty: 30 | Refills: 0
Start: 2020-02-07 | End: 2020-03-07

## 2020-02-07 RX ORDER — ASPIRIN/CALCIUM CARB/MAGNESIUM 324 MG
1 TABLET ORAL
Qty: 30 | Refills: 0
Start: 2020-02-07 | End: 2020-03-10

## 2020-02-07 RX ADMIN — Medication 1 MILLIGRAM(S): at 20:57

## 2020-02-07 RX ADMIN — TAMSULOSIN HYDROCHLORIDE 0.4 MILLIGRAM(S): 0.4 CAPSULE ORAL at 20:58

## 2020-02-07 RX ADMIN — Medication 25 MILLIGRAM(S): at 09:01

## 2020-02-07 RX ADMIN — Medication 1 PATCH: at 20:58

## 2020-02-07 RX ADMIN — OLANZAPINE 30 MILLIGRAM(S): 15 TABLET, FILM COATED ORAL at 20:58

## 2020-02-07 RX ADMIN — Medication 1 MILLIGRAM(S): at 09:01

## 2020-02-07 RX ADMIN — GABAPENTIN 300 MILLIGRAM(S): 400 CAPSULE ORAL at 20:57

## 2020-02-07 RX ADMIN — Medication 1 CAPSULE(S): at 09:05

## 2020-02-07 RX ADMIN — Medication 81 MILLIGRAM(S): at 09:01

## 2020-02-07 RX ADMIN — DIVALPROEX SODIUM 500 MILLIGRAM(S): 500 TABLET, DELAYED RELEASE ORAL at 09:01

## 2020-02-07 RX ADMIN — Medication 25 MILLIGRAM(S): at 20:57

## 2020-02-07 RX ADMIN — Medication 1 PATCH: at 09:05

## 2020-02-07 RX ADMIN — Medication 1 CAPSULE(S): at 17:26

## 2020-02-07 RX ADMIN — Medication 1 PATCH: at 07:02

## 2020-02-07 RX ADMIN — Medication 1 TABLET(S): at 09:01

## 2020-02-07 RX ADMIN — ATORVASTATIN CALCIUM 10 MILLIGRAM(S): 80 TABLET, FILM COATED ORAL at 20:57

## 2020-02-07 RX ADMIN — GABAPENTIN 300 MILLIGRAM(S): 400 CAPSULE ORAL at 12:56

## 2020-02-07 RX ADMIN — ESCITALOPRAM OXALATE 20 MILLIGRAM(S): 10 TABLET, FILM COATED ORAL at 09:01

## 2020-02-07 RX ADMIN — Medication 100 MILLIGRAM(S): at 09:04

## 2020-02-07 RX ADMIN — Medication 1 CAPSULE(S): at 11:55

## 2020-02-07 RX ADMIN — GABAPENTIN 300 MILLIGRAM(S): 400 CAPSULE ORAL at 09:01

## 2020-02-07 RX ADMIN — Medication 1 PATCH: at 09:01

## 2020-02-08 RX ADMIN — Medication 1 TABLET(S): at 08:49

## 2020-02-08 RX ADMIN — Medication 25 MILLIGRAM(S): at 20:59

## 2020-02-08 RX ADMIN — Medication 1 MILLIGRAM(S): at 08:49

## 2020-02-08 RX ADMIN — DIVALPROEX SODIUM 500 MILLIGRAM(S): 500 TABLET, DELAYED RELEASE ORAL at 08:49

## 2020-02-08 RX ADMIN — Medication 1 CAPSULE(S): at 12:11

## 2020-02-08 RX ADMIN — GABAPENTIN 300 MILLIGRAM(S): 400 CAPSULE ORAL at 12:53

## 2020-02-08 RX ADMIN — TAMSULOSIN HYDROCHLORIDE 0.4 MILLIGRAM(S): 0.4 CAPSULE ORAL at 20:59

## 2020-02-08 RX ADMIN — GABAPENTIN 300 MILLIGRAM(S): 400 CAPSULE ORAL at 21:00

## 2020-02-08 RX ADMIN — Medication 81 MILLIGRAM(S): at 08:49

## 2020-02-08 RX ADMIN — Medication 25 MILLIGRAM(S): at 08:49

## 2020-02-08 RX ADMIN — ATORVASTATIN CALCIUM 10 MILLIGRAM(S): 80 TABLET, FILM COATED ORAL at 20:58

## 2020-02-08 RX ADMIN — Medication 100 MILLIGRAM(S): at 08:50

## 2020-02-08 RX ADMIN — OLANZAPINE 30 MILLIGRAM(S): 15 TABLET, FILM COATED ORAL at 20:59

## 2020-02-08 RX ADMIN — ESCITALOPRAM OXALATE 20 MILLIGRAM(S): 10 TABLET, FILM COATED ORAL at 08:49

## 2020-02-08 RX ADMIN — Medication 1 CAPSULE(S): at 08:50

## 2020-02-08 RX ADMIN — Medication 1 PATCH: at 19:59

## 2020-02-08 RX ADMIN — Medication 1 CAPSULE(S): at 16:51

## 2020-02-08 RX ADMIN — Medication 1 MILLIGRAM(S): at 20:58

## 2020-02-08 RX ADMIN — Medication 1 PATCH: at 08:49

## 2020-02-08 RX ADMIN — GABAPENTIN 300 MILLIGRAM(S): 400 CAPSULE ORAL at 08:49

## 2020-02-09 RX ADMIN — Medication 1 MILLIGRAM(S): at 09:56

## 2020-02-09 RX ADMIN — OLANZAPINE 30 MILLIGRAM(S): 15 TABLET, FILM COATED ORAL at 21:05

## 2020-02-09 RX ADMIN — Medication 1 CAPSULE(S): at 12:58

## 2020-02-09 RX ADMIN — Medication 1 PATCH: at 09:57

## 2020-02-09 RX ADMIN — Medication 81 MILLIGRAM(S): at 09:56

## 2020-02-09 RX ADMIN — ATORVASTATIN CALCIUM 10 MILLIGRAM(S): 80 TABLET, FILM COATED ORAL at 21:05

## 2020-02-09 RX ADMIN — Medication 25 MILLIGRAM(S): at 21:05

## 2020-02-09 RX ADMIN — GABAPENTIN 300 MILLIGRAM(S): 400 CAPSULE ORAL at 12:56

## 2020-02-09 RX ADMIN — Medication 1 TABLET(S): at 09:57

## 2020-02-09 RX ADMIN — GABAPENTIN 300 MILLIGRAM(S): 400 CAPSULE ORAL at 21:05

## 2020-02-09 RX ADMIN — ESCITALOPRAM OXALATE 20 MILLIGRAM(S): 10 TABLET, FILM COATED ORAL at 09:56

## 2020-02-09 RX ADMIN — TAMSULOSIN HYDROCHLORIDE 0.4 MILLIGRAM(S): 0.4 CAPSULE ORAL at 21:05

## 2020-02-09 RX ADMIN — Medication 1 MILLIGRAM(S): at 21:56

## 2020-02-09 RX ADMIN — DIVALPROEX SODIUM 500 MILLIGRAM(S): 500 TABLET, DELAYED RELEASE ORAL at 09:56

## 2020-02-09 RX ADMIN — Medication 100 MILLIGRAM(S): at 09:59

## 2020-02-09 RX ADMIN — Medication 1 PATCH: at 21:05

## 2020-02-09 RX ADMIN — Medication 1 CAPSULE(S): at 16:50

## 2020-02-09 RX ADMIN — Medication 1 CAPSULE(S): at 09:59

## 2020-02-09 RX ADMIN — GABAPENTIN 300 MILLIGRAM(S): 400 CAPSULE ORAL at 08:30

## 2020-02-09 RX ADMIN — Medication 25 MILLIGRAM(S): at 09:57

## 2020-02-10 VITALS — TEMPERATURE: 97 F

## 2020-02-10 PROCEDURE — 99238 HOSP IP/OBS DSCHRG MGMT 30/<: CPT

## 2020-02-10 RX ADMIN — GABAPENTIN 300 MILLIGRAM(S): 400 CAPSULE ORAL at 08:40

## 2020-02-10 RX ADMIN — Medication 1 MILLIGRAM(S): at 08:57

## 2020-02-10 RX ADMIN — Medication 1 PATCH: at 08:57

## 2020-02-10 RX ADMIN — Medication 100 MILLIGRAM(S): at 08:59

## 2020-02-10 RX ADMIN — Medication 81 MILLIGRAM(S): at 08:56

## 2020-02-10 RX ADMIN — Medication 1 MILLIGRAM(S): at 08:56

## 2020-02-10 RX ADMIN — ESCITALOPRAM OXALATE 20 MILLIGRAM(S): 10 TABLET, FILM COATED ORAL at 08:56

## 2020-02-10 RX ADMIN — Medication 25 MILLIGRAM(S): at 08:57

## 2020-02-10 RX ADMIN — GABAPENTIN 300 MILLIGRAM(S): 400 CAPSULE ORAL at 13:42

## 2020-02-10 RX ADMIN — Medication 1 CAPSULE(S): at 08:59

## 2020-02-10 RX ADMIN — DIVALPROEX SODIUM 500 MILLIGRAM(S): 500 TABLET, DELAYED RELEASE ORAL at 08:56

## 2020-02-10 RX ADMIN — Medication 1 PATCH: at 08:59

## 2020-02-10 RX ADMIN — Medication 1 TABLET(S): at 08:57

## 2020-03-26 NOTE — ED ADULT NURSE NOTE - PAIN: PRESENCE, MLM
Depo injection given. Verbal order for injection from RICHARD Davis. Patient aware to return to clinic in 12 weeks for next injection. Patient tolerated without incident. See medication administration record ( MAR ).    
denies pain/discomfort

## 2020-05-16 ENCOUNTER — EMERGENCY (EMERGENCY)
Facility: HOSPITAL | Age: 60
LOS: 1 days | Discharge: HOME | End: 2020-05-16
Attending: EMERGENCY MEDICINE
Payer: MEDICARE

## 2020-05-16 VITALS
HEART RATE: 57 BPM | RESPIRATION RATE: 18 BRPM | DIASTOLIC BLOOD PRESSURE: 92 MMHG | SYSTOLIC BLOOD PRESSURE: 151 MMHG | WEIGHT: 179.9 LBS | HEIGHT: 67 IN | TEMPERATURE: 98 F | OXYGEN SATURATION: 97 %

## 2020-05-16 DIAGNOSIS — F48.9 NONPSYCHOTIC MENTAL DISORDER, UNSPECIFIED: ICD-10-CM

## 2020-05-16 DIAGNOSIS — F20.0 PARANOID SCHIZOPHRENIA: ICD-10-CM

## 2020-05-16 LAB
ALBUMIN SERPL ELPH-MCNC: 4.3 G/DL — SIGNIFICANT CHANGE UP (ref 3.3–5.2)
ALP SERPL-CCNC: 78 U/L — SIGNIFICANT CHANGE UP (ref 40–120)
ALT FLD-CCNC: 36 U/L — SIGNIFICANT CHANGE UP
AMPHET UR-MCNC: NEGATIVE — SIGNIFICANT CHANGE UP
ANION GAP SERPL CALC-SCNC: 12 MMOL/L — SIGNIFICANT CHANGE UP (ref 5–17)
APAP SERPL-MCNC: <7.5 UG/ML — LOW (ref 10–26)
APPEARANCE UR: CLEAR — SIGNIFICANT CHANGE UP
AST SERPL-CCNC: 31 U/L — SIGNIFICANT CHANGE UP
BARBITURATES UR SCN-MCNC: NEGATIVE — SIGNIFICANT CHANGE UP
BASOPHILS # BLD AUTO: 0.03 K/UL — SIGNIFICANT CHANGE UP (ref 0–0.2)
BASOPHILS NFR BLD AUTO: 0.4 % — SIGNIFICANT CHANGE UP (ref 0–2)
BENZODIAZ UR-MCNC: NEGATIVE — SIGNIFICANT CHANGE UP
BILIRUB SERPL-MCNC: 0.2 MG/DL — LOW (ref 0.4–2)
BILIRUB UR-MCNC: NEGATIVE — SIGNIFICANT CHANGE UP
BUN SERPL-MCNC: 12 MG/DL — SIGNIFICANT CHANGE UP (ref 8–20)
CALCIUM SERPL-MCNC: 9.7 MG/DL — SIGNIFICANT CHANGE UP (ref 8.6–10.2)
CHLORIDE SERPL-SCNC: 102 MMOL/L — SIGNIFICANT CHANGE UP (ref 98–107)
CO2 SERPL-SCNC: 26 MMOL/L — SIGNIFICANT CHANGE UP (ref 22–29)
COCAINE METAB.OTHER UR-MCNC: NEGATIVE — SIGNIFICANT CHANGE UP
COLOR SPEC: YELLOW — SIGNIFICANT CHANGE UP
CREAT SERPL-MCNC: 1.07 MG/DL — SIGNIFICANT CHANGE UP (ref 0.5–1.3)
DIFF PNL FLD: NEGATIVE — SIGNIFICANT CHANGE UP
EOSINOPHIL # BLD AUTO: 0.1 K/UL — SIGNIFICANT CHANGE UP (ref 0–0.5)
EOSINOPHIL NFR BLD AUTO: 1.3 % — SIGNIFICANT CHANGE UP (ref 0–6)
ETHANOL SERPL-MCNC: 13 MG/DL — SIGNIFICANT CHANGE UP
GLUCOSE SERPL-MCNC: 84 MG/DL — SIGNIFICANT CHANGE UP (ref 70–99)
GLUCOSE UR QL: NEGATIVE MG/DL — SIGNIFICANT CHANGE UP
HCT VFR BLD CALC: 42.3 % — SIGNIFICANT CHANGE UP (ref 39–50)
HGB BLD-MCNC: 14 G/DL — SIGNIFICANT CHANGE UP (ref 13–17)
IMM GRANULOCYTES NFR BLD AUTO: 0.5 % — SIGNIFICANT CHANGE UP (ref 0–1.5)
KETONES UR-MCNC: NEGATIVE — SIGNIFICANT CHANGE UP
LEUKOCYTE ESTERASE UR-ACNC: NEGATIVE — SIGNIFICANT CHANGE UP
LYMPHOCYTES # BLD AUTO: 2.2 K/UL — SIGNIFICANT CHANGE UP (ref 1–3.3)
LYMPHOCYTES # BLD AUTO: 27.9 % — SIGNIFICANT CHANGE UP (ref 13–44)
MCHC RBC-ENTMCNC: 31.4 PG — SIGNIFICANT CHANGE UP (ref 27–34)
MCHC RBC-ENTMCNC: 33.1 GM/DL — SIGNIFICANT CHANGE UP (ref 32–36)
MCV RBC AUTO: 94.8 FL — SIGNIFICANT CHANGE UP (ref 80–100)
METHADONE UR-MCNC: NEGATIVE — SIGNIFICANT CHANGE UP
MONOCYTES # BLD AUTO: 0.71 K/UL — SIGNIFICANT CHANGE UP (ref 0–0.9)
MONOCYTES NFR BLD AUTO: 9 % — SIGNIFICANT CHANGE UP (ref 2–14)
NEUTROPHILS # BLD AUTO: 4.8 K/UL — SIGNIFICANT CHANGE UP (ref 1.8–7.4)
NEUTROPHILS NFR BLD AUTO: 60.9 % — SIGNIFICANT CHANGE UP (ref 43–77)
NITRITE UR-MCNC: NEGATIVE — SIGNIFICANT CHANGE UP
OPIATES UR-MCNC: NEGATIVE — SIGNIFICANT CHANGE UP
PCP SPEC-MCNC: SIGNIFICANT CHANGE UP
PCP UR-MCNC: NEGATIVE — SIGNIFICANT CHANGE UP
PH UR: 6 — SIGNIFICANT CHANGE UP (ref 5–8)
PLATELET # BLD AUTO: 215 K/UL — SIGNIFICANT CHANGE UP (ref 150–400)
POTASSIUM SERPL-MCNC: 4.6 MMOL/L — SIGNIFICANT CHANGE UP (ref 3.5–5.3)
POTASSIUM SERPL-SCNC: 4.6 MMOL/L — SIGNIFICANT CHANGE UP (ref 3.5–5.3)
PROT SERPL-MCNC: 6.7 G/DL — SIGNIFICANT CHANGE UP (ref 6.6–8.7)
PROT UR-MCNC: NEGATIVE MG/DL — SIGNIFICANT CHANGE UP
RBC # BLD: 4.46 M/UL — SIGNIFICANT CHANGE UP (ref 4.2–5.8)
RBC # FLD: 12.4 % — SIGNIFICANT CHANGE UP (ref 10.3–14.5)
SALICYLATES SERPL-MCNC: <0.6 MG/DL — LOW (ref 10–20)
SODIUM SERPL-SCNC: 140 MMOL/L — SIGNIFICANT CHANGE UP (ref 135–145)
SP GR SPEC: 1 — LOW (ref 1.01–1.02)
THC UR QL: NEGATIVE — SIGNIFICANT CHANGE UP
UROBILINOGEN FLD QL: NEGATIVE MG/DL — SIGNIFICANT CHANGE UP
VALPROATE SERPL-MCNC: 57.1 UG/ML — SIGNIFICANT CHANGE UP (ref 50–100)
WBC # BLD: 7.88 K/UL — SIGNIFICANT CHANGE UP (ref 3.8–10.5)
WBC # FLD AUTO: 7.88 K/UL — SIGNIFICANT CHANGE UP (ref 3.8–10.5)

## 2020-05-16 PROCEDURE — 90792 PSYCH DIAG EVAL W/MED SRVCS: CPT

## 2020-05-16 PROCEDURE — 93010 ELECTROCARDIOGRAM REPORT: CPT

## 2020-05-16 PROCEDURE — 99285 EMERGENCY DEPT VISIT HI MDM: CPT

## 2020-05-16 RX ORDER — DIVALPROEX SODIUM 500 MG/1
500 TABLET, DELAYED RELEASE ORAL DAILY
Refills: 0 | Status: DISCONTINUED | OUTPATIENT
Start: 2020-05-16 | End: 2020-05-21

## 2020-05-16 RX ORDER — BENZTROPINE MESYLATE 1 MG
1 TABLET ORAL
Refills: 0 | Status: DISCONTINUED | OUTPATIENT
Start: 2020-05-16 | End: 2020-05-21

## 2020-05-16 RX ORDER — ATORVASTATIN CALCIUM 80 MG/1
10 TABLET, FILM COATED ORAL AT BEDTIME
Refills: 0 | Status: DISCONTINUED | OUTPATIENT
Start: 2020-05-16 | End: 2020-05-21

## 2020-05-16 RX ORDER — LIPASE/PROTEASE/AMYLASE 16-48-48K
12000 CAPSULE,DELAYED RELEASE (ENTERIC COATED) ORAL
Refills: 0 | Status: DISCONTINUED | OUTPATIENT
Start: 2020-05-16 | End: 2020-05-21

## 2020-05-16 RX ORDER — THIAMINE MONONITRATE (VIT B1) 100 MG
100 TABLET ORAL DAILY
Refills: 0 | Status: DISCONTINUED | OUTPATIENT
Start: 2020-05-16 | End: 2020-05-21

## 2020-05-16 RX ORDER — ALBUTEROL 90 UG/1
2 AEROSOL, METERED ORAL EVERY 6 HOURS
Refills: 0 | Status: DISCONTINUED | OUTPATIENT
Start: 2020-05-16 | End: 2020-05-21

## 2020-05-16 RX ORDER — OLANZAPINE 15 MG/1
30 TABLET, FILM COATED ORAL AT BEDTIME
Refills: 0 | Status: DISCONTINUED | OUTPATIENT
Start: 2020-05-16 | End: 2020-05-21

## 2020-05-16 RX ORDER — TAMSULOSIN HYDROCHLORIDE 0.4 MG/1
0.4 CAPSULE ORAL AT BEDTIME
Refills: 0 | Status: DISCONTINUED | OUTPATIENT
Start: 2020-05-16 | End: 2020-05-21

## 2020-05-16 RX ORDER — ESCITALOPRAM OXALATE 10 MG/1
10 TABLET, FILM COATED ORAL DAILY
Refills: 0 | Status: DISCONTINUED | OUTPATIENT
Start: 2020-05-16 | End: 2020-05-21

## 2020-05-16 RX ORDER — ASPIRIN/CALCIUM CARB/MAGNESIUM 324 MG
81 TABLET ORAL DAILY
Refills: 0 | Status: DISCONTINUED | OUTPATIENT
Start: 2020-05-16 | End: 2020-05-21

## 2020-05-16 RX ORDER — GABAPENTIN 400 MG/1
300 CAPSULE ORAL EVERY 8 HOURS
Refills: 0 | Status: DISCONTINUED | OUTPATIENT
Start: 2020-05-16 | End: 2020-05-21

## 2020-05-16 RX ORDER — FOLIC ACID 0.8 MG
1 TABLET ORAL DAILY
Refills: 0 | Status: DISCONTINUED | OUTPATIENT
Start: 2020-05-16 | End: 2020-05-21

## 2020-05-16 RX ORDER — METOPROLOL TARTRATE 50 MG
25 TABLET ORAL
Refills: 0 | Status: DISCONTINUED | OUTPATIENT
Start: 2020-05-16 | End: 2020-05-21

## 2020-05-16 RX ADMIN — ATORVASTATIN CALCIUM 10 MILLIGRAM(S): 80 TABLET, FILM COATED ORAL at 21:31

## 2020-05-16 RX ADMIN — Medication 1 MILLIGRAM(S): at 18:04

## 2020-05-16 RX ADMIN — TAMSULOSIN HYDROCHLORIDE 0.4 MILLIGRAM(S): 0.4 CAPSULE ORAL at 21:30

## 2020-05-16 RX ADMIN — GABAPENTIN 300 MILLIGRAM(S): 400 CAPSULE ORAL at 16:14

## 2020-05-16 RX ADMIN — Medication 25 MILLIGRAM(S): at 18:04

## 2020-05-16 RX ADMIN — OLANZAPINE 30 MILLIGRAM(S): 15 TABLET, FILM COATED ORAL at 21:28

## 2020-05-16 RX ADMIN — Medication 1 CAPSULE(S): at 18:04

## 2020-05-16 RX ADMIN — GABAPENTIN 300 MILLIGRAM(S): 400 CAPSULE ORAL at 21:28

## 2020-05-16 NOTE — ED ADULT NURSE NOTE - OBJECTIVE STATEMENT
Patient reports he told staff at adult home he had thoughts to jump in front of traffic and they called 911/EMS.  Patient reports he is complaint with his medication stating "Maxwell gives me the medication".  Patient was assessed by Nguyen Arteaga NP today at his residence stating "she told me not to go to the hospital".  Patient reports he does not like living in his residence and feels other residence harass him for money and cigarettes.  Patient has 3 past reported suicide attempts two by medication overdose and once by drinking bleach.  Patient was assessed in  area 1/20 and admitted for suicidal ideations to [facility].  Patient denies auditory or visual hallucinations.  Patient cooperative with security contraband assessment.

## 2020-05-16 NOTE — ED BEHAVIORAL HEALTH ASSESSMENT NOTE - SUICIDE RISK FACTORS
Current mood episode/Unable to engage in safety planning/Access to lethal methods (pills, firearm, etc.: Ask specifically about presence or absence of a firearm in the home or ease of accessing/Mood Disorder current/past/Hopelessness or despair/Psychotic disorder current/past/Impulsivity

## 2020-05-16 NOTE — ED BEHAVIORAL HEALTH ASSESSMENT NOTE - HPI (INCLUDE ILLNESS QUALITY, SEVERITY, DURATION, TIMING, CONTEXT, MODIFYING FACTORS, ASSOCIATED SIGNS AND SYMPTOMS)
Patient is a 61 y/o single male domiciled at [facility] in [place], PPH Paranoid Schizophrenia, PMH of BPH, Gerd, HTN, and HLD,  multiple inpatient psychiatric hospitalizations, last time Jan 2020,   2 reported prior suicide attempts,  once by ASA OD 6 yrs ago and in Jan reported drinking 1 cup of bleach, h/o of alcohol use,  denies abuser, denies drug abuse, [facility],  called by [facility] with worsening suicidal ideation for past 3 mo with plan to walk in front of a car.  Pt reports SI for past 3 months.  He spoke with his NP,  RAFAEL Martin, who encourage Pt to avoid admission but Pt insists he is unsafe and requires psych admission.  Pt admits he is dissatisfied with housing but denies it as precipitant and denies admission for secondary gains.  Pt reports past SA by ingestion of 50 ASA approx 6 yrs ago,  SA by OD of vitamins approx 15 yrs ago when  in shelter, also not admitted or treated.   Pt reports was hospitalized in Jan of this year after reporting ingestion of a cup of bleach.   Pt also endorsing paranoid ideation, believes a Yosef may retaliate for he calling him names and states his life may be in danger.  Spoke collateral with LILIYA Martin, who reports Pt overutilizes psych facilities and may be seeking admission for secondary means and tried to dissuaded admission but Pt insisted.  She also believes he may benefit from long tern psych admission at [place].   Pt is well mannered, polite, good eye contact, fairly well related, speech is linear, with delayed responses, blocking, denies AH and denies h/o same, denies HIIP, and no h/o violence in past. Endorsing depression with SI but no current plan walk in front of a car.

## 2020-05-16 NOTE — ED BEHAVIORAL HEALTH ASSESSMENT NOTE - DETAILS
20 yrs ago OD Vitamins, 6 years ago took p & 6 years ago took bottle of aspirin;  Ingested 1 cup bleach 1 week ago in a SA tremor treated with cogentin, Haldol = Spasmodic torticollis mom had what was described as "nervous breakdown" tremors to follow incorrect # listed 20 yrs ago OD Vitamins, 6 years ago took p & 6 years ago took bottle of aspirin;  Ingested 1 cup bleach in jAN (NOT REPORTED)

## 2020-05-16 NOTE — ED BEHAVIORAL HEALTH ASSESSMENT NOTE - SUMMARY
Patient is a 59 y/o single male domiciled at [facility] in [place], PPH Paranoid Schizophrenia, PMH of BPH, Gerd, HTN, and HLD,  multiple inpatient psychiatric hospitalizations, last time Jan 2020,   2 reported prior suicide attempts,  once by ASA OD 6 yrs ago and in Jan reported drinking 1 cup of bleach, h/o of alcohol use,  denies abuser, denies drug abuse, [facility],  called by [facility] with worsening suicidal ideation for past 3 mo with plan to walk in front of a car.  Pt insists he requires in pt psych admission for safety from suicide attempt.  Pt endorsing paranoid ideation.  Pt will be voluntarily admitted to in pt psych when bed becomes available and is medically cleard and COVID neg.

## 2020-05-16 NOTE — CHART NOTE - NSCHARTNOTEFT_GEN_A_CORE
SW Note: Pt in need of inpt psych trx on voluntary basis. SW met with pt at bedside to complete legals, however pt stating "i'm too tired to sign anything right now, come back in the morning. Pt's covid swab results are pending. SW to make appropriate referrals when swab results, remain available for safe trx SW Note: Pt in need of inpt psych trx on voluntary basis. SW met with pt at bedside to complete legals, however pt stating "i'm too tired to sign anything right now, come back in the morning. Pt's covid swab results are pending. SW to make appropriate referrals when swab results, remains available for safe trx SW Note: Pt in need of inpt psych trx on voluntary basis. SW met with pt at bedside to complete legals, however pt stating "i'm too tired to sign anything right now, come back in the morning." Pt's covid swab results are pending. SW to make appropriate referrals when swab results, remains available for safe trx

## 2020-05-16 NOTE — ED ADULT TRIAGE NOTE - CHIEF COMPLAINT QUOTE
Pt states "I've been having suicidal thoughts for 3 months", denies HI, pt admits to being compliant with psych medication, pt with flat affect, MD Chacko called to bedside

## 2020-05-16 NOTE — ED BEHAVIORAL HEALTH ASSESSMENT NOTE - DESCRIPTION
uneventful  ICU Vital Signs Last 24 Hrs  T(C): 36.7 (16 Jan 2020 15:10), Max: 36.7 (16 Jan 2020 15:10)  T(F): 98 (16 Jan 2020 15:10), Max: 98 (16 Jan 2020 15:10)  HR: 57 (16 Jan 2020 15:10) (57 - 58)  BP: 143/80 (16 Jan 2020 15:10) (143/80 - 174/90)  BP(mean): --  ABP: --  ABP(mean): --  RR: 18 (16 Jan 2020 15:10) (18 - 18)  SpO2: 100% (16 Jan 2020 15:10) (100% - 100%) HLD, HTN, BPH, GERD 61yo male never  no children

## 2020-05-16 NOTE — ED ADULT NURSE NOTE - HPI (INCLUDE ILLNESS QUALITY, SEVERITY, DURATION, TIMING, CONTEXT, MODIFYING FACTORS, ASSOCIATED SIGNS AND SYMPTOMS)
Paranoid schizophrenia with multiple past hospitalization for psychiatric treatment with last at [facility] 1/2020.

## 2020-05-16 NOTE — ED PROVIDER NOTE - OBJECTIVE STATEMENT
60 year old male with PMH paranoid schizophrenia, HTN, HLD presents with SI. Pt reports that he has had 2 months of progressively worsening SI. He states that he now has a plan to jump in front of traffic. He denies SI, hallucinations, chest pain, SOB, abd pain, diarrhea, nausea, vomiting. Pt states he drank 1 beer today but denies ilicit drug use.

## 2020-05-16 NOTE — ED PROVIDER NOTE - PATIENT PORTAL LINK FT
You can access the FollowMyHealth Patient Portal offered by [facility] by registering at the following website: [email or web address]. By joining [facility]’s FollowMyHealth portal, you will also be able to view your health information using other applications (apps) compatible with our system.

## 2020-05-16 NOTE — ED BEHAVIORAL HEALTH ASSESSMENT NOTE - RISK ASSESSMENT
RF Pt has maladaptive risk factor, impulsive behavior, past SA, poor insight.  AZ:, Pt is agreeable to admissions and is compliant with meds High Acute Suicide Risk

## 2020-05-16 NOTE — ED BEHAVIORAL HEALTH ASSESSMENT NOTE - OTHER PAST PSYCHIATRIC HISTORY (INCLUDE DETAILS REGARDING ONSET, COURSE OF ILLNESS, INPATIENT/OUTPATIENT TREATMENT)
Multiple prior hospitalizations.  Most recently discharged from [facility] Jan 2020.  Past [facility] admission.

## 2020-05-17 VITALS
TEMPERATURE: 98 F | SYSTOLIC BLOOD PRESSURE: 139 MMHG | RESPIRATION RATE: 18 BRPM | OXYGEN SATURATION: 97 % | DIASTOLIC BLOOD PRESSURE: 86 MMHG | HEART RATE: 66 BPM

## 2020-05-17 LAB — SARS-COV-2 RNA SPEC QL NAA+PROBE: SIGNIFICANT CHANGE UP

## 2020-05-17 PROCEDURE — 99284 EMERGENCY DEPT VISIT MOD MDM: CPT

## 2020-05-17 PROCEDURE — 36415 COLL VENOUS BLD VENIPUNCTURE: CPT

## 2020-05-17 PROCEDURE — 80164 ASSAY DIPROPYLACETIC ACD TOT: CPT

## 2020-05-17 PROCEDURE — 85027 COMPLETE CBC AUTOMATED: CPT

## 2020-05-17 PROCEDURE — U0003: CPT

## 2020-05-17 PROCEDURE — 81003 URINALYSIS AUTO W/O SCOPE: CPT

## 2020-05-17 PROCEDURE — 93005 ELECTROCARDIOGRAM TRACING: CPT

## 2020-05-17 PROCEDURE — 99211 OFF/OP EST MAY X REQ PHY/QHP: CPT

## 2020-05-17 PROCEDURE — 80307 DRUG TEST PRSMV CHEM ANLYZR: CPT

## 2020-05-17 PROCEDURE — 80053 COMPREHEN METABOLIC PANEL: CPT

## 2020-05-17 RX ADMIN — Medication 81 MILLIGRAM(S): at 06:30

## 2020-05-17 RX ADMIN — Medication 12000 CAPSULE(S): at 11:33

## 2020-05-17 RX ADMIN — GABAPENTIN 300 MILLIGRAM(S): 400 CAPSULE ORAL at 14:33

## 2020-05-17 RX ADMIN — DIVALPROEX SODIUM 500 MILLIGRAM(S): 500 TABLET, DELAYED RELEASE ORAL at 06:30

## 2020-05-17 RX ADMIN — Medication 1 TABLET(S): at 06:30

## 2020-05-17 RX ADMIN — Medication 25 MILLIGRAM(S): at 06:30

## 2020-05-17 RX ADMIN — Medication 1 MILLIGRAM(S): at 06:30

## 2020-05-17 RX ADMIN — ESCITALOPRAM OXALATE 10 MILLIGRAM(S): 10 TABLET, FILM COATED ORAL at 06:30

## 2020-05-17 RX ADMIN — Medication 100 MILLIGRAM(S): at 06:30

## 2020-05-17 RX ADMIN — GABAPENTIN 300 MILLIGRAM(S): 400 CAPSULE ORAL at 06:30

## 2020-05-17 NOTE — ED BEHAVIORAL HEALTH NOTE - BEHAVIORAL HEALTH NOTE
TELEPSYCHIATRY REASSESSMENT:    Telepsychiatry service received handoff regarding patient at 2300  Patient reassess overnight and no behavioral concerns noted  Patient visualized on telepsych cart to be asleep, no acute distress, breathing even and unlabored  Will continue to monitor; no changes to assessment and plan from previous  evaluation at this time.

## 2020-05-17 NOTE — ED ADULT NURSE REASSESSMENT NOTE - STATUS
awaiting transfer, no change

## 2020-05-17 NOTE — ED ADULT NURSE REASSESSMENT NOTE - NS ED NURSE REASSESS COMMENT FT1
pt awake and alert, re-evaluated by psychiatry and cleared to return home. pt denies any intent to harm himself or others at this time. pt endorses returning to his group home. pt offered lunch and consumed 50%
sleeping resp even unlabored
received report.  pt for transfer  covid pending. pt sleeping color good  resp even unlabored will monitor
pt given breakfast in his room, currently resting. pt offers no complaints at this time and is in no acute distress. pt contracts for safety in .
No attempts to harm self or others.  Safety of patient maintained.
Patient resting in bed intermittently sleeping since eating 100% of his dinner.  Safety of patient maintained.

## 2020-05-17 NOTE — CHART NOTE - NSCHARTNOTEFT_GEN_A_CORE
As per MD patient is medically stable for discharge. As per NP Graciela, patient is to attend inpatient psychiatry facility upon discharge. SERENA met with patient and patient completed 9.13 legals. SERENA placed call to [facility] and spoke with admissions employee, Ebony. Ebony reported there are no available beds today. SERENA then placed call to [facility] and spoke with admissions employee, Erna, whom reported they have a bed available. Erna requested SW fax BH assessment, 9.13 legals, facesheet, negative covid results, and EKG to Fax: [phone]. SERENA completed fax. SERENA continues to follow to facilitate safe discharge.

## 2020-05-17 NOTE — ED BEHAVIORAL HEALTH NOTE - BEHAVIORAL HEALTH NOTE
PROGRESS NOTE: 20 @ 11:54  	  • Reason for Ongoing Consultation: 	    ID: 60yyo Male with HEALTH ISSUES - PROBLEM Dx:  Deferred condition on axis II  Paranoid schizophrenia          INTERVAL DATA:   • Interval Chief Complaint: "I'm not going to hurt myself.  I really didn't want to be admitted because of the virus".  • Interval History:   Pt slept well, denies complaints.  Pt informed he has no insurance due to overuse of psych days.  Pt is known to be an overutilizer of psych admissions for secondary gains and has bee encouraged to come to hospital for emergency only.  Pt now admits he is not going to hurt himself.  Pt encouraged to stop calling people names as he will feel more connected to people if he is kind.  Pt now concerned about lack of covered days and asked that I call his insurance co.  Pt encouraged to speak with  regarding insurance issues and agreed.  Pt denies acute psych condition which requires psych admission.  Appetite and sleep are good, no agitation, aggression, SI/HI, denies AH and delusions.  Pt compliant with prescribed meds.  Pt to be discharged back to residence and follow up with RAFAEL Martin.  REVIEW OF SYSTEMS:   • Constitutional Symptoms	No complaints  • Eyes	No complaints  • Ears / Nose / Throat / Mouth	No complaints  • Cardiovascular	No complaints  • Respiratory	No complaints  • Gastrointestinal	No complaints  • Genitourinary	No complaints  • Musculoskeletal	No complaints  • Skin	No complaints  • Neurological	No complaints  • Psychiatric (see HPI)	See HPI  • Endocrine	No complaints  • Hematologic / Lymphatic	No complaints  • Allergic / Immunologic	No complaints    REVIEW OF VITALS/LABS/IMAGING/INVESTIGATIONS:   • Vital signs reviewed: Yes  • Vital Signs:	    T(C): 36.7 (20 @ 11:04), Max: 37.1 (20 @ 19:14)  HR: 62 (20 @ 11:04) (52 - 79)  BP: 145/74 (20 @ 11:04) (110/60 - 155/81)  RR: 18 (20 @ 11:04) (18 - 19)  SpO2: 97% (20 @ 11:04) (97% - 98%)    • Available labs reviewed: Yes  • Available Lab Results:                           14.0   7.88  )-----------( 215      ( 16 May 2020 12:17 )             42.3     05-16    140  |  102  |  12.0  ----------------------------<  84  4.6   |  26.0  |  1.07    Ca    9.7      16 May 2020 12:17    TPro  6.7  /  Alb  4.3  /  TBili  0.2<L>  /  DBili  x   /  AST  31  /  ALT  36  /  AlkPhos  78  -    LIVER FUNCTIONS - ( 16 May 2020 12:17 )  Alb: 4.3 g/dL / Pro: 6.7 g/dL / ALK PHOS: 78 U/L / ALT: 36 U/L / AST: 31 U/L / GGT: x             Urinalysis Basic - ( 16 May 2020 12:19 )    Color: Yellow / Appearance: Clear / S.005 / pH: x  Gluc: x / Ketone: Negative  / Bili: Negative / Urobili: Negative mg/dL   Blood: x / Protein: Negative mg/dL / Nitrite: Negative   Leuk Esterase: Negative / RBC: x / WBC x   Sq Epi: x / Non Sq Epi: x / Bacteria: x          MEDICATIONS:      PRN Medications:  • PRN Medications since last evaluation	  • PRN Details	    Current Medications:   ALBUTerol    90 MICROgram(s) HFA Inhaler 2 Puff(s) Inhalation every 6 hours PRN  aspirin  chewable 81 milliGRAM(s) Oral daily  atorvastatin 10 milliGRAM(s) Oral at bedtime  benztropine 1 milliGRAM(s) Oral two times a day  diVALproex  milliGRAM(s) Oral daily  escitalopram 10 milliGRAM(s) Oral daily  folic acid 1 milliGRAM(s) Oral daily  gabapentin 300 milliGRAM(s) Oral every 8 hours  metoprolol succinate ER 25 milliGRAM(s) Oral two times a day  multivitamin 1 Tablet(s) Oral daily  OLANZapine 30 milliGRAM(s) Oral at bedtime  pancrelipase  (CREON 12,000 Lipase Units) 16579 Capsule(s) Oral three times a day with meals  tamsulosin 0.4 milliGRAM(s) Oral at bedtime  thiamine 100 milliGRAM(s) Oral daily     Medication Side Effects:  • Medication Side Effects or Adverse Reactions (new or ongoing)	None known    MENTAL STATUS EXAM:   • Level of Consciousness	Alert  • General Appearance	Well developed  • Body Habitus	Well nourished  • Hygiene	fair  • Grooming	fair to poor  • Behavior	Cooperative  • Eye Contact	Good  • Relatedness	Good-fair  • Impulse Control	Normal  • Muscle Tone / Strength	Normal muscle tone/strength  • Abnormal Movements	No abnormal movements  • Gait / Station	Normal gait / station  • Speech Volume	Normal  • Speech Rate	Normal  • Speech Spontaneity	Normal  • Speech Articulation	Normal  • Mood	Normal  • Affect Quality	neutral  • Affect Range	constricted  • Affect Congruence	Congruent  • Thought Process	Linear  • Thought Associations	Normal  • Thought Content	Unremarkable  • Perceptions	No abnormalities  • Oriented to Time	Yes  • Oriented to Place	Yes  • Oriented to Situation	Yes  • Oriented to Person	Yes  • Attention / Concentration	Normal  • Estimated Intelligence	Average  • Recent Memory	Normal  • Remote Memory	Normal  • Fund of Knowledge	Normal  • Language	No abnormalities noted  • Judgment (regarding everyday events)	Fair  • Insight (regarding psychiatric illness)	Fair    SUICIDALITY:   • Suicidality (Interval)	none known    HOMICIDALITY/AGGRESSION:   • Homicidality/Aggression	none known    DIAGNOSIS DSM-V:    Psychiatric Diagnosis (Corresponds to DSM-IV Axis I, II):   HEALTH ISSUES - PROBLEM Dx:  Deferred condition on axis II  Paranoid schizophrenia           Medical Diagnosis (Corresponds to DSM-IV Axis III):  • Axis III	      ASSESSMENT OF CURRENT CONDITION:   Summary (include case differential, formulation and patient response to therapy):   Pt slept well, denies complaints.  Pt informed he has no insurance due to overuse of psych days.  Pt is known to be an overutilizer of psych admissions for secondary gains and has bee encouraged to come to hospital for emergency only.  Pt now admits he is not going to hurt himself.  Pt encouraged to stop calling people names as he will feel more connected to people if he is kind.  Pt now concerned about lack of covered days and asked that I call his insurance co.  Pt encouraged to speak with  regarding insurance issues and agreed.  Pt denies acute psych condition which requires psych admission.  Appetite and sleep are good, no agitation, aggression, SI/HI, denies AH and delusions.  Pt compliant with prescribed meds.  Pt to be discharged back to residence and follow up with RAFAEL Martin.  Risk Assessment (consider static vs modifiable risk factors and protective factors; comment on level of risk for dangerous behavior):   chronic depressed mood, after reporting SI and subjective SA, never witnessed or requiring tx.  PF stable housing and supports, in tx  Cleared for discharge to group home.

## 2020-05-17 NOTE — CHART NOTE - NSCHARTNOTEFT_GEN_A_CORE
SERENA placed call to [facility] and spoke with employee, Erna, whom reported patient has exhausted Medicare days. Erna reported the patient is not accepted to [facility]. SERENA continues to follow the case.

## 2020-05-17 NOTE — CHART NOTE - NSCHARTNOTEFT_GEN_A_CORE
As per MD, patient is medically stable. As per NP Graciela, patient is not treat and release. SERENA placed call to patient's group home, [facility] ([phone]), and spoke with employeeMelida, whom reported patient can return to group home. SERENA then faxed patient's discharge summary to [facility] (Fax: [phone]). SERENA placed call to [employer] to assist patient with transportation to group home. SERENA spoke with [employer] employee, Lindsey, whom reported the patient's medicaid is not active and was last active in 2016. SERENA then placed call to patient's group home and spoke with employee, Melida, to discuss patient's transportation. Melida reported the group home will arrange patient's cab to return to the facility. Melida reported [facility] will cover the cost of patient's cab. SERENA made Melida aware patient will be in the ED Lobby upon discharge. Patient made aware and is in agreement with discharge plan.

## 2020-05-17 NOTE — ED ADULT NURSE REASSESSMENT NOTE - GENERAL PATIENT STATE
resting/sleeping
resting/sleeping
resting/sleeping/comfortable appearance
cooperative/comfortable appearance
resting/sleeping/comfortable appearance/cooperative

## 2020-05-17 NOTE — ED ADULT NURSE REASSESSMENT NOTE - COMFORT CARE
meal provided/po fluids offered/plan of care explained
darkened lights/meal provided
plan of care explained

## 2020-08-09 ENCOUNTER — EMERGENCY (EMERGENCY)
Facility: HOSPITAL | Age: 60
LOS: 1 days | Discharge: OTHER INSTITUTION | End: 2020-08-09
Attending: STUDENT IN AN ORGANIZED HEALTH CARE EDUCATION/TRAINING PROGRAM
Payer: MEDICARE

## 2020-08-09 VITALS
DIASTOLIC BLOOD PRESSURE: 102 MMHG | HEART RATE: 57 BPM | HEIGHT: 66 IN | SYSTOLIC BLOOD PRESSURE: 175 MMHG | WEIGHT: 154.98 LBS | OXYGEN SATURATION: 97 % | TEMPERATURE: 98 F | RESPIRATION RATE: 18 BRPM

## 2020-08-09 DIAGNOSIS — F20.0 PARANOID SCHIZOPHRENIA: ICD-10-CM

## 2020-08-09 DIAGNOSIS — F10.230 ALCOHOL DEPENDENCE WITH WITHDRAWAL, UNCOMPLICATED: ICD-10-CM

## 2020-08-09 LAB
ALBUMIN SERPL ELPH-MCNC: 4.8 G/DL — SIGNIFICANT CHANGE UP (ref 3.3–5.2)
ALP SERPL-CCNC: 85 U/L — SIGNIFICANT CHANGE UP (ref 40–120)
ALT FLD-CCNC: 34 U/L — SIGNIFICANT CHANGE UP
AMPHET UR-MCNC: NEGATIVE — SIGNIFICANT CHANGE UP
ANION GAP SERPL CALC-SCNC: 16 MMOL/L — SIGNIFICANT CHANGE UP (ref 5–17)
APAP SERPL-MCNC: <7.5 UG/ML — LOW (ref 10–26)
APPEARANCE UR: CLEAR — SIGNIFICANT CHANGE UP
AST SERPL-CCNC: 28 U/L — SIGNIFICANT CHANGE UP
BARBITURATES UR SCN-MCNC: NEGATIVE — SIGNIFICANT CHANGE UP
BASOPHILS # BLD AUTO: 0.03 K/UL — SIGNIFICANT CHANGE UP (ref 0–0.2)
BASOPHILS NFR BLD AUTO: 0.4 % — SIGNIFICANT CHANGE UP (ref 0–2)
BENZODIAZ UR-MCNC: NEGATIVE — SIGNIFICANT CHANGE UP
BILIRUB SERPL-MCNC: 0.4 MG/DL — SIGNIFICANT CHANGE UP (ref 0.4–2)
BILIRUB UR-MCNC: NEGATIVE — SIGNIFICANT CHANGE UP
BUN SERPL-MCNC: 17 MG/DL — SIGNIFICANT CHANGE UP (ref 8–20)
CALCIUM SERPL-MCNC: 10 MG/DL — SIGNIFICANT CHANGE UP (ref 8.6–10.2)
CHLORIDE SERPL-SCNC: 102 MMOL/L — SIGNIFICANT CHANGE UP (ref 98–107)
CO2 SERPL-SCNC: 21 MMOL/L — LOW (ref 22–29)
COCAINE METAB.OTHER UR-MCNC: NEGATIVE — SIGNIFICANT CHANGE UP
COLOR SPEC: YELLOW — SIGNIFICANT CHANGE UP
CREAT SERPL-MCNC: 1.15 MG/DL — SIGNIFICANT CHANGE UP (ref 0.5–1.3)
DIFF PNL FLD: NEGATIVE — SIGNIFICANT CHANGE UP
EOSINOPHIL # BLD AUTO: 0.06 K/UL — SIGNIFICANT CHANGE UP (ref 0–0.5)
EOSINOPHIL NFR BLD AUTO: 0.9 % — SIGNIFICANT CHANGE UP (ref 0–6)
ETHANOL SERPL-MCNC: 19 MG/DL — SIGNIFICANT CHANGE UP
GLUCOSE SERPL-MCNC: 96 MG/DL — SIGNIFICANT CHANGE UP (ref 70–99)
GLUCOSE UR QL: NEGATIVE MG/DL — SIGNIFICANT CHANGE UP
HCT VFR BLD CALC: 43 % — SIGNIFICANT CHANGE UP (ref 39–50)
HGB BLD-MCNC: 14.9 G/DL — SIGNIFICANT CHANGE UP (ref 13–17)
IMM GRANULOCYTES NFR BLD AUTO: 0.4 % — SIGNIFICANT CHANGE UP (ref 0–1.5)
KETONES UR-MCNC: NEGATIVE — SIGNIFICANT CHANGE UP
LEUKOCYTE ESTERASE UR-ACNC: NEGATIVE — SIGNIFICANT CHANGE UP
LYMPHOCYTES # BLD AUTO: 1.59 K/UL — SIGNIFICANT CHANGE UP (ref 1–3.3)
LYMPHOCYTES # BLD AUTO: 23.1 % — SIGNIFICANT CHANGE UP (ref 13–44)
MCHC RBC-ENTMCNC: 32 PG — SIGNIFICANT CHANGE UP (ref 27–34)
MCHC RBC-ENTMCNC: 34.7 GM/DL — SIGNIFICANT CHANGE UP (ref 32–36)
MCV RBC AUTO: 92.5 FL — SIGNIFICANT CHANGE UP (ref 80–100)
METHADONE UR-MCNC: NEGATIVE — SIGNIFICANT CHANGE UP
MONOCYTES # BLD AUTO: 0.63 K/UL — SIGNIFICANT CHANGE UP (ref 0–0.9)
MONOCYTES NFR BLD AUTO: 9.1 % — SIGNIFICANT CHANGE UP (ref 2–14)
NEUTROPHILS # BLD AUTO: 4.55 K/UL — SIGNIFICANT CHANGE UP (ref 1.8–7.4)
NEUTROPHILS NFR BLD AUTO: 66.1 % — SIGNIFICANT CHANGE UP (ref 43–77)
NITRITE UR-MCNC: NEGATIVE — SIGNIFICANT CHANGE UP
OPIATES UR-MCNC: NEGATIVE — SIGNIFICANT CHANGE UP
PCP SPEC-MCNC: SIGNIFICANT CHANGE UP
PCP UR-MCNC: NEGATIVE — SIGNIFICANT CHANGE UP
PH UR: 6 — SIGNIFICANT CHANGE UP (ref 5–8)
PLATELET # BLD AUTO: 195 K/UL — SIGNIFICANT CHANGE UP (ref 150–400)
POTASSIUM SERPL-MCNC: 4.4 MMOL/L — SIGNIFICANT CHANGE UP (ref 3.5–5.3)
POTASSIUM SERPL-SCNC: 4.4 MMOL/L — SIGNIFICANT CHANGE UP (ref 3.5–5.3)
PROT SERPL-MCNC: 7.5 G/DL — SIGNIFICANT CHANGE UP (ref 6.6–8.7)
PROT UR-MCNC: NEGATIVE MG/DL — SIGNIFICANT CHANGE UP
RBC # BLD: 4.65 M/UL — SIGNIFICANT CHANGE UP (ref 4.2–5.8)
RBC # FLD: 12 % — SIGNIFICANT CHANGE UP (ref 10.3–14.5)
RBC CASTS # UR COMP ASSIST: NEGATIVE /HPF — SIGNIFICANT CHANGE UP (ref 0–4)
SALICYLATES SERPL-MCNC: <0.6 MG/DL — LOW (ref 10–20)
SARS-COV-2 RNA SPEC QL NAA+PROBE: SIGNIFICANT CHANGE UP
SODIUM SERPL-SCNC: 139 MMOL/L — SIGNIFICANT CHANGE UP (ref 135–145)
SP GR SPEC: 1.01 — SIGNIFICANT CHANGE UP (ref 1.01–1.02)
THC UR QL: NEGATIVE — SIGNIFICANT CHANGE UP
UROBILINOGEN FLD QL: NEGATIVE MG/DL — SIGNIFICANT CHANGE UP
WBC # BLD: 6.89 K/UL — SIGNIFICANT CHANGE UP (ref 3.8–10.5)
WBC # FLD AUTO: 6.89 K/UL — SIGNIFICANT CHANGE UP (ref 3.8–10.5)
WBC UR QL: NEGATIVE — SIGNIFICANT CHANGE UP

## 2020-08-09 PROCEDURE — 90792 PSYCH DIAG EVAL W/MED SRVCS: CPT

## 2020-08-09 PROCEDURE — 93010 ELECTROCARDIOGRAM REPORT: CPT

## 2020-08-09 PROCEDURE — 99218: CPT | Mod: CS

## 2020-08-09 RX ORDER — ASPIRIN/CALCIUM CARB/MAGNESIUM 324 MG
81 TABLET ORAL DAILY
Refills: 0 | Status: DISCONTINUED | OUTPATIENT
Start: 2020-08-09 | End: 2020-08-14

## 2020-08-09 RX ORDER — ESCITALOPRAM OXALATE 10 MG/1
10 TABLET, FILM COATED ORAL DAILY
Refills: 0 | Status: DISCONTINUED | OUTPATIENT
Start: 2020-08-09 | End: 2020-08-14

## 2020-08-09 RX ORDER — BENZTROPINE MESYLATE 1 MG
1 TABLET ORAL
Refills: 0 | Status: DISCONTINUED | OUTPATIENT
Start: 2020-08-09 | End: 2020-08-14

## 2020-08-09 RX ORDER — OLANZAPINE 15 MG/1
20 TABLET, FILM COATED ORAL AT BEDTIME
Refills: 0 | Status: DISCONTINUED | OUTPATIENT
Start: 2020-08-09 | End: 2020-08-14

## 2020-08-09 RX ORDER — ATORVASTATIN CALCIUM 80 MG/1
10 TABLET, FILM COATED ORAL AT BEDTIME
Refills: 0 | Status: DISCONTINUED | OUTPATIENT
Start: 2020-08-09 | End: 2020-08-14

## 2020-08-09 RX ORDER — METOPROLOL TARTRATE 50 MG
25 TABLET ORAL
Refills: 0 | Status: DISCONTINUED | OUTPATIENT
Start: 2020-08-09 | End: 2020-08-14

## 2020-08-09 RX ORDER — GABAPENTIN 400 MG/1
300 CAPSULE ORAL THREE TIMES A DAY
Refills: 0 | Status: DISCONTINUED | OUTPATIENT
Start: 2020-08-09 | End: 2020-08-14

## 2020-08-09 RX ORDER — TAMSULOSIN HYDROCHLORIDE 0.4 MG/1
0.4 CAPSULE ORAL AT BEDTIME
Refills: 0 | Status: DISCONTINUED | OUTPATIENT
Start: 2020-08-09 | End: 2020-08-14

## 2020-08-09 RX ORDER — LIPASE/PROTEASE/AMYLASE 16-48-48K
1 CAPSULE,DELAYED RELEASE (ENTERIC COATED) ORAL
Refills: 0 | Status: DISCONTINUED | OUTPATIENT
Start: 2020-08-09 | End: 2020-08-14

## 2020-08-09 RX ADMIN — Medication 1 CAPSULE(S): at 21:10

## 2020-08-09 RX ADMIN — Medication 25 MILLIGRAM(S): at 17:09

## 2020-08-09 RX ADMIN — Medication 1 MILLIGRAM(S): at 21:07

## 2020-08-09 RX ADMIN — GABAPENTIN 300 MILLIGRAM(S): 400 CAPSULE ORAL at 21:11

## 2020-08-09 RX ADMIN — ATORVASTATIN CALCIUM 10 MILLIGRAM(S): 80 TABLET, FILM COATED ORAL at 21:11

## 2020-08-09 RX ADMIN — Medication 1 MILLIGRAM(S): at 14:11

## 2020-08-09 RX ADMIN — TAMSULOSIN HYDROCHLORIDE 0.4 MILLIGRAM(S): 0.4 CAPSULE ORAL at 21:10

## 2020-08-09 RX ADMIN — OLANZAPINE 20 MILLIGRAM(S): 15 TABLET, FILM COATED ORAL at 21:12

## 2020-08-09 NOTE — ED BEHAVIORAL HEALTH ASSESSMENT NOTE - DESCRIPTION (FIRST USE, LAST USE, QUANTITY, FREQUENCY, DURATION)
3 packs/day denies abuse.  Drank 1 beer today BAL 13 occasional denies abuse.  Drank 1/2 pint of vodka daily since the past 2 months.  Current BAL 19. Occasional; last used one month ago per patient.

## 2020-08-09 NOTE — ED ADULT NURSE REASSESSMENT NOTE - COMFORT CARE
plan of care explained/meal provided
ambulated to bathroom
plan of care explained
ambulated to bathroom/darkened lights/meal provided/plan of care explained/po fluids offered/side rails down/wait time explained/warm blanket provided
plan of care explained/meal provided

## 2020-08-09 NOTE — ED BEHAVIORAL HEALTH ASSESSMENT NOTE - RISK ASSESSMENT
Low Acute Suicide Risk Risk Factors: History of impulsive behaviors, past SAs, poor insight.  Protective Factors: Compliant with meds Risk Factors: History of impulsive behaviors, past SAs.  Protective Factors: Compliant with meds Risk Factors: History of impulsive behaviors, past SAs. History of alcohol and drug addiction.  Protective Factors: Compliant with meds; amenable to detox and rehab treatment for his alcohol addiction. No current suicidal or homicidal ideations; no current plans or intent to die. No current paranoid thoughts; no current delusions or ideas of reference.

## 2020-08-09 NOTE — ED ADULT NURSE REASSESSMENT NOTE - STATUS
awaiting transfer, no change
awaiting transfer, no change
covid neg
rehaB
rehab/awaiting transfer, no change

## 2020-08-09 NOTE — ED BEHAVIORAL HEALTH ASSESSMENT NOTE - OTHER PAST PSYCHIATRIC HISTORY (INCLUDE DETAILS REGARDING ONSET, COURSE OF ILLNESS, INPATIENT/OUTPATIENT TREATMENT)
Multiple prior hospitalizations.  Most recently discharged from [facility] Jan 2020.  Past [facility] admission.

## 2020-08-09 NOTE — ED ADULT NURSE REASSESSMENT NOTE - GENERAL PATIENT STATE
anxious
comfortable appearance/resting/sleeping
anxious
cooperative/comfortable appearance
cooperative

## 2020-08-09 NOTE — ED ADULT NURSE NOTE - OBJECTIVE STATEMENT
Patient presented in hospital gowns to [facility].  Patient reports he has been feeling suicidal with thoughts to overdose on pills.  Patient reports he is stressed living at his adult home because other residents harass him for money and cigarettes.  Patient endorses intermittent alcohol abuse with last use this morning but unsure of amount.  Patient denies homicidal ideations.  Patient reports past suicidal attempts of drinking bleach and overdosing on aspirin.  Patient denies auditory or visual hallucinations.  Patient is followed outpatient by Nguyen Arteaga NP and last saw her 8/7/20 at his residence.  Patient requesting inpatient hospitalization secondary to having suicidal ideations.  Cooperative with security contraband assessment.

## 2020-08-09 NOTE — ED ADULT TRIAGE NOTE - CHIEF COMPLAINT QUOTE
"I wanted to kill myself" c/o suicidal thoughts and paranoia x1 month. Hx schizophrenia, Pt reports suicide attempt 1x month ago via overdose on aspirin. +Plan, denies self harm attempt today, Admits to drinking 1/2 pint vodka this AM, drinks daily. Denies drug use, denies HI. Changed into yellow gown with belongings locked in  per policy, no s/s withdrawal @ this time, acting calm and cooperative. 1:1 in place for safety

## 2020-08-09 NOTE — ED BEHAVIORAL HEALTH ASSESSMENT NOTE - PATIENT'S CHIEF COMPLAINT
"My supervisor Silvia called 911; I told him I was hearing voices; I hear voices when I drink Alcohol"

## 2020-08-09 NOTE — ED CDU PROVIDER INITIAL DAY NOTE - PROGRESS NOTE DETAILS
Spoke to Dr. Pham from [facility] who states patient can be accepted to [facility] pending covid Spoke to Dr. Pham from [facility] who states patient can be accepted to [facility] detox pending covid

## 2020-08-09 NOTE — ED BEHAVIORAL HEALTH ASSESSMENT NOTE - CURRENT MEDICATION
Metoprolol 25 bid, , Gabapentin 300 mg tid, Cogentin 1 mg bid,  depakote 500 mg qd, Flomax 0.4 mg hs, Lexapro 10 mg hs, Lipitor 10 mg daily, Zyprexa 30 mg hs,  ASA 81 mg daily, Creon ER 1200 units bid,  MVI daily, Folic acid 1 mg daily.  ASA 81mg qd, Creon 78899 u tid with mealsVentolin 90 mcg 2 puff Q 6hrs PRN SOB (confirmed with group home fax).

## 2020-08-09 NOTE — ED BEHAVIORAL HEALTH ASSESSMENT NOTE - REFERRAL / APPOINTMENT DETAILS
Patient educated on the importance of abstaining from hazardous alcohol use. He is amenable to attending inpatient rehab for his alcohol addiction.

## 2020-08-09 NOTE — ED BEHAVIORAL HEALTH ASSESSMENT NOTE - SUICIDE RISK FACTORS
Current mood episode/Access to lethal methods (pills, firearm, etc.: Ask specifically about presence or absence of a firearm in the home or ease of accessing/Unable to engage in safety planning/Psychotic disorder current/past/Mood Disorder current/past/Hopelessness or despair/Impulsivity Psychotic disorder current/past/Alcohol/Substance abuse disorders

## 2020-08-09 NOTE — ED BEHAVIORAL HEALTH ASSESSMENT NOTE - VIOLENCE PROTECTIVE FACTORS:
Engagement in treatment/Residential stability Engagement in treatment/Relationship stability/Residential stability

## 2020-08-09 NOTE — ED BEHAVIORAL HEALTH ASSESSMENT NOTE - SUMMARY
Patient is a 61 y/o single male domiciled at [facility] in [place], PPH Paranoid Schizophrenia, PMH of BPH, Gerd, HTN, and HLD,  multiple inpatient psychiatric hospitalizations, last time Jan 2020,  2 reported prior suicide attempts,  once by ASA OD 6 yrs ago and in Jan reported drinking 1 cup of bleach, h/o of alcohol use,  denies abuser, denies drug abuse, [facility]; called by group home with worsening suicidal ideation.    COVID result pending This is a 61 y/o single, disabled,  male,  unemployed, domiciled at [facility] in [place]; with a psychiatric history significant for Paranoid Schizophrenia and a medical history significant for BPH, GERD, HTN, and HLD,  Per record, has a history of multiple past inpatient psychiatric hospitalizations, last one was in January 2020; with a history of two self-reported prior suicide attempts (by aspirin overdose 6 years ago and in January 2020 by reportedly drinking 1 cup of bleach); with a substance use history of alcohol abuse; no history of illicit drug use, BIBA for suicidal thoughts. BAL 19.    Patient denies acute psychiatric symptoms including current suicidal thoughts, plans or current intent to die. There is no evidence of delusions or paranoid thoughts noted. He does not appear to be an acute risk to self or others to warrant inpatient psychiatric admission at this time. He is y motivated to impatient detox/rehab treatment and looks for to being discharged to detox.   COVID result pending This is a 61 y/o single, disabled,  male,  unemployed, domiciled at [facility] in [place]; with a psychiatric history significant for Paranoid Schizophrenia and a medical history significant for BPH, GERD, HTN, and HLD,  Per record, has a history of multiple past inpatient psychiatric hospitalizations, last one was in January 2020; with a history of two self-reported prior suicide attempts (by aspirin overdose 6 years ago and in January 2020 by reportedly drinking 1 cup of bleach); with a substance use history of alcohol abuse; no history of illicit drug use, BIBA for suicidal thoughts. BAL 19.    Patient denies acute psychiatric symptoms including current suicidal thoughts, plans or current intent to die. There is no evidence of delusions or paranoid thoughts noted. He does not appear to be an acute risk to self or others to warrant inpatient psychiatric admission at this time. However, he is exhibiting current symptoms of alcohol withdrawals including current tremors, anxiety, and poor appetite. He is very motivated to impatient detox/rehab treatment and looks forward to being discharged to detox/rehab. Patient to be discharged when COVID results become available. This is a 59 y/o single, disabled,  male,  unemployed, domiciled at [facility] in [place]; with a psychiatric history significant for Paranoid Schizophrenia and a medical history significant for BPH, GERD, HTN, and HLD,  Per record, has a history of multiple past inpatient psychiatric hospitalizations, last one was in January 2020; with a history of two self-reported prior suicide attempts (by aspirin overdose 6 years ago and in January 2020 by reportedly drinking 1 cup of bleach); with a substance use history of alcohol abuse; no history of illicit drug use, BIBA for suicidal thoughts. BAL 19.    Patient denies acute psychiatric symptoms including current suicidal thoughts, plans or current intent to die. There is no evidence of delusions or paranoid thoughts noted. He does not appear to be an acute risk to self or others to warrant inpatient psychiatric admission at this time. However, he is exhibiting current symptoms of alcohol withdrawals including current tremors, anxiety, elevated BP and poor appetite. He is very motivated to impatient detox/rehab treatment and looks forward to being discharged to detox/rehab. Patient to be discharged when COVID results become available.

## 2020-08-09 NOTE — ED PROVIDER NOTE - CLINICAL SUMMARY MEDICAL DECISION MAKING FREE TEXT BOX
patient expressed suicidality and was going to attempt with alcohol overdose.  seen by psych and cleared from a pyschiatric prospective.  sw is attempting to arrange rehab. will place in obs pending covid testing.

## 2020-08-09 NOTE — ED PROVIDER NOTE - PHYSICAL EXAMINATION
General:     NAD, well-nourished, well-appearing  Head:     NC/AT, EOMI, oral mucosa moist  Neck:     trachea midline  Lungs:     CTA b/l, no w/r/r  CVS:     S1S2, RRR, no m/g/r  Abd:     +BS, s/nt/nd, no organomegaly  Ext:    2+ radial and pedal pulses, no c/c/e  Neuro: grossly intact

## 2020-08-09 NOTE — ED PROVIDER NOTE - NS ED ROS FT
Constitutional: (-) fever  (-)chills  (-)sweats  Eyes/ENT: (-) blurry vision, (-) epistaxis  (-)rhinorrhea   (-) sore throat    Cardiovascular: (-) chest pain, (-) palpitations (-) edema   Respiratory: (-) cough, (-) shortness of breath   Gastrointestinal: (-)nausea  (-)vomiting, (-) diarrhea  (-) abdominal pain   :  (-)dysuria, (-)frequency, (-)urgency, (-)hematuria  Musculoskeletal: (-) neck pain, (-) back pain, (-) joint pain  Integumentary: (-) rash, (-) edema  Neurological: (-) headache, (-) altered mental status  (-)LOC  Psych: +si, -hi, -vh, -ah

## 2020-08-09 NOTE — ED BEHAVIORAL HEALTH ASSESSMENT NOTE - NS ED BHA PLAN TR BH CONTACTED FT
Spoke to patient's psychiatric provider (Nguyen RIVERA) who has no safety concerns related to patient' psychiatric clearance.

## 2020-08-09 NOTE — ED BEHAVIORAL HEALTH ASSESSMENT NOTE - SUICIDE PROTECTIVE FACTORS
None known/Positive therapeutic relationships Has future plans/Positive therapeutic relationships/Identifies reasons for living/Responsibility to family and others

## 2020-08-09 NOTE — ED BEHAVIORAL HEALTH ASSESSMENT NOTE - NICOTINE
pt c.o nose bleed from R nostril since 10 am. no bleeding noted at this time. denies daily blood thinner. Yes

## 2020-08-09 NOTE — ED BEHAVIORAL HEALTH ASSESSMENT NOTE - DETAILS
20 yrs ago reportedly overdosed on Vitamins, 6 years ago took one bottle of aspirin; in 01/2020 Ingested 1 cup bleach. tremor treated with cogentin, Haldol = Spasmodic torticollis mom had what was described as "nervous breakdown" tremors Spoke to patient's residence.

## 2020-08-09 NOTE — ED BEHAVIORAL HEALTH ASSESSMENT NOTE - HPI (INCLUDE ILLNESS QUALITY, SEVERITY, DURATION, TIMING, CONTEXT, MODIFYING FACTORS, ASSOCIATED SIGNS AND SYMPTOMS)
This is a 61 y/o single, disabled,  male,  unemployed, domiciled at [facility] in [place]; with a psychiatric history significant for Paranoid Schizophrenia and a medical history significant for BPH, GERD, HTN, and HLD,  Per record, has a history of multiple past inpatient psychiatric hospitalizations, last one was in January 2020; with a history of two self-reported prior suicide attempts (by aspirin overdose 6 years ago and in January 2020 by reportedly drinking 1 cup of bleach); with a substance use history of alcohol abuse; no history of illicit drug use, BIBA for suicidal thoughts.    On assessment patient is calm and cooperative; stating he came to the ED for This is a 59 y/o single, disabled,  male,  unemployed, domiciled at [facility] in [place]; with a psychiatric history significant for Paranoid Schizophrenia and a medical history significant for BPH, GERD, HTN, and HLD,  Per record, has a history of multiple past inpatient psychiatric hospitalizations, last one was in January 2020; with a history of two self-reported prior suicide attempts (by aspirin overdose 6 years ago and in January 2020 by reportedly drinking 1 cup of bleach); with a substance use history of alcohol abuse; no history of illicit drug use, BIBA for suicidal thoughts. BAL 19.    On assessment patient is calm and cooperative; stating he came to the ED because the supervisor at this residence (Silvia) called 911 after he told him he was hearing voices. Patient states he only hears voices when he drinks alcohol, and notes "I have been drinking 1/2 pint of vodka every day since the past 2 months". He further added that he wants to stop drinking but can't do it alone. He states he wants to go to rehab so he can stop drinking. He also reports a history of using illicit substances including cocaine and cannabis; however, he states he last used cocaine 15 years ago and last used cannabis one month ago. Patient is fully alert and oriented X4; with euthymic affect; well-related; with good eye contact, speech is linear, with some thought blocking; however, he denies current suicidal or homicidal ideations; he denies current intent or plans to die. He denies current acute psychosis including current paranoid or delusional thoughts; he emphasizes that he only hears voices when he drinks alcohol, noting “I don’t hear voices when I don’t drink alcohol”. He denies any history of violence or self injurious behaviors. He reports a history of 3 prior suicidal attempts in the context of alcohol abuse. The first one was 20 years ago; last attempt was in January of this year by allegedly ingesting bleach. He denies current safety concerns at his residence. However, he states some of his peers at his residence tend to ask him for money and cigarettes, which makes him not want to be at the residence any more. He reports a history of one prior rehab “a while ago”. He states he is motivated to attend rehab so he can stop drinking.     COLLATERAL INFORMATION FROM OUTPATIENT PSYCHIATRIC PROVIDER:  Spoke via phone today 08/09/2020 to patient’s outpatient psychiatric provider (Harris Martin) who stated she saw the patient yesterday 08/08/2020 and that the patient told her he wanted to go to the hospital; however, she states she tried to discourage the patient from going to the ED  yesterday since she did not feel it was necessary. She states she has been treating the patient since the past 10 years and knows him well. Nguyen Martin who stated patient tends to over-utilize ED psych services and inpatient psychiatric admissions feels he may be seeking admission for secondary gains.  Nguyen discourages inpatient psychiatric admission for this patient; states she evaluated the patient yesterday and has no acute safety concerns related to suicidality, homicidality or psychosis at this time. This is a 61 y/o single, disabled,  male,  unemployed, domiciled at [facility] in [place]; with a psychiatric history significant for Paranoid Schizophrenia and a medical history significant for BPH, GERD, HTN, and HLD,  Per record, has a history of multiple past inpatient psychiatric hospitalizations, last one was in January 2020; with a history of two self-reported prior suicide attempts (by aspirin overdose 6 years ago and in January 2020 by reportedly drinking 1 cup of bleach); with a substance use history of alcohol abuse; no history of illicit drug use, BIBA for suicidal thoughts. BAL 19.    On assessment patient is calm and cooperative; stating he came to the ED because the supervisor at this residence (Silvia) called 911 after he told him he was hearing voices. Patient states he only hears voices when he drinks alcohol, and notes "I have been drinking 1/2 pint of vodka every day since the past 2 months"; current BAL 19. He further added that he wants to stop drinking but can't do it alone. He states he wants to go to rehab so he can stop drinking. He also reports a history of using illicit substances including cocaine and cannabis; however, he states he last used cocaine 15 years ago and last used cannabis one month ago. Patient is fully alert and oriented X4; with euthymic affect; well-related; with good eye contact, speech is linear, with some thought blocking; however, he denies current suicidal or homicidal ideations; he denies current intent or plans to die. He denies current acute psychosis including current paranoid or delusional thoughts; he emphasizes that he only hears voices when he drinks alcohol, noting “I don’t hear voices when I don’t drink alcohol”. He denies any history of violence or self injurious behaviors. He reports a history of 3 prior suicidal attempts in the context of alcohol abuse. The first one was 20 years ago; last attempt was in January of this year by allegedly ingesting bleach. He denies current safety concerns at his residence. However, he states some of his peers at his residence tend to ask him for money and cigarettes, which makes him not want to be at the residence any more. He reports a history of one prior rehab “a while ago”. He states he is motivated to attend rehab so he can stop drinking.     COLLATERAL INFORMATION FROM OUTPATIENT PSYCHIATRIC PROVIDER:  Spoke via phone today 08/09/2020 to patient’s outpatient psychiatric provider (Harris Martin) who stated she saw the patient yesterday 08/08/2020 and that the patient told her he wanted to go to the hospital; however, she states she tried to discourage the patient from going to the ED  yesterday since she did not feel it was necessary. She states she has been treating the patient since the past 10 years and knows him well. Nguyen Martin who stated patient tends to over-utilize ED psych services and inpatient psychiatric admissions feels he may be seeking admission for secondary gains.  Nguyen discourages inpatient psychiatric admission for this patient; states she evaluated the patient yesterday and has no acute safety concerns related to suicidality, homicidality or psychosis at this time. This is a 59 y/o single, disabled,  male,  unemployed, domiciled at [facility] in [place]; with a psychiatric history significant for Paranoid Schizophrenia and a medical history significant for BPH, GERD, HTN, and HLD,  Per record, has a history of multiple past inpatient psychiatric hospitalizations, last one was in January 2020; with a history of two self-reported prior suicide attempts (by aspirin overdose 6 years ago and in January 2020 by reportedly drinking 1 cup of bleach); with a substance use history of alcohol abuse; no history of illicit drug use, BIBA for suicidal thoughts. BAL 19.    On assessment patient is calm and cooperative; stating he came to the ED because the supervisor at this residence (Silvia) called 911 after he told him he was hearing voices. Patient states he only hears voices when he drinks alcohol, and notes "I have been drinking 1/2 pint of vodka every day since the past 2 months"; current BAL 19. He further added that he wants to stop drinking but can't do it alone. He states he wants to go to rehab so he can stop drinking. He also reports a history of using illicit substances including cocaine and cannabis; however, he states he last used cocaine 15 years ago and last used cannabis one month ago. Patient is fully alert and oriented X4; with euthymic affect; well-related; with good eye contact, speech is linear, with some thought blocking; however, he denies current suicidal or homicidal ideations; he denies current intent or plans to die. He denies current acute psychosis including current paranoid or delusional thoughts; he emphasizes that he only hears voices when he drinks alcohol, noting “I don’t hear voices when I don’t drink alcohol”. He denies any history of violence or self injurious behaviors. He reports a history of 3 prior suicidal attempts in the context of alcohol abuse. The first one was 20 years ago; last attempt was in January of this year by allegedly ingesting bleach. He denies current safety concerns at his residence. However, he states some of his peers at his residence tend to ask him for money and cigarettes, which makes him not want to be at the residence any more. He reports a history of one prior rehab “a while ago”. He states he is motivated to attend rehab so he can stop drinking.     COLLATERAL INFORMATION FROM OUTPATIENT PSYCHIATRIC PROVIDER:  Spoke via phone today 08/09/2020 to patient’s outpatient psychiatric provider (Harris Martin) who stated she saw the patient yesterday 08/08/2020 and that the patient told her he wanted to go to the hospital; however, she states she tried to discourage the patient from going to the ED  yesterday since she did not feel it was necessary. She states she has been treating the patient since the past 10 years and knows him well. Nguyen Martin who stated patient tends to over-utilize ED psych services and inpatient psychiatric admissions feels he may be seeking admission for secondary gains.  Nguyen discourages inpatient psychiatric admission for this patient; states she evaluated the patient yesterday and has no acute safety concerns related to suicidality, homicidality or psychosis at this time.    COLLATERAL INFORMATION FROM PATIENT'S RESIDENCE:  Spoke to patient'  (Silvia) who verified patient' current medication regimen as followed: Gabapentin 300mg PO TID, Depakote 500mg PO daily in AM, cogentin 1 mg PO BID, Lexapro 10 mg PO daily in AM, olanzapine 15 mg PO HS (2 tabs), metoprolol 25 mg PO BID, Flomax 0.4 mg PO HS, Lipitor 10 mg PO HS, ASA 81 mg PO daily in AM, Creon 12,000unit BID, Ventolin 2 puffs as needed. This is a 61 y/o single, disabled,  male,  unemployed, domiciled at [facility] in [place]; with a psychiatric history significant for Paranoid Schizophrenia and a medical history significant for BPH, GERD, HTN, and HLD,  Per record, has a history of multiple past inpatient psychiatric hospitalizations, last one was in January 2020; with a history of two self-reported prior suicide attempts (by aspirin overdose 6 years ago and in January 2020 by reportedly drinking 1 cup of bleach); with a substance use history of alcohol abuse; no history of illicit drug use, BIBA for suicidal thoughts. BAL 19.    On assessment patient is calm and cooperative; stating he came to the ED because the supervisor at this residence (Silvia) called 911 after he told him he was hearing voices. Patient states he only hears voices when he drinks alcohol, and notes "I have been drinking 1/2 pint of vodka every day since the past 2 months"; current BAL 19. He further added that he wants to stop drinking but can't do it alone. He states he wants to go to rehab so he can stop drinking. He also reports a history of using illicit substances including cocaine and cannabis; however, he states he last used cocaine 15 years ago and last used cannabis one month ago. Patient is fully alert and oriented X4; with euthymic affect; well-related; with good eye contact, speech is linear, with some thought blocking; however, he denies current suicidal or homicidal ideations; he denies current intent or plans to die. He denies current acute psychosis including current paranoid or delusional thoughts; he emphasizes that he only hears voices when he drinks alcohol, noting “I don’t hear voices when I don’t drink alcohol”. He denies any history of violence or self injurious behaviors. He reports a history of 3 prior suicidal attempts in the context of alcohol abuse. The first one was 20 years ago; last attempt was in January of this year by allegedly ingesting bleach. He denies current safety concerns at his residence. However, he states some of his peers at his residence tend to ask him for money and cigarettes, which makes him not want to be at the residence any more. He reports a history of one prior rehab “a while ago”. He states he is motivated to attend rehab so he can stop drinking.     COLLATERAL INFORMATION FROM OUTPATIENT PSYCHIATRIC PROVIDER:  Spoke via phone today 08/09/2020 to patient’s outpatient psychiatric provider (Harris Martin) who stated she saw the patient yesterday 08/08/2020 and that the patient told her he wanted to go to the hospital; however, she states she tried to discourage the patient from going to the ED  yesterday since she did not feel it was necessary. She states she has been treating the patient since the past 10 years and knows him well. Nguyen Martin who stated patient tends to over-utilize ED psych services and inpatient psychiatric admissions feels he may be seeking admission for secondary gains.  Nguyen discourages inpatient psychiatric admission for this patient; states she evaluated the patient yesterday and has no acute safety concerns related to suicidality, homicidality or psychosis at this time.    COLLATERAL INFORMATION FROM PATIENT'S RESIDENCE:  Spoke to patient'  (Silvia [phone]) who verified patient' current medication regimen as followed: Gabapentin 300mg PO TID, Depakote 500mg PO daily in AM, cogentin 1 mg PO BID, Lexapro 10 mg PO daily in AM, olanzapine 15 mg PO HS (2 tabs), metoprolol 25 mg PO BID, Flomax 0.4 mg PO HS, Lipitor 10 mg PO HS, ASA 81 mg PO daily in AM, Creon 12,000unit BID, Ventolin 2 puffs as needed.

## 2020-08-09 NOTE — ED PROVIDER NOTE - OBJECTIVE STATEMENT
60 year old male with PMH paranoid schizophrenia, HTN, HLD presents with SI.  patient reports having SI for 1 week, progressively worsening with plans to overdose.  reports 2 prior episodes of suicidal ideation. denies n/v. denies cp/sob/palp. denies f/c/s. denies abd pain.  PMH: Schizophrenia, HTN, HLD  SOCIAL: No tobacco/illicit substance use/socialEtOH

## 2020-08-09 NOTE — ED BEHAVIORAL HEALTH ASSESSMENT NOTE - PRIMARY DX
Paranoid schizophrenia Deferred condition on axis II Alcohol dependence with uncomplicated withdrawal

## 2020-08-09 NOTE — CHART NOTE - NSCHARTNOTEFT_GEN_A_CORE
SERENA Note: Per psych team, pt is T&R, however pt is interested in ETOH services. SW discussed with ED provider, pt a candidate for detox, Doc to doc completed at [facility], pt accepted to [facility] for detox pending covid results. Pt aware of plan, all available clinicals faxed to detox unit

## 2020-08-09 NOTE — ED BEHAVIORAL HEALTH ASSESSMENT NOTE - DESCRIPTION
Calm and cooperative    Vital Signs Last 24 Hrs  T(C): 36.9 (09 Aug 2020 11:12), Max: 36.9 (09 Aug 2020 11:12)  T(F): 98.5 (09 Aug 2020 11:12), Max: 98.5 (09 Aug 2020 11:12)  HR: 60 (09 Aug 2020 11:12) (57 - 60)  BP: 175/94 (09 Aug 2020 11:12) (159/89 - 175/102)  BP(mean): --  RR: 18 (09 Aug 2020 11:12) (18 - 18)  SpO2: 99% (09 Aug 2020 11:12) (97% - 99%) HLD, HTN, BPH, GERD 59yo male never  no children Calm and cooperative    Vital Signs Last 24 Hrs  T(C): 36.9 (09 Aug 2020 11:12), Max: 36.9 (09 Aug 2020 11:12)  T(F): 98.5 (09 Aug 2020 11:12), Max: 98.5 (09 Aug 2020 11:12)  HR: 60 (09 Aug 2020 11:12) (57 - 60)  BP: 175/94 (09 Aug 2020 11:12) (159/89 - 175/102)  BP(mean): --  RR: 18 (09 Aug 2020 11:12) (18 - 18)  SpO2: 99% (09 Aug 2020 11:12) (97% - 99%)    Vital Signs Last 24 Hrs  T(C): 36.9 (09 Aug 2020 19:12), Max: 36.9 (09 Aug 2020 11:12)  T(F): 98.4 (09 Aug 2020 19:12), Max: 98.5 (09 Aug 2020 11:12)  HR: 66 (09 Aug 2020 19:12) (57 - 66)  BP: 137/89 (09 Aug 2020 19:12) (137/89 - 175/102)  BP(mean): --  RR: 18 (09 Aug 2020 19:12) (18 - 18)  SpO2: 98% (09 Aug 2020 19:12) (97% - 100%)

## 2020-08-10 VITALS
SYSTOLIC BLOOD PRESSURE: 152 MMHG | RESPIRATION RATE: 18 BRPM | DIASTOLIC BLOOD PRESSURE: 90 MMHG | TEMPERATURE: 98 F | HEART RATE: 60 BPM | OXYGEN SATURATION: 99 %

## 2020-08-10 LAB
CULTURE RESULTS: SIGNIFICANT CHANGE UP
SPECIMEN SOURCE: SIGNIFICANT CHANGE UP

## 2020-08-10 PROCEDURE — U0003: CPT

## 2020-08-10 PROCEDURE — 99285 EMERGENCY DEPT VISIT HI MDM: CPT

## 2020-08-10 PROCEDURE — G0378: CPT

## 2020-08-10 PROCEDURE — 81001 URINALYSIS AUTO W/SCOPE: CPT

## 2020-08-10 PROCEDURE — 93005 ELECTROCARDIOGRAM TRACING: CPT

## 2020-08-10 PROCEDURE — 36415 COLL VENOUS BLD VENIPUNCTURE: CPT

## 2020-08-10 PROCEDURE — 80307 DRUG TEST PRSMV CHEM ANLYZR: CPT

## 2020-08-10 PROCEDURE — 87086 URINE CULTURE/COLONY COUNT: CPT

## 2020-08-10 PROCEDURE — 85027 COMPLETE CBC AUTOMATED: CPT

## 2020-08-10 PROCEDURE — 99217: CPT | Mod: CS

## 2020-08-10 PROCEDURE — 80053 COMPREHEN METABOLIC PANEL: CPT

## 2020-08-10 RX ORDER — DIPHENHYDRAMINE HCL 50 MG
25 CAPSULE ORAL ONCE
Refills: 0 | Status: DISCONTINUED | OUTPATIENT
Start: 2020-08-10 | End: 2020-08-14

## 2020-08-10 RX ORDER — DIPHENHYDRAMINE HCL 50 MG
25 CAPSULE ORAL ONCE
Refills: 0 | Status: COMPLETED | OUTPATIENT
Start: 2020-08-10 | End: 2020-08-10

## 2020-08-10 RX ADMIN — Medication 1 MILLIGRAM(S): at 07:25

## 2020-08-10 RX ADMIN — GABAPENTIN 300 MILLIGRAM(S): 400 CAPSULE ORAL at 06:29

## 2020-08-10 RX ADMIN — Medication 1 CAPSULE(S): at 08:01

## 2020-08-10 RX ADMIN — Medication 25 MILLIGRAM(S): at 01:12

## 2020-08-10 RX ADMIN — Medication 1 MILLIGRAM(S): at 06:29

## 2020-08-10 RX ADMIN — Medication 25 MILLIGRAM(S): at 06:29

## 2020-08-10 NOTE — ED CDU PROVIDER DISPOSITION NOTE - ATTENDING CONTRIBUTION TO CARE
I performed a face to face history and physical exam of the patient and discussed their management with the resident/ACP. I reviewed the resident/ACP's note and agree with the documented findings and plan of care.    covid neg. Pt accepted to [facility] with outpatient f/up.

## 2020-08-10 NOTE — CHART NOTE - NSCHARTNOTEFT_GEN_A_CORE
SW Note: Plan is to transfer pt to [facility] inpt detox unit for ETOH withdrawal. Called [facility] and spoke with MARY Delacruz [phone]. Confirmed bed is available and pt can be sent over. Faxed labs with covid results, recent vitals and meds.  MARY Lr aware report is needed and # provided. Ambulance being arranged. pt aware and agrees with the plan.

## 2020-08-10 NOTE — ED CDU PROVIDER DISPOSITION NOTE - CLINICAL COURSE
60 year old male with PMH paranoid schizophrenia, HTN, HLD presented to ED c/o SI and alcohol abuse. Workup while in ED unremarkable. Seen by  who was recommending treat and release, however pt requested alcohol detox. Accepted to [facility]. Spoke with  who states pt is ready for transfer to [facility], accepted by Dr. Pham. VSS, pt in NAD, stable for transfer.

## 2020-08-10 NOTE — ED ADULT NURSE REASSESSMENT NOTE - NS ED NURSE REASSESS COMMENT FT1
Attempted to call [facility] at [phone] no answer left message
patient sleeping nad noted will monitor and chart changes and maintain safe environment
Patient ate 100% of his dinner.  Patient educated about plan of care and transfer to substance abuse treatment center when COVID results completed.  Patient requesting medication Zyprexa to be ordered otherwise he reports feelings of paranoia will develop.  No attempts to harm self or others and safety maintained.
patient to nurses station states unable to sleep PA made aware and patient medicated with benadryl 25 mg po. patient tolerated well and awaiting of effect.
CWIA assessed at 5.  PAtient endorsing feeling increasing anxiety and is requesting Ativan.  Provider LUZ MARIA Burnette NP notified of patient medication request.  Safety of patient maintained.
patient covid test neg attempted to call [facility] for admission no answer, message left
Assumed care of patient. patient alert and cooperative.  patient denies any AH at this time. Pt aware of transfer and awaiting Covid results and that transfer would not take place until tomorrow.  Pt aware that he has been accepted to [facility] for Rehab. patient offered po fluids and snack denies at this time.   Will monitor and chart changes and maintain safe environment
Patient provided lunch.  Patient resting in bed with no distress.  Patient denies suicidal or homicidal ideations.  Safety of patient maintained.

## 2020-08-10 NOTE — ED CDU PROVIDER SUBSEQUENT DAY NOTE - ATTENDING CONTRIBUTION TO CARE
I performed a face to face history and physical exam of the patient and discussed their management with the resident/ACP. I reviewed the resident/ACP's note and agree with the documented findings and plan of care.    pending covid for dispo.

## 2020-09-11 ENCOUNTER — EMERGENCY (EMERGENCY)
Facility: HOSPITAL | Age: 60
LOS: 1 days | Discharge: HOME | End: 2020-09-11
Attending: EMERGENCY MEDICINE
Payer: MEDICARE

## 2020-09-11 VITALS
SYSTOLIC BLOOD PRESSURE: 135 MMHG | RESPIRATION RATE: 18 BRPM | OXYGEN SATURATION: 98 % | DIASTOLIC BLOOD PRESSURE: 86 MMHG | TEMPERATURE: 97 F | HEART RATE: 76 BPM

## 2020-09-11 VITALS
SYSTOLIC BLOOD PRESSURE: 168 MMHG | RESPIRATION RATE: 18 BRPM | HEART RATE: 88 BPM | WEIGHT: 179.9 LBS | OXYGEN SATURATION: 95 % | DIASTOLIC BLOOD PRESSURE: 94 MMHG | HEIGHT: 70 IN | TEMPERATURE: 98 F

## 2020-09-11 DIAGNOSIS — F48.9 NONPSYCHOTIC MENTAL DISORDER, UNSPECIFIED: ICD-10-CM

## 2020-09-11 DIAGNOSIS — F20.0 PARANOID SCHIZOPHRENIA: ICD-10-CM

## 2020-09-11 DIAGNOSIS — F10.10 ALCOHOL ABUSE, UNCOMPLICATED: ICD-10-CM

## 2020-09-11 LAB
ALBUMIN SERPL ELPH-MCNC: 4.4 G/DL — SIGNIFICANT CHANGE UP (ref 3.3–5.2)
ALP SERPL-CCNC: 89 U/L — SIGNIFICANT CHANGE UP (ref 40–120)
ALT FLD-CCNC: 21 U/L — SIGNIFICANT CHANGE UP
AMPHET UR-MCNC: NEGATIVE — SIGNIFICANT CHANGE UP
ANION GAP SERPL CALC-SCNC: 10 MMOL/L — SIGNIFICANT CHANGE UP (ref 5–17)
APAP SERPL-MCNC: <3 UG/ML — LOW (ref 10–26)
APPEARANCE UR: CLEAR — SIGNIFICANT CHANGE UP
AST SERPL-CCNC: 25 U/L — SIGNIFICANT CHANGE UP
BARBITURATES UR SCN-MCNC: NEGATIVE — SIGNIFICANT CHANGE UP
BASOPHILS # BLD AUTO: 0.03 K/UL — SIGNIFICANT CHANGE UP (ref 0–0.2)
BASOPHILS NFR BLD AUTO: 0.5 % — SIGNIFICANT CHANGE UP (ref 0–2)
BENZODIAZ UR-MCNC: NEGATIVE — SIGNIFICANT CHANGE UP
BILIRUB SERPL-MCNC: 0.2 MG/DL — LOW (ref 0.4–2)
BILIRUB UR-MCNC: NEGATIVE — SIGNIFICANT CHANGE UP
BUN SERPL-MCNC: 16 MG/DL — SIGNIFICANT CHANGE UP (ref 8–20)
CALCIUM SERPL-MCNC: 10.2 MG/DL — SIGNIFICANT CHANGE UP (ref 8.6–10.2)
CHLORIDE SERPL-SCNC: 102 MMOL/L — SIGNIFICANT CHANGE UP (ref 98–107)
CO2 SERPL-SCNC: 25 MMOL/L — SIGNIFICANT CHANGE UP (ref 22–29)
COCAINE METAB.OTHER UR-MCNC: NEGATIVE — SIGNIFICANT CHANGE UP
COLOR SPEC: YELLOW — SIGNIFICANT CHANGE UP
CREAT SERPL-MCNC: 1.17 MG/DL — SIGNIFICANT CHANGE UP (ref 0.5–1.3)
DIFF PNL FLD: NEGATIVE — SIGNIFICANT CHANGE UP
EOSINOPHIL # BLD AUTO: 0.1 K/UL — SIGNIFICANT CHANGE UP (ref 0–0.5)
EOSINOPHIL NFR BLD AUTO: 1.6 % — SIGNIFICANT CHANGE UP (ref 0–6)
ETHANOL SERPL-MCNC: 29 MG/DL — SIGNIFICANT CHANGE UP
GLUCOSE SERPL-MCNC: 82 MG/DL — SIGNIFICANT CHANGE UP (ref 70–99)
GLUCOSE UR QL: NEGATIVE MG/DL — SIGNIFICANT CHANGE UP
HCT VFR BLD CALC: 41.7 % — SIGNIFICANT CHANGE UP (ref 39–50)
HGB BLD-MCNC: 14.1 G/DL — SIGNIFICANT CHANGE UP (ref 13–17)
IMM GRANULOCYTES NFR BLD AUTO: 0.3 % — SIGNIFICANT CHANGE UP (ref 0–1.5)
KETONES UR-MCNC: NEGATIVE — SIGNIFICANT CHANGE UP
LEUKOCYTE ESTERASE UR-ACNC: NEGATIVE — SIGNIFICANT CHANGE UP
LYMPHOCYTES # BLD AUTO: 2.01 K/UL — SIGNIFICANT CHANGE UP (ref 1–3.3)
LYMPHOCYTES # BLD AUTO: 32.1 % — SIGNIFICANT CHANGE UP (ref 13–44)
MCHC RBC-ENTMCNC: 31.3 PG — SIGNIFICANT CHANGE UP (ref 27–34)
MCHC RBC-ENTMCNC: 33.8 GM/DL — SIGNIFICANT CHANGE UP (ref 32–36)
MCV RBC AUTO: 92.7 FL — SIGNIFICANT CHANGE UP (ref 80–100)
METHADONE UR-MCNC: NEGATIVE — SIGNIFICANT CHANGE UP
MONOCYTES # BLD AUTO: 0.52 K/UL — SIGNIFICANT CHANGE UP (ref 0–0.9)
MONOCYTES NFR BLD AUTO: 8.3 % — SIGNIFICANT CHANGE UP (ref 2–14)
NEUTROPHILS # BLD AUTO: 3.59 K/UL — SIGNIFICANT CHANGE UP (ref 1.8–7.4)
NEUTROPHILS NFR BLD AUTO: 57.2 % — SIGNIFICANT CHANGE UP (ref 43–77)
NITRITE UR-MCNC: NEGATIVE — SIGNIFICANT CHANGE UP
OPIATES UR-MCNC: NEGATIVE — SIGNIFICANT CHANGE UP
PCP SPEC-MCNC: SIGNIFICANT CHANGE UP
PCP UR-MCNC: NEGATIVE — SIGNIFICANT CHANGE UP
PH UR: 6 — SIGNIFICANT CHANGE UP (ref 5–8)
PLATELET # BLD AUTO: 233 K/UL — SIGNIFICANT CHANGE UP (ref 150–400)
POTASSIUM SERPL-MCNC: 4.2 MMOL/L — SIGNIFICANT CHANGE UP (ref 3.5–5.3)
POTASSIUM SERPL-SCNC: 4.2 MMOL/L — SIGNIFICANT CHANGE UP (ref 3.5–5.3)
PROT SERPL-MCNC: 7.5 G/DL — SIGNIFICANT CHANGE UP (ref 6.6–8.7)
PROT UR-MCNC: NEGATIVE MG/DL — SIGNIFICANT CHANGE UP
RBC # BLD: 4.5 M/UL — SIGNIFICANT CHANGE UP (ref 4.2–5.8)
RBC # FLD: 12 % — SIGNIFICANT CHANGE UP (ref 10.3–14.5)
SALICYLATES SERPL-MCNC: <0.6 MG/DL — LOW (ref 10–20)
SARS-COV-2 RNA SPEC QL NAA+PROBE: SIGNIFICANT CHANGE UP
SODIUM SERPL-SCNC: 137 MMOL/L — SIGNIFICANT CHANGE UP (ref 135–145)
SP GR SPEC: 1 — LOW (ref 1.01–1.02)
THC UR QL: NEGATIVE — SIGNIFICANT CHANGE UP
UROBILINOGEN FLD QL: NEGATIVE MG/DL — SIGNIFICANT CHANGE UP
VALPROATE SERPL-MCNC: 31.5 UG/ML — LOW (ref 50–100)
WBC # BLD: 6.27 K/UL — SIGNIFICANT CHANGE UP (ref 3.8–10.5)
WBC # FLD AUTO: 6.27 K/UL — SIGNIFICANT CHANGE UP (ref 3.8–10.5)

## 2020-09-11 PROCEDURE — U0003: CPT

## 2020-09-11 PROCEDURE — 93010 ELECTROCARDIOGRAM REPORT: CPT

## 2020-09-11 PROCEDURE — 85025 COMPLETE CBC W/AUTO DIFF WBC: CPT

## 2020-09-11 PROCEDURE — 90792 PSYCH DIAG EVAL W/MED SRVCS: CPT

## 2020-09-11 PROCEDURE — G0378: CPT

## 2020-09-11 PROCEDURE — 81003 URINALYSIS AUTO W/O SCOPE: CPT

## 2020-09-11 PROCEDURE — 93005 ELECTROCARDIOGRAM TRACING: CPT

## 2020-09-11 PROCEDURE — 80053 COMPREHEN METABOLIC PANEL: CPT

## 2020-09-11 PROCEDURE — 36415 COLL VENOUS BLD VENIPUNCTURE: CPT

## 2020-09-11 PROCEDURE — 80307 DRUG TEST PRSMV CHEM ANLYZR: CPT

## 2020-09-11 PROCEDURE — 99218: CPT | Mod: CS

## 2020-09-11 PROCEDURE — 99284 EMERGENCY DEPT VISIT MOD MDM: CPT

## 2020-09-11 PROCEDURE — 80164 ASSAY DIPROPYLACETIC ACD TOT: CPT

## 2020-09-11 RX ORDER — GABAPENTIN 400 MG/1
300 CAPSULE ORAL ONCE
Refills: 0 | Status: COMPLETED | OUTPATIENT
Start: 2020-09-11 | End: 2020-09-11

## 2020-09-11 RX ADMIN — GABAPENTIN 300 MILLIGRAM(S): 400 CAPSULE ORAL at 16:06

## 2020-09-11 NOTE — ED PROVIDER NOTE - PSYCHIATRIC, MLM
Alert and oriented to person, place, time/situation. Radha affect, no apparent risk to self or others.

## 2020-09-11 NOTE — ED ADULT NURSE NOTE - NSIMPLEMENTINTERV_GEN_ALL_ED
Implemented All Fall Risk Interventions:  [place] to call system. Call bell, personal items and telephone within reach. Instruct patient to call for assistance. Room bathroom lighting operational. Non-slip footwear when patient is off stretcher. Physically safe environment: no spills, clutter or unnecessary equipment. Stretcher in lowest position, wheels locked, appropriate side rails in place. Provide visual cue, wrist band, yellow gown, etc. Monitor gait and stability. Monitor for mental status changes and reorient to person, place, and time. Review medications for side effects contributing to fall risk. Reinforce activity limits and safety measures with patient and family.

## 2020-09-11 NOTE — ED BEHAVIORAL HEALTH ASSESSMENT NOTE - DESCRIPTION (FIRST USE, LAST USE, QUANTITY, FREQUENCY, DURATION)
3 packs/day admits to increasing alcohol daily use and had  Drank 1/2 pint of vodka today and since leaving [place] [id number] Occasional; last used one month ago per patient.

## 2020-09-11 NOTE — ED PROVIDER NOTE - CARE PLAN
Principal Discharge DX:	Paranoid schizophrenia, chronic condition with acute exacerbation  Secondary Diagnosis:	Alcohol use disorder, mild, abuse

## 2020-09-11 NOTE — ED BEHAVIORAL HEALTH ASSESSMENT NOTE - HPI (INCLUDE ILLNESS QUALITY, SEVERITY, DURATION, TIMING, CONTEXT, MODIFYING FACTORS, ASSOCIATED SIGNS AND SYMPTOMS)
This is a 61 y/o single, disabled,  male,  unemployed, domiciled at [facility] in [place]; with a psychiatric history significant for Paranoid Schizophrenia, alcohol use disorder, discharged from [facility] approx 2 weeks ago for alcohol dependence, multiple psych admissions and approx 2 ED visits per month for psychiatric complaints, AH and SI and a medical history significant for BPH, GERD, HTN, and HLD,  Per record, has a history of multiple past inpatient psychiatric hospitalizations, last one was in January 2020; with a history of two self-reported prior suicide attempts (by aspirin overdose 6 years ago and in January 2020 by reportedly drinking 1 cup of bleach);  reported occasional MJ use no history of other illicit drug use, BIBA EMS for suicidal thoughts.   Pt admits to drinking 1/2 pint of Vodka today PTA to ED.  Pt reports he was at [facility] a few weeks ago and released 2 weeks ago for alcohol dependence. On assessment patient is calm and cooperative; stating he came to the ED because the supervisor at this residence (Silvia) called 911 after he told him he was hearing voices.  Patient states he only hears voices telling him to kill himself and claims he wants to die.  He said he wants to go to [facility] because his provider and brother told he he should.  Pt claims he has intent to kill himself with alcohol and Valium and knows where to get Valium which cost him 1 dollar /pill.   Upon further exploration of recent trigger of SI, Pt claims he is tired of where he is living and of people asking him for money as he gave away 60$ this week.  When asked why he does it he states, to have friends.  He also c/o another resident Guy harassing him because he is not with Pt Vivian crespo.   Pt then stated he may be intoxicated.  He is endorsing SI with command  to kill self.  Spoke to patient' residence for meds list as had recent changes per S Mario NPP and provider. return call from supervisor, (Silvia [phone]).   Per last ED eval Pt on following: Gabapentin 300mg PO TID, Depakote 500mg PO daily in AM, cogentin 1 mg PO BID, Lexapro 10 mg PO daily in AM, olanzapine 15 mg PO HS (2 tabs), metoprolol 25 mg PO BID, Flomax 0.4 mg PO HS, Lipitor 10 mg PO HS, ASA 81 mg PO daily in AM, Creon 12,000unit BID, Ventolin 2 puffs as needed.  Spoke with brother and Nguyen Alexis NPP provider, who both reports this behavior is chronic despite where he lives.  He has issues with most residences and sometimes with other residents and can be verbally aggressive, but not known to be physically aggressive.  Brother feels when Pt is like this, he is unsafe and requires admission, while LILIYA Martin reports it is difficult to know when he is a danger to self due to coming to ED at leave 2x month and multiple admissions for same complaints and alcohol abuse.  Of note Nguyen christiansone not believe Pt has alcohol dependence or drinks to that extent. This is a 59 y/o single, disabled,  male,  unemployed, domiciled at [facility] in [place]; with a psychiatric history significant for Paranoid Schizophrenia, alcohol use disorder, discharged from [facility] approx 2 weeks ago for alcohol dependence, multiple psych admissions and approx 2 ED visits per month for psychiatric complaints, AH and SI and a medical history significant for BPH, GERD, HTN, and HLD,  Per record, has a history of multiple past inpatient psychiatric hospitalizations, last one was in January 2020; with a history of two self-reported prior suicide attempts (by aspirin overdose 6 years ago and in January 2020 by reportedly drinking 1 cup of bleach);  reported occasional MJ use no history of other illicit drug use, BIBA EMS for suicidal thoughts.   Pt admits to drinking 1/2 pint of Vodka today PTA to ED.  Pt reports he was at [facility] a few weeks ago and released 2 weeks ago for alcohol dependence. On assessment patient is calm and cooperative; stating he came to the ED because the supervisor at this residence (Silvia) called 911 after he told him he was hearing voices.  Patient states he only hears voices telling him to kill himself and claims he wants to die.  He said he wants to go to [place] because his provider and brother told he he should.  Pt claims he has intent to kill himself with alcohol and Valium and knows where to get Valium which cost him 1 dollar /pill.   Upon further exploration of recent trigger of SI, Pt claims he is tired of where he is living and of people asking him for money as he gave away 60$ this week.  When asked why he does it he states, to have friends.  He also c/o another resident Guy harassing him because he is not with Pt Vivian crespo.   Pt then stated he may be intoxicated.  He is endorsing SI with command  to kill self.  Spoke to patient' residence for meds list as had recent changes per S Mario NPP and provider. return call from supervisor, (Silvia [phone]).   Per last ED eval Pt on following: Gabapentin 300mg PO TID, Depakote 500mg PO daily in AM, cogentin 1 mg PO BID, Lexapro 10 mg PO daily in AM, olanzapine 15 mg PO HS (2 tabs), metoprolol 25 mg PO BID, Flomax 0.4 mg PO HS, Lipitor 10 mg PO HS, ASA 81 mg PO daily in AM, Creon 12,000unit BID, Ventolin 2 puffs as needed.  Current meds to follow in Medications area of note.  Spoke with brother and Nguyen Alexis NPP provider, who both reports this behavior is chronic despite where he lives.  He has issues with most residences and sometimes with other residents and can be verbally aggressive, but not known to be physically aggressive.  Brother feels when Pt is like this, he is unsafe and requires admission, while LILIYA Martin reports it is difficult to know when he is a danger to self due to coming to ED at leave 2x month and multiple admissions for same complaints and alcohol abuse.  Of note Nguyen christiansone not believe Pt has alcohol dependence or drinks to that extent.

## 2020-09-11 NOTE — ED PROVIDER NOTE - OBJECTIVE STATEMENT
59 y/o male with MPHx of paranoid schizophrenia and HTN presents with worsening hallucinations for the past several days. Voices getting worse and commanding him to kill himself    Denies HI, drug use, physical complaints, CP, SOB, fever, chills, N/V/D

## 2020-09-11 NOTE — ED BEHAVIORAL HEALTH ASSESSMENT NOTE - SUICIDE RISK FACTORS
Psychotic disorder current/past/Command hallucinations/Alcohol/Substance abuse disorders/Unable to engage in safety planning/Mood Disorder current/past

## 2020-09-11 NOTE — ED BEHAVIORAL HEALTH ASSESSMENT NOTE - DESCRIPTION
Calm and cooperative, restless at times HLD, HTN, BPH, GERD 59yo male never  no children Calm and cooperative, restless at times.

## 2020-09-11 NOTE — SBIRT NOTE ADULT - NSSBIRTALCPASSREFTXDET_GEN_A_CORE
Provided SBIRT services: Referral to Treatment Performed. Screening results reviewed w pt and pt provided info re healthy guidelines and potential negative consequences associated with level of risk. Motivation and readiness to reduce or stop use was discussed and goals and activities to make changes were suggested/offered. Referral for complete assessment and level of care determination at a certified treatment facility was completed by contacting the treatment facility via phone. Discussed SO Inpatient Rehab with patient; patient not interested in proceeding at this time. Unable to proceed with outpatient with [facility] OT due to patient already under the care of a  provider. Provided AA meeting information to patient.

## 2020-09-11 NOTE — ED BEHAVIORAL HEALTH ASSESSMENT NOTE - RISK ASSESSMENT
RF past suicide attempt, excessive utilizer of ED and hospitals, poor judgement and insight, intellectually impaired, alcohol use  PF supportive family, in tx, compliant with meds Moderate Acute Suicide Risk

## 2020-09-11 NOTE — CHART NOTE - NSCHARTNOTEFT_GEN_A_CORE
SW Note: Per psych team, pt is T&R, cleared to return to his adult home. SBIRT provided pt with AA resources. Pt's covid has resulted, pt is negative. SW placed call to [facility], spoke to staff member Maxwell to make aware of return tonight. SW offered to fax d/c summary and covid result, however per Maxwell pt having papers is sufficient. SW attempted to arrange transport via [employer], however pt's transport benefit is inactive. SERENA called adult home back to make aware, Maxwell requested this writer arrange transport with "[facility]"- [phone], and he will pay taxi when it drops pt off at home. SW called [facility] [employer], who reports they are agreeable to such and will pick pt up in 20 minutes. Pt provided with D/C papers and resources along with neg. covid result, no further SW services identified at this time

## 2020-09-11 NOTE — ED BEHAVIORAL HEALTH ASSESSMENT NOTE - DETAILS
20 yrs ago reportedly overdosed on Vitamins, 6 years ago took one bottle of aspirin; in 01/2020 Ingested 1 cup bleach. tremor treated with cogentin, Haldol = Spasmodic torticollis mom had what was described as "nervous breakdown" tremors AH to kill himself spoke with residence ED attending

## 2020-09-11 NOTE — ED ADULT NURSE NOTE - HPI (INCLUDE ILLNESS QUALITY, SEVERITY, DURATION, TIMING, CONTEXT, MODIFYING FACTORS, ASSOCIATED SIGNS AND SYMPTOMS)
pt presents with a blunted affect and depression. pt is stating that he drank a "half pint of vodka" and endorsing inpatient treatment for suicidal ideations. pt states that he drinks alcohol daily and smokes marijuana. pt states that he is having auditory hallucinations that are telling him to "kill himself". Pt states that his plan would be to overdose on aspirin and drink alcohol. pt states that he attempted suicide approx 1 year ago by drinking "bleach". pt thinking appears linear and is a&o x3. pt denies homicidal ideations and visual hallucinations. pt oriented to the unit and educated on the plan of care.

## 2020-09-11 NOTE — ED BEHAVIORAL HEALTH ASSESSMENT NOTE - ADDITIONAL DETAILS ALL
3 prior SA by OD, 2 by OD and ingestion of bleach Jan 2020, h/o wanting to walk in traffic, and currently plan to OD Valium and Vodka

## 2020-09-11 NOTE — ED BEHAVIORAL HEALTH ASSESSMENT NOTE - SUMMARY
This is a 61 y/o single, disabled,  male,  unemployed, domiciled at [facility] in [place]; with a psychiatric history significant for Paranoid Schizophrenia, alcohol use disorder, discharged from [facility] approx 2 weeks ago for alcohol dependence, multiple psych admissions and approx 2 ED visits per month for psychiatric complaints, AH and SI and a medical history significant for BPH, GERD, HTN, and HLD,  Per record, has a history of multiple past inpatient psychiatric hospitalizations, last one was in January 2020; with a history of two self-reported prior suicide attempts (by aspirin overdose 6 years ago and in January 2020 by reportedly drinking 1 cup of bleach);  reported occasional MJ use no history of other illicit drug use, BIBA EMS for suicidal thoughts.   Pt is currently unable to contract for safety as he maintains he is having AH to kill self and intent.  Pt also admits to precipitant being, not liking housing as he gives others his money and cannot rule out admission for secondary gains.  Will hold overnight for reeval secondary to recent alcohol use and to r/o withdrawal symptoms.

## 2020-09-11 NOTE — ED BEHAVIORAL HEALTH ASSESSMENT NOTE - PAST PSYCHOTROPIC MEDICATION
Gabapentin 300mg PO TID, Depakote 500mg PO daily in AM, cogentin 1 mg PO BID, Lexapro 10 mg PO daily in AM, olanzapine 15 mg PO HS (2 tabs), metoprolol 25 mg PO BID, Flomax 0.4 mg PO HS, Lipitor 10 mg PO HS, ASA 81 mg PO daily in AM, Creon 12,000unit BID, Ventolin 2 puffs as needed.

## 2020-09-11 NOTE — ED ADULT NURSE NOTE - CHIEF COMPLAINT QUOTE
Patient brought in by ambulance A/Ox3, from [facility], sent to ED for SI and auditory hallucinations.

## 2020-09-11 NOTE — ED ADULT NURSE REASSESSMENT NOTE - NS ED NURSE REASSESS COMMENT FT1
pt resting/sleeping in NAD. pt has made no attempts to harm self or others. pt provided dinner and consumed it 100%.
pt sent back to  from [facility]. pt awake and alert, cooperative and in no apparent distress. reports worsening auditory hallucinations telling him to kill himself. pt wanded by security, placed in yellow gown, and escorted to room by CA. will continue to monitor.
Assumed care of pt @1930. Received pt resting/sleeping comfortably, in no apparent distress. respirations even & unlabored. Will monitor the pt for safety.

## 2020-09-11 NOTE — ED ADULT TRIAGE NOTE - CHIEF COMPLAINT QUOTE
Patient brought in by ambulance A/Ox3, from [facility], sent to ED for SI and auditory hallucinations.

## 2020-09-11 NOTE — ED PROVIDER NOTE - CONSTITUTIONAL, MLM
normal... disheveled appearing, awake, alert, oriented to person, place, time/situation and in no apparent distress.

## 2020-09-11 NOTE — ED CDU PROVIDER DISPOSITION NOTE - PATIENT PORTAL LINK FT
You can access the FollowMyHealth Patient Portal offered by [facility] by registering at the following website: [email or web address]. By joining [employer]’s FollowMyHealth portal, you will also be able to view your health information using other applications (apps) compatible with our system.

## 2020-09-11 NOTE — ED BEHAVIORAL HEALTH ASSESSMENT NOTE - CURRENT MEDICATION
Metoprolol 25 bid, , Gabapentin 300 mg tid, Cogentin 1 mg bid,  depakote 500 mg qd, Flomax 0.4 mg hs, Lexapro 10 mg hs, Lipitor 10 mg daily, Zyprexa 30 mg hs,  ASA 81 mg daily, Creon ER 1200 units bid,  MVI daily, Folic acid 1 mg daily.  ASA 81mg qd, Creon 80862 u tid with mealsVentolin 90 mcg 2 puff Q 6hrs PRN SOB (confirmed with group home fax). Metoprolol 25 mg  qd, , Remeron 15 hs, Gabapentin 300 mg tid, Cogentin 1 mg bid,  Depakote  mg hs, Flomax 0.4 mg hs, Lexapro 10 mg 1.5 tabs qd, Thorazine 50 hs, MVI, Thiamine 100 qd, Zenpep 1000 tid,  ASA 81 mg daily,  Folic acid 1 mg daily,  Ventolin 90 mcg 2 puff Q 6hrs PRN SOB   Paliperidone 3 mg qd and 6 mg hs (confirmed with group home fax).

## 2020-09-11 NOTE — ED BEHAVIORAL HEALTH ASSESSMENT NOTE - OTHER PAST PSYCHIATRIC HISTORY (INCLUDE DETAILS REGARDING ONSET, COURSE OF ILLNESS, INPATIENT/OUTPATIENT TREATMENT)
Multiple prior hospitalizations.  Most recently discharged from [facility] Jan 2020.  Past [facility] admission.  [facility] 2 weeks ago for detox

## 2020-09-18 ENCOUNTER — EMERGENCY (EMERGENCY)
Facility: HOSPITAL | Age: 60
LOS: 1 days | Discharge: HOME | End: 2020-09-18
Attending: EMERGENCY MEDICINE
Payer: MEDICARE

## 2020-09-18 VITALS
OXYGEN SATURATION: 97 % | HEART RATE: 62 BPM | RESPIRATION RATE: 18 BRPM | DIASTOLIC BLOOD PRESSURE: 83 MMHG | SYSTOLIC BLOOD PRESSURE: 136 MMHG | TEMPERATURE: 98 F

## 2020-09-18 VITALS
SYSTOLIC BLOOD PRESSURE: 141 MMHG | HEIGHT: 70 IN | TEMPERATURE: 98 F | DIASTOLIC BLOOD PRESSURE: 88 MMHG | OXYGEN SATURATION: 96 % | RESPIRATION RATE: 18 BRPM | HEART RATE: 88 BPM | WEIGHT: 160.06 LBS

## 2020-09-18 DIAGNOSIS — F48.9 NONPSYCHOTIC MENTAL DISORDER, UNSPECIFIED: ICD-10-CM

## 2020-09-18 DIAGNOSIS — F20.9 SCHIZOPHRENIA, UNSPECIFIED: ICD-10-CM

## 2020-09-18 DIAGNOSIS — Z72.89 OTHER PROBLEMS RELATED TO LIFESTYLE: ICD-10-CM

## 2020-09-18 LAB
ALBUMIN SERPL ELPH-MCNC: 4.1 G/DL — SIGNIFICANT CHANGE UP (ref 3.3–5.2)
ALP SERPL-CCNC: 84 U/L — SIGNIFICANT CHANGE UP (ref 40–120)
ALT FLD-CCNC: 22 U/L — SIGNIFICANT CHANGE UP
AMPHET UR-MCNC: NEGATIVE — SIGNIFICANT CHANGE UP
ANION GAP SERPL CALC-SCNC: 11 MMOL/L — SIGNIFICANT CHANGE UP (ref 5–17)
APAP SERPL-MCNC: <3 UG/ML — LOW (ref 10–26)
APPEARANCE UR: CLEAR — SIGNIFICANT CHANGE UP
AST SERPL-CCNC: 26 U/L — SIGNIFICANT CHANGE UP
BARBITURATES UR SCN-MCNC: NEGATIVE — SIGNIFICANT CHANGE UP
BASOPHILS # BLD AUTO: 0.03 K/UL — SIGNIFICANT CHANGE UP (ref 0–0.2)
BASOPHILS NFR BLD AUTO: 0.6 % — SIGNIFICANT CHANGE UP (ref 0–2)
BENZODIAZ UR-MCNC: NEGATIVE — SIGNIFICANT CHANGE UP
BILIRUB SERPL-MCNC: <0.2 MG/DL — LOW (ref 0.4–2)
BILIRUB UR-MCNC: NEGATIVE — SIGNIFICANT CHANGE UP
BUN SERPL-MCNC: 12 MG/DL — SIGNIFICANT CHANGE UP (ref 8–20)
CALCIUM SERPL-MCNC: 10.3 MG/DL — HIGH (ref 8.6–10.2)
CHLORIDE SERPL-SCNC: 104 MMOL/L — SIGNIFICANT CHANGE UP (ref 98–107)
CO2 SERPL-SCNC: 24 MMOL/L — SIGNIFICANT CHANGE UP (ref 22–29)
COCAINE METAB.OTHER UR-MCNC: NEGATIVE — SIGNIFICANT CHANGE UP
COLOR SPEC: YELLOW — SIGNIFICANT CHANGE UP
CREAT SERPL-MCNC: 1.14 MG/DL — SIGNIFICANT CHANGE UP (ref 0.5–1.3)
DIFF PNL FLD: NEGATIVE — SIGNIFICANT CHANGE UP
EOSINOPHIL # BLD AUTO: 0.06 K/UL — SIGNIFICANT CHANGE UP (ref 0–0.5)
EOSINOPHIL NFR BLD AUTO: 1.2 % — SIGNIFICANT CHANGE UP (ref 0–6)
ETHANOL SERPL-MCNC: 41 MG/DL — SIGNIFICANT CHANGE UP
GLUCOSE SERPL-MCNC: 85 MG/DL — SIGNIFICANT CHANGE UP (ref 70–99)
GLUCOSE UR QL: NEGATIVE MG/DL — SIGNIFICANT CHANGE UP
HCT VFR BLD CALC: 39.1 % — SIGNIFICANT CHANGE UP (ref 39–50)
HGB BLD-MCNC: 13.3 G/DL — SIGNIFICANT CHANGE UP (ref 13–17)
IMM GRANULOCYTES NFR BLD AUTO: 0.2 % — SIGNIFICANT CHANGE UP (ref 0–1.5)
KETONES UR-MCNC: NEGATIVE — SIGNIFICANT CHANGE UP
LEUKOCYTE ESTERASE UR-ACNC: NEGATIVE — SIGNIFICANT CHANGE UP
LYMPHOCYTES # BLD AUTO: 1.65 K/UL — SIGNIFICANT CHANGE UP (ref 1–3.3)
LYMPHOCYTES # BLD AUTO: 31.7 % — SIGNIFICANT CHANGE UP (ref 13–44)
MCHC RBC-ENTMCNC: 31.8 PG — SIGNIFICANT CHANGE UP (ref 27–34)
MCHC RBC-ENTMCNC: 34 GM/DL — SIGNIFICANT CHANGE UP (ref 32–36)
MCV RBC AUTO: 93.5 FL — SIGNIFICANT CHANGE UP (ref 80–100)
METHADONE UR-MCNC: NEGATIVE — SIGNIFICANT CHANGE UP
MONOCYTES # BLD AUTO: 0.52 K/UL — SIGNIFICANT CHANGE UP (ref 0–0.9)
MONOCYTES NFR BLD AUTO: 10 % — SIGNIFICANT CHANGE UP (ref 2–14)
NEUTROPHILS # BLD AUTO: 2.94 K/UL — SIGNIFICANT CHANGE UP (ref 1.8–7.4)
NEUTROPHILS NFR BLD AUTO: 56.3 % — SIGNIFICANT CHANGE UP (ref 43–77)
NITRITE UR-MCNC: NEGATIVE — SIGNIFICANT CHANGE UP
OPIATES UR-MCNC: NEGATIVE — SIGNIFICANT CHANGE UP
PCP SPEC-MCNC: SIGNIFICANT CHANGE UP
PCP UR-MCNC: NEGATIVE — SIGNIFICANT CHANGE UP
PH UR: 6 — SIGNIFICANT CHANGE UP (ref 5–8)
PLATELET # BLD AUTO: 193 K/UL — SIGNIFICANT CHANGE UP (ref 150–400)
POTASSIUM SERPL-MCNC: 4.2 MMOL/L — SIGNIFICANT CHANGE UP (ref 3.5–5.3)
POTASSIUM SERPL-SCNC: 4.2 MMOL/L — SIGNIFICANT CHANGE UP (ref 3.5–5.3)
PROT SERPL-MCNC: 7 G/DL — SIGNIFICANT CHANGE UP (ref 6.6–8.7)
PROT UR-MCNC: NEGATIVE MG/DL — SIGNIFICANT CHANGE UP
RBC # BLD: 4.18 M/UL — LOW (ref 4.2–5.8)
RBC # FLD: 12.2 % — SIGNIFICANT CHANGE UP (ref 10.3–14.5)
SALICYLATES SERPL-MCNC: <0.6 MG/DL — LOW (ref 10–20)
SARS-COV-2 RNA SPEC QL NAA+PROBE: SIGNIFICANT CHANGE UP
SODIUM SERPL-SCNC: 139 MMOL/L — SIGNIFICANT CHANGE UP (ref 135–145)
SP GR SPEC: 1.01 — SIGNIFICANT CHANGE UP (ref 1.01–1.02)
THC UR QL: NEGATIVE — SIGNIFICANT CHANGE UP
UROBILINOGEN FLD QL: NEGATIVE MG/DL — SIGNIFICANT CHANGE UP
VALPROATE SERPL-MCNC: 36.7 UG/ML — LOW (ref 50–100)
WBC # BLD: 5.21 K/UL — SIGNIFICANT CHANGE UP (ref 3.8–10.5)
WBC # FLD AUTO: 5.21 K/UL — SIGNIFICANT CHANGE UP (ref 3.8–10.5)

## 2020-09-18 PROCEDURE — 93010 ELECTROCARDIOGRAM REPORT: CPT

## 2020-09-18 PROCEDURE — 99284 EMERGENCY DEPT VISIT MOD MDM: CPT | Mod: CS

## 2020-09-18 PROCEDURE — U0003: CPT

## 2020-09-18 PROCEDURE — 99283 EMERGENCY DEPT VISIT LOW MDM: CPT

## 2020-09-18 PROCEDURE — 80053 COMPREHEN METABOLIC PANEL: CPT

## 2020-09-18 PROCEDURE — 85025 COMPLETE CBC W/AUTO DIFF WBC: CPT

## 2020-09-18 PROCEDURE — 80164 ASSAY DIPROPYLACETIC ACD TOT: CPT

## 2020-09-18 PROCEDURE — 80307 DRUG TEST PRSMV CHEM ANLYZR: CPT

## 2020-09-18 PROCEDURE — 93005 ELECTROCARDIOGRAM TRACING: CPT

## 2020-09-18 PROCEDURE — 81003 URINALYSIS AUTO W/O SCOPE: CPT

## 2020-09-18 PROCEDURE — 90792 PSYCH DIAG EVAL W/MED SRVCS: CPT

## 2020-09-18 PROCEDURE — 36415 COLL VENOUS BLD VENIPUNCTURE: CPT

## 2020-09-18 RX ORDER — GABAPENTIN 400 MG/1
300 CAPSULE ORAL ONCE
Refills: 0 | Status: COMPLETED | OUTPATIENT
Start: 2020-09-18 | End: 2020-09-18

## 2020-09-18 RX ADMIN — GABAPENTIN 300 MILLIGRAM(S): 400 CAPSULE ORAL at 17:01

## 2020-09-18 NOTE — CHART NOTE - NSCHARTNOTEFT_GEN_A_CORE
Cr; Pt's negative result, pt ready to return to his Group Home. Worker spoke with (Noe) result faxed to him at [phone], no change in meds . Per Noe,pt can be sent via [employer].  [employer] called ([place]) states pt's mcaid is not active for transportation. Worker informed Noe at the Group home,will explore options. SW to follow.

## 2020-09-18 NOTE — CHART NOTE - NSCHARTNOTEFT_GEN_A_CORE
Cr: p/c from Tiffany Magallanes supervisor at [facility] [phone] which handles pt's transportation services.  Unable to obtain taxi and/or lette at this moment, an Uber was obtained (Sid louis) will p/u pt within 6 minutes at the ED lobby. Pt walked by nurse assistance (David) all needed paperwork sent . Worker called Noe at [facility], made aware verbalized understanding. No other concerns reported at this moment.

## 2020-09-18 NOTE — ED ADULT NURSE NOTE - NSIMPLEMENTINTERV_GEN_ALL_ED
Implemented All Universal Safety Interventions:  [place] to call system. Call bell, personal items and telephone within reach. Instruct patient to call for assistance. Room bathroom lighting operational. Non-slip footwear when patient is off stretcher. Physically safe environment: no spills, clutter or unnecessary equipment. Stretcher in lowest position, wheels locked, appropriate side rails in place.

## 2020-09-18 NOTE — ED BEHAVIORAL HEALTH ASSESSMENT NOTE - OTHER PAST PSYCHIATRIC HISTORY (INCLUDE DETAILS REGARDING ONSET, COURSE OF ILLNESS, INPATIENT/OUTPATIENT TREATMENT)
Multiple prior hospitalizations.  Most recently discharged from [facility] Jan 2020.  Past [facility] admission.  [facility] 3 weeks ago for detox

## 2020-09-18 NOTE — ED ADULT NURSE REASSESSMENT NOTE - NS ED NURSE REASSESS COMMENT FT1
Assumed care of patient. patient alert and cooperative. patient aware of present discharge pending transportation tubbs. patient. patient upset that he is staying so long. Explained to patient that attempts are being made to get him home.  Will monitor and chart changes and maintain safe environment.

## 2020-09-18 NOTE — SBIRT NOTE ADULT - NSSBIRTALCPASSREFTXDET_GEN_A_CORE
Explored possible inpt substance abuse rehabs for pt. Last inpt rehab was at [facility] 8/20 for 4 days after he completed their detox program. Declined by Skyler and VANESSA because the pt would require additional MH support and RN support then they provide. [facility] reports no beds for possible 1 week.  Spoke with ED [employer] worker Sarath and requested she speak with pt possible about [employer] for community support. Explored possible inpt substance abuse rehabs for pt. Last inpt rehab was at [facility] 8/20 for 4 days after he completed their detox program. Declined by Skyler and VANESSA because the pt would require additional MH support and RN support then they provide. [facility] reports no beds for possible 1 week.  Spoke with ED [employer] worker Sarath and requested she speak with pt possible about [employer] connect for community support.    9/18 15:00- Health  contacted [facility] for Inpatient Rehab; no beds available at this time. Health  contacted [facility] Inpatient Rehab; no beds available at this time. Health  unable to link patient to outpatient due to already under the care of a psychiatrist. Enrolled and consented patient to [employer], external care navigation program.

## 2020-09-18 NOTE — ED BEHAVIORAL HEALTH ASSESSMENT NOTE - SUMMARY
[facility] approx 3 weeks ago for alcohol dependence, multiple psych admissions and approx 2 ED visits per month for psychiatric complaints, last time 9/11/20 with similar complaints, today reported to ED attending SI with plan to jump off roof AH saying same,  medical history significant for BPH, GERD, HTN, and HLD,  Per record, has a history of multiple past inpatient psychiatric hospitalizations, last one was in January 2020; with a history of two self-reported prior suicide attempts (by aspirin overdose 6 years ago and in January 2020 by reportedly drinking 1 cup of bleach);  reported occasional MJ use no history of other illicit drug use, BIBA EMS for suicidal thoughts.   Pt inially requesting psych admission for SI after c/o his habit of giving away his money to make friends.  He later denies he was actually suicidal and is requesting a psych  rehab to a place where he can smoke.  Pt admits to having difficulty with habit of giving people money and was encourage to allow brother to provide a budget, if he cannot change behavior,  so he does not continue this behavior.  Pt has no acute psych condition which requires in Pt psych admission.  Pt is not an imminent danger to self or others and is cleared psychiatrically for discharge.

## 2020-09-18 NOTE — ED PROVIDER NOTE - PATIENT PORTAL LINK FT
You can access the FollowMyHealth Patient Portal offered by [facility] by registering at the following website: [email or web address]. By joining [employer]’s FollowMyHealth portal, you will also be able to view your health information using other applications (apps) compatible with our system.

## 2020-09-18 NOTE — ED BEHAVIORAL HEALTH ASSESSMENT NOTE - RISK ASSESSMENT
RF h/o feigning psych symptoms for secondary gains, h/o reported suicide attempt, alcohol use  PF stable housing, in tx, support from family and residence peers Low Acute Suicide Risk

## 2020-09-18 NOTE — ED BEHAVIORAL HEALTH ASSESSMENT NOTE - HPI (INCLUDE ILLNESS QUALITY, SEVERITY, DURATION, TIMING, CONTEXT, MODIFYING FACTORS, ASSOCIATED SIGNS AND SYMPTOMS)
This is a 61 y/o single, disabled,  male,  unemployed, domiciled at [facility] in [place]; with a psychiatric history significant for Paranoid Schizophrenia, alcohol use disorder, discharged from [facility] approx 3 weeks ago for alcohol dependence, multiple psych admissions and approx 2 ED visits per month for psychiatric complaints, last time 9/11/20 with similar complaints, today reported to ED attending SI with plan to jump off roof AH saying same,  medical history significant for BPH, GERD, HTN, and HLD,  Per record, has a history of multiple past inpatient psychiatric hospitalizations, last one was in January 2020; with a history of two self-reported prior suicide attempts (by aspirin overdose 6 years ago and in January 2020 by reportedly drinking 1 cup of bleach);  reported occasional MJ use no history of other illicit drug use, BIBA EMS for suicidal thoughts.   Pt admits to drinking 1/2 pint of Vodka today  and one beer PTA to ED.  Pt reports he was at [facility] a few weeks ago and released 3 weeks ago for alcohol dependence, however per LILIYA Martin he was transferred to a psych unit at that time.  Pt initially reports he is suicidal, and hearing voices to hurt himself by jumping off a roof.  He later stated he wanted to go to [place] because he could smoke which is very important to him.  Pt then admitted he was not really suicidal or hearing , just upset about giving away money.  He claims his brother will wants to give him a budget so he cannot give away his money which Pt admits is to make friends.  Maxwell  denies any acute changes in behavior or agitation.  Maxwell reports is the "same old thing", he gives away his money, then he feels bad about it, then he drinks and then comes to hospital.  Pt is well related, no acute psych disturbances, denies SI/HI/AH/delusions.  Pt has h/o feigned psychiatric complaints for secondary gains which is chronic behavior.  Brother Shoaib, confirmed Pt issues with giving money away and has been budgeting his money for him.

## 2020-09-18 NOTE — ED BEHAVIORAL HEALTH ASSESSMENT NOTE - DETAILS
20 yrs ago reportedly overdosed on Vitamins, 6 years ago took one bottle of aspirin; in 01/2020 Ingested 1 cup bleach. tremors Maxwell from residence tremor treated with cogentin, Haldol = Spasmodic torticollis mom had what was described as "nervous breakdown"

## 2020-09-18 NOTE — ED PROVIDER NOTE - OBJECTIVE STATEMENT
61 y/o male hx htn, hld, paranoid schizophrenia from group home for worsening paranoia and SI. pt was telling staff and me wants to jump off roof. Denies ingestions, no pain, no other complaints. +AH. Here 6 days ago    ROS: No fever/chills. No eye pain/changes in vision, No ear pain/sore throat/dysphagia, No chest pain/palpitations. No SOB/cough/. No abdominal pain, N/V/D, no black/bloody bm. No dysuria/frequency/discharge, No headache. No Dizziness.    No rashes or breaks in skin. No numbness/tingling/weakness.

## 2020-09-18 NOTE — ED BEHAVIORAL HEALTH ASSESSMENT NOTE - DESCRIPTION (FIRST USE, LAST USE, QUANTITY, FREQUENCY, DURATION)
3 packs/day admits to increasing alcohol daily use and had  Drank 1/2 pint of vodka today and since leaving [facility] pending Occasional;  tox pending

## 2020-09-18 NOTE — ED BEHAVIORAL HEALTH ASSESSMENT NOTE - SUICIDE PROTECTIVE FACTORS
Responsibility to family and others/Has future plans/Supportive social network of family or friends/Positive therapeutic relationships

## 2020-09-18 NOTE — ED ADULT TRIAGE NOTE - CHIEF COMPLAINT QUOTE
Pt coming from group home with hx of schizophrenia c/o "wanting to jump off the roof", as per EMS "the facility is reporting increased paranoia", denies MD Oleksandr WELCH called to bedside

## 2020-09-18 NOTE — CHART NOTE - NSCHARTNOTEFT_GEN_A_CORE
SERENA Note: Called and spoke with adult home staff member Brook [phone]. Aware pt is cleared for discharge back to [facility] when covid results are back. Results and meds given list needs to faxed to [phone].   ED [facility] worker made referral to [facility].  SW to follow

## 2020-09-18 NOTE — ED BEHAVIORAL HEALTH ASSESSMENT NOTE - CURRENT MEDICATION
Metoprolol 25 mg  qd, , Remeron 15 hs, Gabapentin 300 mg tid, Cogentin 1 mg bid,  Depakote  mg hs, Flomax 0.4 mg hs, Lexapro 10 mg 1.5 tabs qd, Thorazine 50 hs, MVI, Thiamine 100 qd, Zenpep 1000 tid,  ASA 81 mg daily,  Folic acid 1 mg daily,  Ventolin 90 mcg 2 puff Q 6hrs PRN SOB   Paliperidone 3 mg qd and 6 mg hs (confirmed with group home fax).

## 2020-10-07 NOTE — ED ADULT TRIAGE NOTE - STATUS:
Medical record reviewed.    Voicemail message left at preferred phone number provided.  Requested call back to discuss progress since discharge.    HRTIC Episode of Care scheduled to close: 10/29/20    Bridget Malone  Transitional Care RN  [phone]     Applied

## 2020-10-15 NOTE — ED BEHAVIORAL HEALTH ASSESSMENT NOTE - INTERRUPTED ATTEMPT:
Spoke with patient's father, he is aware he can use the albuterol up to every 6 hours.  Advised ED if symptoms worse.  Silverio says that he is now improved.     None known

## 2020-10-24 ENCOUNTER — EMERGENCY (EMERGENCY)
Facility: HOSPITAL | Age: 60
LOS: 1 days | Discharge: HOME | End: 2020-10-24
Attending: EMERGENCY MEDICINE
Payer: MEDICARE

## 2020-10-24 VITALS
DIASTOLIC BLOOD PRESSURE: 93 MMHG | HEART RATE: 73 BPM | TEMPERATURE: 99 F | SYSTOLIC BLOOD PRESSURE: 154 MMHG | WEIGHT: 169.98 LBS | HEIGHT: 66 IN | RESPIRATION RATE: 18 BRPM | OXYGEN SATURATION: 98 %

## 2020-10-24 DIAGNOSIS — F20.9 SCHIZOPHRENIA, UNSPECIFIED: ICD-10-CM

## 2020-10-24 DIAGNOSIS — F10.10 ALCOHOL ABUSE, UNCOMPLICATED: ICD-10-CM

## 2020-10-24 LAB
ALBUMIN SERPL ELPH-MCNC: 4.3 G/DL — SIGNIFICANT CHANGE UP (ref 3.3–5.2)
ALP SERPL-CCNC: 84 U/L — SIGNIFICANT CHANGE UP (ref 40–120)
ALT FLD-CCNC: 21 U/L — SIGNIFICANT CHANGE UP
AMPHET UR-MCNC: NEGATIVE — SIGNIFICANT CHANGE UP
ANION GAP SERPL CALC-SCNC: 13 MMOL/L — SIGNIFICANT CHANGE UP (ref 5–17)
APAP SERPL-MCNC: <3 UG/ML — LOW (ref 10–26)
APPEARANCE UR: CLEAR — SIGNIFICANT CHANGE UP
AST SERPL-CCNC: 28 U/L — SIGNIFICANT CHANGE UP
BARBITURATES UR SCN-MCNC: NEGATIVE — SIGNIFICANT CHANGE UP
BASOPHILS # BLD AUTO: 0.03 K/UL — SIGNIFICANT CHANGE UP (ref 0–0.2)
BASOPHILS NFR BLD AUTO: 0.6 % — SIGNIFICANT CHANGE UP (ref 0–2)
BENZODIAZ UR-MCNC: NEGATIVE — SIGNIFICANT CHANGE UP
BILIRUB SERPL-MCNC: 0.4 MG/DL — SIGNIFICANT CHANGE UP (ref 0.4–2)
BILIRUB UR-MCNC: NEGATIVE — SIGNIFICANT CHANGE UP
BUN SERPL-MCNC: 12 MG/DL — SIGNIFICANT CHANGE UP (ref 8–20)
CALCIUM SERPL-MCNC: 10.1 MG/DL — SIGNIFICANT CHANGE UP (ref 8.6–10.2)
CHLORIDE SERPL-SCNC: 101 MMOL/L — SIGNIFICANT CHANGE UP (ref 98–107)
CO2 SERPL-SCNC: 22 MMOL/L — SIGNIFICANT CHANGE UP (ref 22–29)
COCAINE METAB.OTHER UR-MCNC: NEGATIVE — SIGNIFICANT CHANGE UP
COLOR SPEC: YELLOW — SIGNIFICANT CHANGE UP
CREAT SERPL-MCNC: 1.16 MG/DL — SIGNIFICANT CHANGE UP (ref 0.5–1.3)
DIFF PNL FLD: NEGATIVE — SIGNIFICANT CHANGE UP
EOSINOPHIL # BLD AUTO: 0.03 K/UL — SIGNIFICANT CHANGE UP (ref 0–0.5)
EOSINOPHIL NFR BLD AUTO: 0.6 % — SIGNIFICANT CHANGE UP (ref 0–6)
ETHANOL SERPL-MCNC: <10 MG/DL — SIGNIFICANT CHANGE UP
GLUCOSE SERPL-MCNC: 104 MG/DL — HIGH (ref 70–99)
GLUCOSE UR QL: NEGATIVE MG/DL — SIGNIFICANT CHANGE UP
HCT VFR BLD CALC: 43.5 % — SIGNIFICANT CHANGE UP (ref 39–50)
HGB BLD-MCNC: 14.8 G/DL — SIGNIFICANT CHANGE UP (ref 13–17)
IMM GRANULOCYTES NFR BLD AUTO: 0.2 % — SIGNIFICANT CHANGE UP (ref 0–1.5)
KETONES UR-MCNC: NEGATIVE — SIGNIFICANT CHANGE UP
LEUKOCYTE ESTERASE UR-ACNC: NEGATIVE — SIGNIFICANT CHANGE UP
LYMPHOCYTES # BLD AUTO: 1.5 K/UL — SIGNIFICANT CHANGE UP (ref 1–3.3)
LYMPHOCYTES # BLD AUTO: 28.1 % — SIGNIFICANT CHANGE UP (ref 13–44)
MCHC RBC-ENTMCNC: 31 PG — SIGNIFICANT CHANGE UP (ref 27–34)
MCHC RBC-ENTMCNC: 34 GM/DL — SIGNIFICANT CHANGE UP (ref 32–36)
MCV RBC AUTO: 91 FL — SIGNIFICANT CHANGE UP (ref 80–100)
METHADONE UR-MCNC: NEGATIVE — SIGNIFICANT CHANGE UP
MONOCYTES # BLD AUTO: 0.52 K/UL — SIGNIFICANT CHANGE UP (ref 0–0.9)
MONOCYTES NFR BLD AUTO: 9.8 % — SIGNIFICANT CHANGE UP (ref 2–14)
NEUTROPHILS # BLD AUTO: 3.24 K/UL — SIGNIFICANT CHANGE UP (ref 1.8–7.4)
NEUTROPHILS NFR BLD AUTO: 60.7 % — SIGNIFICANT CHANGE UP (ref 43–77)
NITRITE UR-MCNC: NEGATIVE — SIGNIFICANT CHANGE UP
OPIATES UR-MCNC: NEGATIVE — SIGNIFICANT CHANGE UP
PCP SPEC-MCNC: SIGNIFICANT CHANGE UP
PCP UR-MCNC: NEGATIVE — SIGNIFICANT CHANGE UP
PH UR: 6 — SIGNIFICANT CHANGE UP (ref 5–8)
PLATELET # BLD AUTO: 213 K/UL — SIGNIFICANT CHANGE UP (ref 150–400)
POTASSIUM SERPL-MCNC: 4.8 MMOL/L — SIGNIFICANT CHANGE UP (ref 3.5–5.3)
POTASSIUM SERPL-SCNC: 4.8 MMOL/L — SIGNIFICANT CHANGE UP (ref 3.5–5.3)
PROT SERPL-MCNC: 7.5 G/DL — SIGNIFICANT CHANGE UP (ref 6.6–8.7)
PROT UR-MCNC: NEGATIVE MG/DL — SIGNIFICANT CHANGE UP
RBC # BLD: 4.78 M/UL — SIGNIFICANT CHANGE UP (ref 4.2–5.8)
RBC # FLD: 11.8 % — SIGNIFICANT CHANGE UP (ref 10.3–14.5)
SALICYLATES SERPL-MCNC: <0.6 MG/DL — LOW (ref 10–20)
SARS-COV-2 RNA SPEC QL NAA+PROBE: SIGNIFICANT CHANGE UP
SODIUM SERPL-SCNC: 136 MMOL/L — SIGNIFICANT CHANGE UP (ref 135–145)
SP GR SPEC: 1.01 — SIGNIFICANT CHANGE UP (ref 1.01–1.02)
THC UR QL: NEGATIVE — SIGNIFICANT CHANGE UP
UROBILINOGEN FLD QL: NEGATIVE MG/DL — SIGNIFICANT CHANGE UP
WBC # BLD: 5.33 K/UL — SIGNIFICANT CHANGE UP (ref 3.8–10.5)
WBC # FLD AUTO: 5.33 K/UL — SIGNIFICANT CHANGE UP (ref 3.8–10.5)

## 2020-10-24 PROCEDURE — 93010 ELECTROCARDIOGRAM REPORT: CPT

## 2020-10-24 PROCEDURE — 99283 EMERGENCY DEPT VISIT LOW MDM: CPT

## 2020-10-24 PROCEDURE — 99218: CPT | Mod: CS

## 2020-10-24 RX ORDER — DIVALPROEX SODIUM 500 MG/1
500 TABLET, DELAYED RELEASE ORAL ONCE
Refills: 0 | Status: COMPLETED | OUTPATIENT
Start: 2020-10-24 | End: 2020-10-24

## 2020-10-24 RX ORDER — ASPIRIN/CALCIUM CARB/MAGNESIUM 324 MG
81 TABLET ORAL DAILY
Refills: 0 | Status: DISCONTINUED | OUTPATIENT
Start: 2020-10-25 | End: 2020-10-28

## 2020-10-24 RX ORDER — GABAPENTIN 400 MG/1
300 CAPSULE ORAL ONCE
Refills: 0 | Status: COMPLETED | OUTPATIENT
Start: 2020-10-24 | End: 2020-10-24

## 2020-10-24 RX ORDER — GABAPENTIN 400 MG/1
300 CAPSULE ORAL THREE TIMES A DAY
Refills: 0 | Status: DISCONTINUED | OUTPATIENT
Start: 2020-10-25 | End: 2020-10-28

## 2020-10-24 RX ORDER — CHLORPROMAZINE HCL 10 MG
50 TABLET ORAL ONCE
Refills: 0 | Status: COMPLETED | OUTPATIENT
Start: 2020-10-24 | End: 2020-10-24

## 2020-10-24 RX ORDER — BENZTROPINE MESYLATE 1 MG
1 TABLET ORAL
Refills: 0 | Status: DISCONTINUED | OUTPATIENT
Start: 2020-10-25 | End: 2020-10-28

## 2020-10-24 RX ORDER — ESCITALOPRAM OXALATE 10 MG/1
15 TABLET, FILM COATED ORAL DAILY
Refills: 0 | Status: DISCONTINUED | OUTPATIENT
Start: 2020-10-25 | End: 2020-10-28

## 2020-10-24 RX ORDER — BENZTROPINE MESYLATE 1 MG
1 TABLET ORAL ONCE
Refills: 0 | Status: COMPLETED | OUTPATIENT
Start: 2020-10-24 | End: 2020-10-24

## 2020-10-24 RX ORDER — METOPROLOL TARTRATE 50 MG
25 TABLET ORAL DAILY
Refills: 0 | Status: DISCONTINUED | OUTPATIENT
Start: 2020-10-25 | End: 2020-10-28

## 2020-10-24 RX ORDER — TAMSULOSIN HYDROCHLORIDE 0.4 MG/1
0.4 CAPSULE ORAL AT BEDTIME
Refills: 0 | Status: DISCONTINUED | OUTPATIENT
Start: 2020-10-24 | End: 2020-10-28

## 2020-10-24 RX ORDER — MIRTAZAPINE 45 MG/1
15 TABLET, ORALLY DISINTEGRATING ORAL ONCE
Refills: 0 | Status: COMPLETED | OUTPATIENT
Start: 2020-10-24 | End: 2020-10-24

## 2020-10-24 RX ORDER — LIPASE/PROTEASE/AMYLASE 16-48-48K
1000 CAPSULE,DELAYED RELEASE (ENTERIC COATED) ORAL
Refills: 0 | Status: DISCONTINUED | OUTPATIENT
Start: 2020-10-25 | End: 2020-10-25

## 2020-10-24 RX ADMIN — Medication 50 MILLIGRAM(S): at 20:30

## 2020-10-24 RX ADMIN — DIVALPROEX SODIUM 500 MILLIGRAM(S): 500 TABLET, DELAYED RELEASE ORAL at 20:30

## 2020-10-24 RX ADMIN — TAMSULOSIN HYDROCHLORIDE 0.4 MILLIGRAM(S): 0.4 CAPSULE ORAL at 20:30

## 2020-10-24 RX ADMIN — GABAPENTIN 300 MILLIGRAM(S): 400 CAPSULE ORAL at 14:49

## 2020-10-24 RX ADMIN — GABAPENTIN 300 MILLIGRAM(S): 400 CAPSULE ORAL at 20:29

## 2020-10-24 RX ADMIN — MIRTAZAPINE 15 MILLIGRAM(S): 45 TABLET, ORALLY DISINTEGRATING ORAL at 20:30

## 2020-10-24 RX ADMIN — Medication 1 MILLIGRAM(S): at 20:29

## 2020-10-24 NOTE — ED ADULT NURSE NOTE - HPI (INCLUDE ILLNESS QUALITY, SEVERITY, DURATION, TIMING, CONTEXT, MODIFYING FACTORS, ASSOCIATED SIGNS AND SYMPTOMS)
pt with hx of paranoid schizophrenia c/o suicidal thoughts for 2 weeks. pt has a flat affect and pressured speech. pt states that he is depressed for a "very long time" and feels that the other residents at his adult home use him for "money". pt also states that he feels chronically paranoid that people are after him. pt reports that he has had 2 past suicide attempts, once by drinking Clorox 1 year ago and once by overdosing on aspirin 5 years ago. pt also admits drinking "vodka" daily "a couple of shots" but denies drinking for the past 2 days because "I have no money". pt assessed for alcohol withdrawals and given a CIWA score of 1. pt makes good eye, hygiene and grooming is fair. pt denies auditory or visual hallucinations. pt reports feeling "anxious"

## 2020-10-24 NOTE — ED BEHAVIORAL HEALTH NOTE - BEHAVIORAL HEALTH NOTE
COVID Exposure Screen- Patient    1.	*In the past 14 days, have you been around anyone with a positive COVID-19 test?*   (  ) Yes   ( x ) No   (  ) Unknown- Reason (e.g. patient uncertain, sedated, refusing to answer, etc.):  ______  IF YES PROCEED TO QUESTION #2. IF NO or UNKNOWN, PLEASE SKIP TO QUESTION #7  2.	Were you within 6 feet of them for at least 15 minutes? (  ) Yes   (  ) No   (  ) Unknown- Reason: ______    3.	Have you provided care for them? (  ) Yes   (  ) No   (  ) Unknown- Reason: ______    4.	Have you had direct physical contact with them (touched, hugged, or kissed them)? (  ) Yes   (  ) No    (  ) Unknown- Reason: ______    5.	Have you shared eating or drinking utensils with them? (  ) Yes   (  ) No    (  ) Unknown- Reason: ______    6.	Have they sneezed, coughed, or somehow got respiratory droplets on you? (  ) Yes   (  ) No    (  ) Unknown- Reason: ______    7.	*Have you been out of [place] within the past 14 days?*  (  ) Yes   (x  ) No   (  ) Unknown- Reason (e.g. patient uncertain, sedated, refusing to answer, etc.): _______  IF YES PLEASE ANSWER THE FOLLOWING QUESTIONS:  8.	Which state/country have you been to? ______   9.	Were you there over 24 hours? (  ) Yes   (  ) No    (  ) Unknown- Reason: ______    10.	What date did you return to [facility]? ______

## 2020-10-24 NOTE — ED BEHAVIORAL HEALTH ASSESSMENT NOTE - SUMMARY
This is a 59 y/o single, disabled,  male,  unemployed, domiciled at [facility] in [place]; with a psychiatric history significant for Paranoid Schizophrenia, alcohol use disorder, discharged from [facility] approx 6 weeks ago for alcohol dependence, multiple psych admissions and approx 2 ED visits per month for psychiatric complaints, last time 9/18/20 with similar complaints, today reported to ED attending SI with plan to jump off roof  medical history significant for BPH, GERD, HTN, and HLD,  Per record, has a history of multiple past inpatient psychiatric hospitalizations, last one was in January 2020; with a history of two self-reported prior suicide attempts (by aspirin overdose 6 years ago and in January 2020 by reportedly drinking 1 cup of bleach);  reported occasional MJ use no history of other illicit drug use, BIBA EMS for suicidal thoughts.   Patient endorsed active suicidal ideation with active plan and intent;  Requesting voluntary admission, does not feel safe in the community. Paitent with persistent pattern of this behavior of presenting to ED with suicidal ideation requesting admission but often times changes his mind during course of evaluation and then is adequately able to safety plan and be discharge.  Today, he is unable engage in safety planning for discharge;  Reports he does not feel safe outside of the hospital setting.   Will admit voluntarily.

## 2020-10-24 NOTE — ED BEHAVIORAL HEALTH ASSESSMENT NOTE - RISK ASSESSMENT
RF h/o feigning psych symptoms for secondary gains, h/o reported suicide attempt, alcohol use  PF stable housing, in tx, support from family and residence peers  Patient with active suicidal ideation and plan to jump off a roof;  refusing to engage in safety planning; High Acute Suicide Risk

## 2020-10-24 NOTE — ED CDU PROVIDER INITIAL DAY NOTE - OBJECTIVE STATEMENT
The patient presents with SI and depression and evaluated by  and will most likely be placed inpatient psychiatry

## 2020-10-24 NOTE — ED ADULT NURSE REASSESSMENT NOTE - NS ED NURSE REASSESS COMMENT FT1
Assumed care of patient.  patient alert and cooperative. awaiting on night meds to be ordered. patient resting nad noted will monitor and chart changes maintain safe environment Assumed care of patient.  patient alert and cooperative. Pt with flat affect awaiting on night meds to be ordered. patient resting nad noted will monitor and chart changes maintain safe environment

## 2020-10-24 NOTE — ED ADULT NURSE REASSESSMENT NOTE - NS ED NURSE REASSESS COMMENT FT1
pt is calm and cooperative, provided meal and po fluids which he consumed 100%, pt makes no attempts to harm himself. plan of care discussed with pt.

## 2020-10-24 NOTE — ED BEHAVIORAL HEALTH ASSESSMENT NOTE - DESCRIPTION
Calm and cooperative;  Vital Signs Last 24 Hrs  T(C): 36.9 (24 Oct 2020 11:32), Max: 37.4 (24 Oct 2020 11:07)  T(F): 98.5 (24 Oct 2020 11:32), Max: 99.3 (24 Oct 2020 11:07)  HR: 85 (24 Oct 2020 11:32) (73 - 85)  BP: 157/109 (24 Oct 2020 11:32) (154/93 - 157/109)  BP(mean): --  RR: 20 (24 Oct 2020 11:32) (18 - 20)  SpO2: 99% (24 Oct 2020 11:32) (98% - 99%) HLD, HTN, BPH, GERD 59 yo male never  no children

## 2020-10-24 NOTE — ED ADULT TRIAGE NOTE - CHIEF COMPLAINT QUOTE
pt BIBA for suicidal thoughts, states wants to "jump off roof" because he "hates the home he lives in" pt denies visual/auditory disturbances. a&ox3, denies drug/alcohol use. states compliant with meds. pt changed into yellow gown, belongings secured. Dr Kumar called to bedside for  eval

## 2020-10-24 NOTE — ED PROVIDER NOTE - OBJECTIVE STATEMENT
The patient is a 60 year old male presents with depression and SI with auditory hallucinations  No HA, No CP, No SOB, No abd pain  Last drink 2 days ago from group home The patient is a 60 year old male presents with depression and SI with auditory hallucinations  No HA, No CP, No SOB, No abd pain  Last drink 2 days ago from group home  On thorazine, lexapro

## 2020-10-24 NOTE — ED ADULT NURSE REASSESSMENT NOTE - NS ED NURSE REASSESS COMMENT FT1
patient given meds as ordered and tolerated well no c/o will monitor and chart changes and maintain safe environment

## 2020-10-24 NOTE — ED BEHAVIORAL HEALTH ASSESSMENT NOTE - MEDICAL ISSUES AND PLAN (INCLUDE STANDING AND PRN MEDICATION)
Metoprolol 25 mg  qd; Flomax 0.4 mg hs;  MVI, Thiamine 100 qd, Zenpep 1000 tid,  ASA 81 mg daily,  Folic acid 1 mg daily,  Ventolin 90 mcg 2 puff Q 6hrs PRN SOB

## 2020-10-24 NOTE — ED BEHAVIORAL HEALTH ASSESSMENT NOTE - DETAILS
20 yrs ago reportedly overdosed on Vitamins, 6 years ago took one bottle of aspirin; in 01/2020 Ingested 1 cup bleach. tremor treated with cogentin, Haldol = Spasmodic torticollis mom had what was described as "nervous breakdown" tremors self referred

## 2020-10-24 NOTE — CHART NOTE - NSCHARTNOTEFT_GEN_A_CORE
SWNote: pt seen by psych,pt to benefit from inpatient hospitalization on a 9.13 status. Worker called [facility]) bed available, pending covid results. Paperwork  will be faxed asap for review, worker will be called with review outcome. No auth needed pt is a [facility] recipient. SW to follow.

## 2020-10-24 NOTE — ED BEHAVIORAL HEALTH ASSESSMENT NOTE - HPI (INCLUDE ILLNESS QUALITY, SEVERITY, DURATION, TIMING, CONTEXT, MODIFYING FACTORS, ASSOCIATED SIGNS AND SYMPTOMS)
This is a 61 y/o single, disabled,  male,  unemployed, domiciled at [facility] in [place]; with a psychiatric history significant for Paranoid Schizophrenia, alcohol use disorder, discharged from [facility] approx 6 weeks ago for alcohol dependence, multiple psych admissions and approx 2 ED visits per month for psychiatric complaints, last time 9/18/20 with similar complaints, today reported to ED attending SI with plan to jump off roof  medical history significant for BPH, GERD, HTN, and HLD,  Per record, has a history of multiple past inpatient psychiatric hospitalizations, last one was in January 2020; with a history of two self-reported prior suicide attempts (by aspirin overdose 6 years ago and in January 2020 by reportedly drinking 1 cup of bleach);  reported occasional MJ use no history of other illicit drug use, BIBA EMS for suicidal thoughts.   Pt reports he is suicidal with plan to jump off roof.   He reports that he has worsening depression due to multiple stressors, ie peers taking all of his money and feelings of isolation.  He reports he is in treatment with RAFAEL Arteaga NP, reports he is compliant with his medications and also drinks 1/4 pint of Vodka daily, last 2 days ago.  He endorsed fair sleep, anergia, feels hopeless, amotivated, and anhedonic.  He reports he does not feel safe outside of the hospital and has intention to kill himself if he is not admitted.  The patient denies other significant mood symptoms.  Specifically, the patient denies manic symptoms, past and present.  The patient denies auditory or visual hallucinations, and no delusions could be elicited on direct questioning.  The patient denies homicidal ideation, intent, or plan.    Brother Shoaib, confirmed Pt issues with giving money away and has been budgeting his money for him.

## 2020-10-24 NOTE — ED BEHAVIORAL HEALTH ASSESSMENT NOTE - PSYCHIATRIC ISSUES AND PLAN (INCLUDE STANDING AND PRN MEDICATION)
Remeron 15 hs, Gabapentin 300 mg tid, Cogentin 1 mg bid,  Depakote  mg hs; Lexapro 10 mg 1.5 tabs qd, Thorazine 50 hs; Paliperidone 3 mg qd and 6 mg hs

## 2020-10-24 NOTE — ED BEHAVIORAL HEALTH ASSESSMENT NOTE - OTHER PAST PSYCHIATRIC HISTORY (INCLUDE DETAILS REGARDING ONSET, COURSE OF ILLNESS, INPATIENT/OUTPATIENT TREATMENT)
Multiple prior hospitalizations.  Most recently discharged from [facility] Jan 2020.  Past [facility] admission.  [facility] 6 weeks ago for detox

## 2020-10-25 PROCEDURE — 99226: CPT | Mod: CS

## 2020-10-25 RX ORDER — LIPASE/PROTEASE/AMYLASE 16-48-48K
1 CAPSULE,DELAYED RELEASE (ENTERIC COATED) ORAL
Refills: 0 | Status: DISCONTINUED | OUTPATIENT
Start: 2020-10-25 | End: 2020-10-28

## 2020-10-25 RX ADMIN — Medication 25 MILLIGRAM(S): at 06:07

## 2020-10-25 RX ADMIN — Medication 81 MILLIGRAM(S): at 13:00

## 2020-10-25 RX ADMIN — Medication 1 MILLIGRAM(S): at 06:07

## 2020-10-25 RX ADMIN — ESCITALOPRAM OXALATE 15 MILLIGRAM(S): 10 TABLET, FILM COATED ORAL at 12:59

## 2020-10-25 RX ADMIN — Medication 1 MILLIGRAM(S): at 19:02

## 2020-10-25 RX ADMIN — TAMSULOSIN HYDROCHLORIDE 0.4 MILLIGRAM(S): 0.4 CAPSULE ORAL at 22:25

## 2020-10-25 RX ADMIN — GABAPENTIN 300 MILLIGRAM(S): 400 CAPSULE ORAL at 13:02

## 2020-10-25 RX ADMIN — GABAPENTIN 300 MILLIGRAM(S): 400 CAPSULE ORAL at 06:07

## 2020-10-25 RX ADMIN — GABAPENTIN 300 MILLIGRAM(S): 400 CAPSULE ORAL at 22:25

## 2020-10-25 NOTE — ED ADULT NURSE REASSESSMENT NOTE - COMFORT CARE
plan of care explained/meal provided/po fluids offered
ambulated to bathroom
po fluids offered/ambulated to bathroom/warm blanket provided/darkened lights/side rails down

## 2020-10-25 NOTE — CHART NOTE - NSCHARTNOTEFT_GEN_A_CORE
SWNote: p/c to try possible bed at [facility] jacob betts ([facility]ir[facility]) ,no bed available. Covid negative. SW to follow in the am.

## 2020-10-25 NOTE — ED ADULT NURSE REASSESSMENT NOTE - NS ED NURSE REASSESS COMMENT FT1
Assumed care of patient.  Pt alert and cooperative. Pt aware of awaiting on transfer.   patient with flat affect.  Pt resting in bed no c/o will monitor ane chart changes and maintain safe environment

## 2020-10-25 NOTE — ED CDU PROVIDER SUBSEQUENT DAY NOTE - PROGRESS NOTE DETAILS
As per sign-out from Dr. Boykin, patient medically cleared with SI w/ plan awaiting inpatient admission.

## 2020-10-25 NOTE — CHART NOTE - NSCHARTNOTEFT_GEN_A_CORE
As per MD, patient is medically stable. As per NP Crilly, patient is to attend inpatient on 9.13 status. Patient completed 9.13 legals. Patient has medicaid, therefore patient can attend  or [facility] for inpatient psychiatry. SERENA placed call to  and spoke with Magalie sawyer whom reported there are no beds available. SERENA then followed up with [facility] ([phone]) and left message for Assistant Director of Nursing. SERENA will continue to follow for placement.

## 2020-10-26 ENCOUNTER — INPATIENT (INPATIENT)
Facility: HOSPITAL | Age: 60
LOS: 29 days | Discharge: HOME | DRG: 885 | End: 2020-11-25
Attending: PSYCHIATRY & NEUROLOGY | Admitting: PSYCHIATRY & NEUROLOGY
Payer: MEDICARE

## 2020-10-26 VITALS
HEART RATE: 85 BPM | RESPIRATION RATE: 20 BRPM | DIASTOLIC BLOOD PRESSURE: 95 MMHG | TEMPERATURE: 98 F | SYSTOLIC BLOOD PRESSURE: 132 MMHG | OXYGEN SATURATION: 100 %

## 2020-10-26 VITALS — RESPIRATION RATE: 18 BRPM | WEIGHT: 170.2 LBS | OXYGEN SATURATION: 99 % | HEIGHT: 66 IN

## 2020-10-26 DIAGNOSIS — R45.851 SUICIDAL IDEATIONS: ICD-10-CM

## 2020-10-26 DIAGNOSIS — K21.9 GASTRO-ESOPHAGEAL REFLUX DISEASE WITHOUT ESOPHAGITIS: ICD-10-CM

## 2020-10-26 DIAGNOSIS — F20.0 PARANOID SCHIZOPHRENIA: ICD-10-CM

## 2020-10-26 DIAGNOSIS — N40.0 BENIGN PROSTATIC HYPERPLASIA WITHOUT LOWER URINARY TRACT SYMPTOMS: ICD-10-CM

## 2020-10-26 DIAGNOSIS — E78.5 HYPERLIPIDEMIA, UNSPECIFIED: ICD-10-CM

## 2020-10-26 PROCEDURE — 82140 ASSAY OF AMMONIA: CPT

## 2020-10-26 PROCEDURE — 82607 VITAMIN B-12: CPT

## 2020-10-26 PROCEDURE — 99217: CPT | Mod: CS

## 2020-10-26 PROCEDURE — 80061 LIPID PANEL: CPT

## 2020-10-26 PROCEDURE — U0003: CPT

## 2020-10-26 PROCEDURE — 81003 URINALYSIS AUTO W/O SCOPE: CPT

## 2020-10-26 PROCEDURE — 71045 X-RAY EXAM CHEST 1 VIEW: CPT

## 2020-10-26 PROCEDURE — G0378: CPT

## 2020-10-26 PROCEDURE — 85025 COMPLETE CBC W/AUTO DIFF WBC: CPT

## 2020-10-26 PROCEDURE — 36415 COLL VENOUS BLD VENIPUNCTURE: CPT

## 2020-10-26 PROCEDURE — 80164 ASSAY DIPROPYLACETIC ACD TOT: CPT

## 2020-10-26 PROCEDURE — 99261: CPT

## 2020-10-26 PROCEDURE — 82746 ASSAY OF FOLIC ACID SERUM: CPT

## 2020-10-26 PROCEDURE — 99222 1ST HOSP IP/OBS MODERATE 55: CPT

## 2020-10-26 PROCEDURE — 99285 EMERGENCY DEPT VISIT HI MDM: CPT

## 2020-10-26 PROCEDURE — 84443 ASSAY THYROID STIM HORMONE: CPT

## 2020-10-26 PROCEDURE — 99223 1ST HOSP IP/OBS HIGH 75: CPT

## 2020-10-26 PROCEDURE — 80076 HEPATIC FUNCTION PANEL: CPT

## 2020-10-26 PROCEDURE — 93005 ELECTROCARDIOGRAM TRACING: CPT

## 2020-10-26 PROCEDURE — 83036 HEMOGLOBIN GLYCOSYLATED A1C: CPT

## 2020-10-26 PROCEDURE — 80307 DRUG TEST PRSMV CHEM ANLYZR: CPT

## 2020-10-26 PROCEDURE — 80053 COMPREHEN METABOLIC PANEL: CPT

## 2020-10-26 PROCEDURE — 99213 OFFICE O/P EST LOW 20 MIN: CPT

## 2020-10-26 RX ORDER — MIRTAZAPINE 45 MG/1
15 TABLET, ORALLY DISINTEGRATING ORAL AT BEDTIME
Refills: 0 | Status: DISCONTINUED | OUTPATIENT
Start: 2020-10-26 | End: 2020-11-25

## 2020-10-26 RX ORDER — ESCITALOPRAM OXALATE 10 MG/1
15 TABLET, FILM COATED ORAL DAILY
Refills: 0 | Status: DISCONTINUED | OUTPATIENT
Start: 2020-10-26 | End: 2020-10-30

## 2020-10-26 RX ORDER — ACETAMINOPHEN 500 MG
650 TABLET ORAL EVERY 6 HOURS
Refills: 0 | Status: DISCONTINUED | OUTPATIENT
Start: 2020-10-26 | End: 2020-11-25

## 2020-10-26 RX ORDER — BENZTROPINE MESYLATE 1 MG
1 TABLET ORAL
Refills: 0 | Status: DISCONTINUED | OUTPATIENT
Start: 2020-10-26 | End: 2020-11-23

## 2020-10-26 RX ORDER — ASPIRIN/CALCIUM CARB/MAGNESIUM 324 MG
81 TABLET ORAL DAILY
Refills: 0 | Status: DISCONTINUED | OUTPATIENT
Start: 2020-10-26 | End: 2020-11-25

## 2020-10-26 RX ORDER — TAMSULOSIN HYDROCHLORIDE 0.4 MG/1
0.4 CAPSULE ORAL AT BEDTIME
Refills: 0 | Status: DISCONTINUED | OUTPATIENT
Start: 2020-10-26 | End: 2020-11-25

## 2020-10-26 RX ORDER — ATORVASTATIN CALCIUM 80 MG/1
20 TABLET, FILM COATED ORAL AT BEDTIME
Refills: 0 | Status: DISCONTINUED | OUTPATIENT
Start: 2020-10-26 | End: 2020-11-25

## 2020-10-26 RX ORDER — DIVALPROEX SODIUM 500 MG/1
500 TABLET, DELAYED RELEASE ORAL AT BEDTIME
Refills: 0 | Status: DISCONTINUED | OUTPATIENT
Start: 2020-10-26 | End: 2020-11-25

## 2020-10-26 RX ORDER — METOPROLOL TARTRATE 50 MG
25 TABLET ORAL DAILY
Refills: 0 | Status: DISCONTINUED | OUTPATIENT
Start: 2020-10-26 | End: 2020-11-25

## 2020-10-26 RX ORDER — GABAPENTIN 400 MG/1
300 CAPSULE ORAL THREE TIMES A DAY
Refills: 0 | Status: DISCONTINUED | OUTPATIENT
Start: 2020-10-26 | End: 2020-11-25

## 2020-10-26 RX ORDER — NICOTINE POLACRILEX 2 MG
1 GUM BUCCAL DAILY
Refills: 0 | Status: DISCONTINUED | OUTPATIENT
Start: 2020-10-26 | End: 2020-11-25

## 2020-10-26 RX ORDER — OLANZAPINE 15 MG/1
10 TABLET, FILM COATED ORAL ONCE
Refills: 0 | Status: COMPLETED | OUTPATIENT
Start: 2020-10-26 | End: 2020-10-26

## 2020-10-26 RX ORDER — NICOTINE POLACRILEX 2 MG
2 GUM BUCCAL
Refills: 0 | Status: DISCONTINUED | OUTPATIENT
Start: 2020-10-26 | End: 2020-11-25

## 2020-10-26 RX ORDER — MAGNESIUM HYDROXIDE 400 MG/1
30 TABLET, CHEWABLE ORAL DAILY
Refills: 0 | Status: DISCONTINUED | OUTPATIENT
Start: 2020-10-26 | End: 2020-11-25

## 2020-10-26 RX ORDER — ALBUTEROL 90 UG/1
2 AEROSOL, METERED ORAL EVERY 6 HOURS
Refills: 0 | Status: DISCONTINUED | OUTPATIENT
Start: 2020-10-26 | End: 2020-11-25

## 2020-10-26 RX ORDER — CHLORPROMAZINE HCL 10 MG
50 TABLET ORAL AT BEDTIME
Refills: 0 | Status: DISCONTINUED | OUTPATIENT
Start: 2020-10-26 | End: 2020-10-29

## 2020-10-26 RX ADMIN — MIRTAZAPINE 15 MILLIGRAM(S): 45 TABLET, ORALLY DISINTEGRATING ORAL at 21:30

## 2020-10-26 RX ADMIN — Medication 1 MILLIGRAM(S): at 21:30

## 2020-10-26 RX ADMIN — Medication 25 MILLIGRAM(S): at 06:27

## 2020-10-26 RX ADMIN — ESCITALOPRAM OXALATE 15 MILLIGRAM(S): 10 TABLET, FILM COATED ORAL at 13:17

## 2020-10-26 RX ADMIN — OLANZAPINE 10 MILLIGRAM(S): 15 TABLET, FILM COATED ORAL at 10:59

## 2020-10-26 RX ADMIN — TAMSULOSIN HYDROCHLORIDE 0.4 MILLIGRAM(S): 0.4 CAPSULE ORAL at 21:30

## 2020-10-26 RX ADMIN — GABAPENTIN 300 MILLIGRAM(S): 400 CAPSULE ORAL at 21:30

## 2020-10-26 RX ADMIN — Medication 50 MILLIGRAM(S): at 21:30

## 2020-10-26 RX ADMIN — Medication 81 MILLIGRAM(S): at 13:17

## 2020-10-26 RX ADMIN — GABAPENTIN 300 MILLIGRAM(S): 400 CAPSULE ORAL at 06:27

## 2020-10-26 RX ADMIN — GABAPENTIN 300 MILLIGRAM(S): 400 CAPSULE ORAL at 13:16

## 2020-10-26 RX ADMIN — Medication 1 MILLIGRAM(S): at 06:27

## 2020-10-26 RX ADMIN — DIVALPROEX SODIUM 500 MILLIGRAM(S): 500 TABLET, DELAYED RELEASE ORAL at 21:30

## 2020-10-26 RX ADMIN — Medication 1 CAPSULE(S): at 13:18

## 2020-10-26 NOTE — BEHAVIORAL HEALTH ASSESSMENT NOTE - AFFECT CONGRUENCE
Breast cancer  right, s/p chemo  Cardiomyopathy    Diabetes  type 2  HIV (human immunodeficiency virus infection)    HLD (hyperlipidemia)    HTN (hypertension)    Mitral regurgitation
Congruent

## 2020-10-26 NOTE — CHART NOTE - NSCHARTNOTEFT_GEN_A_CORE
SW Note: Pt has been accepted for inpt psychiatric care at [facility], [address], [phone]. Accepting MD, Dr. Jackman.  RN called unit and gave report. Pt aware. Legal status: 9.13. Ambulance arranged with [facility]. Notified pts adult home, [phone]. No auth needed

## 2020-10-26 NOTE — CONSULT NOTE ADULT - ASSESSMENT
60 YOWM chronic Paranoid Schizophrenic is admitted to the Psychiatric Service for stabilization and treatment of depression with suicidal ideation .  His GERD and BPH are not active at this  time . He should be continued on statin therapy for cardiovascular event risk reduction and follow up at out patient facility .  There are no  issues requiring  medical intervention at this  time.

## 2020-10-26 NOTE — BEHAVIORAL HEALTH ASSESSMENT NOTE - SUMMARY
This is a 61 y/o single, disabled,  male,  unemployed, domiciled at [facility] in [place]; with a psychiatric history significant for Paranoid Schizophrenia, alcohol use disorder, discharged from [facility] approx 6 weeks ago for alcohol dependence, multiple psych admissions and approx 2 ED visits per month for psychiatric complaints, last time 9/18/20 with similar complaints, today reported to ED attending SI with plan to jump off roof  medical history significant for BPH, GERD, HTN, and HLD,  Per record, has a history of multiple past inpatient psychiatric hospitalizations, last one was in January 2020; with a history of two self-reported prior suicide attempts (by aspirin overdose 6 years ago and in January 2020 by reportedly drinking 1 cup of bleach);  reported occasional MJ use no history of other illicit drug use, BIBA EMS for suicidal thoughts.     Patient endorsed active suicidal ideation with active plan and intent;  Requesting voluntary admission, does not feel safe in the community. Paitent with persistent pattern of this behavior of presenting to ED with suicidal ideation requesting admission but often times changes his mind during course of evaluation and then is adequately able to safety plan and be discharge.  Today, he is unable engage in safety planning for discharge;  Reports he does not feel safe outside of the hospital setting.   Will admit voluntarily.

## 2020-10-26 NOTE — BEHAVIORAL HEALTH ASSESSMENT NOTE - RISK ASSESSMENT
High Acute Suicide Risk RF h/o feigning psych symptoms for secondary gains, h/o reported suicide attempt, alcohol use  PF stable housing, in tx, support from family and residence peers  Patient with active suicidal ideation and plan to jump off a roof;  refusing to engage in safety planning;

## 2020-10-26 NOTE — BEHAVIORAL HEALTH ASSESSMENT NOTE - HPI (INCLUDE ILLNESS QUALITY, SEVERITY, DURATION, TIMING, CONTEXT, MODIFYING FACTORS, ASSOCIATED SIGNS AND SYMPTOMS)
This is a 61 y/o single, disabled,  male,  unemployed, domiciled at [facility] in [place]; with a psychiatric history significant for Paranoid Schizophrenia, alcohol use disorder, discharged from [facility] approx 6 weeks ago for alcohol dependence, multiple psych admissions and approx 2 ED visits per month for psychiatric complaints, last time 9/18/20 with similar complaints, today reported to ED attending SI with plan to jump off roof. Medical history significant for BPH, GERD, HTN, and HLD.     Per record, has a history of multiple past inpatient psychiatric hospitalizations, last one was in January 2020; with a history of two self-reported prior suicide attempts (by aspirin overdose 6 years ago and in January 2020 by reportedly drinking 1 cup of bleach);  reported occasional MJ use no history of other illicit drug use, BIBA EMS for suicidal thoughts.   Pt reports he is suicidal with plan to jump off roof.   He reports that he has worsening depression due to multiple stressors, i.e. peers taking all of his money and feelings of isolation.  He reports he is in treatment with RAFAEL Arteaga NP, reports he is compliant with his medications and also drinks 1/4 pint of Vodka daily, last 2 days ago.  He endorsed fair sleep, anergia, feels hopeless, amotivated, and anhedonic.  He reports he does not feel safe outside of the hospital and has intention to kill himself if he is not admitted.  The patient denies other significant mood symptoms.  Specifically, the patient denies manic symptoms, past and present.  The patient denies auditory or visual hallucinations, and no delusions could be elicited on direct questioning.  The patient denies homicidal ideation, intent, or plan.    Brother Shoaib, confirmed Pt issues with giving money away and has been budgeting his money for him.

## 2020-10-26 NOTE — ED BEHAVIORAL HEALTH NOTE - BEHAVIORAL HEALTH NOTE
PROGRESS NOTE: 10-26-20 @ 10:48  	  • Reason for Ongoing Consultation: no available bed	    ID: [id number] Male with HEALTH ISSUES - PROBLEM Dx:  ETOH abuse    Schizophrenia, unspecified type    INTERVAL DATA:   • Interval Chief Complaint: "I want to commit suicide and am feeling very paranoid"  • Interval History: Patient is a disabled 60 YOM with history of paranoid schizophrenia, alcohol abuse disorder (1/2 pint of vodka daily, last drink 5 days ago), multiple psychiatric inpatient admissions (last 2020) and multiple suicide attempts (2020 via drinking 1 cup of bleach and 6 years ago via ASA overdose) and medical history of BPH, GERD, HTN and hyperlipidemia presenting to ED 3 days ago with suicidal ideations stating he wants to jump off a roof. On re-evaluation, patient states he is still feeling very paranoid, depressed, wanting to commit suicide and "messed up in the head;" states he still wants to be admitted for inpatient psychiatric care. Currently denies auditory or visual hallucinations, delusions, manic symptoms, violent tendencies or HI.     REVIEW OF SYSTEMS:   • Constitutional Symptoms	No complaints  • Eyes	No complaints  • Ears / Nose / Throat / Mouth	No complaints  • Cardiovascular	No complaints  • Respiratory	No complaints  • Gastrointestinal	No complaints  • Genitourinary	No complaints  • Musculoskeletal	No complaints  • Skin	No complaints  • Neurological	No complaints  • Psychiatric (see HPI)	See HPI  • Endocrine	No complaints  • Hematologic / Lymphatic	No complaints  • Allergic / Immunologic	No complaints    REVIEW OF VITALS/LABS/IMAGING/INVESTIGATIONS:   • Vital signs reviewed: Yes  • Vital Signs:	    Vital Signs Last 24 Hrs  T(C): 36.7 (26 Oct 2020 07:28), Max: 36.8 (25 Oct 2020 15:20)  T(F): 98.1 (26 Oct 2020 07:28), Max: 98.3 (25 Oct 2020 23:35)  HR: 83 (26 Oct 2020 07:28) (57 - 83)  BP: 138/93 (26 Oct 2020 07:28) (122/80 - 153/89)  RR: 20 (26 Oct 2020 07:28) (18 - 20)  SpO2: 97% (26 Oct 2020 07:28) (96% - 99%)    • Available labs reviewed: Yes  • Available Lab Results:                           14.8   5.33  )-----------( 213      ( 24 Oct 2020 11:21 )             43.5     10-    136  |  101  |  12.0  ----------------------------<  104<H>  4.8   |  22.0  |  1.16    Ca    10.1      24 Oct 2020 11:21    TPro  7.5  /  Alb  4.3  /  TBili  0.4  /  DBili  x   /  AST  28  /  ALT  21  /  AlkPhos  84  10    LIVER FUNCTIONS - ( 24 Oct 2020 11:21 )  Alb: 4.3 g/dL / Pro: 7.5 g/dL / ALK PHOS: 84 U/L / ALT: 21 U/L / AST: 28 U/L / GGT: x             Urinalysis Basic - ( 24 Oct 2020 14:48 )    Color: Yellow / Appearance: Clear / S.010 / pH: x  Gluc: x / Ketone: Negative  / Bili: Negative / Urobili: Negative mg/dL   Blood: x / Protein: Negative mg/dL / Nitrite: Negative   Leuk Esterase: Negative / RBC: x / WBC x   Sq Epi: x / Non Sq Epi: x / Bacteria: x      MEDICATIONS:      PRN Medications:   • PRN Medications since last evaluation	  • PRN Details	    Current Medications:   aspirin  chewable 81 milliGRAM(s) Oral daily  benztropine 1 milliGRAM(s) Oral two times a day  escitalopram 15 milliGRAM(s) Oral daily  gabapentin 300 milliGRAM(s) Oral three times a day  metoprolol succinate ER 25 milliGRAM(s) Oral daily  OLANZapine 10 milliGRAM(s) Oral once  pancrelipase  (CREON 12,000 Lipase Units) 1 Capsule(s) Oral three times a day with meals  tamsulosin 0.4 milliGRAM(s) Oral at bedtime     Medication Side Effects:   • Medication Side Effects or Adverse Reactions (new or ongoing)	Haldol - spasmodic torticollis    MENTAL STATUS EXAM:   • Level of Consciousness	Alert  • General Appearance	Well developed  • Body Habitus	Average build  • Hygiene	Fair  • Grooming	Fair  • Behavior	Cooperative  • Eye Contact	Poor  • Relatedness	Fair  • Impulse Control	Normal  • Muscle Tone / Strength	NA  • Abnormal Movements	No abnormal movements  • Gait / Station	Normal gait / station  • Speech Volume	Soft  • Speech Rate	Normal  • Speech Spontaneity	Normal  • Speech Articulation	Normal  • Mood	Sad  • Affect Quality	Depressed  • Affect Range	Constricted  • Affect Congruence	Congruent  • Thought Process	Linear  • Thought Associations	Normal  • Thought Content	Suicidality   • Perceptions	No abnormalities  • Oriented to Time	Yes  • Oriented to Place	Yes  • Oriented to Situation	Yes  • Oriented to Person	Yes  • Attention / Concentration	Normal  • Estimated Intelligence	Average  • Recent Memory	Normal  • Remote Memory	Normal  • Fund of Knowledge	Normal  • Language	No abnormalities noted  • Judgment (regarding everyday events)	Fair  • Insight (regarding psychiatric illness)	Fair    SUICIDALITY:   • Suicidality (Interval)	patient admits to suicidal ideations stating he will kill himself if he goes home    HOMICIDALITY/AGGRESSION:   • Homicidality/Aggression	none known    DIAGNOSIS DSM-V:    Psychiatric Diagnosis (Corresponds to DSM-IV Axis I, II):   HEALTH ISSUES - PROBLEM Dx:  ETOH abuse    Schizophrenia, paranoid type    Suicidal ideations     Medical Diagnosis (Corresponds to DSM-IV Axis III):  • Axis III	  BPH  GERD  HTN   hyperlipidemia    ASSESSMENT OF CURRENT CONDITION:   Summary (include case differential, formulation and patient response to therapy): Patient is a 60 YOM with history of paranoid schizophrenia, alcohol abuse, multiple inpatient psychiatric admissions and multiple suicide attempts presenting to ED 3 days ago with suicidal ideations to jump off a roof. Patient continues to express depression, suicidal ideations, paranoia and that if he were to return home he would try to kill himself. Patient appropriate for psychiatric admission. Requesting Olanzapine; administered Olanzapine 10 mg PO (one time dose).    Risk Assessment (consider static vs modifiable risk factors and protective factors; comment on level of risk for dangerous behavior): High imminent risk for suicide    PLAN  Psychiatric inpatient admission - awaiting bed  Administered Olanzapine 10 mg PO as requested by patient (one time dose).

## 2020-10-26 NOTE — BEHAVIORAL HEALTH ASSESSMENT NOTE - NSBHCHARTREVIEWINVESTIGATE_PSY_A_CORE FT
e< from: 12 Lead ECG (10.24.20 @ 11:13) >    Ventricular Rate 74 BPM    Atrial Rate 74 BPM    P-R Interval 140 ms    QRS Duration 144 ms    Q-T Interval 406 ms    QTC Calculation(Bazett) 450 ms    P Axis 60 degrees    R Axis -80 degrees    T Axis 0 degrees    Diagnosis Line Normal sinus rhythm  Right bundle branch block  Left anterior fascicular block  *** Bifascicular block ***  Abnormal ECG    Confirmed by LUDIN YOST ([id number]) on 10/24/2020 12:48:20 PM

## 2020-10-26 NOTE — BEHAVIORAL HEALTH ASSESSMENT NOTE - NSBHCHARTREVIEWVS_PSY_A_CORE FT
Vital Signs Last 24 Hrs  T(C): 36.4 (26 Oct 2020 11:11), Max: 36.8 (25 Oct 2020 15:20)  T(F): 97.5 (26 Oct 2020 11:11), Max: 98.3 (25 Oct 2020 23:35)  HR: 85 (26 Oct 2020 11:11) (57 - 85)  BP: 132/95 (26 Oct 2020 11:11) (122/80 - 153/89)  BP(mean): --  RR: 20 (26 Oct 2020 11:11) (18 - 20)  SpO2: 100% (26 Oct 2020 11:11) (96% - 100%)

## 2020-10-26 NOTE — ED CDU PROVIDER SUBSEQUENT DAY NOTE - MEDICAL DECISION MAKING DETAILS
patient in obs for psych admission, psychosis and SI
Pt medically cleared and pending psych admission

## 2020-10-26 NOTE — ED ADULT NURSE REASSESSMENT NOTE - GENERAL PATIENT STATE
resting/sleeping
resting/sleeping/cooperative

## 2020-10-26 NOTE — BEHAVIORAL HEALTH ASSESSMENT NOTE - DETAILS
20 yrs ago reportedly overdosed on Vitamins, 6 years ago took one bottle of aspirin; in 01/2020 Ingested 1 cup bleach. Tremor treated with cogentin, Haldol = Spasmodic Torticollis Mom had what was described as "nervous breakdown" tremors HTN GERD BPH HLD;

## 2020-10-26 NOTE — CONSULT NOTE ADULT - SUBJECTIVE AND OBJECTIVE BOX
60 YOWM from a group home with history of Paranoid Schizophrenia Reported to residents and staff that he was going to "through himself off a roof". Patient has had multiple hospitalizations for Psychiatric disorder most recently in 2020 . He has has two suicide attempts in the past with Aspirin overdose and by drinking a cup of bleach. Most recently he has become depressed at because of his living conditions, feeling of isolation, and that fact that people having "been taking his money".   He is being treated by a NP in private clinic and takes his medication regularly .He also states he drinks approximately 1/4 pint of Vodka daily.  At this time, he has no specific complaints and would like to get to sleep because he did not sleep well last night.   He denies suicidal or homicidal ideation and denies any hallucinosis.    PMH   GERD  Hiatal Hernia   no treatment currently asymptomatic            Hyperlipidemia     takes medication but does not know the name             Benign Prostatic Hypertrophy  no treatment at this time     PSHx   no history of surgery     Family HX     Mother and Father   cause not known                      6 brothers and two sisters Alive and Well   Social Hx      Never  , no children                       disabled ,unemployed     ROS        Head          no trauma or headaches   Eyes            no visual disturbances  ENT             no complaints   Resp            no cough, SOB, sputum production,   CVS             no chest pain PND , Orthopnea,   palpitations ,syncope or claudications   GI               no weight loss hematemesis, melena or nausea or vomiting                 no dysuria , nocturia, supra pubic or flank pain , no fever or chills  Musculoskeletal  no joint pain or swelling     Vital Signs Last 24 Hrs  T(C): 36.4 (26 Oct 2020 11:11), Max: 36.8 (25 Oct 2020 23:35)  T(F): 97.5 (26 Oct 2020 11:11), Max: 98.3 (25 Oct 2020 23:35)  HR: 85 (26 Oct 2020 11:11) (57 - 85)  BP: 132/95 (26 Oct 2020 11:11) (122/80 - 138/93)  BP(mean): --  RR: 18 (26 Oct 2020 15:21) (18 - 20)  SpO2: 99% (26 Oct 2020 15:21) (96% - 100%)    Covid -19 not detected     PE    Alert , oriented NAD     Head no trauma or deformity   Eyes  PERRLA @ 6 mm  no jaundice   ENT   WNL  Neck supple  mo masses , no bruits   Lungs  no rales ,rubs, rhonchi or wheezes  Heart  No murmur or gallop   Abdomen   no tender no masses  or hepatosplenomegaly   + bowel sounds   Ext   no rubor calor or tumor full ROM   Neuro    CN  grossly WNL                 No nystagmus                no ataxia  or flapping tremor                Romberg negative                                          60 YOWM from a group home with history of Paranoid Schizophrenia Reported to residents and staff that he was going to "through himself off a roof". Patient has had multiple hospitalizations for Psychiatric disorder most recently in 2020 . He has has two suicide attempts in the past with Aspirin overdose and by drinking a cup of bleach. Most recently he has become depressed at because of his living conditions, feeling of isolation, and that fact that people having "been taking his money".   He is being treated by a NP in private clinic and takes his medication regularly .He also states he drinks approximately 1/4 pint of Vodka daily.  At this time, he has no specific complaints and would like to get to sleep because he did not sleep well last night.   He denies suicidal or homicidal ideation and denies any hallucinosis.    PMH   GERD  Hiatal Hernia   no treatment currently asymptomatic            Hyperlipidemia     takes medication but does not know the name             Benign Prostatic Hypertrophy  no treatment at this time     PSHx   no history of surgery     Family HX     Mother and Father   cause not known                      6 brothers and two sisters Alive and Well   Social Hx      Never  , no children                       disabled ,unemployed   Allergies      ( Haldol  )    rash     ROS        Head          no trauma or headaches   Eyes            no visual disturbances  ENT             no complaints   Resp            no cough, SOB, sputum production,   CVS             no chest pain PND , Orthopnea,   palpitations ,syncope or claudications   GI               no weight loss hematemesis, melena or nausea or vomiting                 no dysuria , nocturia, supra pubic or flank pain , no fever or chills  Musculoskeletal  no joint pain or swelling     Vital Signs Last 24 Hrs  T(C): 36.4 (26 Oct 2020 11:11), Max: 36.8 (25 Oct 2020 23:35)  T(F): 97.5 (26 Oct 2020 11:11), Max: 98.3 (25 Oct 2020 23:35)  HR: 85 (26 Oct 2020 11:11) (57 - 85)  BP: 132/95 (26 Oct 2020 11:11) (122/80 - 138/93)  BP(mean): --  RR: 18 (26 Oct 2020 15:21) (18 - 20)  SpO2: 99% (26 Oct 2020 15:21) (96% - 100%)    Covid -19 not detected     PE    Alert , oriented NAD     Head no trauma or deformity   Eyes  PERRLA @ 6 mm  no jaundice   ENT   WNL  Neck supple  mo masses , no bruits   Lungs  no rales ,rubs, rhonchi or wheezes  Heart  No murmur or gallop   Abdomen   no tender no masses  or hepatosplenomegaly   + bowel sounds   Ext   no rubor calor or tumor full ROM   Neuro    CN  grossly WNL                 No nystagmus                no ataxia  or flapping tremor                Romberg negative

## 2020-10-26 NOTE — ED CDU PROVIDER SUBSEQUENT DAY NOTE - PHYSICAL EXAMINATION
Gen: NAD  Head: NCAT  HEENT: EOMI, oral mucosa moist, normal conjunctiva, neck supple  Lung: no respiratory distress  CV:  Normal perfusion  MSK: no visible deformities  Neuro: No focal neurologic deficits  Skin: No rash   Psych: mild psychosis, depression

## 2020-10-26 NOTE — CONSULT NOTE ADULT - ATTENDING COMMENTS
60 year old male patient with narrative as previously stated    -Admit to 5 [facility]      #Suicidal Ideation  -management per psych    #Schizophrenia  -management per psych    #HLD  -on statin    #Other  -reconsult as necessary

## 2020-10-27 DIAGNOSIS — F10.10 ALCOHOL ABUSE, UNCOMPLICATED: ICD-10-CM

## 2020-10-27 LAB
A1C WITH ESTIMATED AVERAGE GLUCOSE RESULT: 5.7 % — HIGH (ref 4–5.6)
CHOLEST SERPL-MCNC: 159 MG/DL — SIGNIFICANT CHANGE UP
ESTIMATED AVERAGE GLUCOSE: 117 MG/DL — HIGH (ref 68–114)
HDLC SERPL-MCNC: 34 MG/DL — LOW
LIPID PNL WITH DIRECT LDL SERPL: 96 MG/DL — SIGNIFICANT CHANGE UP
NON HDL CHOLESTEROL: 125 MG/DL — SIGNIFICANT CHANGE UP
TRIGL SERPL-MCNC: 146 MG/DL — SIGNIFICANT CHANGE UP
TSH SERPL-MCNC: 0.75 UU/ML — SIGNIFICANT CHANGE UP (ref 0.34–4.82)

## 2020-10-27 PROCEDURE — 99232 SBSQ HOSP IP/OBS MODERATE 35: CPT

## 2020-10-27 RX ORDER — PALIPERIDONE 1.5 MG/1
6 TABLET, EXTENDED RELEASE ORAL AT BEDTIME
Refills: 0 | Status: DISCONTINUED | OUTPATIENT
Start: 2020-10-27 | End: 2020-11-25

## 2020-10-27 RX ORDER — CHLORPROMAZINE HCL 10 MG
50 TABLET ORAL EVERY 6 HOURS
Refills: 0 | Status: DISCONTINUED | OUTPATIENT
Start: 2020-10-27 | End: 2020-11-25

## 2020-10-27 RX ORDER — PALIPERIDONE 1.5 MG/1
3 TABLET, EXTENDED RELEASE ORAL DAILY
Refills: 0 | Status: DISCONTINUED | OUTPATIENT
Start: 2020-10-27 | End: 2020-11-25

## 2020-10-27 RX ADMIN — MIRTAZAPINE 15 MILLIGRAM(S): 45 TABLET, ORALLY DISINTEGRATING ORAL at 21:50

## 2020-10-27 RX ADMIN — GABAPENTIN 300 MILLIGRAM(S): 400 CAPSULE ORAL at 12:39

## 2020-10-27 RX ADMIN — Medication 50 MILLIGRAM(S): at 21:50

## 2020-10-27 RX ADMIN — Medication 1 MILLIGRAM(S): at 21:50

## 2020-10-27 RX ADMIN — Medication 81 MILLIGRAM(S): at 09:23

## 2020-10-27 RX ADMIN — Medication 1 PATCH: at 09:23

## 2020-10-27 RX ADMIN — Medication 25 MILLIGRAM(S): at 09:23

## 2020-10-27 RX ADMIN — Medication 1 PATCH: at 19:07

## 2020-10-27 RX ADMIN — PALIPERIDONE 3 MILLIGRAM(S): 1.5 TABLET, EXTENDED RELEASE ORAL at 09:46

## 2020-10-27 RX ADMIN — Medication 1 MILLIGRAM(S): at 09:23

## 2020-10-27 RX ADMIN — ATORVASTATIN CALCIUM 20 MILLIGRAM(S): 80 TABLET, FILM COATED ORAL at 21:50

## 2020-10-27 RX ADMIN — TAMSULOSIN HYDROCHLORIDE 0.4 MILLIGRAM(S): 0.4 CAPSULE ORAL at 21:50

## 2020-10-27 RX ADMIN — GABAPENTIN 300 MILLIGRAM(S): 400 CAPSULE ORAL at 21:50

## 2020-10-27 RX ADMIN — ESCITALOPRAM OXALATE 15 MILLIGRAM(S): 10 TABLET, FILM COATED ORAL at 09:24

## 2020-10-27 RX ADMIN — DIVALPROEX SODIUM 500 MILLIGRAM(S): 500 TABLET, DELAYED RELEASE ORAL at 21:50

## 2020-10-27 RX ADMIN — GABAPENTIN 300 MILLIGRAM(S): 400 CAPSULE ORAL at 09:23

## 2020-10-27 RX ADMIN — PALIPERIDONE 6 MILLIGRAM(S): 1.5 TABLET, EXTENDED RELEASE ORAL at 21:51

## 2020-10-27 RX ADMIN — Medication 50 MILLIGRAM(S): at 15:40

## 2020-10-27 NOTE — PROGRESS NOTE BEHAVIORAL HEALTH - NSBHFUPADDHPIFT_PSY_A_CORE
Brother Shoaib, confirmed Pt issues with giving money away and has been budgeting his money for him.

## 2020-10-27 NOTE — PROGRESS NOTE BEHAVIORAL HEALTH - PROBLEM SELECTOR PLAN 1
MEDICATIONS  (STANDING):  aspirin enteric coated 81 milliGRAM(s) Oral daily  atorvastatin 20 milliGRAM(s) Oral at bedtime  benztropine 1 milliGRAM(s) Oral two times a day  chlorproMAZINE    Tablet 50 milliGRAM(s) Oral at bedtime  diVALproex  milliGRAM(s) Oral at bedtime  escitalopram 15 milliGRAM(s) Oral daily  gabapentin 300 milliGRAM(s) Oral three times a day  metoprolol succinate ER 25 milliGRAM(s) Oral daily  mirtazapine 15 milliGRAM(s) Oral at bedtime  nicotine - 21 mG/24Hr(s) Patch 1 patch Transdermal daily  paliperidone ER 3 milliGRAM(s) Oral daily  paliperidone ER 6 milliGRAM(s) Oral at bedtime  tamsulosin 0.4 milliGRAM(s) Oral at bedtime

## 2020-10-27 NOTE — PROGRESS NOTE BEHAVIORAL HEALTH - NSBHFUPIPCHARTREVFT_PSY_A_CORE
60 yr old single   male living at ,[facility] in [place]; dxn with  paranoid schizophrenia, alcohol use multiple psych admissions BIBA EMS for suicidal thoughts. pt voiced intention to kill himself if he is not admitted as he claimed does not feel safe in the community. Patient reports he is stressed living at his adult home because other residents harass him for money and cigarettes. Patient states he only hears voices when he drinks alcohol, Pt claims worsening depression due to multiple stressors, ie peers taking all of his money and feelings of isolation.

## 2020-10-27 NOTE — PROGRESS NOTE BEHAVIORAL HEALTH - RISK ASSESSMENT
low risk  acute still with auditory hallucination but decreasing , feeling depressed and lonely  mitigating : denies any suicidal intent or plan   protective :  pt with help seeking behavior, support of brother and group home ,stable place to live  chronic: prior psych hospitalization ,alcohol use

## 2020-10-27 NOTE — PROGRESS NOTE BEHAVIORAL HEALTH - NSBHADDHXPSYSOCFT_PSY_A_CORE
Shoaib [phone] Brother  Silvia [phone].  [facility] in [place]  Brother Shoaib, confirmed Pt issues with giving money away and has been budgeting his money for him.

## 2020-10-27 NOTE — PROGRESS NOTE BEHAVIORAL HEALTH - SUMMARY
This is a 61 y/o single, disabled,  male,  unemployed, domiciled at [facility] in [place]; with a psychiatric history significant for Paranoid Schizophrenia, alcohol use disorder, discharged from [facility] approx 6 weeks ago for alcohol dependence, multiple psych admissions and approx 2 ED visits per month for psychiatric complaints, last time 9/18/20 with similar complaints, today reported to ED attending SI with plan to jump off roof  medical history significant for BPH, GERD, HTN, and HLD,  Per record, has a history of multiple past inpatient psychiatric hospitalizations, last one was in January 2020; with a history of two self-reported prior suicide attempts (by aspirin overdose 6 years ago and in January 2020 by reportedly drinking 1 cup of bleach);  reported occasional MJ use no history of other illicit drug use, BIBA EMS for suicidal thoughts.     Patient endorsed active suicidal ideation with active plan and intent;  Requesting voluntary admission, does not feel safe in the community. Paitent with persistent pattern of this behavior of presenting to ED with suicidal ideation requesting admission but often times changes his mind during course of evaluation and then is adequately able to safety plan and be discharge.  Today, he is unable engage in safety planning for discharge;  Reports he does not feel safe outside of the hospital setting.   Will admit voluntarily. The pt is a   60 year-old single WM  s, disabled , unemployed, and [facility], The pt had most recently been discharged from [facility]  after a 6 week stay with a similar pt clinical presentation.             The pt. was BIB  EMS to [facility] on 10/26/2020 for evaluation of pt's worsening paranoid psychosis with reported SI  and ETOH use d/o.  for suicidal thoughts.    The pt was then transferred to [facility] and admitted to  on a 9.13 voluntary legal status for acute safety and for further evaluation and treatment of his schizophrenia and his ETOH use d/o.  Plan  1.Pt restarted Invega 3 mg po q am and 6 mg po qhs

## 2020-10-27 NOTE — PROGRESS NOTE BEHAVIORAL HEALTH - NSBHFUPINTERVALHXFT_PSY_A_CORE
Pt with response to internal stimuli , preoccupied and staring .  Affect is blunted constricted.   His brother relayed information that pt had been giving away his belonging  Pt claiming that he is drinking alcohol "1/2 of a 1/2 pint every day. I forget a lot. Pt claiming he hates being asked for money by other residents at his place of living but use money to buy friends.  Pt gets angry about spending the money and gets depressed and drinks and becomes suicidal with auditory hallucination commanding him to kill self

## 2020-10-27 NOTE — PROGRESS NOTE BEHAVIORAL HEALTH - NSBHADDHXSUBSTFT_PSY_A_CORE
occasional MJ use no history of other illicit drug use,  drinks 1/4 pint of Vodka daily, last 2 days ago.  last used cocaine 15 years ago and last used cannabis  in July 2020  3 packs/day cigarettes  discharged from [facility] approx 6 weeks ago for alcohol dependence,  discharged from [facility] approx 6 weeks ago for alcohol dependence

## 2020-10-27 NOTE — PROGRESS NOTE BEHAVIORAL HEALTH - NSBHCHARTREVIEWLAB_PSY_A_CORE FT
CBC  Chem-WNL TPro  7.3  /  Alb  3.5  /  TBili  0.4  /  DBili  0.1  /  AST  27  /  ALT  31  /  AlkPhos  84  10-27

## 2020-10-27 NOTE — PROGRESS NOTE BEHAVIORAL HEALTH - NSBHADDHXPSYCHFT_PSY_A_CORE
discharged from [facility] approx 6 weeks ago for alcohol dependence, multiple psych admissions and approx 2 ED visits per month for psychiatric complaints, last time 9/18/20 with similar complaints, today reported to ED attending SI with plan to jump off roof.  History of multiple past inpatient psychiatric hospitalizations, last one was in January 2020;,Past [facility] admission.  History of two self-reported prior suicide First suicide attempt  20 years ago,  aspirin overdose 6 years ago and in January 2020 by reportedly drinking 1 cup of bleach);  Nguyen Arteaga NP and last saw her 8/7/20 at his residenceHe reports a history of 3 prior suicidal attempts in the context of alcohol abuse.

## 2020-10-28 DIAGNOSIS — F10.20 ALCOHOL DEPENDENCE, UNCOMPLICATED: ICD-10-CM

## 2020-10-28 DIAGNOSIS — Z91.19 PATIENT'S NONCOMPLIANCE WITH OTHER MEDICAL TREATMENT AND REGIMEN: ICD-10-CM

## 2020-10-28 PROCEDURE — 99232 SBSQ HOSP IP/OBS MODERATE 35: CPT

## 2020-10-28 RX ADMIN — Medication 1 MILLIGRAM(S): at 20:40

## 2020-10-28 RX ADMIN — Medication 50 MILLIGRAM(S): at 13:29

## 2020-10-28 RX ADMIN — Medication 50 MILLIGRAM(S): at 20:40

## 2020-10-28 RX ADMIN — ESCITALOPRAM OXALATE 15 MILLIGRAM(S): 10 TABLET, FILM COATED ORAL at 10:00

## 2020-10-28 RX ADMIN — GABAPENTIN 300 MILLIGRAM(S): 400 CAPSULE ORAL at 13:25

## 2020-10-28 RX ADMIN — PALIPERIDONE 3 MILLIGRAM(S): 1.5 TABLET, EXTENDED RELEASE ORAL at 09:59

## 2020-10-28 RX ADMIN — Medication 1 PATCH: at 10:02

## 2020-10-28 RX ADMIN — MIRTAZAPINE 15 MILLIGRAM(S): 45 TABLET, ORALLY DISINTEGRATING ORAL at 20:40

## 2020-10-28 RX ADMIN — Medication 1 PATCH: at 09:58

## 2020-10-28 RX ADMIN — ATORVASTATIN CALCIUM 20 MILLIGRAM(S): 80 TABLET, FILM COATED ORAL at 20:48

## 2020-10-28 RX ADMIN — Medication 1 MILLIGRAM(S): at 10:00

## 2020-10-28 RX ADMIN — Medication 25 MILLIGRAM(S): at 09:59

## 2020-10-28 RX ADMIN — Medication 1 PATCH: at 20:47

## 2020-10-28 RX ADMIN — GABAPENTIN 300 MILLIGRAM(S): 400 CAPSULE ORAL at 10:00

## 2020-10-28 RX ADMIN — PALIPERIDONE 6 MILLIGRAM(S): 1.5 TABLET, EXTENDED RELEASE ORAL at 20:39

## 2020-10-28 RX ADMIN — GABAPENTIN 300 MILLIGRAM(S): 400 CAPSULE ORAL at 20:53

## 2020-10-28 RX ADMIN — TAMSULOSIN HYDROCHLORIDE 0.4 MILLIGRAM(S): 0.4 CAPSULE ORAL at 20:39

## 2020-10-28 RX ADMIN — Medication 81 MILLIGRAM(S): at 10:00

## 2020-10-28 RX ADMIN — DIVALPROEX SODIUM 500 MILLIGRAM(S): 500 TABLET, DELAYED RELEASE ORAL at 20:40

## 2020-10-28 NOTE — PROGRESS NOTE BEHAVIORAL HEALTH - NSBHFUPTYPE_PSY_A_CORE
September 15, 2020        Roxie Jean  [address]  [facility] [address]    To Whom It May Concern:    This is to certify Roxie Jean was evaluated on 09/15/20 and is unable to return to work.    Roxie Jean should self-quarantine until at least 96 hours until test result is received.  ?  CDC guidelines for return to work are as follows:  • At least 72 hours have passed since fever resolution without use of fever reducing medication and   • Symptoms have improved and  • At least 10 days have passed since symptoms first appeared     Many employers are asking that employees be seen and reexamined to return to work. We ask that you follow the CDC guidelines above and that you do not ask that your employees be seen back in the clinic setting for a Return to Work slip when off due to presumed Covid related symptoms.    The Coronavirus is a rapidly evolving situation and recommendations are changing regularly to prevent spread of the disease and further loss of life.    Thank you for your understanding during these unusual times.        Electronically signed by:   Yuridia Martinez CNP                 [facility] [facility]   [address] [place], [place] [address]         Inpatient

## 2020-10-28 NOTE — PROGRESS NOTE BEHAVIORAL HEALTH - RISK ASSESSMENT
low risk  acute still with auditory hallucinations but now restarted on Invega in hopes of alleviating the pt's paranoid psychosis, feeling depressed and lonely  mitigating : Pt agreed to enter inpatient hospital safe setting, pt amenable to resume medications,   protective :  pt with help seeking behavior, support of brother and group home ,stable place to live  chronic: prior psych hospitalization ,alcohol use

## 2020-10-28 NOTE — PROGRESS NOTE BEHAVIORAL HEALTH - PROBLEM SELECTOR PLAN 2
1.encourage to attend groups 1. Pt encouraged to maintain sobriety after DC from hospital  2.Pt encouraged to attend therapy groups as tolerated with focus on substance use d/o treatment options  3.To continue to recommend AA/ Sponsor after DC from hospital when pt clinically stable

## 2020-10-28 NOTE — PROGRESS NOTE BEHAVIORAL HEALTH - NSBHFUPINTERVALHXFT_PSY_A_CORE
Pt seen in his room. Pt is disheveled, unkempt with poor dentition and an angry, irritable but labile overall range of affect. Pt  remains reclusive and socially withdrawn, refusing to attend therapy groups despite ongoing staff support and encouragement. The pt appears distracted and internally preoccupied as if he is in the midst of an argument with whatever he alone may be hearing. The pt had reported med compliance with antipsychotic meds but he was strangely unfamiliar with the Invega he reportedly had been prescribed  at 3 mg q am and 6 mg po qhs for sometime. Pt asked, " Invega? That's for voices? I didn't know that." after writer attempted to discuss the hospital staff's ability to provide the pt with antipsychotic  medication to alleviate his AH. The pt consistently half charges towards writer or anyone else who attempts to enter his room  in a seeming pt effort to curtail any conversation and to shoo the person out of the room.     The pt has been eating meals and he has currently been med compliant. The pt remains irritable, illogical and seemingly internally preoccupied. No chance despite staff efforts to speak with the pt as to problems at the pt's [facility] in [place] or about the pt's recent report of SI as well as the pt's ongoing paranoia and h/o ETOH use d/o . ( BAL< 10 on admission 10 /26/2020 with pt transfer from [facility] to [facility] 5N). The pt will not sustain any efforts to initiate a conversation with him. Unable to assess pt current clinical status more fully pending now restarted Invega trial to decrease pt symptoms of paranoid psychosis.    The pt's judgment remains quite impaired with virtually no insight into his  medical and psychiatric comorbid illnesses and the need for ongoing consistent treatment both in and out of hospital.

## 2020-10-28 NOTE — PROGRESS NOTE BEHAVIORAL HEALTH - SUMMARY
The pt is a   60 year-old single WM  s, disabled , unemployed, and domiciled at [facility] other illicit drug use, The pt had most recently been discharged from [facility]  after a 6 week stay with a similar pt clinical presentation.             The pt. was BIB  EMS to [facility] on 10/26/2020 for evaluation of pt's worsening paranoid psychosis with reported SI  and ETOH use d/o.  for suicidal thoughts.    The pt was then transferred to [facility] and admitted to  on a 9.13 voluntary legal status for acute safety and for further evaluation and treatment of his schizophrenia and his ETOH use d/o.  Plan  1.Pt restarted Invega 3 mg po q am and 6 mg po qhs  2. Collateral clinical pt history with pt permission

## 2020-10-28 NOTE — PROGRESS NOTE BEHAVIORAL HEALTH - PROBLEM SELECTOR PLAN 1
MEDICATIONS    aspirin enteric coated 81 milliGRAM(s) Oral daily  atorvastatin 20 milliGRAM(s) Oral at bedtime  benztropine 1 milliGRAM(s) Oral two times a day  chlorproMAZINE    Tablet 50 milliGRAM(s) Oral at bedtime  diVALproex  milliGRAM(s) Oral at bedtime  escitalopram 15 milliGRAM(s) Oral daily  gabapentin 300 milliGRAM(s) Oral three times a day  metoprolol succinate ER 25 milliGRAM(s) Oral daily  mirtazapine 15 milliGRAM(s) Oral at bedtime  nicotine - 21 mG/24Hr(s) Patch 1 patch Transdermal daily  paliperidone ER 3 milliGRAM(s) Oral daily  paliperidone ER 6 milliGRAM(s) Oral at bedtime  tamsulosin 0.4 milliGRAM(s) Oral at bedtime 1.Restart Invega   3 mg po q am and 6 mg po qhs for alleviation of psychosis  2.chlorproMAZINE    Tablet 50 milliGRAM(s) Oral at bedtime  3.diVALproex  milliGRAM(s) Oral at bedtime  4.escitalopram 15 milliGRAM(s) Oral daily  5.gabapentin 300 milliGRAM(s) Oral three times a day  6.mirtazapine 15 milliGRAM(s) Oral at bedtime  7. To gather collateral pt clinical history with pt consent ( not yet obtained)  8.Disposition planning ongoing

## 2020-10-29 PROCEDURE — 99232 SBSQ HOSP IP/OBS MODERATE 35: CPT

## 2020-10-29 RX ORDER — CHLORPROMAZINE HCL 10 MG
50 TABLET ORAL THREE TIMES A DAY
Refills: 0 | Status: DISCONTINUED | OUTPATIENT
Start: 2020-10-29 | End: 2020-10-31

## 2020-10-29 RX ADMIN — MIRTAZAPINE 15 MILLIGRAM(S): 45 TABLET, ORALLY DISINTEGRATING ORAL at 21:45

## 2020-10-29 RX ADMIN — Medication 1 PATCH: at 09:26

## 2020-10-29 RX ADMIN — TAMSULOSIN HYDROCHLORIDE 0.4 MILLIGRAM(S): 0.4 CAPSULE ORAL at 21:45

## 2020-10-29 RX ADMIN — GABAPENTIN 300 MILLIGRAM(S): 400 CAPSULE ORAL at 09:25

## 2020-10-29 RX ADMIN — Medication 50 MILLIGRAM(S): at 21:45

## 2020-10-29 RX ADMIN — ATORVASTATIN CALCIUM 20 MILLIGRAM(S): 80 TABLET, FILM COATED ORAL at 21:45

## 2020-10-29 RX ADMIN — Medication 50 MILLIGRAM(S): at 09:34

## 2020-10-29 RX ADMIN — PALIPERIDONE 3 MILLIGRAM(S): 1.5 TABLET, EXTENDED RELEASE ORAL at 09:25

## 2020-10-29 RX ADMIN — Medication 81 MILLIGRAM(S): at 09:25

## 2020-10-29 RX ADMIN — Medication 1 MILLIGRAM(S): at 21:45

## 2020-10-29 RX ADMIN — Medication 50 MILLIGRAM(S): at 12:50

## 2020-10-29 RX ADMIN — DIVALPROEX SODIUM 500 MILLIGRAM(S): 500 TABLET, DELAYED RELEASE ORAL at 21:45

## 2020-10-29 RX ADMIN — GABAPENTIN 300 MILLIGRAM(S): 400 CAPSULE ORAL at 12:50

## 2020-10-29 RX ADMIN — PALIPERIDONE 6 MILLIGRAM(S): 1.5 TABLET, EXTENDED RELEASE ORAL at 21:45

## 2020-10-29 RX ADMIN — Medication 1 PATCH: at 07:27

## 2020-10-29 RX ADMIN — Medication 1 PATCH: at 09:31

## 2020-10-29 RX ADMIN — Medication 25 MILLIGRAM(S): at 09:25

## 2020-10-29 RX ADMIN — Medication 1 PATCH: at 19:00

## 2020-10-29 RX ADMIN — GABAPENTIN 300 MILLIGRAM(S): 400 CAPSULE ORAL at 21:45

## 2020-10-29 RX ADMIN — ESCITALOPRAM OXALATE 15 MILLIGRAM(S): 10 TABLET, FILM COATED ORAL at 09:25

## 2020-10-29 RX ADMIN — Medication 1 MILLIGRAM(S): at 09:25

## 2020-10-29 NOTE — PROGRESS NOTE BEHAVIORAL HEALTH - SUMMARY
The pt is a   60 year-old single WM  s, disabled , unemployed, and domiciled at [facility] other illicit drug use, The pt had most recently been discharged from [facility]  after a 6 week stay with a similar pt clinical presentation.             The pt. was BIB  EMS to [facility] on 10/26/2020 for evaluation of pt's worsening paranoid psychosis with reported SI  and ETOH use d/o.  for suicidal thoughts.    The pt was then transferred to [facility] and admitted to  on a 9.13 voluntary legal status for acute safety and for further evaluation and treatment of his schizophrenia and his ETOH use d/o.  Plan  1.Pt restarted Invega 3 mg po q am and 6 mg po qhs  2. Collateral clinical pt history with pt permission

## 2020-10-29 NOTE — PROGRESS NOTE BEHAVIORAL HEALTH - RISK ASSESSMENT
low risk  acute still with auditory hallucinations but now restarted on Invega in hopes of alleviating the pt's paranoid psychosis, feeling depressed and lonely. AH persist but reportedly non command type  mitigating : Pt agreed to enter inpatient hospital safe setting, pt amenable to resume medications, , pt able to form limited selective therapeutic alliance with staff  protective :  pt with help seeking behavior, support of brother and group home ,stable place to live  chronic: prior psych hospitalization ,alcohol use

## 2020-10-29 NOTE — PROGRESS NOTE BEHAVIORAL HEALTH - PROBLEM SELECTOR PLAN 1
1.Restarted  Invega   3 mg po q am and 6 mg po qhs for alleviation of psychosis  2Increased standing .chlorproMAZINE  to 50 mg po tid  ( paranoid psychosis0  3.diVALproex  milliGRAM(s) po qhs  and to check Depakote level  4.escitalopram 15 mg po q am    5.gabapentin 300 milliGRAM(s) Oral three times a day  6.mirtazapine 15 milliGRAM(s) Oral at bedtime  7. To gather collateral pt clinical history with pt consent   8.Disposition planning ongoing

## 2020-10-29 NOTE — PROGRESS NOTE BEHAVIORAL HEALTH - PROBLEM SELECTOR PLAN 2
1. Pt encouraged to maintain sobriety after DC from hospital  2.Pt encouraged to attend therapy groups as tolerated with focus on substance use d/o treatment options  3.To continue to recommend AA/ Sponsor after DC from hospital when pt clinically stable

## 2020-10-29 NOTE — PROGRESS NOTE BEHAVIORAL HEALTH - NSBHFUPINTERVALHXFT_PSY_A_CORE
Pt seen in his room alone and later with RN who was able to persuade the pt to remain med adherent in an effort to alleviate the pt's ongoing worsening AH in the context of the pt's h/o entrenched treatment noncompliance. Prior to the pt's current admission on 10/26/2020, the pt reportedly had not been med compliant for the past 6 months.          The pt remains  disheveled, unkempt with poor dentition but less irritable and more overtly anxious and apprehensive seemingly in the context of his ongoing paranoid psychosis. Pt  remains reclusive and socially withdrawn, refusing to attend therapy groups despite ongoing staff support and encouragement. The pt appears distracted and internally preoccupied with poor attention to conversation with others.   The pt is reportedly tolerating his now restarted Invega 3 mg po q am and 6 mg po qhs without dif=de effects. The pt was amenable to resume low dose Depakote , Lexapro, Remeron  and Neurontin as well for affective lability. The pt was also agreeable to having low dose FGA standing Thorazine 50 mg po tid added in the short term to help alleviate the pt's AH while awaiting Invega starkey clinical response. This writer also spoke with the pt as to the possibility of the pt's considering a long acting q monthly Invega Sustenna injection in order to facilitate the pt's compliance with medications and thus, to hopefully decrease the pt's risk of future clinical relapse.      The pt has been eating meals and he has currently been med compliant. The pt remains irritable, illogical and seemingly internally preoccupied. No chance despite staff efforts to speak with the pt as to problems at the pt's [facility] in [place] or about the pt's recent report of SI as well as the pt's ongoing paranoia and h/o ETOH use d/o . ( BAL< 10 on admission 10 /26/2020 with pt transfer from [facility] to [facility] 5N). The pt will not sustain any efforts to initiate a conversation with him. Unable to assess pt current clinical status more fully pending now restarted Invega trial to decrease pt symptoms of paranoid psychosis.    The pt's judgment remains quite impaired with virtually no insight into his  medical and psychiatric comorbid illnesses and the need for ongoing consistent treatment both in and out of hospital.

## 2020-10-29 NOTE — PROGRESS NOTE BEHAVIORAL HEALTH - OTHER
improving slowly improving with select staff and peers paucity of speech " Not so good. I am hearing voices." dysphoric somewhat impoverished

## 2020-10-30 PROCEDURE — 99232 SBSQ HOSP IP/OBS MODERATE 35: CPT

## 2020-10-30 RX ORDER — ESCITALOPRAM OXALATE 10 MG/1
20 TABLET, FILM COATED ORAL DAILY
Refills: 0 | Status: DISCONTINUED | OUTPATIENT
Start: 2020-10-31 | End: 2020-11-25

## 2020-10-30 RX ADMIN — Medication 1 PATCH: at 07:49

## 2020-10-30 RX ADMIN — Medication 1 PATCH: at 18:58

## 2020-10-30 RX ADMIN — ESCITALOPRAM OXALATE 15 MILLIGRAM(S): 10 TABLET, FILM COATED ORAL at 09:20

## 2020-10-30 RX ADMIN — Medication 50 MILLIGRAM(S): at 13:02

## 2020-10-30 RX ADMIN — GABAPENTIN 300 MILLIGRAM(S): 400 CAPSULE ORAL at 13:02

## 2020-10-30 RX ADMIN — PALIPERIDONE 6 MILLIGRAM(S): 1.5 TABLET, EXTENDED RELEASE ORAL at 21:46

## 2020-10-30 RX ADMIN — Medication 5 MILLIGRAM(S): at 21:45

## 2020-10-30 RX ADMIN — Medication 1 PATCH: at 09:30

## 2020-10-30 RX ADMIN — Medication 1 PATCH: at 09:19

## 2020-10-30 RX ADMIN — Medication 81 MILLIGRAM(S): at 09:19

## 2020-10-30 RX ADMIN — MIRTAZAPINE 15 MILLIGRAM(S): 45 TABLET, ORALLY DISINTEGRATING ORAL at 21:45

## 2020-10-30 RX ADMIN — GABAPENTIN 300 MILLIGRAM(S): 400 CAPSULE ORAL at 09:19

## 2020-10-30 RX ADMIN — TAMSULOSIN HYDROCHLORIDE 0.4 MILLIGRAM(S): 0.4 CAPSULE ORAL at 21:46

## 2020-10-30 RX ADMIN — DIVALPROEX SODIUM 500 MILLIGRAM(S): 500 TABLET, DELAYED RELEASE ORAL at 21:45

## 2020-10-30 RX ADMIN — Medication 1 MILLIGRAM(S): at 09:19

## 2020-10-30 RX ADMIN — Medication 25 MILLIGRAM(S): at 09:28

## 2020-10-30 RX ADMIN — Medication 50 MILLIGRAM(S): at 21:45

## 2020-10-30 RX ADMIN — ATORVASTATIN CALCIUM 20 MILLIGRAM(S): 80 TABLET, FILM COATED ORAL at 21:46

## 2020-10-30 RX ADMIN — PALIPERIDONE 3 MILLIGRAM(S): 1.5 TABLET, EXTENDED RELEASE ORAL at 09:20

## 2020-10-30 RX ADMIN — Medication 50 MILLIGRAM(S): at 09:19

## 2020-10-30 RX ADMIN — Medication 1 MILLIGRAM(S): at 21:45

## 2020-10-30 RX ADMIN — GABAPENTIN 300 MILLIGRAM(S): 400 CAPSULE ORAL at 21:45

## 2020-10-30 NOTE — PROGRESS NOTE BEHAVIORAL HEALTH - SUMMARY
The pt is a   60 year-old single WM  s, disabled , unemployed, and domiciled at [facility] other illicit drug use, The pt had most recently been discharged from [facility]  after a 6 week stay with a similar pt clinical presentation.             The pt. was BIB  EMS to [facility] on 10/26/2020 for evaluation of pt's worsening paranoid psychosis with reported SI  and ETOH use d/o.  for suicidal thoughts.    The pt was then transferred to [facility] and admitted to  on a 9.13 voluntary legal status for acute safety and for further evaluation and treatment of his schizophrenia and his ETOH use d/o.  Plan  1.Pt restarted Invega 3 mg po q am and 6 mg po qhs  2. Collateral clinical pt history with pt permission The pt is a   60 year-old single WM  s, disabled , unemployed, and [facility]iciled [facility] other illicit drug use, The pt had most recently been discharged from [facility]  after a 6 week stay with a similar pt clinical presentation.             The pt. was BIB  EMS to [facility] on 10/26/2020 for evaluation of pt's worsening paranoid psychosis with reported SI  and ETOH use d/o.  for suicidal thoughts.    The pt was then transferred to [facility] and admitted to  on a 9.13 voluntary legal status for acute safety and for further evaluation and treatment of his schizophrenia and his ETOH use d/o.  Plan  1.Pt restarted Invega 3 mg po q am and 6 mg po qhs  2. Collateral clinical pt history with pt permission  3. Plan to titrate Lexapro and to add low dose Buspar to alleviate the pt's significant anxiety and depression

## 2020-10-30 NOTE — PROGRESS NOTE BEHAVIORAL HEALTH - OTHER
improving slowly improving with select staff and peers paucity of speech " Not so good. I am hearing voices." dysphoric somewhat impoverished " Not so good. I  feel depressed and paranoid" paranoid, persecutory delusional thinking

## 2020-10-30 NOTE — PROGRESS NOTE BEHAVIORAL HEALTH - PROBLEM SELECTOR PLAN 2
1. Pt encouraged to maintain sobriety after DC from hospital  2.Pt encouraged to attend therapy groups as tolerated with focus on substance use d/o treatment options  3.To continue to recommend AA/ Sponsor after DC from hospital when pt clinically stable 1. Pt encouraged to maintain sobriety after DC from hospital  2.Pt encouraged to attend therapy groups as tolerated with focus on substance use d/o treatment options  3.To continue to recommend AA/ Sponsor after DC from hospital when pt clinically stable  4. Buspar 5 mg po tid (anxiety)

## 2020-10-30 NOTE — PROGRESS NOTE BEHAVIORAL HEALTH - NSBHCHARTREVIEWVS_PSY_A_CORE FT
Vital Signs Last 24 Hrs  T(C): 36.6 (30 Oct 2020 09:16), Max: 36.6 (30 Oct 2020 09:16)  T(F): 97.8 (30 Oct 2020 09:16), Max: 97.8 (30 Oct 2020 09:16)  HR: --  BP: --  BP(mean): --  RR: 14 (30 Oct 2020 09:16) (14 - 14)  SpO2: 100% (30 Oct 2020 09:16) (100% - 100%)

## 2020-10-30 NOTE — PROGRESS NOTE BEHAVIORAL HEALTH - NSBHFUPINTERVALHXFT_PSY_A_CORE
Pt seen in his room alone . The pt had initially been hiding in the bathroom until he determined it was 'safe ; to show himself. due to his ongoing paranoia. Pt noted standing Thorazine helping with paranoia while Invega has a chance to resume it's efficacy. who was able to persuade the pt to remain med adherent in an effort to alleviate the pt's ongoing worsening AH in the context of the pt's h/o entrenched treatment noncompliance. Prior to the pt's current admission on 10/26/2020, the pt reportedly had not been med compliant for the past 6 months.          The pt remains  disheveled, unkempt with poor dentition but less irritable and more overtly anxious and apprehensive seemingly in the context of his ongoing paranoid psychosis. Pt  remains reclusive and socially withdrawn, refusing to attend therapy groups despite ongoing staff support and encouragement. The pt appears distracted and internally preoccupied with poor attention to conversation with others.   The pt is reportedly tolerating his now restarted Invega 3 mg po q am and 6 mg po qhs without dif=de effects. The pt was amenable to resume low dose Depakote , Lexapro, Remeron  and Neurontin as well for affective lability. The pt was also agreeable to having low dose FGA standing Thorazine 50 mg po tid added in the short term to help alleviate the pt's AH while awaiting Invega starkey clinical response. This writer also spoke with the pt as to the possibility of the pt's considering a long acting q monthly Invega Sustenna injection in order to facilitate the pt's compliance with medications and thus, to hopefully decrease the pt's risk of future clinical relapse.       The pt appeared more apprehensive and truly frightened in the context of more marked paranoia. Writer and other staff continuing to reassure the pt as to his overall safety while on the unit.  Pt with better eye contact. More intense and fearful as above. illogical and seemingly internally preoccupied. No chance despite staff efforts to speak with the pt as to problems at the pt's [facility] in [place] or about the pt's recent report of SI as well as the pt's ongoing paranoia and h/o ETOH use d/o . ( BAL< 10 on admission 10 /26/2020 with pt transfer from [facility] to [facility] 5N). The pt will not sustain any efforts to initiate a conversation with him. Unable to assess pt current clinical status more fully pending now restarted Invega trial to decrease pt symptoms of paranoid psychosis.    The pt's judgment remains quite impaired with virtually no insight into his  medical and psychiatric comorbid illnesses and the need for ongoing consistent treatment both in and out of hospital. Pt seen in his room alone . The pt had initially been hiding in the bathroom until he determined it was 'safe ; to show himself. due to his ongoing paranoia. Pt noted standing Thorazine helping with paranoia while Invega has a chance to resume it's efficacy. who was able to persuade the pt to remain med adherent in an effort to alleviate the pt's ongoing worsening AH in the context of the pt's h/o entrenched treatment noncompliance. Prior to the pt's current admission on 10/26/2020, the pt reportedly had not been med compliant for the past 6 months.          The pt remains  disheveled, unkempt with poor dentition but less irritable and more overtly anxious and apprehensive seemingly in the context of his ongoing paranoid psychosis. Pt  remains reclusive and socially withdrawn, refusing to attend therapy groups despite ongoing staff support and encouragement. The pt appears distracted and internally preoccupied with poor attention to conversation with others.   The pt is reportedly tolerating his now restarted Invega 3 mg po q am and 6 mg po qhs without dif=de effects. The pt was amenable to resume low dose Depakote , Lexapro, Remeron  and Neurontin as well for affective lability. The pt was also agreeable to having low dose FGA standing Thorazine 50 mg po tid added in the short term to help alleviate the pt's AH while awaiting Invega starkey clinical response. This writer also spoke with the pt as to the possibility of the pt's considering a long acting q monthly Invega Sustenna injection in order to facilitate the pt's compliance with medications and thus, to hopefully decrease the pt's risk of future clinical relapse.       The pt appeared more apprehensive and truly frightened in the context of more marked paranoia. Writer and other staff continuing to reassure the pt as to his overall safety while on the unit.  Pt with better eye contact. More intense and fearful as above. illogical and seemingly internally preoccupied. No chance despite staff efforts to speak with the pt as to problems at the pt's [facility] in [place] or about the pt's recent report of SI as well as the pt's ongoing paranoia and h/o ETOH use d/o . ( BAL< 10 on admission 10 /26/2020 with pt transfer from [facility] to [facility] 5N). The pt will not sustain any efforts to initiate a conversation with him. Unable to assess pt current clinical status more fully pending now restarted Invega trial to decrease pt symptoms of paranoid psychosis.    The pt's judgment remains quite impaired with virtually no insight into his  medical and psychiatric comorbid illnesses and the need for ongoing consistent treatment both in and out of hospital. The pt reported very small initial relief with Invega restarted at prior dose of 3mg po q am and 6 mg po qhs with plan to recommend to the pt a trial of Invega Sustenna  q monthly without need for PO meds in light of the pt's h/o apparent poor med compliance.   Informed consent discussion also begun with the pt as to a pt trial of Buspar to alleviate pt anxiety and to serve as an antidepressant adjunct . Writer discussed plan to avoid benzodiazepines  in light of the pt's h/o ETOH use d/o. Pt amenable to plan to increase Lexapro and to begin low dose  non habit forming Buspar 5 mg po tid as above to alleviate the pt's ongoing anxiety.

## 2020-10-30 NOTE — PROGRESS NOTE BEHAVIORAL HEALTH - PROBLEM SELECTOR PLAN 1
1.Restarted  Invega   3 mg po q am and 6 mg po qhs for alleviation of psychosis  2Increased standing .chlorproMAZINE  to 50 mg po tid  ( paranoid psychosis0  3.diVALproex  milliGRAM(s) po qhs  and to check Depakote level  4.escitalopram 15 mg po q am    5.gabapentin 300 milliGRAM(s) Oral three times a day  6.mirtazapine 15 milliGRAM(s) Oral at bedtime  7. To gather collateral pt clinical history with pt consent   8.Disposition planning ongoing 1.Restarted  Invega   3 mg po q am and 6 mg po qhs for alleviation of psychosis  2Increased standing .chlorproMAZINE  to 50 mg po tid  ( paranoid psychosis0  3.diVALproex  milliGRAM(s) po qhs  and to check Depakote level on 10/31/2020  4.escitalopram to be increased to 20 mg po q am    5.gabapentin 300 milliGRAM(s) Oral three times a day  6.mirtazapine 15 milliGRAM(s) Oral at bedtime  7. To gather collateral pt clinical history with pt consent   8.Disposition planning ongoing

## 2020-10-30 NOTE — PROGRESS NOTE BEHAVIORAL HEALTH - RISK ASSESSMENT
low risk  acute still with auditory hallucinations but now restarted on Invega in hopes of alleviating the pt's paranoid psychosis, feeling depressed and lonely. AH persist but reportedly non command type  mitigating : Pt agreed to enter inpatient hospital safe setting, pt amenable to resume medications, , pt able to form limited selective therapeutic alliance with staff  protective :  pt with help seeking behavior, support of brother and group home ,stable place to live  chronic: prior psych hospitalization ,alcohol use low risk  acute still with auditory hallucinations but now restarted on Invega in hopes of alleviating the pt's paranoid psychosis, feeling depressed and lonely. AH persist but reportedly non command type. Pt beginning to slowly elaborate on his paranoid fears so that staff can work with the pt to decrease the pt's fears  mitigating : Pt agreed to enter inpatient hospital safe setting, pt amenable to resume medications, , pt able to form limited selective therapeutic alliance with staff  protective :  pt with help seeking behavior, support of brother and group home ,stable place to live  chronic: prior psych hospitalization ,alcohol use

## 2020-10-31 LAB
AMMONIA BLD-MCNC: 28 UMOL/L — SIGNIFICANT CHANGE UP (ref 11–32)
FOLATE SERPL-MCNC: >20 NG/ML — SIGNIFICANT CHANGE UP
VALPROATE SERPL-MCNC: 60 UG/ML — SIGNIFICANT CHANGE UP (ref 50–100)
VIT B12 SERPL-MCNC: 641 PG/ML — SIGNIFICANT CHANGE UP (ref 232–1245)

## 2020-10-31 PROCEDURE — 99232 SBSQ HOSP IP/OBS MODERATE 35: CPT

## 2020-10-31 RX ORDER — CHLORPROMAZINE HCL 10 MG
75 TABLET ORAL THREE TIMES A DAY
Refills: 0 | Status: DISCONTINUED | OUTPATIENT
Start: 2020-10-31 | End: 2020-11-03

## 2020-10-31 RX ADMIN — ESCITALOPRAM OXALATE 20 MILLIGRAM(S): 10 TABLET, FILM COATED ORAL at 09:35

## 2020-10-31 RX ADMIN — DIVALPROEX SODIUM 500 MILLIGRAM(S): 500 TABLET, DELAYED RELEASE ORAL at 21:25

## 2020-10-31 RX ADMIN — Medication 81 MILLIGRAM(S): at 09:34

## 2020-10-31 RX ADMIN — PALIPERIDONE 3 MILLIGRAM(S): 1.5 TABLET, EXTENDED RELEASE ORAL at 09:00

## 2020-10-31 RX ADMIN — PALIPERIDONE 6 MILLIGRAM(S): 1.5 TABLET, EXTENDED RELEASE ORAL at 21:25

## 2020-10-31 RX ADMIN — TAMSULOSIN HYDROCHLORIDE 0.4 MILLIGRAM(S): 0.4 CAPSULE ORAL at 21:25

## 2020-10-31 RX ADMIN — MIRTAZAPINE 15 MILLIGRAM(S): 45 TABLET, ORALLY DISINTEGRATING ORAL at 21:24

## 2020-10-31 RX ADMIN — Medication 50 MILLIGRAM(S): at 14:20

## 2020-10-31 RX ADMIN — GABAPENTIN 300 MILLIGRAM(S): 400 CAPSULE ORAL at 21:24

## 2020-10-31 RX ADMIN — Medication 25 MILLIGRAM(S): at 09:35

## 2020-10-31 RX ADMIN — Medication 5 MILLIGRAM(S): at 09:34

## 2020-10-31 RX ADMIN — Medication 50 MILLIGRAM(S): at 09:35

## 2020-10-31 RX ADMIN — Medication 1 PATCH: at 09:35

## 2020-10-31 RX ADMIN — Medication 1 MILLIGRAM(S): at 09:34

## 2020-10-31 RX ADMIN — ATORVASTATIN CALCIUM 20 MILLIGRAM(S): 80 TABLET, FILM COATED ORAL at 21:24

## 2020-10-31 RX ADMIN — Medication 75 MILLIGRAM(S): at 21:24

## 2020-10-31 RX ADMIN — GABAPENTIN 300 MILLIGRAM(S): 400 CAPSULE ORAL at 09:35

## 2020-10-31 RX ADMIN — Medication 1 MILLIGRAM(S): at 21:25

## 2020-10-31 RX ADMIN — GABAPENTIN 300 MILLIGRAM(S): 400 CAPSULE ORAL at 14:20

## 2020-10-31 RX ADMIN — Medication 10 MILLIGRAM(S): at 21:25

## 2020-10-31 RX ADMIN — Medication 5 MILLIGRAM(S): at 14:20

## 2020-10-31 NOTE — PROGRESS NOTE BEHAVIORAL HEALTH - NSBHFUPINTERVALHXFT_PSY_A_CORE
Pt seen in his room briefly with his roommate with whom the pt appears somewhat comfortable and then alone.  The pt reported that the voices were 'a little better' and asked if his Thorazine could be increased  more to help decrease his AH. determined it was 'safe ; to show himself. due to his ongoing paranoia. Pt noted that his newly begun standing Thorazine was helping with paranoia while Invega has a chance to resume it's efficacy. who was able to persuade the pt to remain med adherent in an effort to alleviate the pt's ongoing worsening AH in the context of the pt's h/o entrenched treatment noncompliance. Prior to the pt's current admission on 10/26/2020, the pt reportedly had not been med compliant for the past 6 months.          The pt remains  disheveled, unkempt with poor dentition but less irritable and more overtly anxious and apprehensive seemingly in the context of his ongoing paranoid psychosis. Pt  remains reclusive and socially withdrawn, refusing to attend therapy groups despite ongoing staff support and encouragement. The pt appears distracted and internally preoccupied with poor attention to conversation with others.   The pt is reportedly tolerating his now restarted Invega 3 mg po q am and 6 mg po qhs without dif=de effects. The pt was amenable to resume low dose Depakote , Lexapro, Remeron  and Neurontin as well for affective lability. The pt was also agreeable to having low dose FGA standing Thorazine 50 mg po tid added in the short term to help alleviate the pt's AH while awaiting Invega starkey clinical response. This writer also spoke with the pt as to the possibility of the pt's considering a long acting q monthly Invega Sustenna injection in order to facilitate the pt's compliance with medications and thus, to hopefully decrease the pt's risk of future clinical relapse.       The pt appeared more apprehensive and truly frightened in the context of more marked paranoia. Writer and other staff continuing to reassure the pt as to his overall safety while on the unit.  Pt with better eye contact. More intense and fearful as above. illogical and seemingly internally preoccupied. No chance despite staff efforts to speak with the pt as to problems at the pt's [facility] in [place] or about the pt's recent report of SI as well as the pt's ongoing paranoia and h/o ETOH use d/o . ( BAL< 10 on admission 10 /26/2020 with pt transfer from [facility] to [facility] 5N). The pt will not sustain any efforts to initiate a conversation with him. Unable to assess pt current clinical status more fully pending now restarted Invega trial to decrease pt symptoms of paranoid psychosis.    The pt's judgment remains quite impaired with virtually no insight into his  medical and psychiatric comorbid illnesses and the need for ongoing consistent treatment both in and out of hospital. The pt reported very small initial relief with Invega restarted at prior dose of 3mg po q am and 6 mg po qhs with plan to recommend to the pt a trial of Invega Sustenna  q monthly without need for PO meds in light of the pt's h/o apparent poor med compliance.   Informed consent discussion also begun with the pt as to a pt trial of Buspar  with plan to increase the dose to 10 mg po tid in an effort to alleviate pt anxiety and to serve as an antidepressant adjunct . Writer discussed plan to avoid benzodiazepines  in light of the pt's h/o ETOH use d/o.

## 2020-10-31 NOTE — PROGRESS NOTE BEHAVIORAL HEALTH - OTHER
improving slowly improving with select staff and peers paucity of speech " Not so good. I  feel depressed and paranoid" dysphoric paranoid, persecutory delusional thinking

## 2020-10-31 NOTE — PROGRESS NOTE BEHAVIORAL HEALTH - NSBHCHARTREVIEWVS_PSY_A_CORE FT
Vital Signs Last 24 Hrs  T(C): 36.3 (31 Oct 2020 08:35), Max: 36.3 (31 Oct 2020 08:35)  T(F): 97.4 (31 Oct 2020 08:35), Max: 97.4 (31 Oct 2020 08:35)  HR: --  BP: --  BP(mean): --  RR: --  SpO2: 98% (31 Oct 2020 08:35) (98% - 98%)

## 2020-10-31 NOTE — PROGRESS NOTE BEHAVIORAL HEALTH - RISK ASSESSMENT
low risk  acute still with auditory hallucinations but now restarted on Invega in hopes of alleviating the pt's paranoid psychosis, feeling depressed and lonely. AH persist but reportedly non command type. Pt beginning to slowly elaborate on his paranoid fears so that staff can work with the pt to decrease the pt's fears  mitigating : Pt agreed to enter inpatient hospital safe setting, pt amenable to resume medications, , pt able to form limited selective therapeutic alliance with staff  protective :  pt with help seeking behavior, support of brother and group home ,stable place to live  chronic: prior psych hospitalization ,alcohol use

## 2020-10-31 NOTE — PROGRESS NOTE BEHAVIORAL HEALTH - SUMMARY
The pt is a   60 year-old single WM  s, disabled , unemployed, and [facility], The pt had most recently been discharged from [facility]  after a 6 week stay with a similar pt clinical presentation.             The pt. was BIB  EMS to [facility] on 10/26/2020 for evaluation of pt's worsening paranoid psychosis with reported SI  and ETOH use d/o.  for suicidal thoughts.    The pt was then transferred to [facility] and admitted to  on a 9.13 voluntary legal status for acute safety and for further evaluation and treatment of his schizophrenia and his ETOH use d/o.  Plan  1.Pt restarted Invega 3 mg po q am and 6 mg po qhs  2. Collateral clinical pt history with pt permission  3. Plan to titrate Lexapro and to add low dose Buspar to alleviate the pt's significant anxiety and depression

## 2020-10-31 NOTE — PROGRESS NOTE BEHAVIORAL HEALTH - PROBLEM SELECTOR PLAN 1
1.Restarted  Invega   3 mg po q am and 6 mg po qhs for alleviation of psychosis  2Increased standing .chlorproMAZINE  to 75  mg po tid  ( paranoid psychosis0  3.diVALproex  milliGRAM(s) po qhs  and to check Depakote level on 10/31/2020  4.escitalopram increased to 20 mg po q am    5.gabapentin 300 milliGRAM(s) Oral three times a day  6.mirtazapine 15 milliGRAM(s) Oral at bedtime  7. To gather collateral pt clinical history with pt consent   8.Disposition planning ongoing

## 2020-10-31 NOTE — PROGRESS NOTE BEHAVIORAL HEALTH - PROBLEM SELECTOR PLAN 2
1. Pt encouraged to maintain sobriety after DC from hospital  2.Pt encouraged to attend therapy groups as tolerated with focus on substance use d/o treatment options  3.To continue to recommend AA/ Sponsor after DC from hospital when pt clinically stable  4. Buspar increased to 10  mg po tid (anxiety)

## 2020-11-01 RX ADMIN — DIVALPROEX SODIUM 500 MILLIGRAM(S): 500 TABLET, DELAYED RELEASE ORAL at 21:10

## 2020-11-01 RX ADMIN — Medication 10 MILLIGRAM(S): at 21:11

## 2020-11-01 RX ADMIN — Medication 1 PATCH: at 09:39

## 2020-11-01 RX ADMIN — Medication 10 MILLIGRAM(S): at 09:38

## 2020-11-01 RX ADMIN — Medication 1 PATCH: at 13:18

## 2020-11-01 RX ADMIN — Medication 1 MILLIGRAM(S): at 09:38

## 2020-11-01 RX ADMIN — MIRTAZAPINE 15 MILLIGRAM(S): 45 TABLET, ORALLY DISINTEGRATING ORAL at 21:10

## 2020-11-01 RX ADMIN — Medication 75 MILLIGRAM(S): at 13:16

## 2020-11-01 RX ADMIN — PALIPERIDONE 6 MILLIGRAM(S): 1.5 TABLET, EXTENDED RELEASE ORAL at 21:10

## 2020-11-01 RX ADMIN — Medication 1 PATCH: at 09:41

## 2020-11-01 RX ADMIN — Medication 1 MILLIGRAM(S): at 21:09

## 2020-11-01 RX ADMIN — TAMSULOSIN HYDROCHLORIDE 0.4 MILLIGRAM(S): 0.4 CAPSULE ORAL at 21:10

## 2020-11-01 RX ADMIN — ATORVASTATIN CALCIUM 20 MILLIGRAM(S): 80 TABLET, FILM COATED ORAL at 21:10

## 2020-11-01 RX ADMIN — ESCITALOPRAM OXALATE 20 MILLIGRAM(S): 10 TABLET, FILM COATED ORAL at 09:38

## 2020-11-01 RX ADMIN — Medication 75 MILLIGRAM(S): at 21:09

## 2020-11-01 RX ADMIN — GABAPENTIN 300 MILLIGRAM(S): 400 CAPSULE ORAL at 09:38

## 2020-11-01 RX ADMIN — Medication 10 MILLIGRAM(S): at 13:17

## 2020-11-01 RX ADMIN — Medication 25 MILLIGRAM(S): at 09:38

## 2020-11-01 RX ADMIN — Medication 75 MILLIGRAM(S): at 09:38

## 2020-11-01 RX ADMIN — GABAPENTIN 300 MILLIGRAM(S): 400 CAPSULE ORAL at 21:10

## 2020-11-01 RX ADMIN — GABAPENTIN 300 MILLIGRAM(S): 400 CAPSULE ORAL at 13:17

## 2020-11-01 RX ADMIN — Medication 81 MILLIGRAM(S): at 09:38

## 2020-11-01 RX ADMIN — PALIPERIDONE 3 MILLIGRAM(S): 1.5 TABLET, EXTENDED RELEASE ORAL at 09:39

## 2020-11-02 PROCEDURE — 99232 SBSQ HOSP IP/OBS MODERATE 35: CPT

## 2020-11-02 RX ADMIN — Medication 75 MILLIGRAM(S): at 09:23

## 2020-11-02 RX ADMIN — Medication 1 PATCH: at 18:08

## 2020-11-02 RX ADMIN — ATORVASTATIN CALCIUM 20 MILLIGRAM(S): 80 TABLET, FILM COATED ORAL at 20:41

## 2020-11-02 RX ADMIN — Medication 1 MILLIGRAM(S): at 09:23

## 2020-11-02 RX ADMIN — GABAPENTIN 300 MILLIGRAM(S): 400 CAPSULE ORAL at 09:22

## 2020-11-02 RX ADMIN — GABAPENTIN 300 MILLIGRAM(S): 400 CAPSULE ORAL at 20:41

## 2020-11-02 RX ADMIN — MIRTAZAPINE 15 MILLIGRAM(S): 45 TABLET, ORALLY DISINTEGRATING ORAL at 20:40

## 2020-11-02 RX ADMIN — Medication 75 MILLIGRAM(S): at 12:53

## 2020-11-02 RX ADMIN — GABAPENTIN 300 MILLIGRAM(S): 400 CAPSULE ORAL at 12:53

## 2020-11-02 RX ADMIN — ESCITALOPRAM OXALATE 20 MILLIGRAM(S): 10 TABLET, FILM COATED ORAL at 09:23

## 2020-11-02 RX ADMIN — Medication 75 MILLIGRAM(S): at 20:40

## 2020-11-02 RX ADMIN — Medication 10 MILLIGRAM(S): at 09:23

## 2020-11-02 RX ADMIN — PALIPERIDONE 3 MILLIGRAM(S): 1.5 TABLET, EXTENDED RELEASE ORAL at 09:23

## 2020-11-02 RX ADMIN — Medication 25 MILLIGRAM(S): at 09:22

## 2020-11-02 RX ADMIN — DIVALPROEX SODIUM 500 MILLIGRAM(S): 500 TABLET, DELAYED RELEASE ORAL at 20:42

## 2020-11-02 RX ADMIN — Medication 10 MILLIGRAM(S): at 12:53

## 2020-11-02 RX ADMIN — TAMSULOSIN HYDROCHLORIDE 0.4 MILLIGRAM(S): 0.4 CAPSULE ORAL at 20:41

## 2020-11-02 RX ADMIN — Medication 1 PATCH: at 09:21

## 2020-11-02 RX ADMIN — Medication 1 MILLIGRAM(S): at 20:41

## 2020-11-02 RX ADMIN — Medication 1 PATCH: at 09:26

## 2020-11-02 RX ADMIN — Medication 1 PATCH: at 08:24

## 2020-11-02 RX ADMIN — Medication 10 MILLIGRAM(S): at 20:41

## 2020-11-02 RX ADMIN — Medication 81 MILLIGRAM(S): at 09:23

## 2020-11-02 RX ADMIN — PALIPERIDONE 6 MILLIGRAM(S): 1.5 TABLET, EXTENDED RELEASE ORAL at 20:40

## 2020-11-02 NOTE — PROGRESS NOTE BEHAVIORAL HEALTH - NSBHCHARTREVIEWVS_PSY_A_CORE FT
Vital Signs Last 24 Hrs  T(C): 36.4 (02 Nov 2020 09:17), Max: 36.4 (02 Nov 2020 09:17)  T(F): 97.5 (02 Nov 2020 09:17), Max: 97.5 (02 Nov 2020 09:17)  HR: --  BP: --  BP(mean): --  RR: 18 (02 Nov 2020 09:17) (18 - 18)  SpO2: 100% (02 Nov 2020 09:17) (100% - 100%)

## 2020-11-02 NOTE — PROGRESS NOTE BEHAVIORAL HEALTH - OTHER
more furtive paucity of speech " Not so good. I  feel depressed and paranoid" dysphoric paranoid, persecutory delusional thinking, impoverished

## 2020-11-02 NOTE — PROGRESS NOTE BEHAVIORAL HEALTH - PROBLEM SELECTOR PLAN 1
1.Restarted  Invega   3 mg po q am and 6 mg po qhs for alleviation of psychosis  2Increased standing .chlorproMAZINE  to 100  mg po tid  ( paranoid psychosis0  3.diVALproex  milliGRAM(s) po qhs  and to check Depakote level on 10/31/2020  4.escitalopram increased to 20 mg po q am    5.gabapentin 300 milliGRAM(s) Oral three times a day  6.mirtazapine 15 milliGRAM(s) Oral at bedtime  7. To gather collateral pt clinical history with pt consent   8.Disposition planning ongoing

## 2020-11-02 NOTE — PROGRESS NOTE BEHAVIORAL HEALTH - NSBHFUPINTERVALHXFT_PSY_A_CORE
Pt seen in his room . The pt remains disheveled, unkempt , unshaven and fearful in the context of his ongoing paranoid delusional thinking. The pt continues to remain in his room due to his fixed false beliefs that he is 'safer here in my room where no one can attack me." The pt reported that the voices were 'a little better' and asked if his Thorazine could be increased  more to help decrease his AH. determined it was 'safe ; to show himself. due to his ongoing paranoia. Pt noted that his newly begun standing Thorazine was helping with paranoia while Invega has a chance to resume it's efficacy. who was able to persuade the pt to remain med adherent in an effort to alleviate the pt's ongoing worsening AH in the context of the pt's h/o entrenched treatment noncompliance. Prior to the pt's current admission on 10/26/2020, the pt reportedly had not been med compliant for the past 6 months.          The pt remains  disheveled, unkempt with poor dentition but less irritable and more overtly anxious and apprehensive seemingly in the context of his ongoing paranoid psychosis. Pt  remains reclusive and socially withdrawn, refusing to attend therapy groups despite ongoing staff support and encouragement. The pt appears distracted and internally preoccupied with poor attention to conversation with others.   The pt is reportedly tolerating his now restarted Invega 3 mg po q am and 6 mg po qhs without dif=de effects. The pt was amenable to resume low dose Depakote , Lexapro, Remeron  and Neurontin as well for affective lability. The pt was also agreeable to having low dose FGA standing Thorazine 50 mg po tid added in the short term to help alleviate the pt's AH while awaiting Invega starkey clinical response. This writer also spoke with the pt as to the possibility of the pt's considering a long acting q monthly Invega Sustenna injection in order to facilitate the pt's compliance with medications and thus, to hopefully decrease the pt's risk of future clinical relapse.       The pt appeared more apprehensive and truly frightened in the context of more marked paranoia. Writer and other staff continuing to reassure the pt as to his overall safety while on the unit.  Pt with better eye contact. More intense and fearful as above. illogical and seemingly internally preoccupied. No chance despite staff efforts to speak with the pt as to problems at the pt's [facility] in [place] or about the pt's recent report of SI as well as the pt's ongoing paranoia and h/o ETOH use d/o . ( BAL< 10 on admission 10 /26/2020 with pt transfer from [facility] to [facility]). The pt will not sustain any efforts to initiate a conversation with him. Unable to assess pt current clinical status more fully pending now restarted Invega trial to decrease pt symptoms of paranoid psychosis.    The pt's judgment remains quite impaired with virtually no insight into his  medical and psychiatric comorbid illnesses and the need for ongoing consistent treatment both in and out of hospital. The pt reported very small initial relief with Invega restarted at prior dose of 3mg po q am and 6 mg po qhs with plan to recommend to the pt a trial of Invega Sustenna  q monthly without need for PO meds in light of the pt's h/o apparent poor med compliance.   Informed consent discussion also begun with the pt as to a pt trial of Buspar  with plan to increase the dose to 10 mg po tid in an effort to alleviate pt anxiety and to serve as an antidepressant adjunct . Writer discussed plan to avoid benzodiazepines  in light of the pt's h/o ETOH use d/o.

## 2020-11-02 NOTE — PROGRESS NOTE BEHAVIORAL HEALTH - SUMMARY
The pt is a   60 year-old single WM  s, disabled , unemployed, and [facility], The pt had most recently been discharged from [facility]  after a 6 week stay with a similar pt clinical presentation.             The pt. was BIB  EMS to [facility] on 10/26/2020 for evaluation of pt's worsening paranoid psychosis with reported SI  and ETOH use d/o.  for suicidal thoughts.    The pt was then transferred to [facility] and admitted to  on a 9.13 voluntary legal status for acute safety and for further evaluation and treatment of his schizophrenia and his ETOH use d/o.  Plan  1.Pt restarted Invega 3 mg po q am and 6 mg po qhs  2. Collateral clinical pt history with pt permission  3. Plan to titrate Lexapro and to add low dose Buspar to alleviate the pt's significant anxiety and depression

## 2020-11-03 PROCEDURE — 99232 SBSQ HOSP IP/OBS MODERATE 35: CPT

## 2020-11-03 RX ORDER — CHLORPROMAZINE HCL 10 MG
100 TABLET ORAL THREE TIMES A DAY
Refills: 0 | Status: DISCONTINUED | OUTPATIENT
Start: 2020-11-03 | End: 2020-11-19

## 2020-11-03 RX ADMIN — Medication 10 MILLIGRAM(S): at 09:23

## 2020-11-03 RX ADMIN — Medication 75 MILLIGRAM(S): at 13:51

## 2020-11-03 RX ADMIN — DIVALPROEX SODIUM 500 MILLIGRAM(S): 500 TABLET, DELAYED RELEASE ORAL at 20:08

## 2020-11-03 RX ADMIN — PALIPERIDONE 3 MILLIGRAM(S): 1.5 TABLET, EXTENDED RELEASE ORAL at 09:25

## 2020-11-03 RX ADMIN — Medication 75 MILLIGRAM(S): at 09:24

## 2020-11-03 RX ADMIN — PALIPERIDONE 6 MILLIGRAM(S): 1.5 TABLET, EXTENDED RELEASE ORAL at 22:13

## 2020-11-03 RX ADMIN — ATORVASTATIN CALCIUM 20 MILLIGRAM(S): 80 TABLET, FILM COATED ORAL at 20:07

## 2020-11-03 RX ADMIN — Medication 100 MILLIGRAM(S): at 20:08

## 2020-11-03 RX ADMIN — GABAPENTIN 300 MILLIGRAM(S): 400 CAPSULE ORAL at 13:51

## 2020-11-03 RX ADMIN — Medication 1 MILLIGRAM(S): at 20:08

## 2020-11-03 RX ADMIN — ESCITALOPRAM OXALATE 20 MILLIGRAM(S): 10 TABLET, FILM COATED ORAL at 09:23

## 2020-11-03 RX ADMIN — Medication 1 PATCH: at 09:25

## 2020-11-03 RX ADMIN — Medication 10 MILLIGRAM(S): at 20:07

## 2020-11-03 RX ADMIN — TAMSULOSIN HYDROCHLORIDE 0.4 MILLIGRAM(S): 0.4 CAPSULE ORAL at 20:08

## 2020-11-03 RX ADMIN — Medication 1 PATCH: at 19:00

## 2020-11-03 RX ADMIN — Medication 25 MILLIGRAM(S): at 09:24

## 2020-11-03 RX ADMIN — Medication 81 MILLIGRAM(S): at 09:23

## 2020-11-03 RX ADMIN — Medication 1 PATCH: at 07:25

## 2020-11-03 RX ADMIN — GABAPENTIN 300 MILLIGRAM(S): 400 CAPSULE ORAL at 20:08

## 2020-11-03 RX ADMIN — MIRTAZAPINE 15 MILLIGRAM(S): 45 TABLET, ORALLY DISINTEGRATING ORAL at 20:07

## 2020-11-03 RX ADMIN — Medication 1 MILLIGRAM(S): at 09:23

## 2020-11-03 RX ADMIN — Medication 10 MILLIGRAM(S): at 13:51

## 2020-11-03 RX ADMIN — GABAPENTIN 300 MILLIGRAM(S): 400 CAPSULE ORAL at 09:23

## 2020-11-03 RX ADMIN — Medication 1 PATCH: at 09:24

## 2020-11-03 NOTE — PROGRESS NOTE BEHAVIORAL HEALTH - NSBHFUPINTERVALHXFT_PSY_A_CORE
Pt seen in his room . The pt remains disheveled, unkempt , unshaven and fearful in the context of his ongoing paranoid delusional thinking. The pt continues to remain in his room and frequently hides in the bathroom even though this writer and other staff always announce ourselves to him  prior to and immediately after entering the pt's room. The pt will try stall tactics until 'the coast is clear' " and he feels he can safely reenter his room. The pt reported that the voices were getting a little better and asked if his Thorazine could be increased  more to help decrease his AH. determined it was 'safe ; to show himself. due to his ongoing paranoia. Pt noted that his newly begun standing Thorazine was helping with paranoia while Invega has a chance to resume it's efficacy. who was able to persuade the pt to remain med adherent in an effort to alleviate the pt's ongoing worsening AH in the context of the pt's h/o entrenched treatment noncompliance. Prior to the pt's current admission on 10/26/2020, the pt reportedly had not been med compliant for the past 6 months.     The pt is reportedly tolerating his now restarted Invega 3 mg po q am and 6 mg po qhs without reported side effects. The pt was amenable to resume low dose Depakote , Lexapro, Remeron  and Neurontin as well for affective lability. The pt was also agreeable to having low dose FGA standing Thorazine to be increased to 100 mg po tid added in the short term to help alleviate the pt's AH while awaiting Invega starkey clinical response. This writer also spoke with the pt as to the possibility of the pt's considering a long acting q monthly Invega Sustenna injection in order to facilitate the pt's compliance with medications and thus, to hopefully decrease the pt's risk of future clinical relapse.    The pt's judgment remains quite impaired with virtually no insight into his  medical and psychiatric comorbid illnesses and the need for ongoing consistent treatment both in and out of hospital. The pt reported very small initial relief with Invega restarted at prior dose of 3mg po q am and 6 mg po qhs with plan to recommend to the pt a trial of Invega Sustenna  q monthly without need for PO meds in light of the pt's h/o apparent poor med compliance.   Informed consent discussion also begun with the pt as to a pt trial of Buspar  with plan to increase the dose to 10 mg po tid in an effort to alleviate pt anxiety and to serve as an antidepressant adjunct . Writer discussed plan to avoid benzodiazepines  in light of the pt's h/o ETOH use d/o.   Writer to continue talk with the pt as to COVID-19 CDC guidelines to decrease risk of infection including wearing  a face mask, social distancing and good hand hygiene. Currently, the pt remains to paranoid and psychotic to meaningfully discuss this important public health matter.

## 2020-11-03 NOTE — PROGRESS NOTE BEHAVIORAL HEALTH - SUMMARY
The pt is a   60 year-old single WM  s, disabled , unemployed, and [facility], The pt had most recently been discharged from [facility]  after a 6 week stay with a similar pt clinical presentation.             The pt. was BIB  EMS to [facility] on 10/26/2020 for evaluation of pt's worsening paranoid psychosis with reported SI  and ETOH use d/o.  for suicidal thoughts.    The pt was then transferred to [facility] and admitted to  on a 9.13 voluntary legal status for acute safety and for further evaluation and treatment of his schizophrenia and his ETOH use d/o.  Plan  1.Pt restarted Invega 3 mg po q am and 6 mg po qhs  2. Collateral clinical pt history with pt permission  3. Plan to titrate Lexapro and to add low dose Buspar to alleviate the pt's significant anxiety and depression

## 2020-11-03 NOTE — PROGRESS NOTE BEHAVIORAL HEALTH - NSBHCHARTREVIEWVS_PSY_A_CORE FT
`Vital Signs Last 24 Hrs  T(C): 36.8 (03 Nov 2020 08:57), Max: 36.8 (03 Nov 2020 08:57)  T(F): 98.2 (03 Nov 2020 08:57), Max: 98.2 (03 Nov 2020 08:57)  HR: --  BP: --  BP(mean): --  RR: 14 (03 Nov 2020 08:57) (14 - 14)  SpO2: 100% (03 Nov 2020 08:57) (100% - 100%)

## 2020-11-04 PROCEDURE — 99232 SBSQ HOSP IP/OBS MODERATE 35: CPT

## 2020-11-04 RX ADMIN — GABAPENTIN 300 MILLIGRAM(S): 400 CAPSULE ORAL at 20:02

## 2020-11-04 RX ADMIN — Medication 1 PATCH: at 09:45

## 2020-11-04 RX ADMIN — Medication 100 MILLIGRAM(S): at 09:37

## 2020-11-04 RX ADMIN — Medication 10 MILLIGRAM(S): at 12:18

## 2020-11-04 RX ADMIN — GABAPENTIN 300 MILLIGRAM(S): 400 CAPSULE ORAL at 09:38

## 2020-11-04 RX ADMIN — PALIPERIDONE 3 MILLIGRAM(S): 1.5 TABLET, EXTENDED RELEASE ORAL at 11:56

## 2020-11-04 RX ADMIN — Medication 100 MILLIGRAM(S): at 20:02

## 2020-11-04 RX ADMIN — TAMSULOSIN HYDROCHLORIDE 0.4 MILLIGRAM(S): 0.4 CAPSULE ORAL at 20:02

## 2020-11-04 RX ADMIN — Medication 10 MILLIGRAM(S): at 20:02

## 2020-11-04 RX ADMIN — Medication 10 MILLIGRAM(S): at 09:38

## 2020-11-04 RX ADMIN — PALIPERIDONE 6 MILLIGRAM(S): 1.5 TABLET, EXTENDED RELEASE ORAL at 20:48

## 2020-11-04 RX ADMIN — ESCITALOPRAM OXALATE 20 MILLIGRAM(S): 10 TABLET, FILM COATED ORAL at 09:45

## 2020-11-04 RX ADMIN — Medication 1 PATCH: at 07:33

## 2020-11-04 RX ADMIN — DIVALPROEX SODIUM 500 MILLIGRAM(S): 500 TABLET, DELAYED RELEASE ORAL at 20:02

## 2020-11-04 RX ADMIN — Medication 100 MILLIGRAM(S): at 12:18

## 2020-11-04 RX ADMIN — Medication 25 MILLIGRAM(S): at 09:37

## 2020-11-04 RX ADMIN — Medication 1 MILLIGRAM(S): at 20:02

## 2020-11-04 RX ADMIN — ATORVASTATIN CALCIUM 20 MILLIGRAM(S): 80 TABLET, FILM COATED ORAL at 20:02

## 2020-11-04 RX ADMIN — Medication 1 PATCH: at 18:46

## 2020-11-04 RX ADMIN — Medication 1 MILLIGRAM(S): at 09:37

## 2020-11-04 RX ADMIN — GABAPENTIN 300 MILLIGRAM(S): 400 CAPSULE ORAL at 12:18

## 2020-11-04 RX ADMIN — Medication 81 MILLIGRAM(S): at 09:45

## 2020-11-04 RX ADMIN — MIRTAZAPINE 15 MILLIGRAM(S): 45 TABLET, ORALLY DISINTEGRATING ORAL at 20:02

## 2020-11-04 NOTE — PROGRESS NOTE BEHAVIORAL HEALTH - SUMMARY
The pt is a   60 year-old single WM  s, disabled , unemployed, and [facility], The pt had most recently been discharged from [facility]  after a 6 week stay with a similar pt clinical presentation.             The pt. was BIB  EMS to [facility] on 10/26/2020 for evaluation of pt's worsening paranoid psychosis with reported SI  and ETOH use d/o.  for suicidal thoughts.    The pt was then transferred to [facility] and admitted to  on a 9.13 voluntary legal status for acute safety and for further evaluation and treatment of his schizophrenia and his ETOH use d/o.  Plan  1.Pt restarted Invega 3 mg po q am and 6 mg po qhs  2. Collateral clinical pt history with pt permission  3. Plan to titrate Lexapro and to add low dose Buspar to alleviate the pt's significant anxiety and depression

## 2020-11-04 NOTE — PROGRESS NOTE BEHAVIORAL HEALTH - NSBHFUPINTERVALHXFT_PSY_A_CORE
Pt seen in his  new room . The pt appeared  a bit more attentive to his ADLs and to his grooming with ongoing help and support from hospital staff. The pt continues  fearful in the context of his ongoing paranoid delusional thinking. but he appeared  a bit less tense and apprehensive overall.  The pt continues to remain in his room and is not yet able to attend therapy groups despite ongoing staff support . The pt is tolerating now increased standing Thorazine  100 mg po tid  to help decrease his AH and ongoing impairing paranoia. . Pt noted that his newly begun standing Thorazine is  helping with paranoia while Invega has a chance to resume it's efficacy. who was able to persuade the pt to remain med adherent in an effort to alleviate the pt's ongoing worsening AH in the context of the pt's h/o entrenched treatment noncompliance. Prior to the pt's current admission on 10/26/2020, the pt reportedly had not been med compliant for the past 6 months.     The pt is reportedly tolerating his now restarted Invega 3 mg po q am and 6 mg po qhs without reported side effects. The pt was amenable to resume low dose Depakote , Lexapro, Remeron  and Neurontin as well for affective lability. The pt was also agreeable to having low dose FGA standing Thorazine to be increased to 100 mg po tid added in the short term to help alleviate the pt's AH while awaiting Invega starkey clinical response. This writer also spoke with the pt as to the possibility of the pt's considering a long acting q monthly Invega Sustenna injection in order to facilitate the pt's compliance with medications and thus, to hopefully decrease the pt's risk of future clinical relapse.    The pt's judgment remains quite impaired with virtually no insight into his  medical and psychiatric comorbid illnesses and the need for ongoing consistent treatment both in and out of hospital. The pt reported very small initial relief with Invega restarted at prior dose of 3mg po q am and 6 mg po qhs with plan to recommend to the pt a trial of Invega Sustenna  q monthly without need for PO meds in light of the pt's h/o apparent poor med compliance.   Plan to continue pt trial of Buspar  10 mg po tid in an effort to alleviate pt anxiety and to serve as an antidepressant adjunct . Writer discussed plan to avoid benzodiazepines  in light of the pt's h/o ETOH use d/o.   Writer to continue talk with the pt as to COVID-19 CDC guidelines to decrease risk of infection including wearing  a face mask, social distancing and good hand hygiene. Currently, the pt remains to paranoid and psychotic to meaningfully discuss this important public health matter.

## 2020-11-04 NOTE — PROGRESS NOTE BEHAVIORAL HEALTH - NSBHCHARTREVIEWVS_PSY_A_CORE FT
Vital Signs Last 24 Hrs  T(C): 36.3 (04 Nov 2020 08:30), Max: 36.3 (04 Nov 2020 08:30)  T(F): 97.3 (04 Nov 2020 08:30), Max: 97.3 (04 Nov 2020 08:30)  HR: --  BP: --  BP(mean): --  RR: 14 (04 Nov 2020 08:30) (14 - 14)  SpO2: 100% (04 Nov 2020 08:30) (100% - 100%)

## 2020-11-05 PROCEDURE — 99232 SBSQ HOSP IP/OBS MODERATE 35: CPT

## 2020-11-05 RX ADMIN — ATORVASTATIN CALCIUM 20 MILLIGRAM(S): 80 TABLET, FILM COATED ORAL at 21:25

## 2020-11-05 RX ADMIN — Medication 25 MILLIGRAM(S): at 09:25

## 2020-11-05 RX ADMIN — Medication 1 PATCH: at 09:28

## 2020-11-05 RX ADMIN — Medication 10 MILLIGRAM(S): at 21:25

## 2020-11-05 RX ADMIN — Medication 81 MILLIGRAM(S): at 09:25

## 2020-11-05 RX ADMIN — DIVALPROEX SODIUM 500 MILLIGRAM(S): 500 TABLET, DELAYED RELEASE ORAL at 21:31

## 2020-11-05 RX ADMIN — MIRTAZAPINE 15 MILLIGRAM(S): 45 TABLET, ORALLY DISINTEGRATING ORAL at 21:26

## 2020-11-05 RX ADMIN — GABAPENTIN 300 MILLIGRAM(S): 400 CAPSULE ORAL at 12:39

## 2020-11-05 RX ADMIN — ESCITALOPRAM OXALATE 20 MILLIGRAM(S): 10 TABLET, FILM COATED ORAL at 09:25

## 2020-11-05 RX ADMIN — GABAPENTIN 300 MILLIGRAM(S): 400 CAPSULE ORAL at 09:25

## 2020-11-05 RX ADMIN — Medication 1 PATCH: at 09:25

## 2020-11-05 RX ADMIN — GABAPENTIN 300 MILLIGRAM(S): 400 CAPSULE ORAL at 21:25

## 2020-11-05 RX ADMIN — Medication 10 MILLIGRAM(S): at 09:25

## 2020-11-05 RX ADMIN — PALIPERIDONE 3 MILLIGRAM(S): 1.5 TABLET, EXTENDED RELEASE ORAL at 09:28

## 2020-11-05 RX ADMIN — Medication 100 MILLIGRAM(S): at 21:25

## 2020-11-05 RX ADMIN — PALIPERIDONE 6 MILLIGRAM(S): 1.5 TABLET, EXTENDED RELEASE ORAL at 22:44

## 2020-11-05 RX ADMIN — Medication 1 PATCH: at 17:29

## 2020-11-05 RX ADMIN — Medication 100 MILLIGRAM(S): at 09:25

## 2020-11-05 RX ADMIN — Medication 1 MILLIGRAM(S): at 09:25

## 2020-11-05 RX ADMIN — TAMSULOSIN HYDROCHLORIDE 0.4 MILLIGRAM(S): 0.4 CAPSULE ORAL at 21:25

## 2020-11-05 RX ADMIN — Medication 10 MILLIGRAM(S): at 12:39

## 2020-11-05 RX ADMIN — Medication 1 MILLIGRAM(S): at 21:25

## 2020-11-05 RX ADMIN — Medication 100 MILLIGRAM(S): at 12:39

## 2020-11-05 NOTE — PROGRESS NOTE BEHAVIORAL HEALTH - NSBHCHARTREVIEWVS_PSY_A_CORE FT
Vital Signs Last 24 Hrs  T(C): 36.7 (05 Nov 2020 08:34), Max: 36.7 (05 Nov 2020 08:34)  T(F): 98 (05 Nov 2020 08:34), Max: 98 (05 Nov 2020 08:34)  HR: --  BP: --  BP(mean): --  RR: 16 (05 Nov 2020 08:34) (16 - 16)  SpO2: 98% (05 Nov 2020 08:34) (98% - 98%)

## 2020-11-05 NOTE — PROGRESS NOTE BEHAVIORAL HEALTH - NSBHFUPINTERVALHXFT_PSY_A_CORE
Pt seen in his  room . The pt is dressed, still rather disheveled and unkempt despite ongoing staff efforts to offer the pt support and aid with his ADLs.  The pt continues  fearful in the context of his ongoing paranoid delusional thinking. but he appeared  a bit less tense and apprehensive overall.  The pt continues to remain in his room and is not yet able to attend therapy groups despite ongoing staff support . The pt is tolerating now increased standing Thorazine  100 mg po tid  to help decrease his AH and ongoing impairing paranoia. . Pt noted that his newly begun standing Thorazine is  helping with paranoia while Invega has a chance to resume it's efficacy. who was able to persuade the pt to remain med adherent in an effort to alleviate the pt's ongoing worsening AH in the context of the pt's h/o entrenched treatment noncompliance. Prior to the pt's current admission on 10/26/2020, the pt reportedly had not been med compliant for the past 6 months.     The pt is reportedly tolerating his now restarted Invega 3 mg po q am and 6 mg po qhs without reported side effects. The pt was amenable to resume low dose Depakote , Lexapro, Remeron  and Neurontin as well for affective lability. The pt was also agreeable to having low dose FGA standing Thorazine to be increased to 100 mg po tid added in the short term to help alleviate the pt's AH while awaiting Invega starkey clinical response. This writer also spoke with the pt as to the possibility of the pt's considering a long acting q monthly Invega Sustenna injection in order to facilitate the pt's compliance with medications and thus, to hopefully decrease the pt's risk of future clinical relapse.    The pt's judgment remains quite impaired with virtually no insight into his  medical and psychiatric comorbid illnesses and the need for ongoing consistent treatment both in and out of hospital. The pt reported very small initial relief with Invega restarted at prior dose of 3mg po q am and 6 mg po qhs with plan to recommend to the pt a trial of Invega Sustenna  q monthly without need for PO meds in light of the pt's h/o apparent poor med compliance. Pt not yet convinced as to q monthly long acting medication. Pt stated, " Let me think about it."   Plan to continue pt trial of Buspar  10 mg po tid in an effort to alleviate pt anxiety and to serve as an antidepressant adjunct . Writer discussed plan to avoid benzodiazepines  in light of the pt's h/o ETOH use d/o.   Writer to continue talk with the pt as to COVID-19 CDC guidelines to decrease risk of infection including wearing  a face mask, social distancing and good hand hygiene. Currently, the pt remains to paranoid and psychotic to meaningfully discuss this important public health matter.

## 2020-11-05 NOTE — PROGRESS NOTE BEHAVIORAL HEALTH - PROBLEM SELECTOR PLAN 1
1.Restarted  Invega   3 mg po q am and 6 mg po qhs for alleviation of psychosis  2Increased standing .chlorproMAZINE  to 100  mg po tid  ( paranoid psychosis0  3.diVALproex  milliGRAM(s) po qhs  and to check Depakote level on 10/31/2020  4.escitalopram increased to 20 mg po q am    5.gabapentin 300 milliGRAM(s) Oral three times a day  6.mirtazapine 15 milliGRAM(s) Oral at bedtime  7. To gather collateral pt clinical history with pt consent   8.Disposition planning ongoing with ongoing pt discussions as to staff recommendation for pt long acting Invega Sustenna to decrease pt risk of clinical relapse in the context of treatment noncompliance

## 2020-11-05 NOTE — PROGRESS NOTE BEHAVIORAL HEALTH - SUMMARY
The pt is a   60 year-old single WM  s, disabled , unemployed, and [facility], The pt had most recently been discharged from [facility]  after a 6 week stay with a similar pt clinical presentation.             The pt. was BIB  EMS to [facility] on 10/26/2020 for evaluation of pt's worsening paranoid psychosis with reported SI  and ETOH use d/o.  for suicidal thoughts.    The pt was then transferred to [facility] and admitted to  on a 9.13 voluntary legal status for acute safety and for further evaluation and treatment of his schizophrenia and his ETOH use d/o.  Plan  1.Pt restarted Invega 3 mg po q am and 6 mg po qhs  2. Collateral clinical pt history with pt permission  3. Plan to titrate Lexapro and to add low dose Buspar to alleviate the pt's significant anxiety and depression

## 2020-11-05 NOTE — PROGRESS NOTE BEHAVIORAL HEALTH - OTHER
furtive but very slowly improving paucity of speech but slightly more spontaneous " Ok A little better." still paranoid and apprehensive appearing paranoid, persecutory delusional thinking, impoverished

## 2020-11-06 PROCEDURE — 99232 SBSQ HOSP IP/OBS MODERATE 35: CPT

## 2020-11-06 RX ADMIN — Medication 10 MILLIGRAM(S): at 12:48

## 2020-11-06 RX ADMIN — Medication 25 MILLIGRAM(S): at 09:21

## 2020-11-06 RX ADMIN — Medication 10 MILLIGRAM(S): at 21:04

## 2020-11-06 RX ADMIN — TAMSULOSIN HYDROCHLORIDE 0.4 MILLIGRAM(S): 0.4 CAPSULE ORAL at 21:04

## 2020-11-06 RX ADMIN — DIVALPROEX SODIUM 500 MILLIGRAM(S): 500 TABLET, DELAYED RELEASE ORAL at 21:09

## 2020-11-06 RX ADMIN — ESCITALOPRAM OXALATE 20 MILLIGRAM(S): 10 TABLET, FILM COATED ORAL at 09:21

## 2020-11-06 RX ADMIN — Medication 1 PATCH: at 09:23

## 2020-11-06 RX ADMIN — Medication 81 MILLIGRAM(S): at 09:20

## 2020-11-06 RX ADMIN — Medication 10 MILLIGRAM(S): at 09:21

## 2020-11-06 RX ADMIN — MIRTAZAPINE 15 MILLIGRAM(S): 45 TABLET, ORALLY DISINTEGRATING ORAL at 21:04

## 2020-11-06 RX ADMIN — Medication 1 MILLIGRAM(S): at 09:21

## 2020-11-06 RX ADMIN — Medication 1 MILLIGRAM(S): at 21:09

## 2020-11-06 RX ADMIN — GABAPENTIN 300 MILLIGRAM(S): 400 CAPSULE ORAL at 09:21

## 2020-11-06 RX ADMIN — Medication 100 MILLIGRAM(S): at 09:21

## 2020-11-06 RX ADMIN — PALIPERIDONE 6 MILLIGRAM(S): 1.5 TABLET, EXTENDED RELEASE ORAL at 21:06

## 2020-11-06 RX ADMIN — Medication 100 MILLIGRAM(S): at 12:48

## 2020-11-06 RX ADMIN — GABAPENTIN 300 MILLIGRAM(S): 400 CAPSULE ORAL at 21:04

## 2020-11-06 RX ADMIN — GABAPENTIN 300 MILLIGRAM(S): 400 CAPSULE ORAL at 12:48

## 2020-11-06 RX ADMIN — Medication 100 MILLIGRAM(S): at 21:05

## 2020-11-06 RX ADMIN — Medication 1 PATCH: at 19:58

## 2020-11-06 RX ADMIN — Medication 1 PATCH: at 09:24

## 2020-11-06 RX ADMIN — ATORVASTATIN CALCIUM 20 MILLIGRAM(S): 80 TABLET, FILM COATED ORAL at 21:04

## 2020-11-06 RX ADMIN — PALIPERIDONE 3 MILLIGRAM(S): 1.5 TABLET, EXTENDED RELEASE ORAL at 09:23

## 2020-11-06 NOTE — PROGRESS NOTE BEHAVIORAL HEALTH - SUMMARY
The pt is a   60 year-old single WM  s, disabled , unemployed, [facility], The pt had most recently been discharged from [facility]  after a 6 week stay with a similar pt clinical presentation.             The pt. was BIB  EMS to [facility] on 10/26/2020 for evaluation of pt's worsening paranoid psychosis with reported SI  and ETOH use d/o.  for suicidal thoughts.    The pt was then transferred to [facility] and admitted to  on a 9.13 voluntary legal status for acute safety and for further evaluation and treatment of his schizophrenia and his ETOH use d/o.  Plan  1.Pt restarted Invega 3 mg po q am and 6 mg po qhs  2. Collateral clinical pt history with pt permission  3. Plan to titrate Lexapro and to add low dose Buspar to alleviate the pt's significant anxiety and depression

## 2020-11-06 NOTE — PROGRESS NOTE BEHAVIORAL HEALTH - NSBHFUPINTERVALHXFT_PSY_A_CORE
Pt seen in his  room . The pt is dressed, still disheveled and unkempt despite ongoing staff efforts to offer the pt support and aid with his ADLs.  The pt continues  fearful in the context of his ongoing paranoid delusional thinking. but he appeared  a bit less tense and apprehensive overall.  The pt continues to remain in his room and is not yet able to attend therapy groups despite ongoing staff support . The pt is tolerating now increased standing Thorazine  100 mg po tid  to help decrease his AH and ongoing impairing paranoia. . Pt noted that his newly begun standing Thorazine is  helping with paranoia while Invega has a chance to resume it's efficacy. who was able to persuade the pt to remain med adherent in an effort to alleviate the pt's ongoing worsening AH in the context of the pt's h/o entrenched treatment noncompliance. Prior to the pt's current admission on 10/26/2020, the pt reportedly had not been med compliant for the past 6 months.     The pt is reportedly tolerating his now restarted Invega 3 mg po q am and 6 mg po qhs without reported side effects. The pt was amenable to resume low dose Depakote , Lexapro, Remeron  and Neurontin as well for affective lability. The pt was also agreeable to having low dose FGA standing Thorazine to be increased to 100 mg po tid added in the short term to help alleviate the pt's AH while awaiting Invega starkey clinical response. This writer also spoke with the pt as to the possibility of the pt's considering a long acting q monthly Invega Sustenna injection in order to facilitate the pt's compliance with medications and thus, to hopefully decrease the pt's risk of future clinical relapse.    The pt's judgment remains quite impaired with virtually no insight into his  medical and psychiatric comorbid illnesses and the need for ongoing consistent treatment both in and out of hospital. The pt reported very small initial relief with Invega restarted at prior dose of 3mg po q am and 6 mg po qhs with plan to recommend to the pt a trial of Invega Sustenna  q monthly without need for PO meds in light of the pt's h/o apparent poor med compliance. Pt not yet convinced as to q monthly long acting medication. Pt stated, " Let me think about it."   Plan to continue pt trial of Buspar  10 mg po tid in an effort to alleviate pt anxiety and to serve as an antidepressant adjunct . Writer discussed plan to avoid benzodiazepines  in light of the pt's h/o ETOH use d/o.   Writer to continue talk with the pt as to COVID-19 CDC guidelines to decrease risk of infection including wearing  a face mask, social distancing and good hand hygiene. Currently, the pt remains to paranoid and psychotic to meaningfully discuss this important public health matter. Pt seen in his  room .   PLEASE NOTE- This writer and other hospital staff have continued to attempt to discuss with the pt the treatment recommendation that the pt consent to a long acting antipsychotic medication such as Invega Sustenna q monthly in an effort to decrease the pt's risk of clinical relapse . The pt continues to remain undecided. " I'll have to think about it."  The pt is dressed, still disheveled and unkempt despite ongoing staff efforts to offer the pt support and aid with his ADLs.  The pt continues  fearful in the context of his ongoing paranoid delusional thinking. but he appeared  a bit less tense and apprehensive overall.  The pt continues to remain in his room and is not yet able to attend therapy groups despite ongoing staff support . The pt is tolerating now increased standing Thorazine  100 mg po tid  to help decrease his AH and ongoing impairing paranoia. . Pt noted that his newly begun standing Thorazine is  helping with paranoia while Invega has a chance to resume it's efficacy. who was able to persuade the pt to remain med adherent in an effort to alleviate the pt's ongoing worsening AH in the context of the pt's h/o entrenched treatment noncompliance. Prior to the pt's current admission on 10/26/2020, the pt reportedly had not been med compliant for the past 6 months.     The pt is reportedly tolerating his now restarted Invega 3 mg po q am and 6 mg po qhs without reported side effects. The pt was amenable to resume low dose Depakote , Lexapro, Remeron  and Neurontin as well for affective lability. The pt was also agreeable to having low dose FGA standing Thorazine to be increased to 100 mg po tid added in the short term to help alleviate the pt's AH while awaiting Invega starkey clinical response. This writer also spoke with the pt as to the possibility of the pt's considering a long acting q monthly Invega Sustenna injection in order to facilitate the pt's compliance with medications and thus, to hopefully decrease the pt's risk of future clinical relapse.    The pt's judgment remains quite impaired with virtually no insight into his  medical and psychiatric comorbid illnesses and the need for ongoing consistent treatment both in and out of hospital. The pt reported very small initial relief with Invega restarted at prior dose of 3mg po q am and 6 mg po qhs with plan to recommend to the pt a trial of Invega Sustenna  q monthly without need for PO meds in light of the pt's h/o apparent poor med compliance. Pt not yet convinced as to q monthly long acting medication. Pt stated, " Let me think about it."   Plan to continue pt trial of Buspar  10 mg po tid in an effort to alleviate pt anxiety and to serve as an antidepressant adjunct . Writer discussed plan to avoid benzodiazepines  in light of the pt's h/o ETOH use d/o.   Writer to continue talk with the pt as to COVID-19 CDC guidelines to decrease risk of infection including wearing  a face mask, social distancing and good hand hygiene. Currently, the pt remains to paranoid and psychotic to meaningfully discuss this important public health matter.

## 2020-11-06 NOTE — PROGRESS NOTE BEHAVIORAL HEALTH - NSBHCHARTREVIEWVS_PSY_A_CORE FT
Vital Signs Last 24 Hrs  T(C): 36.4 (06 Nov 2020 08:58), Max: 36.4 (06 Nov 2020 08:58)  T(F): 97.6 (06 Nov 2020 08:58), Max: 97.6 (06 Nov 2020 08:58)  HR: --  BP: --  BP(mean): --  RR: 16 (06 Nov 2020 08:58) (16 - 16)  SpO2: 98% (06 Nov 2020 08:58) (98% - 98%)

## 2020-11-07 RX ADMIN — Medication 1 MILLIGRAM(S): at 09:31

## 2020-11-07 RX ADMIN — Medication 25 MILLIGRAM(S): at 09:30

## 2020-11-07 RX ADMIN — Medication 81 MILLIGRAM(S): at 09:31

## 2020-11-07 RX ADMIN — MIRTAZAPINE 15 MILLIGRAM(S): 45 TABLET, ORALLY DISINTEGRATING ORAL at 20:02

## 2020-11-07 RX ADMIN — PALIPERIDONE 6 MILLIGRAM(S): 1.5 TABLET, EXTENDED RELEASE ORAL at 20:02

## 2020-11-07 RX ADMIN — DIVALPROEX SODIUM 500 MILLIGRAM(S): 500 TABLET, DELAYED RELEASE ORAL at 20:02

## 2020-11-07 RX ADMIN — Medication 1 MILLIGRAM(S): at 20:02

## 2020-11-07 RX ADMIN — Medication 1 PATCH: at 19:32

## 2020-11-07 RX ADMIN — GABAPENTIN 300 MILLIGRAM(S): 400 CAPSULE ORAL at 09:31

## 2020-11-07 RX ADMIN — Medication 100 MILLIGRAM(S): at 09:31

## 2020-11-07 RX ADMIN — Medication 100 MILLIGRAM(S): at 20:03

## 2020-11-07 RX ADMIN — GABAPENTIN 300 MILLIGRAM(S): 400 CAPSULE ORAL at 20:02

## 2020-11-07 RX ADMIN — Medication 100 MILLIGRAM(S): at 12:19

## 2020-11-07 RX ADMIN — GABAPENTIN 300 MILLIGRAM(S): 400 CAPSULE ORAL at 12:19

## 2020-11-07 RX ADMIN — Medication 10 MILLIGRAM(S): at 20:02

## 2020-11-07 RX ADMIN — Medication 1 PATCH: at 09:34

## 2020-11-07 RX ADMIN — Medication 1 PATCH: at 09:30

## 2020-11-07 RX ADMIN — Medication 10 MILLIGRAM(S): at 09:31

## 2020-11-07 RX ADMIN — Medication 10 MILLIGRAM(S): at 12:19

## 2020-11-07 RX ADMIN — ATORVASTATIN CALCIUM 20 MILLIGRAM(S): 80 TABLET, FILM COATED ORAL at 20:02

## 2020-11-07 RX ADMIN — TAMSULOSIN HYDROCHLORIDE 0.4 MILLIGRAM(S): 0.4 CAPSULE ORAL at 20:02

## 2020-11-07 RX ADMIN — PALIPERIDONE 3 MILLIGRAM(S): 1.5 TABLET, EXTENDED RELEASE ORAL at 09:30

## 2020-11-07 RX ADMIN — Medication 1 PATCH: at 07:31

## 2020-11-07 RX ADMIN — ESCITALOPRAM OXALATE 20 MILLIGRAM(S): 10 TABLET, FILM COATED ORAL at 09:31

## 2020-11-08 RX ADMIN — Medication 1 MILLIGRAM(S): at 20:29

## 2020-11-08 RX ADMIN — PALIPERIDONE 6 MILLIGRAM(S): 1.5 TABLET, EXTENDED RELEASE ORAL at 21:14

## 2020-11-08 RX ADMIN — Medication 25 MILLIGRAM(S): at 09:20

## 2020-11-08 RX ADMIN — GABAPENTIN 300 MILLIGRAM(S): 400 CAPSULE ORAL at 13:14

## 2020-11-08 RX ADMIN — PALIPERIDONE 3 MILLIGRAM(S): 1.5 TABLET, EXTENDED RELEASE ORAL at 09:20

## 2020-11-08 RX ADMIN — Medication 100 MILLIGRAM(S): at 09:20

## 2020-11-08 RX ADMIN — Medication 1 PATCH: at 07:24

## 2020-11-08 RX ADMIN — MIRTAZAPINE 15 MILLIGRAM(S): 45 TABLET, ORALLY DISINTEGRATING ORAL at 21:13

## 2020-11-08 RX ADMIN — DIVALPROEX SODIUM 500 MILLIGRAM(S): 500 TABLET, DELAYED RELEASE ORAL at 21:13

## 2020-11-08 RX ADMIN — Medication 100 MILLIGRAM(S): at 20:30

## 2020-11-08 RX ADMIN — Medication 10 MILLIGRAM(S): at 13:14

## 2020-11-08 RX ADMIN — TAMSULOSIN HYDROCHLORIDE 0.4 MILLIGRAM(S): 0.4 CAPSULE ORAL at 21:13

## 2020-11-08 RX ADMIN — Medication 10 MILLIGRAM(S): at 20:30

## 2020-11-08 RX ADMIN — Medication 1 PATCH: at 09:21

## 2020-11-08 RX ADMIN — Medication 100 MILLIGRAM(S): at 13:15

## 2020-11-08 RX ADMIN — Medication 1 MILLIGRAM(S): at 09:20

## 2020-11-08 RX ADMIN — Medication 1 PATCH: at 18:51

## 2020-11-08 RX ADMIN — ESCITALOPRAM OXALATE 20 MILLIGRAM(S): 10 TABLET, FILM COATED ORAL at 09:20

## 2020-11-08 RX ADMIN — Medication 10 MILLIGRAM(S): at 09:20

## 2020-11-08 RX ADMIN — Medication 81 MILLIGRAM(S): at 09:20

## 2020-11-08 RX ADMIN — Medication 1 PATCH: at 09:20

## 2020-11-08 RX ADMIN — ATORVASTATIN CALCIUM 20 MILLIGRAM(S): 80 TABLET, FILM COATED ORAL at 20:30

## 2020-11-08 RX ADMIN — GABAPENTIN 300 MILLIGRAM(S): 400 CAPSULE ORAL at 09:20

## 2020-11-08 RX ADMIN — GABAPENTIN 300 MILLIGRAM(S): 400 CAPSULE ORAL at 20:29

## 2020-11-09 LAB
ALBUMIN SERPL ELPH-MCNC: 3.7 G/DL — SIGNIFICANT CHANGE UP (ref 3.3–5)
ALP SERPL-CCNC: 83 U/L — SIGNIFICANT CHANGE UP (ref 40–120)
ALT FLD-CCNC: 36 U/L — SIGNIFICANT CHANGE UP (ref 12–78)
ANION GAP SERPL CALC-SCNC: 3 MMOL/L — LOW (ref 5–17)
AST SERPL-CCNC: 22 U/L — SIGNIFICANT CHANGE UP (ref 15–37)
BASOPHILS # BLD AUTO: 0.05 K/UL — SIGNIFICANT CHANGE UP (ref 0–0.2)
BASOPHILS NFR BLD AUTO: 0.8 % — SIGNIFICANT CHANGE UP (ref 0–2)
BILIRUB SERPL-MCNC: 0.3 MG/DL — SIGNIFICANT CHANGE UP (ref 0.2–1.2)
BUN SERPL-MCNC: 21 MG/DL — SIGNIFICANT CHANGE UP (ref 7–23)
CALCIUM SERPL-MCNC: 10.5 MG/DL — HIGH (ref 8.5–10.1)
CHLORIDE SERPL-SCNC: 107 MMOL/L — SIGNIFICANT CHANGE UP (ref 96–108)
CO2 SERPL-SCNC: 31 MMOL/L — SIGNIFICANT CHANGE UP (ref 22–31)
CREAT SERPL-MCNC: 1.4 MG/DL — HIGH (ref 0.5–1.3)
EOSINOPHIL # BLD AUTO: 0.26 K/UL — SIGNIFICANT CHANGE UP (ref 0–0.5)
EOSINOPHIL NFR BLD AUTO: 4.1 % — SIGNIFICANT CHANGE UP (ref 0–6)
GLUCOSE SERPL-MCNC: 100 MG/DL — HIGH (ref 70–99)
HCT VFR BLD CALC: 44 % — SIGNIFICANT CHANGE UP (ref 39–50)
HGB BLD-MCNC: 14.8 G/DL — SIGNIFICANT CHANGE UP (ref 13–17)
IMM GRANULOCYTES NFR BLD AUTO: 0.3 % — SIGNIFICANT CHANGE UP (ref 0–1.5)
LYMPHOCYTES # BLD AUTO: 2.28 K/UL — SIGNIFICANT CHANGE UP (ref 1–3.3)
LYMPHOCYTES # BLD AUTO: 36.1 % — SIGNIFICANT CHANGE UP (ref 13–44)
MCHC RBC-ENTMCNC: 30.6 PG — SIGNIFICANT CHANGE UP (ref 27–34)
MCHC RBC-ENTMCNC: 33.6 GM/DL — SIGNIFICANT CHANGE UP (ref 32–36)
MCV RBC AUTO: 91.1 FL — SIGNIFICANT CHANGE UP (ref 80–100)
MONOCYTES # BLD AUTO: 0.8 K/UL — SIGNIFICANT CHANGE UP (ref 0–0.9)
MONOCYTES NFR BLD AUTO: 12.7 % — SIGNIFICANT CHANGE UP (ref 2–14)
NEUTROPHILS # BLD AUTO: 2.91 K/UL — SIGNIFICANT CHANGE UP (ref 1.8–7.4)
NEUTROPHILS NFR BLD AUTO: 46 % — SIGNIFICANT CHANGE UP (ref 43–77)
PLATELET # BLD AUTO: 206 K/UL — SIGNIFICANT CHANGE UP (ref 150–400)
POTASSIUM SERPL-MCNC: 4.4 MMOL/L — SIGNIFICANT CHANGE UP (ref 3.5–5.3)
POTASSIUM SERPL-SCNC: 4.4 MMOL/L — SIGNIFICANT CHANGE UP (ref 3.5–5.3)
PROT SERPL-MCNC: 7.4 GM/DL — SIGNIFICANT CHANGE UP (ref 6–8.3)
RBC # BLD: 4.83 M/UL — SIGNIFICANT CHANGE UP (ref 4.2–5.8)
RBC # FLD: 11.7 % — SIGNIFICANT CHANGE UP (ref 10.3–14.5)
SODIUM SERPL-SCNC: 141 MMOL/L — SIGNIFICANT CHANGE UP (ref 135–145)
VALPROATE SERPL-MCNC: 46 UG/ML — LOW (ref 50–100)
WBC # BLD: 6.32 K/UL — SIGNIFICANT CHANGE UP (ref 3.8–10.5)
WBC # FLD AUTO: 6.32 K/UL — SIGNIFICANT CHANGE UP (ref 3.8–10.5)

## 2020-11-09 PROCEDURE — 99232 SBSQ HOSP IP/OBS MODERATE 35: CPT

## 2020-11-09 RX ORDER — PALIPERIDONE 1.5 MG/1
234 TABLET, EXTENDED RELEASE ORAL ONCE
Refills: 0 | Status: COMPLETED | OUTPATIENT
Start: 2020-11-10 | End: 2020-11-10

## 2020-11-09 RX ORDER — PALIPERIDONE 1.5 MG/1
156 TABLET, EXTENDED RELEASE ORAL ONCE
Refills: 0 | Status: COMPLETED | OUTPATIENT
Start: 2020-11-17 | End: 2020-11-17

## 2020-11-09 RX ADMIN — Medication 1 PATCH: at 09:19

## 2020-11-09 RX ADMIN — DIVALPROEX SODIUM 500 MILLIGRAM(S): 500 TABLET, DELAYED RELEASE ORAL at 20:12

## 2020-11-09 RX ADMIN — TAMSULOSIN HYDROCHLORIDE 0.4 MILLIGRAM(S): 0.4 CAPSULE ORAL at 20:12

## 2020-11-09 RX ADMIN — MIRTAZAPINE 15 MILLIGRAM(S): 45 TABLET, ORALLY DISINTEGRATING ORAL at 20:12

## 2020-11-09 RX ADMIN — PALIPERIDONE 6 MILLIGRAM(S): 1.5 TABLET, EXTENDED RELEASE ORAL at 20:13

## 2020-11-09 RX ADMIN — Medication 10 MILLIGRAM(S): at 13:09

## 2020-11-09 RX ADMIN — Medication 1 PATCH: at 09:20

## 2020-11-09 RX ADMIN — Medication 100 MILLIGRAM(S): at 13:09

## 2020-11-09 RX ADMIN — Medication 100 MILLIGRAM(S): at 20:12

## 2020-11-09 RX ADMIN — Medication 1 PATCH: at 09:18

## 2020-11-09 RX ADMIN — GABAPENTIN 300 MILLIGRAM(S): 400 CAPSULE ORAL at 20:12

## 2020-11-09 RX ADMIN — GABAPENTIN 300 MILLIGRAM(S): 400 CAPSULE ORAL at 09:18

## 2020-11-09 RX ADMIN — Medication 100 MILLIGRAM(S): at 09:18

## 2020-11-09 RX ADMIN — Medication 1 MILLIGRAM(S): at 09:18

## 2020-11-09 RX ADMIN — Medication 25 MILLIGRAM(S): at 09:22

## 2020-11-09 RX ADMIN — GABAPENTIN 300 MILLIGRAM(S): 400 CAPSULE ORAL at 13:09

## 2020-11-09 RX ADMIN — Medication 1 PATCH: at 19:31

## 2020-11-09 RX ADMIN — Medication 10 MILLIGRAM(S): at 20:12

## 2020-11-09 RX ADMIN — ESCITALOPRAM OXALATE 20 MILLIGRAM(S): 10 TABLET, FILM COATED ORAL at 09:18

## 2020-11-09 RX ADMIN — Medication 1 MILLIGRAM(S): at 20:12

## 2020-11-09 RX ADMIN — ATORVASTATIN CALCIUM 20 MILLIGRAM(S): 80 TABLET, FILM COATED ORAL at 20:12

## 2020-11-09 RX ADMIN — PALIPERIDONE 3 MILLIGRAM(S): 1.5 TABLET, EXTENDED RELEASE ORAL at 09:18

## 2020-11-09 RX ADMIN — Medication 81 MILLIGRAM(S): at 09:18

## 2020-11-09 RX ADMIN — Medication 10 MILLIGRAM(S): at 09:18

## 2020-11-09 NOTE — PROGRESS NOTE BEHAVIORAL HEALTH - SUMMARY
The pt is a   60 year-old single WM  s, disabled , unemployed, [facility], The pt had most recently been discharged from [facility]  after a 6 week stay with a similar pt clinical presentation.             The pt. was BIB  EMS to [facility] on 10/26/2020 for evaluation of pt's worsening paranoid psychosis with reported SI  and ETOH use d/o.  for suicidal thoughts.    The pt was then transferred to [facility] and admitted to  on a 9.13 voluntary legal status for acute safety and for further evaluation and treatment of his schizophrenia and his ETOH use d/o.  Plan  1.Pt restarted Invega 3 mg po q am and 6 mg po qhs  2. Collateral clinical pt history with pt permission  3. Plan to titrate Lexapro and to add low dose Buspar to alleviate the pt's significant anxiety and depression

## 2020-11-09 NOTE — PROGRESS NOTE BEHAVIORAL HEALTH - NSBHCHARTREVIEWVS_PSY_A_CORE FT
Vital Signs Last 24 Hrs  T(C): 36.4 (06 Nov 2020 08:58), Max: 36.4 (06 Nov 2020 08:58)  T(F): 97.6 (06 Nov 2020 08:58), Max: 97.6 (06 Nov 2020 08:58)  HR: --  BP: --  BP(mean): --  RR: 16 (06 Nov 2020 08:58) (16 - 16)  SpO2: 98% (06 Nov 2020 08:58) (98% - 98%) Vital Signs Last 24 Hrs  T(C): 36.4 (09 Nov 2020 08:45), Max: 36.4 (09 Nov 2020 08:45)  T(F): 97.6 (09 Nov 2020 08:45), Max: 97.6 (09 Nov 2020 08:45)  HR: --  BP: --  BP(mean): --  RR: 12 (09 Nov 2020 08:45) (12 - 12)  SpO2: 100% (09 Nov 2020 08:45) (100% - 100%)

## 2020-11-09 NOTE — PROGRESS NOTE BEHAVIORAL HEALTH - NSBHCHARTREVIEWLAB_PSY_A_CORE FT
serum NH3 = 28  serum folate > 20  serum B12 = 641 CBC Full  -  ( 09 Nov 2020 08:37 )  WBC Count : 6.32 K/uL  RBC Count : 4.83 M/uL  Hemoglobin : 14.8 g/dL  Hematocrit : 44.0 %  Platelet Count - Automated : 206 K/uL  Mean Cell Volume : 91.1 fl  Mean Cell Hemoglobin : 30.6 pg  Mean Cell Hemoglobin Concentration : 33.6 gm/dL  Auto Neutrophil # : 2.91 K/uL  Auto Lymphocyte # : 2.28 K/uL  Auto Monocyte # : 0.80 K/uL  Auto Eosinophil # : 0.26 K/uL  Auto Basophil # : 0.05 K/uL  Auto Neutrophil % : 46.0 %  Auto Lymphocyte % : 36.1 %  Auto Monocyte % : 12.7 %  Auto Eosinophil % : 4.1 %  Auto Basophil % : 0.8 %    11-09    141  |  107  |  21  ----------------------------<  100<H>  4.4   |  31  |  1.40<H>    Ca    10.5<H>      09 Nov 2020 08:37    TPro  7.4  /  Alb  3.7  /  TBili  0.3  /  DBili  x   /  AST  22  /  ALT  36  /  AlkPhos  83  11-09

## 2020-11-09 NOTE — PROGRESS NOTE BEHAVIORAL HEALTH - NSBHFUPINTERVALHXFT_PSY_A_CORE
Pt seen in his  room . Pt now amenable to begin Invega Sustenna with plan to taper and DC po Invega   This writer and other hospital staff have continued to attempt to discuss with the pt the treatment recommendation that the pt consent to a long acting antipsychotic medication such as Invega Sustenna q monthly in an effort to decrease the pt's risk of clinical relapse . The pt continues to remain undecided. " I'll have to think about it."  The pt is dressed, still disheveled and unkempt despite ongoing staff efforts to offer the pt support and aid with his ADLs.  The pt continues  fearful in the context of his ongoing paranoid delusional thinking. but he appeared  a bit less tense and apprehensive overall.  The pt continues to remain in his room and is not yet able to attend therapy groups despite ongoing staff support . The pt is tolerating now increased standing Thorazine  100 mg po tid  to help decrease his AH and ongoing impairing paranoia. . Pt noted that his newly begun standing Thorazine is  helping with paranoia while Invega has a chance to resume it's efficacy. who was able to persuade the pt to remain med adherent in an effort to alleviate the pt's ongoing worsening AH in the context of the pt's h/o entrenched treatment noncompliance. Prior to the pt's current admission on 10/26/2020, the pt reportedly had not been med compliant for the past 6 months.     The pt is reportedly tolerating his now restarted Invega 3 mg po q am and 6 mg po qhs without reported side effects. The pt was amenable to resume low dose Depakote , Lexapro, Remeron  and Neurontin as well for affective lability. The pt was also agreeable to having low dose FGA standing Thorazine to be increased to 100 mg po tid added in the short term to help alleviate the pt's AH while awaiting Invega starkey clinical response. This writer also spoke with the pt as to the possibility of the pt's considering a long acting q monthly Invega Sustenna injection in order to facilitate the pt's compliance with medications and thus, to hopefully decrease the pt's risk of future clinical relapse.    The pt's judgment remains quite impaired with virtually no insight into his  medical and psychiatric comorbid illnesses and the need for ongoing consistent treatment both in and out of hospital. The pt reported very small initial relief with Invega restarted at prior dose of 3mg po q am and 6 mg po qhs with plan to recommend to the pt a trial of Invega Sustenna  q monthly without need for PO meds in light of the pt's h/o apparent poor med compliance. Pt not yet convinced as to q monthly long acting medication. Pt stated, " Let me think about it."   Plan to continue pt trial of Buspar  10 mg po tid in an effort to alleviate pt anxiety and to serve as an antidepressant adjunct . Writer discussed plan to avoid benzodiazepines  in light of the pt's h/o ETOH use d/o.   Writer to continue talk with the pt as to COVID-19 CDC guidelines to decrease risk of infection including wearing  a face mask, social distancing and good hand hygiene. Currently, the pt remains to paranoid and psychotic to meaningfully discuss this important public health matter. Pt seen in his  room . Pt now amenable to begin Invega Sustenna with plan to taper and DC po Invega after 2 weeks. The pt was amenable to begin loading dose of 234 mg IM Invega Sustenna on 11/10/2020 with second dose in 1 week of 156 mg IM  followed by q 4 weekly  mg dose to alleviate pt symptoms of paranoid schizophrenia in  the context of a h/o treatment noncompliance.   This writer and other hospital staff have continued to attempt to discuss with the pt the treatment recommendation that the pt consent to a long acting antipsychotic medication such as Invega Sustenna q monthly in an effort to decrease the pt's risk of clinical relapse . The pt continues to remain undecided. " I'll have to think about it."  The pt is dressed, still disheveled and unkempt despite ongoing staff efforts to offer the pt support and aid with his ADLs.  The pt continues  fearful in the context of his ongoing paranoid delusional thinking. but he appeared  a bit less tense and apprehensive overall.  The pt continues to remain in his room and is not yet able to attend therapy groups despite ongoing staff support . The pt is tolerating now increased standing Thorazine  100 mg po tid  to help decrease his AH and ongoing impairing paranoia. . Pt noted that his newly begun standing Thorazine is  helping with paranoia while Invega has a chance to resume it's efficacy. who was able to persuade the pt to remain med adherent in an effort to alleviate the pt's ongoing worsening AH in the context of the pt's h/o entrenched treatment noncompliance. Prior to the pt's current admission on 10/26/2020, the pt reportedly had not been med compliant for the past 6 months.     The pt is reportedly tolerating his now restarted Invega 3 mg po q am and 6 mg po qhs without reported side effects. The pt was amenable to resume low dose Depakote , Lexapro, Remeron  and Neurontin as well for affective lability. The pt was also agreeable to having low dose FGA standing Thorazine to be increased to 100 mg po tid added in the short term to help alleviate the pt's AH while awaiting Invega starkey clinical response. This writer also spoke with the pt as to the possibility of the pt's considering a long acting q monthly Invega Sustenna injection in order to facilitate the pt's compliance with medications and thus, to hopefully decrease the pt's risk of future clinical relapse.    The pt's judgment remains quite impaired with virtually no insight into his  medical and psychiatric comorbid illnesses and the need for ongoing consistent treatment both in and out of hospital. The pt reported very small initial relief with Invega restarted at prior dose of 3mg po q am and 6 mg po qhs with plan to recommend to the pt a trial of Invega Sustenna  q monthly without need for PO meds in light of the pt's h/o apparent poor med compliance. Pt not yet convinced as to q monthly long acting medication. Pt stated, " Let me think about it."   Plan to continue pt trial of Buspar  10 mg po tid in an effort to alleviate pt anxiety and to serve as an antidepressant adjunct . Writer discussed plan to avoid benzodiazepines  in light of the pt's h/o ETOH use d/o.   Writer to continue talk with the pt as to COVID-19 CDC guidelines to decrease risk of infection including wearing  a face mask, social distancing and good hand hygiene. Currently, the pt remains to paranoid and psychotic to meaningfully discuss this important public health matter.

## 2020-11-09 NOTE — PROGRESS NOTE BEHAVIORAL HEALTH - PROBLEM SELECTOR PLAN 1
1.Restarted  Invega   3 mg po q am and 6 mg po qhs for alleviation of psychosis  2Increased standing .chlorproMAZINE  to 100  mg po tid  ( paranoid psychosis0  3.diVALproex  milliGRAM(s) po qhs  and to check Depakote level on 10/31/2020  4.escitalopram increased to 20 mg po q am    5.gabapentin 300 milliGRAM(s) Oral three times a day  6.mirtazapine 15 milliGRAM(s) Oral at bedtime  7. To gather collateral pt clinical history with pt consent   8.Disposition planning ongoing with ongoing pt discussions as to staff recommendation for pt long acting Invega Sustenna to decrease pt risk of clinical relapse in the context of treatment noncompliance 1.Restarted  Invega   3 mg po q am and 6 mg po qhs for alleviation of psychosis  2Increased standing .chlorproMAZINE  to 100  mg po tid  ( paranoid psychosis0  3.diVALproex  milliGRAM(s) po qhs  and to check Depakote level on 10/31/2020  4.escitalopram increased to 20 mg po q am    5.gabapentin 300 milliGRAM(s) Oral three times a day  6.mirtazapine 15 milliGRAM(s) Oral at bedtime  7. To gather collateral pt clinical history with pt consent   8.Disposition planning ongoing with ongoing pt discussions as to staff   9.Invega Sustenna 234 mg IM on 11/10/2020  10. Invega Sustenna 156 mg IM in 1 week on 1/17/2020  to decrease pt risk of clinical relapse in the context of treatment noncompliance

## 2020-11-09 NOTE — PROGRESS NOTE BEHAVIORAL HEALTH - RISK ASSESSMENT
low risk  acute still with auditory hallucinations but now restarted on Invega in hopes of alleviating the pt's paranoid psychosis, feeling depressed and lonely. AH persist but reportedly non command type. Pt beginning to slowly elaborate on his paranoid fears so that staff can work with the pt to decrease the pt's fears  mitigating : Pt agreed to enter inpatient hospital safe setting, pt amenable to resume medications, , pt able to form limited selective therapeutic alliance with staff  protective :  pt with help seeking behavior, support of brother and group home ,stable place to live  chronic: prior psych hospitalization ,alcohol use low risk  acute now with a DC of  auditory hallucinations but with ongoing paranoia  now restarted on Invega in hopes of alleviating the pt's paranoid psychosis, feeling depressed and lonely. AH persist but reportedly non command type. Pt beginning to slowly elaborate on his paranoid fears so that staff can work with the pt to decrease the pt's fears  mitigating : Pt agreed to enter inpatient hospital safe setting, pt amenable to resume medications, , pt able to form limited selective therapeutic alliance with staff, pt now agreeing to Invega Sustenna IM to help alleviate the pt's longstanding paranoid schizophrenia    protective :  pt with help seeking behavior, support of brother and group home ,stable place to live  chronic: prior psych hospitalization ,alcohol use

## 2020-11-10 PROCEDURE — 99232 SBSQ HOSP IP/OBS MODERATE 35: CPT

## 2020-11-10 RX ADMIN — Medication 100 MILLIGRAM(S): at 09:49

## 2020-11-10 RX ADMIN — Medication 100 MILLIGRAM(S): at 12:54

## 2020-11-10 RX ADMIN — GABAPENTIN 300 MILLIGRAM(S): 400 CAPSULE ORAL at 20:48

## 2020-11-10 RX ADMIN — Medication 1 PATCH: at 09:51

## 2020-11-10 RX ADMIN — Medication 1 MILLIGRAM(S): at 09:49

## 2020-11-10 RX ADMIN — TAMSULOSIN HYDROCHLORIDE 0.4 MILLIGRAM(S): 0.4 CAPSULE ORAL at 20:48

## 2020-11-10 RX ADMIN — PALIPERIDONE 6 MILLIGRAM(S): 1.5 TABLET, EXTENDED RELEASE ORAL at 20:47

## 2020-11-10 RX ADMIN — PALIPERIDONE 234 MILLIGRAM(S): 1.5 TABLET, EXTENDED RELEASE ORAL at 11:07

## 2020-11-10 RX ADMIN — Medication 1 PATCH: at 07:00

## 2020-11-10 RX ADMIN — GABAPENTIN 300 MILLIGRAM(S): 400 CAPSULE ORAL at 09:49

## 2020-11-10 RX ADMIN — Medication 81 MILLIGRAM(S): at 09:48

## 2020-11-10 RX ADMIN — ESCITALOPRAM OXALATE 20 MILLIGRAM(S): 10 TABLET, FILM COATED ORAL at 09:49

## 2020-11-10 RX ADMIN — Medication 10 MILLIGRAM(S): at 20:48

## 2020-11-10 RX ADMIN — Medication 10 MILLIGRAM(S): at 09:49

## 2020-11-10 RX ADMIN — PALIPERIDONE 3 MILLIGRAM(S): 1.5 TABLET, EXTENDED RELEASE ORAL at 09:48

## 2020-11-10 RX ADMIN — Medication 25 MILLIGRAM(S): at 09:48

## 2020-11-10 RX ADMIN — Medication 10 MILLIGRAM(S): at 12:54

## 2020-11-10 RX ADMIN — ATORVASTATIN CALCIUM 20 MILLIGRAM(S): 80 TABLET, FILM COATED ORAL at 20:48

## 2020-11-10 RX ADMIN — Medication 1 MILLIGRAM(S): at 20:48

## 2020-11-10 RX ADMIN — Medication 1 PATCH: at 09:49

## 2020-11-10 RX ADMIN — MIRTAZAPINE 15 MILLIGRAM(S): 45 TABLET, ORALLY DISINTEGRATING ORAL at 20:49

## 2020-11-10 RX ADMIN — Medication 100 MILLIGRAM(S): at 20:49

## 2020-11-10 RX ADMIN — GABAPENTIN 300 MILLIGRAM(S): 400 CAPSULE ORAL at 12:54

## 2020-11-10 RX ADMIN — DIVALPROEX SODIUM 500 MILLIGRAM(S): 500 TABLET, DELAYED RELEASE ORAL at 20:48

## 2020-11-10 NOTE — PROGRESS NOTE BEHAVIORAL HEALTH - NSBHCHARTREVIEWLAB_PSY_A_CORE FT
CBC Full  -  ( 09 Nov 2020 08:37 )  WBC Count : 6.32 K/uL  RBC Count : 4.83 M/uL  Hemoglobin : 14.8 g/dL  Hematocrit : 44.0 %  Platelet Count - Automated : 206 K/uL  Mean Cell Volume : 91.1 fl  Mean Cell Hemoglobin : 30.6 pg  Mean Cell Hemoglobin Concentration : 33.6 gm/dL  Auto Neutrophil # : 2.91 K/uL  Auto Lymphocyte # : 2.28 K/uL  Auto Monocyte # : 0.80 K/uL  Auto Eosinophil # : 0.26 K/uL  Auto Basophil # : 0.05 K/uL  Auto Neutrophil % : 46.0 %  Auto Lymphocyte % : 36.1 %  Auto Monocyte % : 12.7 %  Auto Eosinophil % : 4.1 %  Auto Basophil % : 0.8 %    11-09    141  |  107  |  21  ----------------------------<  100<H>  4.4   |  31  |  1.40<H>    Ca    10.5<H>      09 Nov 2020 08:37    TPro  7.4  /  Alb  3.7  /  TBili  0.3  /  DBili  x   /  AST  22  /  ALT  36  /  AlkPhos  83  11-09

## 2020-11-10 NOTE — PROGRESS NOTE BEHAVIORAL HEALTH - PROBLEM SELECTOR PLAN 1
1.Restarted  Invega   3 mg po q am and 6 mg po qhs for alleviation of psychosis  2Increased standing .chlorproMAZINE  to 100  mg po tid  ( paranoid psychosis0  3.diVALproex  milliGRAM(s) po qhs  and to check Depakote level on 10/31/2020  4.escitalopram increased to 20 mg po q am    5.gabapentin 300 milliGRAM(s) Oral three times a day  6.mirtazapine 15 milliGRAM(s) Oral at bedtime  7. To gather collateral pt clinical history with pt consent   8.Disposition planning ongoing with ongoing pt discussions as to staff   9.Invega Sustenna 234 mg IM on 11/10/2020  10. Invega Sustenna 156 mg IM in 1 week on 1/17/2020  to decrease pt risk of clinical relapse in the context of treatment noncompliance

## 2020-11-10 NOTE — PROGRESS NOTE BEHAVIORAL HEALTH - SUMMARY
The pt is a   60 year-old single WM  s, disabled , unemployed, [facility], The pt had most recently been discharged from [facility]  after a 6 week stay with a similar pt clinical presentation.             The pt. was BIB  EMS to [facility] on 10/26/2020 for evaluation of pt's worsening paranoid psychosis with reported SI  and ETOH use d/o.  for suicidal thoughts.    The pt was then transferred to [facility] and admitted to  on a 9.13 voluntary legal status for acute safety and for further evaluation and treatment of his schizophrenia and his ETOH use d/o.  Plan  1.Pt restarted Invega 3 mg po q am and 6 mg po qhs  2. Collateral clinical pt history with pt permission  3. Plan to titrate Lexapro and to add low dose Buspar to alleviate the pt's significant anxiety and depression

## 2020-11-10 NOTE — PROGRESS NOTE BEHAVIORAL HEALTH - NSBHCHARTREVIEWVS_PSY_A_CORE FT
Vital Signs Last 24 Hrs  T(C): 36.4 (10 Nov 2020 08:11), Max: 36.4 (10 Nov 2020 08:11)  T(F): 97.5 (10 Nov 2020 08:11), Max: 97.5 (10 Nov 2020 08:11)  HR: --  BP: --  BP(mean): --  RR: 16 (10 Nov 2020 08:11) (16 - 16)  SpO2: 98% (10 Nov 2020 08:11) (98% - 98%)

## 2020-11-10 NOTE — PROGRESS NOTE BEHAVIORAL HEALTH - RISK ASSESSMENT
low risk with better overall prognosis now that the pt has agreed to allow for Invega Sustenna lomg acting IM treatment in an effort to improve pt treatment adherence with the goal of decreasing his risk for clinical relapse  acute now with a DC of  auditory hallucinations but with ongoing paranoia  now restarted on Invega in hopes of alleviating the pt's paranoid psychosis, feeling depressed and lonely. AH persist but reportedly non command type. Pt beginning to slowly elaborate on his paranoid fears so that staff can work with the pt to decrease the pt's fears  mitigating : Pt agreed to enter inpatient hospital safe setting, pt amenable to resume medications, , pt able to form limited selective therapeutic alliance with staff, pt now agreeing to Invega Sustenna IM to help alleviate the pt's longstanding paranoid schizophrenia    protective :  pt with help seeking behavior, support of brother and group home ,stable place to live  chronic: prior psych hospitalization ,alcohol use

## 2020-11-10 NOTE — PROGRESS NOTE BEHAVIORAL HEALTH - NSBHFUPINTERVALHXFT_PSY_A_CORE
Pt seen in his  room .Pt sitting alone and looking at a puzzle book. Pt remains suspicious, guarded and hypervigilant with accentuated startle response. Pt with effort to smile but appearing tense and mistrustful.  Pt now amenable to begin Invega Sustenna with plan to taper and DC po Invega after 2 weeks. The pt was amenable to begin loading dose of 234 mg IM Invega Sustenna on 11/10/2020 with second dose in 1 week of 156 mg IM  followed by q 4 weekly  mg dose to alleviate pt symptoms of paranoid schizophrenia in  the context of a h/o treatment noncompliance.   The pt did now accept his Invega Sustenna  mg injection on 11/10/2020 with plan discussed with the pt to complete the loading dose with a second dose of Invega Sustenna 156 mg IM in 1 week, followed by q monthly IM Invega Sustenna 234 mg and with plan to DC PO Invega tabs after 2 weeks.  The pt continues to remain undecided. " I'll have to think about it."  The pt is dressed, still disheveled and unkempt despite ongoing staff efforts to offer the pt support and aid with his ADLs.  The pt continues  fearful in the context of his ongoing paranoid delusional thinking. but he appeared  a bit less tense and apprehensive overall.  The pt continues to remain in his room and is not yet able to attend therapy groups despite ongoing staff support . The pt is tolerating now increased standing Thorazine  100 mg po tid  to help decrease his AH and ongoing impairing paranoia. . Pt noted that his newly begun standing Thorazine is  helping with paranoia while Invega has a chance to resume it's efficacy. who was able to persuade the pt to remain med adherent in an effort to alleviate the pt's ongoing worsening AH in the context of the pt's h/o entrenched treatment noncompliance. Prior to the pt's current admission on 10/26/2020, the pt reportedly had not been med compliant for the past 6 months.     The pt is reportedly tolerating his now restarted Invega 3 mg po q am and 6 mg po qhs without reported side effects. The pt was amenable to resume low dose Depakote , Lexapro, Remeron  and Neurontin as well for affective lability. The pt was also agreeable to having low dose FGA standing Thorazine to be increased to 100 mg po tid added in the short term to help alleviate the pt's AH while awaiting Invega starkey clinical response.    The pt's judgment remains quite impaired with virtually no insight into his  medical and psychiatric comorbid illnesses and the need for ongoing consistent treatment both in and out of hospital.    Plan to continue pt trial of Buspar  10 mg po tid in an effort to alleviate pt anxiety and to serve as an antidepressant adjunct . Writer discussed plan to avoid benzodiazepines  in light of the pt's h/o ETOH use d/o.   Writer to continue talk with the pt as to COVID-19 CDC guidelines to decrease risk of infection including wearing  a face mask, social distancing and good hand hygiene. Currently, the pt remains to paranoid and psychotic to meaningfully discuss this important public health matter.

## 2020-11-11 PROCEDURE — 99232 SBSQ HOSP IP/OBS MODERATE 35: CPT

## 2020-11-11 RX ADMIN — Medication 1 MILLIGRAM(S): at 21:14

## 2020-11-11 RX ADMIN — Medication 10 MILLIGRAM(S): at 21:15

## 2020-11-11 RX ADMIN — Medication 100 MILLIGRAM(S): at 12:45

## 2020-11-11 RX ADMIN — Medication 10 MILLIGRAM(S): at 08:55

## 2020-11-11 RX ADMIN — Medication 1 PATCH: at 07:51

## 2020-11-11 RX ADMIN — DIVALPROEX SODIUM 500 MILLIGRAM(S): 500 TABLET, DELAYED RELEASE ORAL at 21:14

## 2020-11-11 RX ADMIN — Medication 25 MILLIGRAM(S): at 08:56

## 2020-11-11 RX ADMIN — TAMSULOSIN HYDROCHLORIDE 0.4 MILLIGRAM(S): 0.4 CAPSULE ORAL at 21:14

## 2020-11-11 RX ADMIN — PALIPERIDONE 6 MILLIGRAM(S): 1.5 TABLET, EXTENDED RELEASE ORAL at 21:14

## 2020-11-11 RX ADMIN — MIRTAZAPINE 15 MILLIGRAM(S): 45 TABLET, ORALLY DISINTEGRATING ORAL at 21:14

## 2020-11-11 RX ADMIN — Medication 100 MILLIGRAM(S): at 21:15

## 2020-11-11 RX ADMIN — Medication 1 MILLIGRAM(S): at 08:55

## 2020-11-11 RX ADMIN — ESCITALOPRAM OXALATE 20 MILLIGRAM(S): 10 TABLET, FILM COATED ORAL at 08:56

## 2020-11-11 RX ADMIN — ATORVASTATIN CALCIUM 20 MILLIGRAM(S): 80 TABLET, FILM COATED ORAL at 21:15

## 2020-11-11 RX ADMIN — GABAPENTIN 300 MILLIGRAM(S): 400 CAPSULE ORAL at 08:55

## 2020-11-11 RX ADMIN — Medication 81 MILLIGRAM(S): at 08:56

## 2020-11-11 RX ADMIN — PALIPERIDONE 3 MILLIGRAM(S): 1.5 TABLET, EXTENDED RELEASE ORAL at 08:55

## 2020-11-11 RX ADMIN — Medication 10 MILLIGRAM(S): at 12:45

## 2020-11-11 RX ADMIN — GABAPENTIN 300 MILLIGRAM(S): 400 CAPSULE ORAL at 12:45

## 2020-11-11 RX ADMIN — GABAPENTIN 300 MILLIGRAM(S): 400 CAPSULE ORAL at 21:14

## 2020-11-11 RX ADMIN — Medication 1 PATCH: at 08:56

## 2020-11-11 RX ADMIN — Medication 1 PATCH: at 08:57

## 2020-11-11 RX ADMIN — Medication 100 MILLIGRAM(S): at 08:56

## 2020-11-11 NOTE — PROGRESS NOTE BEHAVIORAL HEALTH - OTHER
furtive, wary  but very slowly improving in clipped, rushed frightened spurts " Terrible." still paranoid and apprehensive appearing paranoid, persecutory delusional thinking, impoverished

## 2020-11-11 NOTE — PROGRESS NOTE BEHAVIORAL HEALTH - NSBHFUPINTERVALHXFT_PSY_A_CORE
Pt seen in his  room  The pt appeared  more overtly anxious, tense and paranoid  with frightened eye contact though nothing had apparently changed in his environment or as to  his clinical status since 24 hours earlier when the pt had reported feeling a little better. .The pt appeared suspicious and apprehensive as to his current med regimen despite the fact that the only change was in having the pt begin Invega Sustenna  as above in an effort to improve pt treatment compliance with the hope of decreasing the pt's risk of clinical relapse and need for readmission to hospital. Pt remains suspicious, guarded and hypervigilant with accentuated startle response. Pt with effort to smile but appearing tense and mistrustful.  Pt received his first dose of  Invega Sustenna on 11/10/2020  with plan to taper and DC po Invega after 2 weeks after second 1/2 of loading dose is completed in 1 week. . The pt was amenable to begin loading dose of 234 mg IM Invega Sustenna on 11/10/2020 with second dose in 1 week of 156 mg IM  followed by q 4 weekly  mg dose to alleviate pt symptoms of paranoid schizophrenia in  the context of a h/o treatment noncompliance.   The pt did now accept his Invega Sustenna  mg injection on 11/10/2020 with plan discussed with the pt to complete the loading dose with a second dose of Invega Sustenna 156 mg IM in 1 week, followed by q monthly IM Invega Sustenna 234 mg and with plan to DC PO Invega tabs after 2 weeks.  The pt continues to remain undecided. " I'll have to think about it."  The pt is dressed, still disheveled and unkempt despite ongoing staff efforts to offer the pt support and aid with his ADLs.  The pt continues  fearful in the context of his ongoing paranoid delusional thinking. but he appeared  a bit less tense and apprehensive overall.  The pt continues to remain in his room and is not yet able to attend therapy groups despite ongoing staff support . The pt is tolerating now increased standing Thorazine  100 mg po tid  to help decrease his AH and ongoing impairing paranoia. . Pt noted that his newly begun standing Thorazine is  helping with paranoia while Invega has a chance to resume it's efficacy. who was able to persuade the pt to remain med adherent in an effort to alleviate the pt's ongoing worsening AH in the context of the pt's h/o entrenched treatment noncompliance. Prior to the pt's current admission on 10/26/2020, the pt reportedly had not been med compliant for the past 6 months.     The pt is reportedly tolerating his now restarted Invega 3 mg po q am and 6 mg po qhs without reported side effects. The pt was amenable to resume low dose Depakote , Lexapro, Remeron  and Neurontin as well for affective lability. The pt was also agreeable to having low dose FGA standing Thorazine to be increased to 100 mg po tid added in the short term to help alleviate the pt's AH while awaiting Invega starkey clinical response.    The pt's judgment remains quite impaired with virtually no insight into his  medical and psychiatric comorbid illnesses and the need for ongoing consistent treatment both in and out of hospital.    Plan to continue pt trial of Buspar  10 mg po tid in an effort to alleviate pt anxiety and to serve as an antidepressant adjunct . Writer discussed plan to avoid benzodiazepines  in light of the pt's h/o ETOH use d/o.   Writer to continue talk with the pt as to COVID-19 CDC guidelines to decrease risk of infection including wearing  a face mask, social distancing and good hand hygiene. Currently, the pt remains to paranoid and psychotic to meaningfully discuss this important public health matter.

## 2020-11-11 NOTE — PROGRESS NOTE BEHAVIORAL HEALTH - RISK ASSESSMENT
low risk with better overall prognosis now that the pt has agreed to allow for Invega Sustenna long acting IM treatment in an effort to improve pt treatment adherence with the goal of decreasing his risk for clinical relapse. Pt chronic severe paranoia continues to impede clinical progress, but new trial of Invega Sustenna is a hopeful start   acute now with a DC of  auditory hallucinations but with ongoing paranoia  now restarted on Invega in hopes of alleviating the pt's paranoid psychosis, feeling depressed and lonely. AH persist but reportedly non command type. Pt beginning to slowly elaborate on his paranoid fears so that staff can work with the pt to decrease the pt's fears  mitigating : Pt agreed to enter inpatient hospital safe setting, pt amenable to resume medications, , pt able to form limited selective therapeutic alliance with staff, pt now agreeing to Invega Sustenna IM to help alleviate the pt's longstanding paranoid schizophrenia    protective :  pt with help seeking behavior, support of brother and group home ,stable place to live  chronic: prior psych hospitalization ,alcohol use

## 2020-11-11 NOTE — PROGRESS NOTE BEHAVIORAL HEALTH - NSBHCHARTREVIEWVS_PSY_A_CORE FT
Vital Signs Last 24 Hrs  T(C): 36.5 (11 Nov 2020 07:54), Max: 36.5 (11 Nov 2020 07:54)  T(F): 97.7 (11 Nov 2020 07:54), Max: 97.7 (11 Nov 2020 07:54)  HR: --  BP: --  BP(mean): --  RR: 18 (11 Nov 2020 07:54) (18 - 18)  SpO2: 98% (11 Nov 2020 07:54) (98% - 98%)

## 2020-11-11 NOTE — PROGRESS NOTE BEHAVIORAL HEALTH - SUMMARY
The pt is a   60 year-old single WM  s, disabled , unemployed, and [facility], The pt had most recently been discharged from [facility]  after a 6 week stay with a similar pt clinical presentation.             The pt. was BIB  EMS to [facility] on 10/26/2020 for evaluation of pt's worsening paranoid psychosis with reported SI  and ETOH use d/o.  for suicidal thoughts.    The pt was then transferred to [facility] and admitted to  on a 9.13 voluntary legal status for acute safety and for further evaluation and treatment of his schizophrenia and his ETOH use d/o.  Plan  1.Pt restarted Invega 3 mg po q am and 6 mg po qhs  2. Collateral clinical pt history with pt permission  3. Plan to titrate Lexapro and to add low dose Buspar to alleviate the pt's significant anxiety and depression

## 2020-11-12 PROCEDURE — 99232 SBSQ HOSP IP/OBS MODERATE 35: CPT

## 2020-11-12 RX ORDER — SALIVA SUBSTITUTE COMB NO.11 351 MG
15 POWDER IN PACKET (EA) MUCOUS MEMBRANE THREE TIMES A DAY
Refills: 0 | Status: DISCONTINUED | OUTPATIENT
Start: 2020-11-12 | End: 2020-11-20

## 2020-11-12 RX ADMIN — DIVALPROEX SODIUM 500 MILLIGRAM(S): 500 TABLET, DELAYED RELEASE ORAL at 21:31

## 2020-11-12 RX ADMIN — Medication 1 MILLIGRAM(S): at 09:38

## 2020-11-12 RX ADMIN — Medication 100 MILLIGRAM(S): at 21:34

## 2020-11-12 RX ADMIN — Medication 81 MILLIGRAM(S): at 09:38

## 2020-11-12 RX ADMIN — Medication 15 MILLILITER(S): at 21:34

## 2020-11-12 RX ADMIN — Medication 10 MILLIGRAM(S): at 09:39

## 2020-11-12 RX ADMIN — Medication 10 MILLIGRAM(S): at 13:07

## 2020-11-12 RX ADMIN — Medication 1 PATCH: at 18:43

## 2020-11-12 RX ADMIN — Medication 100 MILLIGRAM(S): at 09:38

## 2020-11-12 RX ADMIN — GABAPENTIN 300 MILLIGRAM(S): 400 CAPSULE ORAL at 09:38

## 2020-11-12 RX ADMIN — Medication 1 PATCH: at 09:41

## 2020-11-12 RX ADMIN — ESCITALOPRAM OXALATE 20 MILLIGRAM(S): 10 TABLET, FILM COATED ORAL at 09:39

## 2020-11-12 RX ADMIN — Medication 1 MILLIGRAM(S): at 21:31

## 2020-11-12 RX ADMIN — ATORVASTATIN CALCIUM 20 MILLIGRAM(S): 80 TABLET, FILM COATED ORAL at 21:31

## 2020-11-12 RX ADMIN — Medication 25 MILLIGRAM(S): at 09:38

## 2020-11-12 RX ADMIN — PALIPERIDONE 3 MILLIGRAM(S): 1.5 TABLET, EXTENDED RELEASE ORAL at 09:37

## 2020-11-12 RX ADMIN — Medication 1 PATCH: at 09:36

## 2020-11-12 RX ADMIN — Medication 100 MILLIGRAM(S): at 13:08

## 2020-11-12 RX ADMIN — MIRTAZAPINE 15 MILLIGRAM(S): 45 TABLET, ORALLY DISINTEGRATING ORAL at 21:31

## 2020-11-12 RX ADMIN — PALIPERIDONE 6 MILLIGRAM(S): 1.5 TABLET, EXTENDED RELEASE ORAL at 21:30

## 2020-11-12 RX ADMIN — TAMSULOSIN HYDROCHLORIDE 0.4 MILLIGRAM(S): 0.4 CAPSULE ORAL at 21:31

## 2020-11-12 RX ADMIN — Medication 10 MILLIGRAM(S): at 21:31

## 2020-11-12 RX ADMIN — Medication 1 PATCH: at 09:21

## 2020-11-12 RX ADMIN — GABAPENTIN 300 MILLIGRAM(S): 400 CAPSULE ORAL at 21:30

## 2020-11-12 RX ADMIN — GABAPENTIN 300 MILLIGRAM(S): 400 CAPSULE ORAL at 13:07

## 2020-11-12 NOTE — PROGRESS NOTE BEHAVIORAL HEALTH - NSBHCHARTREVIEWVS_PSY_A_CORE FT
Vital Signs Last 24 Hrs  T(C): 36.6 (12 Nov 2020 08:26), Max: 36.6 (12 Nov 2020 08:26)  T(F): 97.8 (12 Nov 2020 08:26), Max: 97.8 (12 Nov 2020 08:26)  HR: --  BP: --  BP(mean): --  RR: 18 (12 Nov 2020 08:26) (18 - 18)  SpO2: 99% (12 Nov 2020 08:26) (99% - 99%)

## 2020-11-12 NOTE — PROGRESS NOTE BEHAVIORAL HEALTH - NSBHFUPINTERVALHXFT_PSY_A_CORE
Pt seen in his  room  The pt continues  more overtly anxious, tense and paranoid  with more somatic preoccupation and with  frightened eye contact though nothing had apparently changed in his environment or as to  his clinical status since 24 hours earlier when the pt had reported feeling a little better. .The pt appeared suspicious and apprehensive as to his current med regimen despite the fact that the only change was in having the pt begin Invega Sustenna  as above in an effort to improve pt treatment compliance with the hope of decreasing the pt's risk of clinical relapse and need for readmission to hospital. Pt remains suspicious, guarded and hypervigilant with accentuated startle response. Pt with effort to smile but appearing tense and mistrustful.  Pt received his first dose of  Invega Sustenna on 11/10/2020  with plan to taper and DC po Invega after 2 weeks after second 1/2 of loading dose is completed in 1 week. . The pt was amenable to begin loading dose of 234 mg IM Invega Sustenna on 11/10/2020 with second dose in 1 week of 156 mg IM  followed by q 4 weekly  mg dose to alleviate pt symptoms of paranoid schizophrenia in  the context of a h/o treatment noncompliance.   The pt did now accept his Invega Sustenna  mg injection on 11/10/2020 with plan discussed with the pt to complete the loading dose with a second dose of Invega Sustenna 156 mg IM in 1 week, followed by q monthly IM Invega Sustenna 234 mg and with plan to DC PO Invega tabs after 2 weeks.  The pt continues to remain undecided. " I'll have to think about it."  The pt is dressed, still disheveled and unkempt despite ongoing staff efforts to offer the pt support and aid with his ADLs.  The pt continues  fearful in the context of his ongoing paranoid delusional thinking. but he appeared  a bit less tense and apprehensive overall.  The pt continues to remain in his room and is not yet able to attend therapy groups despite ongoing staff support . The pt is tolerating now increased standing Thorazine  100 mg po tid  to help decrease his AH and ongoing impairing paranoia. . Pt noted that his newly begun standing Thorazine is  helping with paranoia while Invega has a chance to resume it's efficacy. who was able to persuade the pt to remain med adherent in an effort to alleviate the pt's ongoing worsening AH in the context of the pt's h/o entrenched treatment noncompliance. Prior to the pt's current admission on 10/26/2020, the pt reportedly had not been med compliant for the past 6 months.     The pt is reportedly tolerating his now restarted Invega 3 mg po q am and 6 mg po qhs without reported side effects. The pt was amenable to resume low dose Depakote , Lexapro, Remeron  and Neurontin as well for affective lability. The pt was also agreeable to having low dose FGA standing Thorazine to be increased to 100 mg po tid added in the short term to help alleviate the pt's AH while awaiting Invega starkey clinical response.    The pt's judgment remains quite impaired with virtually no insight into his  medical and psychiatric comorbid illnesses and the need for ongoing consistent treatment both in and out of hospital.    Plan to continue pt trial of Buspar  10 mg po tid in an effort to alleviate pt anxiety and to serve as an antidepressant adjunct . Writer discussed plan to avoid benzodiazepines  in light of the pt's h/o ETOH use d/o.   Writer to continue talk with the pt as to COVID-19 CDC guidelines to decrease risk of infection including wearing  a face mask, social distancing and good hand hygiene. Currently, the pt remains to paranoid and psychotic to meaningfully discuss this important public health matter.

## 2020-11-12 NOTE — PROGRESS NOTE BEHAVIORAL HEALTH - SUMMARY
The pt is a   60 year-old single WM  s, disabled , unemployed, and [facility], The pt had most recently been discharged from [facility]  after a 6 week stay with a similar pt clinical presentation.             The pt. was BIB  EMS to [facility] on 10/26/2020 for evaluation of pt's worsening paranoid psychosis with reported SI  and ETOH use d/o.  for suicidal thoughts.    The pt was then transferred to [facility] and admitted to  on a 9.13 voluntary legal status for acute safety and for further evaluation and treatment of his schizophrenia and his ETOH use d/o.  Plan  1.Pt restarted Invega 3 mg po q am and 6 mg po qhs  2. Collateral clinical pt history with pt permission  3. Plan to titrate Lexapro and to add low dose Buspar to alleviate the pt's significant anxiety and depression

## 2020-11-13 PROCEDURE — 99232 SBSQ HOSP IP/OBS MODERATE 35: CPT

## 2020-11-13 RX ADMIN — Medication 15 MILLILITER(S): at 12:02

## 2020-11-13 RX ADMIN — Medication 10 MILLIGRAM(S): at 20:55

## 2020-11-13 RX ADMIN — Medication 1 PATCH: at 09:34

## 2020-11-13 RX ADMIN — Medication 100 MILLIGRAM(S): at 12:02

## 2020-11-13 RX ADMIN — ESCITALOPRAM OXALATE 20 MILLIGRAM(S): 10 TABLET, FILM COATED ORAL at 09:13

## 2020-11-13 RX ADMIN — Medication 10 MILLIGRAM(S): at 12:02

## 2020-11-13 RX ADMIN — Medication 1 TABLET(S): at 09:13

## 2020-11-13 RX ADMIN — Medication 25 MILLIGRAM(S): at 09:13

## 2020-11-13 RX ADMIN — GABAPENTIN 300 MILLIGRAM(S): 400 CAPSULE ORAL at 09:13

## 2020-11-13 RX ADMIN — Medication 100 MILLIGRAM(S): at 09:14

## 2020-11-13 RX ADMIN — Medication 100 MILLIGRAM(S): at 20:55

## 2020-11-13 RX ADMIN — TAMSULOSIN HYDROCHLORIDE 0.4 MILLIGRAM(S): 0.4 CAPSULE ORAL at 21:20

## 2020-11-13 RX ADMIN — PALIPERIDONE 3 MILLIGRAM(S): 1.5 TABLET, EXTENDED RELEASE ORAL at 09:13

## 2020-11-13 RX ADMIN — Medication 1 MILLIGRAM(S): at 09:13

## 2020-11-13 RX ADMIN — Medication 81 MILLIGRAM(S): at 09:13

## 2020-11-13 RX ADMIN — GABAPENTIN 300 MILLIGRAM(S): 400 CAPSULE ORAL at 12:02

## 2020-11-13 RX ADMIN — ATORVASTATIN CALCIUM 20 MILLIGRAM(S): 80 TABLET, FILM COATED ORAL at 20:56

## 2020-11-13 RX ADMIN — MIRTAZAPINE 15 MILLIGRAM(S): 45 TABLET, ORALLY DISINTEGRATING ORAL at 21:20

## 2020-11-13 RX ADMIN — Medication 1 PATCH: at 09:14

## 2020-11-13 RX ADMIN — GABAPENTIN 300 MILLIGRAM(S): 400 CAPSULE ORAL at 20:55

## 2020-11-13 RX ADMIN — Medication 15 MILLILITER(S): at 21:20

## 2020-11-13 RX ADMIN — Medication 10 MILLIGRAM(S): at 09:13

## 2020-11-13 RX ADMIN — PALIPERIDONE 6 MILLIGRAM(S): 1.5 TABLET, EXTENDED RELEASE ORAL at 21:20

## 2020-11-13 RX ADMIN — Medication 1 PATCH: at 07:29

## 2020-11-13 RX ADMIN — DIVALPROEX SODIUM 500 MILLIGRAM(S): 500 TABLET, DELAYED RELEASE ORAL at 21:20

## 2020-11-13 RX ADMIN — Medication 1 PATCH: at 19:28

## 2020-11-13 RX ADMIN — Medication 15 MILLILITER(S): at 09:13

## 2020-11-13 RX ADMIN — Medication 1 MILLIGRAM(S): at 20:55

## 2020-11-13 NOTE — PROGRESS NOTE BEHAVIORAL HEALTH - RISK ASSESSMENT
low risk with better overall prognosis now that the pt has agreed to allow for Invega Sustenna long acting IM treatment in an effort to improve pt treatment adherence with the goal of decreasing his risk for clinical relapse. Pt chronic severe paranoia continues to impede clinical progress, but new trial of Invega Sustenna is a hopeful start . Pt sleep and appetite are slowly normalizing with treatment  acute now with a DC of  auditory hallucinations but with ongoing paranoia  now restarted on Invega in hopes of alleviating the pt's paranoid psychosis, feeling depressed and lonely. AH persist but reportedly non command type. Pt beginning to slowly elaborate on his paranoid fears so that staff can work with the pt to decrease the pt's fears  mitigating : Pt agreed to enter inpatient hospital safe setting, pt amenable to resume medications, , pt able to form limited selective therapeutic alliance with staff, pt now agreeing to Invega Sustenna IM to help alleviate the pt's longstanding paranoid schizophrenia    protective :  pt with help seeking behavior, support of brother and group home ,stable place to live  chronic: prior psych hospitalization ,alcohol use

## 2020-11-13 NOTE — PROGRESS NOTE BEHAVIORAL HEALTH - NSBHCHARTREVIEWVS_PSY_A_CORE FT
Vital Signs Last 24 Hrs  T(C): 36.4 (13 Nov 2020 08:17), Max: 36.4 (13 Nov 2020 08:17)  T(F): 97.5 (13 Nov 2020 08:17), Max: 97.5 (13 Nov 2020 08:17)  HR: --  BP: --  BP(mean): --  RR: 14 (13 Nov 2020 08:17) (14 - 14)  SpO2: 100% (13 Nov 2020 08:17) (100% - 100%)

## 2020-11-13 NOTE — PROGRESS NOTE BEHAVIORAL HEALTH - NSBHFUPINTERVALHXFT_PSY_A_CORE
Pt seen in his  room  where he was having his lunch due to the pt's paranoid fear of eating with peers on the unit. The pt continues  more overtly anxious, tense and paranoid  with more somatic preoccupation and with  frightened eye contact though nothing had apparently changed in his environment or as to  his clinical status since 24 hours earlier when the pt had reported feeling a little better. .The pt appeared suspicious and apprehensive as to his current med regimen despite the fact that the only change was in having the pt begin Invega Sustenna  as above in an effort to improve pt treatment compliance with the hope of decreasing the pt's risk of clinical relapse and need for readmission to hospital. Pt remains suspicious, guarded and hypervigilant with accentuated startle response. Pt with effort to smile but appearing tense and mistrustful.  Pt received his first dose of  Invega Sustenna on 11/10/2020  with plan to taper and DC po Invega after 2 weeks after second 1/2 of loading dose is completed in 1 week. . The pt was amenable to begin loading dose of 234 mg IM Invega Sustenna on 11/10/2020 with second dose in 1 week of 156 mg IM  followed by q 4 weekly  mg dose to alleviate pt symptoms of paranoid schizophrenia in  the context of a h/o treatment noncompliance.   The pt did now accept his Invega Sustenna  mg injection on 11/10/2020 with plan discussed with the pt to complete the loading dose with a second dose of Invega Sustenna 156 mg IM in 1 week, followed by q monthly IM Invega Sustenna 234 mg and with plan to DC PO Invega tabs after 2 weeks.  The pt continues to remain undecided. " I'll have to think about it."  The pt is dressed, still disheveled and unkempt despite ongoing staff efforts to offer the pt support and aid with his ADLs.  The pt continues  fearful in the context of his ongoing paranoid delusional thinking. but he appeared  a bit less tense and apprehensive overall.  The pt continues to remain in his room and is not yet able to attend therapy groups despite ongoing staff support . The pt is tolerating now increased standing Thorazine  100 mg po tid  to help decrease his AH and ongoing impairing paranoia. . Pt noted that his newly begun standing Thorazine is  helping with paranoia while Invega has a chance to resume it's efficacy. who was able to persuade the pt to remain med adherent in an effort to alleviate the pt's ongoing worsening AH in the context of the pt's h/o entrenched treatment noncompliance. Prior to the pt's current admission on 10/26/2020, the pt reportedly had not been med compliant for the past 6 months.     The pt is reportedly tolerating his now restarted Invega 3 mg po q am and 6 mg po qhs without reported side effects. The pt was amenable to resume low dose Depakote , Lexapro, Remeron  and Neurontin as well for affective lability. The pt was also agreeable to having low dose FGA standing Thorazine to be increased to 100 mg po tid added in the short term to help alleviate the pt's AH while awaiting Invega starkey clinical response.    The pt's judgment remains quite impaired with virtually no insight into his  medical and psychiatric comorbid illnesses and the need for ongoing consistent treatment both in and out of hospital.    Plan to continue pt trial of Buspar  10 mg po tid in an effort to alleviate pt anxiety and to serve as an antidepressant adjunct . Writer discussed plan to avoid benzodiazepines  in light of the pt's h/o ETOH use d/o.   Writer to continue talk with the pt as to COVID-19 CDC guidelines to decrease risk of infection including wearing  a face mask, social distancing and good hand hygiene. Currently, the pt remains to paranoid and psychotic to meaningfully discuss this important public health matter.

## 2020-11-13 NOTE — PROGRESS NOTE BEHAVIORAL HEALTH - SUMMARY
The pt is a   60 year-old single WM  s, disabled , unemployed, and [facility], The pt had most recently been discharged from [facility]  after a 6 week stay with a similar pt clinical presentation.             The pt. was BIB  EMS to [facility] on 10/26/2020 for evaluation of pt's worsening paranoid psychosis with reported SI  and ETOH use d/o.  for suicidal thoughts.    The pt was then [facility] to Health system and admitted to  on a 9.13 voluntary legal status for acute safety and for further evaluation and treatment of his schizophrenia and his ETOH use d/o.  Plan  1.Pt restarted Invega 3 mg po q am and 6 mg po qhs  2. Collateral clinical pt history with pt permission  3. Plan to titrate Lexapro and to add low dose Buspar to alleviate the pt's significant anxiety and depression

## 2020-11-14 RX ADMIN — PALIPERIDONE 6 MILLIGRAM(S): 1.5 TABLET, EXTENDED RELEASE ORAL at 20:28

## 2020-11-14 RX ADMIN — ATORVASTATIN CALCIUM 20 MILLIGRAM(S): 80 TABLET, FILM COATED ORAL at 20:23

## 2020-11-14 RX ADMIN — Medication 10 MILLIGRAM(S): at 20:23

## 2020-11-14 RX ADMIN — Medication 10 MILLIGRAM(S): at 14:35

## 2020-11-14 RX ADMIN — GABAPENTIN 300 MILLIGRAM(S): 400 CAPSULE ORAL at 20:22

## 2020-11-14 RX ADMIN — Medication 1 PATCH: at 18:39

## 2020-11-14 RX ADMIN — ESCITALOPRAM OXALATE 20 MILLIGRAM(S): 10 TABLET, FILM COATED ORAL at 09:34

## 2020-11-14 RX ADMIN — PALIPERIDONE 3 MILLIGRAM(S): 1.5 TABLET, EXTENDED RELEASE ORAL at 09:35

## 2020-11-14 RX ADMIN — Medication 81 MILLIGRAM(S): at 09:33

## 2020-11-14 RX ADMIN — Medication 100 MILLIGRAM(S): at 09:33

## 2020-11-14 RX ADMIN — Medication 1 PATCH: at 09:34

## 2020-11-14 RX ADMIN — MIRTAZAPINE 15 MILLIGRAM(S): 45 TABLET, ORALLY DISINTEGRATING ORAL at 20:23

## 2020-11-14 RX ADMIN — TAMSULOSIN HYDROCHLORIDE 0.4 MILLIGRAM(S): 0.4 CAPSULE ORAL at 20:30

## 2020-11-14 RX ADMIN — Medication 1 MILLIGRAM(S): at 20:28

## 2020-11-14 RX ADMIN — Medication 1 MILLIGRAM(S): at 09:32

## 2020-11-14 RX ADMIN — GABAPENTIN 300 MILLIGRAM(S): 400 CAPSULE ORAL at 09:33

## 2020-11-14 RX ADMIN — Medication 10 MILLIGRAM(S): at 09:33

## 2020-11-14 RX ADMIN — Medication 25 MILLIGRAM(S): at 09:34

## 2020-11-14 RX ADMIN — Medication 100 MILLIGRAM(S): at 20:24

## 2020-11-14 RX ADMIN — Medication 15 MILLILITER(S): at 20:23

## 2020-11-14 RX ADMIN — GABAPENTIN 300 MILLIGRAM(S): 400 CAPSULE ORAL at 14:35

## 2020-11-14 RX ADMIN — Medication 1 TABLET(S): at 09:34

## 2020-11-14 RX ADMIN — DIVALPROEX SODIUM 500 MILLIGRAM(S): 500 TABLET, DELAYED RELEASE ORAL at 20:23

## 2020-11-14 RX ADMIN — Medication 1 PATCH: at 09:00

## 2020-11-14 RX ADMIN — Medication 100 MILLIGRAM(S): at 14:35

## 2020-11-15 RX ADMIN — TAMSULOSIN HYDROCHLORIDE 0.4 MILLIGRAM(S): 0.4 CAPSULE ORAL at 20:30

## 2020-11-15 RX ADMIN — PALIPERIDONE 6 MILLIGRAM(S): 1.5 TABLET, EXTENDED RELEASE ORAL at 20:30

## 2020-11-15 RX ADMIN — Medication 1 PATCH: at 09:39

## 2020-11-15 RX ADMIN — ESCITALOPRAM OXALATE 20 MILLIGRAM(S): 10 TABLET, FILM COATED ORAL at 09:36

## 2020-11-15 RX ADMIN — Medication 1 PATCH: at 09:37

## 2020-11-15 RX ADMIN — ATORVASTATIN CALCIUM 20 MILLIGRAM(S): 80 TABLET, FILM COATED ORAL at 20:27

## 2020-11-15 RX ADMIN — Medication 10 MILLIGRAM(S): at 20:27

## 2020-11-15 RX ADMIN — Medication 15 MILLILITER(S): at 14:03

## 2020-11-15 RX ADMIN — GABAPENTIN 300 MILLIGRAM(S): 400 CAPSULE ORAL at 20:28

## 2020-11-15 RX ADMIN — Medication 100 MILLIGRAM(S): at 09:36

## 2020-11-15 RX ADMIN — MIRTAZAPINE 15 MILLIGRAM(S): 45 TABLET, ORALLY DISINTEGRATING ORAL at 20:27

## 2020-11-15 RX ADMIN — Medication 100 MILLIGRAM(S): at 20:30

## 2020-11-15 RX ADMIN — Medication 10 MILLIGRAM(S): at 09:35

## 2020-11-15 RX ADMIN — DIVALPROEX SODIUM 500 MILLIGRAM(S): 500 TABLET, DELAYED RELEASE ORAL at 20:27

## 2020-11-15 RX ADMIN — Medication 15 MILLILITER(S): at 20:30

## 2020-11-15 RX ADMIN — PALIPERIDONE 3 MILLIGRAM(S): 1.5 TABLET, EXTENDED RELEASE ORAL at 09:20

## 2020-11-15 RX ADMIN — Medication 1 MILLIGRAM(S): at 20:27

## 2020-11-15 RX ADMIN — Medication 1 PATCH: at 19:41

## 2020-11-15 RX ADMIN — Medication 1 TABLET(S): at 09:36

## 2020-11-15 RX ADMIN — GABAPENTIN 300 MILLIGRAM(S): 400 CAPSULE ORAL at 09:36

## 2020-11-15 RX ADMIN — Medication 1 MILLIGRAM(S): at 09:35

## 2020-11-15 RX ADMIN — Medication 10 MILLIGRAM(S): at 14:03

## 2020-11-15 RX ADMIN — GABAPENTIN 300 MILLIGRAM(S): 400 CAPSULE ORAL at 14:03

## 2020-11-15 RX ADMIN — Medication 100 MILLIGRAM(S): at 14:03

## 2020-11-15 RX ADMIN — Medication 25 MILLIGRAM(S): at 09:36

## 2020-11-15 RX ADMIN — Medication 81 MILLIGRAM(S): at 09:35

## 2020-11-16 LAB
ALBUMIN SERPL ELPH-MCNC: 4 G/DL — SIGNIFICANT CHANGE UP (ref 3.3–5)
ALP SERPL-CCNC: 83 U/L — SIGNIFICANT CHANGE UP (ref 40–120)
ALT FLD-CCNC: 51 U/L — SIGNIFICANT CHANGE UP (ref 12–78)
ANION GAP SERPL CALC-SCNC: 4 MMOL/L — LOW (ref 5–17)
AST SERPL-CCNC: 29 U/L — SIGNIFICANT CHANGE UP (ref 15–37)
BASOPHILS # BLD AUTO: 0.06 K/UL — SIGNIFICANT CHANGE UP (ref 0–0.2)
BASOPHILS NFR BLD AUTO: 0.8 % — SIGNIFICANT CHANGE UP (ref 0–2)
BILIRUB SERPL-MCNC: 0.3 MG/DL — SIGNIFICANT CHANGE UP (ref 0.2–1.2)
BUN SERPL-MCNC: 23 MG/DL — SIGNIFICANT CHANGE UP (ref 7–23)
CALCIUM SERPL-MCNC: 10.1 MG/DL — SIGNIFICANT CHANGE UP (ref 8.5–10.1)
CHLORIDE SERPL-SCNC: 106 MMOL/L — SIGNIFICANT CHANGE UP (ref 96–108)
CO2 SERPL-SCNC: 29 MMOL/L — SIGNIFICANT CHANGE UP (ref 22–31)
CREAT SERPL-MCNC: 1.37 MG/DL — HIGH (ref 0.5–1.3)
EOSINOPHIL # BLD AUTO: 0.27 K/UL — SIGNIFICANT CHANGE UP (ref 0–0.5)
EOSINOPHIL NFR BLD AUTO: 3.6 % — SIGNIFICANT CHANGE UP (ref 0–6)
GLUCOSE SERPL-MCNC: 97 MG/DL — SIGNIFICANT CHANGE UP (ref 70–99)
HCT VFR BLD CALC: 45 % — SIGNIFICANT CHANGE UP (ref 39–50)
HGB BLD-MCNC: 14.9 G/DL — SIGNIFICANT CHANGE UP (ref 13–17)
IMM GRANULOCYTES NFR BLD AUTO: 0.4 % — SIGNIFICANT CHANGE UP (ref 0–1.5)
LYMPHOCYTES # BLD AUTO: 2.65 K/UL — SIGNIFICANT CHANGE UP (ref 1–3.3)
LYMPHOCYTES # BLD AUTO: 35.2 % — SIGNIFICANT CHANGE UP (ref 13–44)
MCHC RBC-ENTMCNC: 30.4 PG — SIGNIFICANT CHANGE UP (ref 27–34)
MCHC RBC-ENTMCNC: 33.1 GM/DL — SIGNIFICANT CHANGE UP (ref 32–36)
MCV RBC AUTO: 91.8 FL — SIGNIFICANT CHANGE UP (ref 80–100)
MONOCYTES # BLD AUTO: 0.87 K/UL — SIGNIFICANT CHANGE UP (ref 0–0.9)
MONOCYTES NFR BLD AUTO: 11.6 % — SIGNIFICANT CHANGE UP (ref 2–14)
NEUTROPHILS # BLD AUTO: 3.64 K/UL — SIGNIFICANT CHANGE UP (ref 1.8–7.4)
NEUTROPHILS NFR BLD AUTO: 48.4 % — SIGNIFICANT CHANGE UP (ref 43–77)
PLATELET # BLD AUTO: 241 K/UL — SIGNIFICANT CHANGE UP (ref 150–400)
POTASSIUM SERPL-MCNC: 4.4 MMOL/L — SIGNIFICANT CHANGE UP (ref 3.5–5.3)
POTASSIUM SERPL-SCNC: 4.4 MMOL/L — SIGNIFICANT CHANGE UP (ref 3.5–5.3)
PROT SERPL-MCNC: 8 GM/DL — SIGNIFICANT CHANGE UP (ref 6–8.3)
RBC # BLD: 4.9 M/UL — SIGNIFICANT CHANGE UP (ref 4.2–5.8)
RBC # FLD: 11.9 % — SIGNIFICANT CHANGE UP (ref 10.3–14.5)
SODIUM SERPL-SCNC: 139 MMOL/L — SIGNIFICANT CHANGE UP (ref 135–145)
VALPROATE SERPL-MCNC: 51 UG/ML — SIGNIFICANT CHANGE UP (ref 50–100)
WBC # BLD: 7.52 K/UL — SIGNIFICANT CHANGE UP (ref 3.8–10.5)
WBC # FLD AUTO: 7.52 K/UL — SIGNIFICANT CHANGE UP (ref 3.8–10.5)

## 2020-11-16 PROCEDURE — 99232 SBSQ HOSP IP/OBS MODERATE 35: CPT

## 2020-11-16 RX ADMIN — GABAPENTIN 300 MILLIGRAM(S): 400 CAPSULE ORAL at 09:15

## 2020-11-16 RX ADMIN — PALIPERIDONE 6 MILLIGRAM(S): 1.5 TABLET, EXTENDED RELEASE ORAL at 21:21

## 2020-11-16 RX ADMIN — TAMSULOSIN HYDROCHLORIDE 0.4 MILLIGRAM(S): 0.4 CAPSULE ORAL at 21:20

## 2020-11-16 RX ADMIN — GABAPENTIN 300 MILLIGRAM(S): 400 CAPSULE ORAL at 13:07

## 2020-11-16 RX ADMIN — Medication 1 PATCH: at 09:14

## 2020-11-16 RX ADMIN — Medication 100 MILLIGRAM(S): at 21:20

## 2020-11-16 RX ADMIN — ESCITALOPRAM OXALATE 20 MILLIGRAM(S): 10 TABLET, FILM COATED ORAL at 09:15

## 2020-11-16 RX ADMIN — ATORVASTATIN CALCIUM 20 MILLIGRAM(S): 80 TABLET, FILM COATED ORAL at 21:20

## 2020-11-16 RX ADMIN — Medication 25 MILLIGRAM(S): at 09:15

## 2020-11-16 RX ADMIN — PALIPERIDONE 3 MILLIGRAM(S): 1.5 TABLET, EXTENDED RELEASE ORAL at 09:16

## 2020-11-16 RX ADMIN — MIRTAZAPINE 15 MILLIGRAM(S): 45 TABLET, ORALLY DISINTEGRATING ORAL at 21:20

## 2020-11-16 RX ADMIN — Medication 1 MILLIGRAM(S): at 09:15

## 2020-11-16 RX ADMIN — Medication 1 PATCH: at 09:13

## 2020-11-16 RX ADMIN — DIVALPROEX SODIUM 500 MILLIGRAM(S): 500 TABLET, DELAYED RELEASE ORAL at 21:20

## 2020-11-16 RX ADMIN — Medication 1 TABLET(S): at 09:15

## 2020-11-16 RX ADMIN — Medication 10 MILLIGRAM(S): at 13:07

## 2020-11-16 RX ADMIN — Medication 100 MILLIGRAM(S): at 13:07

## 2020-11-16 RX ADMIN — GABAPENTIN 300 MILLIGRAM(S): 400 CAPSULE ORAL at 21:20

## 2020-11-16 RX ADMIN — Medication 100 MILLIGRAM(S): at 09:15

## 2020-11-16 RX ADMIN — Medication 1 PATCH: at 19:13

## 2020-11-16 RX ADMIN — Medication 1 MILLIGRAM(S): at 21:20

## 2020-11-16 RX ADMIN — Medication 10 MILLIGRAM(S): at 09:15

## 2020-11-16 RX ADMIN — Medication 15 MILLILITER(S): at 21:21

## 2020-11-16 RX ADMIN — Medication 10 MILLIGRAM(S): at 21:20

## 2020-11-16 RX ADMIN — Medication 81 MILLIGRAM(S): at 09:15

## 2020-11-16 RX ADMIN — Medication 1 PATCH: at 08:47

## 2020-11-16 NOTE — PROGRESS NOTE BEHAVIORAL HEALTH - NSBHFUPINTERVALHXFT_PSY_A_CORE
Pt seen in his  room  where he was having his lunch due to the pt's paranoid fear of eating with peers on the unit. Pt amenable to plan to at least attempt to leave his room however briefly in order to desensitize himself to his severe and often overwhelming paranoia fueled fa=ear.  The pt continues  more overtly anxious, tense and paranoid  with more somatic preoccupation and with  frightened eye contact though nothing had apparently changed in his environment or as to  his clinical status since 24 hours earlier when the pt had reported feeling a little better. .The pt appeared suspicious and apprehensive as to his current med regimen despite the fact that the only change was in having the pt begin Invega Sustenna  as above in an effort to improve pt treatment compliance with the hope of decreasing the pt's risk of clinical relapse and need for readmission to hospital. Pt remains suspicious, guarded and hypervigilant with accentuated startle response. Pt with effort to smile but appearing tense and mistrustful.  Pt received his first dose of  Invega Sustenna on 11/10/2020  with plan to taper and DC po Invega after 2 weeks after second 1/2 of loading dose is completed in 1 week. . The pt was amenable to begin loading dose of 234 mg IM Invega Sustenna on 11/10/2020 with second dose in 1 week of 156 mg IM  followed by q 4 weekly  mg dose to alleviate pt symptoms of paranoid schizophrenia in  the context of a h/o treatment noncompliance.   The pt did now accept his Invega Sustenna  mg injection on 11/10/2020 with plan discussed with the pt to complete the loading dose with a second dose of Invega Sustenna 156 mg IM in 1 week, followed by q monthly IM Invega Sustenna 234 mg and with plan to DC PO Invega tabs after 2 weeks.  The pt continues to remain undecided. " I'll have to think about it."  The pt is dressed, still disheveled and unkempt despite ongoing staff efforts to offer the pt support and aid with his ADLs.  The pt continues  fearful in the context of his ongoing paranoid delusional thinking. but he appeared  a bit less tense and apprehensive overall.  The pt continues to remain in his room and is not yet able to attend therapy groups despite ongoing staff support . The pt is tolerating now increased standing Thorazine  100 mg po tid  to help decrease his AH and ongoing impairing paranoia. . Pt noted that his newly begun standing Thorazine is  helping with paranoia while Invega has a chance to resume it's efficacy. who was able to persuade the pt to remain med adherent in an effort to alleviate the pt's ongoing worsening AH in the context of the pt's h/o entrenched treatment noncompliance. Prior to the pt's current admission on 10/26/2020, the pt reportedly had not been med compliant for the past 6 months.     The pt is reportedly tolerating his now restarted Invega 3 mg po q am and 6 mg po qhs without reported side effects. The pt was amenable to resume low dose Depakote , Lexapro, Remeron  and Neurontin as well for affective lability. The pt was also agreeable to having low dose FGA standing Thorazine to be increased to 100 mg po tid added in the short term to help alleviate the pt's AH while awaiting Invega starkey clinical response.    The pt's judgment remains quite impaired with virtually no insight into his  medical and psychiatric comorbid illnesses and the need for ongoing consistent treatment both in and out of hospital.    Plan to continue pt trial of Buspar  10 mg po tid in an effort to alleviate pt anxiety and to serve as an antidepressant adjunct . Writer discussed plan to avoid benzodiazepines  in light of the pt's h/o ETOH use d/o.   Writer to continue talk with the pt as to COVID-19 CDC guidelines to decrease risk of infection including wearing  a face mask, social distancing and good hand hygiene. Currently, the pt remains to paranoid and psychotic to meaningfully discuss this important public health matter.

## 2020-11-16 NOTE — PROGRESS NOTE BEHAVIORAL HEALTH - NSBHCHARTREVIEWVS_PSY_A_CORE FT
Vital Signs Last 24 Hrs  T(C): 36.1 (16 Nov 2020 08:13), Max: 36.1 (16 Nov 2020 08:13)  T(F): 97 (16 Nov 2020 08:13), Max: 97 (16 Nov 2020 08:13)  HR: --  BP: --  BP(mean): --  RR: 14 (16 Nov 2020 08:13) (14 - 14)  SpO2: 100% (16 Nov 2020 08:13) (100% - 100%)

## 2020-11-16 NOTE — PROGRESS NOTE BEHAVIORAL HEALTH - OTHER
furtive, wary  with easy startle reaction in clipped, rushed frightened spurts " I'm still paranoid." still paranoid and apprehensive appearing paranoid, persecutory delusional thinking, impoverished

## 2020-11-16 NOTE — PROGRESS NOTE BEHAVIORAL HEALTH - SUMMARY
The pt is a   60 year-old single WM  s, disabled , unemployed, and [facility] other [facility] drug [facility], The pt had most recently been discharged from [facility]  after a 6 week stay with a similar pt clinical presentation.             The pt. was BIB  EMS to [facility] on 10/26/2020 for evaluation of pt's worsening paranoid psychosis with reported SI  and ETOH use d/o.  for suicidal thoughts.    The pt was then transferred to [facility] and admitted to  on a 9.13 voluntary legal status for acute safety and for further evaluation and treatment of his schizophrenia and his ETOH use d/o.  Plan  1.Pt restarted Invega 3 mg po q am and 6 mg po qhs  2. Collateral clinical pt history with pt permission  3. Plan to titrate Lexapro and to add low dose Buspar to alleviate the pt's significant anxiety and depression

## 2020-11-17 PROCEDURE — 99232 SBSQ HOSP IP/OBS MODERATE 35: CPT

## 2020-11-17 RX ADMIN — Medication 10 MILLIGRAM(S): at 13:07

## 2020-11-17 RX ADMIN — Medication 100 MILLIGRAM(S): at 09:45

## 2020-11-17 RX ADMIN — Medication 1 PATCH: at 09:47

## 2020-11-17 RX ADMIN — Medication 81 MILLIGRAM(S): at 09:44

## 2020-11-17 RX ADMIN — Medication 10 MILLIGRAM(S): at 20:23

## 2020-11-17 RX ADMIN — Medication 1 MILLIGRAM(S): at 20:23

## 2020-11-17 RX ADMIN — GABAPENTIN 300 MILLIGRAM(S): 400 CAPSULE ORAL at 09:44

## 2020-11-17 RX ADMIN — GABAPENTIN 300 MILLIGRAM(S): 400 CAPSULE ORAL at 13:06

## 2020-11-17 RX ADMIN — MIRTAZAPINE 15 MILLIGRAM(S): 45 TABLET, ORALLY DISINTEGRATING ORAL at 20:23

## 2020-11-17 RX ADMIN — Medication 15 MILLILITER(S): at 20:22

## 2020-11-17 RX ADMIN — PALIPERIDONE 156 MILLIGRAM(S): 1.5 TABLET, EXTENDED RELEASE ORAL at 13:04

## 2020-11-17 RX ADMIN — GABAPENTIN 300 MILLIGRAM(S): 400 CAPSULE ORAL at 20:23

## 2020-11-17 RX ADMIN — Medication 10 MILLIGRAM(S): at 09:44

## 2020-11-17 RX ADMIN — Medication 1 MILLIGRAM(S): at 09:44

## 2020-11-17 RX ADMIN — Medication 1 TABLET(S): at 09:44

## 2020-11-17 RX ADMIN — DIVALPROEX SODIUM 500 MILLIGRAM(S): 500 TABLET, DELAYED RELEASE ORAL at 20:23

## 2020-11-17 RX ADMIN — PALIPERIDONE 6 MILLIGRAM(S): 1.5 TABLET, EXTENDED RELEASE ORAL at 20:24

## 2020-11-17 RX ADMIN — Medication 1 PATCH: at 09:45

## 2020-11-17 RX ADMIN — ESCITALOPRAM OXALATE 20 MILLIGRAM(S): 10 TABLET, FILM COATED ORAL at 09:44

## 2020-11-17 RX ADMIN — TAMSULOSIN HYDROCHLORIDE 0.4 MILLIGRAM(S): 0.4 CAPSULE ORAL at 20:22

## 2020-11-17 RX ADMIN — Medication 25 MILLIGRAM(S): at 09:46

## 2020-11-17 RX ADMIN — Medication 100 MILLIGRAM(S): at 20:23

## 2020-11-17 RX ADMIN — PALIPERIDONE 3 MILLIGRAM(S): 1.5 TABLET, EXTENDED RELEASE ORAL at 09:47

## 2020-11-17 RX ADMIN — Medication 1 PATCH: at 18:12

## 2020-11-17 RX ADMIN — ATORVASTATIN CALCIUM 20 MILLIGRAM(S): 80 TABLET, FILM COATED ORAL at 20:23

## 2020-11-17 RX ADMIN — Medication 100 MILLIGRAM(S): at 13:07

## 2020-11-17 NOTE — PROGRESS NOTE BEHAVIORAL HEALTH - NSBHFUPINTERVALHXFT_PSY_A_CORE
Pt seen in his  room sitting at the edge of his bed in a hypervigilant state  with a good angle to view anyone who might enter the room.  Pt encouraged  by staff and ultimately was able to try to leave his room even briefly  perhaps for a meal  in an  to desensitize himself to his severe and often overwhelming paranoia fueled fa=ear.  The pt continues  more overtly anxious, tense and paranoid  with more somatic preoccupation and with  frightened eye contact though nothing had apparently changed in his environment or as to  his clinical status since 24 hours earlier when the pt had reported feeling a little better. .The pt appeared suspicious and apprehensive as to his current med regimen despite the fact that the only change was in having the pt begin Invega Sustenna  as above in an effort to improve pt treatment compliance with the hope of decreasing the pt's risk of clinical relapse and need for readmission to hospital. Pt remains suspicious, guarded and hypervigilant with accentuated startle response. Pt with effort to smile but appearing tense and mistrustful.  Pt received his first dose of  Invega Sustenna on 11/10/2020  with plan to taper and DC po Invega after 2 weeks after second 1/2 of loading dose is completed in 1 week. . The pt was amenable to begin loading dose of 234 mg IM Invega Sustenna on 11/10/2020 with second dose in 1 week of 156 mg IM  followed by q 4 weekly  mg dose to alleviate pt symptoms of paranoid schizophrenia in  the context of a h/o treatment noncompliance.   The pt did now accept his Invega Sustenna  mg injection on 11/10/2020 with plan discussed with the pt to complete the loading dose with a second dose of Invega Sustenna 156 mg IM in 1 week, followed by q monthly IM Invega Sustenna 234 mg and with plan to DC PO Invega tabs after 2 weeks.  The pt continues to remain undecided. " I'll have to think about it."  The pt is dressed, still disheveled and unkempt despite ongoing staff efforts to offer the pt support and aid with his ADLs.  The pt continues  fearful in the context of his ongoing paranoid delusional thinking. but he appeared  a bit less tense and apprehensive overall.  The pt continues to remain in his room and is not yet able to attend therapy groups despite ongoing staff support . The pt is tolerating now increased standing Thorazine  100 mg po tid  to help decrease his AH and ongoing impairing paranoia. . Pt noted that his newly begun standing Thorazine is  helping with paranoia while Invega has a chance to resume it's efficacy. who was able to persuade the pt to remain med adherent in an effort to alleviate the pt's ongoing worsening AH in the context of the pt's h/o entrenched treatment noncompliance. Prior to the pt's current admission on 10/26/2020, the pt reportedly had not been med compliant for the past 6 months.     The pt is reportedly tolerating his now restarted Invega 3 mg po q am and 6 mg po qhs without reported side effects. The pt was amenable to resume low dose Depakote , Lexapro, Remeron  and Neurontin as well for affective lability. The pt was also agreeable to having low dose FGA standing Thorazine to be increased to 100 mg po tid added in the short term to help alleviate the pt's AH while awaiting Invega starkey clinical response.    The pt's judgment remains quite impaired with virtually no insight into his  medical and psychiatric comorbid illnesses and the need for ongoing consistent treatment both in and out of hospital.    Plan to continue pt trial of Buspar  10 mg po tid in an effort to alleviate pt anxiety and to serve as an antidepressant adjunct . Writer discussed plan to avoid benzodiazepines  in light of the pt's h/o ETOH use d/o.   Writer to continue talk with the pt as to COVID-19 CDC guidelines to decrease risk of infection including wearing  a face mask, social distancing and good hand hygiene. Currently, the pt remains to paranoid and psychotic to meaningfully discuss this important public health matter.

## 2020-11-17 NOTE — PROGRESS NOTE BEHAVIORAL HEALTH - NSBHCHARTREVIEWVS_PSY_A_CORE FT
Vital Signs Last 24 Hrs  T(C): 36.2 (17 Nov 2020 08:09), Max: 36.2 (17 Nov 2020 08:09)  T(F): 97.1 (17 Nov 2020 08:09), Max: 97.1 (17 Nov 2020 08:09)  HR: --  BP: --  BP(mean): --  RR: 14 (17 Nov 2020 08:09) (14 - 14)  SpO2: 100% (17 Nov 2020 08:09) (100% - 100%)

## 2020-11-17 NOTE — PROGRESS NOTE BEHAVIORAL HEALTH - SUMMARY
The pt is a   60 year-old single WM  s, disabled , unemployed, and [facility] other [facility], The pt had most recently been discharged from [facility]  after a 6 week stay with a similar pt clinical presentation.             The pt. was BIB  EMS to [facility] on 10/26/2020 for evaluation of pt's worsening paranoid psychosis with reported SI  and ETOH use d/o.  for suicidal thoughts.    The pt was then transferred to [facility] and admitted to  on a 9.13 voluntary legal status for acute safety and for further evaluation and treatment of his schizophrenia and his ETOH use d/o.  Plan  1.Pt restarted Invega 3 mg po q am and 6 mg po qhs  2. Collateral clinical pt history with pt permission  3. Plan to titrate Lexapro and to add low dose Buspar to alleviate the pt's significant anxiety and depression

## 2020-11-18 PROCEDURE — 99232 SBSQ HOSP IP/OBS MODERATE 35: CPT

## 2020-11-18 RX ADMIN — Medication 15 MILLILITER(S): at 09:31

## 2020-11-18 RX ADMIN — ATORVASTATIN CALCIUM 20 MILLIGRAM(S): 80 TABLET, FILM COATED ORAL at 21:21

## 2020-11-18 RX ADMIN — GABAPENTIN 300 MILLIGRAM(S): 400 CAPSULE ORAL at 13:15

## 2020-11-18 RX ADMIN — Medication 15 MILLILITER(S): at 21:23

## 2020-11-18 RX ADMIN — Medication 25 MILLIGRAM(S): at 09:30

## 2020-11-18 RX ADMIN — Medication 1 PATCH: at 09:30

## 2020-11-18 RX ADMIN — Medication 100 MILLIGRAM(S): at 13:15

## 2020-11-18 RX ADMIN — Medication 81 MILLIGRAM(S): at 09:30

## 2020-11-18 RX ADMIN — DIVALPROEX SODIUM 500 MILLIGRAM(S): 500 TABLET, DELAYED RELEASE ORAL at 21:21

## 2020-11-18 RX ADMIN — Medication 10 MILLIGRAM(S): at 13:15

## 2020-11-18 RX ADMIN — GABAPENTIN 300 MILLIGRAM(S): 400 CAPSULE ORAL at 09:30

## 2020-11-18 RX ADMIN — Medication 10 MILLIGRAM(S): at 21:21

## 2020-11-18 RX ADMIN — Medication 100 MILLIGRAM(S): at 21:21

## 2020-11-18 RX ADMIN — PALIPERIDONE 3 MILLIGRAM(S): 1.5 TABLET, EXTENDED RELEASE ORAL at 09:32

## 2020-11-18 RX ADMIN — Medication 1 TABLET(S): at 09:30

## 2020-11-18 RX ADMIN — Medication 1 PATCH: at 07:27

## 2020-11-18 RX ADMIN — Medication 100 MILLIGRAM(S): at 09:30

## 2020-11-18 RX ADMIN — Medication 1 MILLIGRAM(S): at 09:30

## 2020-11-18 RX ADMIN — PALIPERIDONE 6 MILLIGRAM(S): 1.5 TABLET, EXTENDED RELEASE ORAL at 21:23

## 2020-11-18 RX ADMIN — MIRTAZAPINE 15 MILLIGRAM(S): 45 TABLET, ORALLY DISINTEGRATING ORAL at 21:21

## 2020-11-18 RX ADMIN — Medication 1 PATCH: at 09:31

## 2020-11-18 RX ADMIN — Medication 1 PATCH: at 19:19

## 2020-11-18 RX ADMIN — Medication 1 MILLIGRAM(S): at 21:21

## 2020-11-18 RX ADMIN — ESCITALOPRAM OXALATE 20 MILLIGRAM(S): 10 TABLET, FILM COATED ORAL at 09:30

## 2020-11-18 RX ADMIN — Medication 15 MILLILITER(S): at 13:15

## 2020-11-18 RX ADMIN — TAMSULOSIN HYDROCHLORIDE 0.4 MILLIGRAM(S): 0.4 CAPSULE ORAL at 21:21

## 2020-11-18 RX ADMIN — Medication 10 MILLIGRAM(S): at 09:30

## 2020-11-18 RX ADMIN — GABAPENTIN 300 MILLIGRAM(S): 400 CAPSULE ORAL at 21:21

## 2020-11-18 NOTE — PROGRESS NOTE BEHAVIORAL HEALTH - SUMMARY
The pt is a   60 year-old single WM  s, disabled , unemployed, and [facility], The pt had most recently been discharged from [facility]  after a 6 week stay with a similar pt clinical presentation.             The pt. was BIB  EMS to [facility] on 10/26/2020 for evaluation of pt's worsening paranoid psychosis with reported SI  and ETOH use d/o.  for suicidal thoughts.    The pt was then transferred to [facility] and admitted to  on a 9.13 voluntary legal status for acute safety and for further evaluation and treatment of his schizophrenia and his ETOH use d/o.  Plan  1.Pt restarted Invega 3 mg po q am and 6 mg po qhs  2. Collateral clinical pt history with pt permission  3. Plan to titrate Lexapro and to add low dose Buspar to alleviate the pt's significant anxiety and depression

## 2020-11-18 NOTE — PROGRESS NOTE BEHAVIORAL HEALTH - NSBHCHARTREVIEWVS_PSY_A_CORE FT
Vital Signs Last 24 Hrs  T(C): 36.4 (18 Nov 2020 08:28), Max: 36.4 (18 Nov 2020 08:28)  T(F): 97.5 (18 Nov 2020 08:28), Max: 97.5 (18 Nov 2020 08:28)  HR: --  BP: --  BP(mean): --  RR: 12 (18 Nov 2020 08:28) (12 - 12)  SpO2: 97% (18 Nov 2020 08:28) (97% - 97%)

## 2020-11-18 NOTE — PROGRESS NOTE BEHAVIORAL HEALTH - NSBHFUPINTERVALHXFT_PSY_A_CORE
Pt seen in his  room as he continues sitting at the edge of his bed in a hypervigilant state  with a good angle to view anyone who might enter the room.  Pt encouraged  by staff and ultimately was able to try to leave his room even briefly  perhaps for a meal  in an  to desensitize himself to his severe and often overwhelming paranoia fueled fa=ear.  The pt continues  more overtly anxious, tense and paranoid  with more somatic preoccupation and with  frightened eye contact though nothing had apparently changed in his environment or as to  his clinical status since 24 hours earlier when the pt had reported feeling a little better. .The pt appeared suspicious and apprehensive as to his current med regimen despite the fact that the only change was in having the pt begin Invega Sustenna  as above in an effort to improve pt treatment compliance with the hope of decreasing the pt's risk of clinical relapse and need for readmission to hospital. Pt remains suspicious, guarded and hypervigilant with accentuated startle response. Pt with effort to smile but appearing tense and mistrustful.  Pt received his first dose of  Invega Sustenna on 11/10/2020  with plan to taper and DC po Invega after 2 weeks after second 1/2 of loading dose is completed in 1 week. . The pt was amenable to begin loading dose of 234 mg IM Invega Sustenna on 11/10/2020 with second dose in 1 week of 156 mg IM  followed by q 4 weekly  mg dose to alleviate pt symptoms of paranoid schizophrenia in  the context of a h/o treatment noncompliance.   The pt did now accept his Invega Sustenna  mg injection on 11/10/2020 with plan discussed with the pt to complete the loading dose with a second dose of Invega Sustenna 156 mg IM in 1 week, followed by q monthly IM Invega Sustenna 234 mg and with plan to DC PO Invega tabs after 2 weeks.  The pt continues to remain undecided. " I'll have to think about it."  The pt is dressed, still disheveled and unkempt despite ongoing staff efforts to offer the pt support and aid with his ADLs.  The pt continues  fearful in the context of his ongoing paranoid delusional thinking. but he appeared  a bit less tense and apprehensive overall.  The pt continues to remain in his room and is not yet able to attend therapy groups despite ongoing staff support . The pt is tolerating now increased standing Thorazine  100 mg po tid  to help decrease his AH and ongoing impairing paranoia. . Pt noted that his newly begun standing Thorazine is  helping with paranoia while Invega has a chance to resume it's efficacy. who was able to persuade the pt to remain med adherent in an effort to alleviate the pt's ongoing worsening AH in the context of the pt's h/o entrenched treatment noncompliance. Prior to the pt's current admission on 10/26/2020, the pt reportedly had not been med compliant for the past 6 months.     The pt is reportedly tolerating his now restarted Invega 3 mg po q am and 6 mg po qhs without reported side effects. The pt was amenable to resume low dose Depakote , Lexapro, Remeron  and Neurontin as well for affective lability. The pt was also agreeable to having low dose FGA standing Thorazine to be increased to 100 mg po tid added in the short term to help alleviate the pt's AH while awaiting Invega starkey clinical response.    The pt's judgment remains quite impaired with virtually no insight into his  medical and psychiatric comorbid illnesses and the need for ongoing consistent treatment both in and out of hospital.    Plan to continue pt trial of Buspar  10 mg po tid in an effort to alleviate pt anxiety and to serve as an antidepressant adjunct . Writer discussed plan to avoid benzodiazepines  in light of the pt's h/o ETOH use d/o.   Writer to continue talk with the pt as to COVID-19 CDC guidelines to decrease risk of infection including wearing  a face mask, social distancing and good hand hygiene. Currently, the pt remains to paranoid and psychotic to meaningfully discuss this important public health matter.

## 2020-11-19 PROCEDURE — 99232 SBSQ HOSP IP/OBS MODERATE 35: CPT

## 2020-11-19 RX ORDER — CHLORPROMAZINE HCL 10 MG
75 TABLET ORAL THREE TIMES A DAY
Refills: 0 | Status: DISCONTINUED | OUTPATIENT
Start: 2020-11-19 | End: 2020-11-20

## 2020-11-19 RX ADMIN — GABAPENTIN 300 MILLIGRAM(S): 400 CAPSULE ORAL at 09:47

## 2020-11-19 RX ADMIN — Medication 15 MILLILITER(S): at 21:09

## 2020-11-19 RX ADMIN — MIRTAZAPINE 15 MILLIGRAM(S): 45 TABLET, ORALLY DISINTEGRATING ORAL at 21:02

## 2020-11-19 RX ADMIN — GABAPENTIN 300 MILLIGRAM(S): 400 CAPSULE ORAL at 13:48

## 2020-11-19 RX ADMIN — Medication 81 MILLIGRAM(S): at 09:47

## 2020-11-19 RX ADMIN — Medication 1 MILLIGRAM(S): at 09:47

## 2020-11-19 RX ADMIN — Medication 1 PATCH: at 09:48

## 2020-11-19 RX ADMIN — Medication 1 TABLET(S): at 09:47

## 2020-11-19 RX ADMIN — Medication 10 MILLIGRAM(S): at 21:02

## 2020-11-19 RX ADMIN — ATORVASTATIN CALCIUM 20 MILLIGRAM(S): 80 TABLET, FILM COATED ORAL at 21:02

## 2020-11-19 RX ADMIN — Medication 1 PATCH: at 19:20

## 2020-11-19 RX ADMIN — Medication 10 MILLIGRAM(S): at 09:47

## 2020-11-19 RX ADMIN — Medication 100 MILLIGRAM(S): at 13:48

## 2020-11-19 RX ADMIN — GABAPENTIN 300 MILLIGRAM(S): 400 CAPSULE ORAL at 21:11

## 2020-11-19 RX ADMIN — Medication 1 PATCH: at 09:47

## 2020-11-19 RX ADMIN — Medication 10 MILLIGRAM(S): at 13:48

## 2020-11-19 RX ADMIN — Medication 1 PATCH: at 07:19

## 2020-11-19 RX ADMIN — TAMSULOSIN HYDROCHLORIDE 0.4 MILLIGRAM(S): 0.4 CAPSULE ORAL at 21:12

## 2020-11-19 RX ADMIN — Medication 75 MILLIGRAM(S): at 21:10

## 2020-11-19 RX ADMIN — ESCITALOPRAM OXALATE 20 MILLIGRAM(S): 10 TABLET, FILM COATED ORAL at 09:47

## 2020-11-19 RX ADMIN — DIVALPROEX SODIUM 500 MILLIGRAM(S): 500 TABLET, DELAYED RELEASE ORAL at 21:12

## 2020-11-19 RX ADMIN — Medication 1 MILLIGRAM(S): at 21:02

## 2020-11-19 RX ADMIN — PALIPERIDONE 6 MILLIGRAM(S): 1.5 TABLET, EXTENDED RELEASE ORAL at 21:12

## 2020-11-19 RX ADMIN — Medication 100 MILLIGRAM(S): at 09:47

## 2020-11-19 RX ADMIN — Medication 25 MILLIGRAM(S): at 09:47

## 2020-11-19 RX ADMIN — PALIPERIDONE 3 MILLIGRAM(S): 1.5 TABLET, EXTENDED RELEASE ORAL at 09:48

## 2020-11-19 NOTE — PROGRESS NOTE BEHAVIORAL HEALTH - PROBLEM SELECTOR PLAN 1
1.Restarted  Invega   3 mg po q am and 6 mg po qhs for alleviation of psychosis  2To begin slow taper of standing .chlorproMAZINE  to 75  mg po tid  ( paranoid psychosis0  3.diVALproex  milliGRAM(s) po qhs  and to check Depakote level on 10/31/2020  4.escitalopram increased to 20 mg po q am    5.gabapentin 300 milliGRAM(s) Oral three times a day  6.mirtazapine 15 milliGRAM(s) Oral at bedtime  7. To gather collateral pt clinical history with pt consent   8.Disposition planning ongoing with ongoing pt discussions as to staff   9.Invega Sustenna 234 mg IM on 11/10/2020  10. Invega Sustenna 156 mg IM in 1 week on 1/17/2020  to decrease pt risk of clinical relapse in the context of treatment noncompliance

## 2020-11-19 NOTE — PROGRESS NOTE BEHAVIORAL HEALTH - NSBHFUPINTERVALHXFT_PSY_A_CORE
Pt seen in his  room as he continues sitting tensely at the edge of his bed in a hypervigilant state  with a good angle to view anyone who might enter the room.  Pt encouraged  by staff and ultimately was able to try to leave his room even briefly  perhaps for a meal  in an  to desensitize himself to his severe and often overwhelming paranoia fueled fa=ear.  The pt continues  more overtly anxious, tense and paranoid  with more somatic preoccupation and with  frightened eye contact though nothing had apparently changed in his environment or as to  his clinical status since 24 hours earlier when the pt had reported feeling a little better.  Discussed with the pt the plan to taper and DC Thorazine as Invega PO and Sustenna become the sole antipsychotic medication treatments over time to alleviate the pt's severe chronic paranoid schizophrenia.   .The pt appeared suspicious and apprehensive as to his current med regimen despite the fact that the only change was in having the pt begin Invega Sustenna  as above in an effort to improve pt treatment compliance with the hope of decreasing the pt's risk of clinical relapse and need for readmission to hospital. Pt remains suspicious, guarded and hypervigilant with accentuated startle response. Pt with effort to smile but appearing tense and mistrustful.  Pt received his first dose of  Invega Sustenna on 11/10/2020  with plan to taper and DC po Invega after 2 weeks after second 1/2 of loading dose is completed in 1 week. . The pt was amenable to begin loading dose of 234 mg IM Invega Sustenna on 11/10/2020 with second dose in 1 week of 156 mg IM  followed by q 4 weekly  mg dose to alleviate pt symptoms of paranoid schizophrenia in  the context of a h/o treatment noncompliance.   The pt did now accept his Invega Sustenna  mg injection on 11/10/2020 with plan discussed with the pt to complete the loading dose with a second dose of Invega Sustenna 156 mg IM in 1 week, followed by q monthly IM Invega Sustenna 234 mg and with plan to DC PO Invega tabs after 2 weeks.  The pt continues to remain undecided. " I'll have to think about it."  The pt is dressed, still disheveled and unkempt despite ongoing staff efforts to offer the pt support and aid with his ADLs.  The pt continues  fearful in the context of his ongoing paranoid delusional thinking. but he appeared  a bit less tense and apprehensive overall.  The pt continues to remain in his room and is not yet able to attend therapy groups despite ongoing staff support . The pt is tolerating now increased standing Thorazine  100 mg po tid  to help decrease his AH and ongoing impairing paranoia. . Pt noted that his newly begun standing Thorazine is  helping with paranoia while Invega has a chance to resume it's efficacy. who was able to persuade the pt to remain med adherent in an effort to alleviate the pt's ongoing worsening AH in the context of the pt's h/o entrenched treatment noncompliance. Prior to the pt's current admission on 10/26/2020, the pt reportedly had not been med compliant for the past 6 months.     The pt is reportedly tolerating his now restarted Invega 3 mg po q am and 6 mg po qhs without reported side effects. The pt was amenable to resume low dose Depakote , Lexapro, Remeron  and Neurontin as well for affective lability. The pt was also agreeable to having low dose FGA standing Thorazine to be increased to 100 mg po tid added in the short term to help alleviate the pt's AH while awaiting Invega starkey clinical response.    The pt's judgment remains quite impaired with virtually no insight into his  medical and psychiatric comorbid illnesses and the need for ongoing consistent treatment both in and out of hospital.    Plan to continue pt trial of Buspar  10 mg po tid in an effort to alleviate pt anxiety and to serve as an antidepressant adjunct . Writer discussed plan to avoid benzodiazepines  in light of the pt's h/o ETOH use d/o.   Writer to continue talk with the pt as to COVID-19 CDC guidelines to decrease risk of infection including wearing  a face mask, social distancing and good hand hygiene. Currently, the pt remains to paranoid and psychotic to meaningfully discuss this important public health matter.

## 2020-11-19 NOTE — PROGRESS NOTE BEHAVIORAL HEALTH - SUMMARY
The pt is a   60 year-old single WM  s, disabled , unemployed, and [facility]iciled at [facility] other illicit drug use, The pt had most recently been discharged from [facility]  after a 6 week stay with a similar pt clinical presentation.             The pt. was BIB  EMS to [facility] on 10/26/2020 for evaluation of pt's worsening paranoid psychosis with reported SI  and ETOH use d/o.  for suicidal thoughts.    The pt was then transferred to [facility] and admitted to  on a 9.13 voluntary legal status for acute safety and for further evaluation and treatment of his schizophrenia and his ETOH use d/o.  Plan  1.Pt restarted Invega 3 mg po q am and 6 mg po qhs  2. Collateral clinical pt history with pt permission  3. Plan to titrate Lexapro and to add low dose Buspar to alleviate the pt's significant anxiety and depression

## 2020-11-19 NOTE — PROGRESS NOTE BEHAVIORAL HEALTH - NSBHCHARTREVIEWVS_PSY_A_CORE FT
Vital Signs Last 24 Hrs  T(C): 36.1 (19 Nov 2020 08:07), Max: 36.1 (19 Nov 2020 08:07)  T(F): 97 (19 Nov 2020 08:07), Max: 97 (19 Nov 2020 08:07)  HR: --  BP: --  BP(mean): --  RR: 16 (19 Nov 2020 08:07) (16 - 16)  SpO2: 98% (19 Nov 2020 08:07) (98% - 98%)

## 2020-11-20 PROCEDURE — 99232 SBSQ HOSP IP/OBS MODERATE 35: CPT

## 2020-11-20 RX ORDER — CHLORPROMAZINE HCL 10 MG
50 TABLET ORAL THREE TIMES A DAY
Refills: 0 | Status: DISCONTINUED | OUTPATIENT
Start: 2020-11-20 | End: 2020-11-23

## 2020-11-20 RX ORDER — SALIVA SUBSTITUTE COMB NO.11 351 MG
15 POWDER IN PACKET (EA) MUCOUS MEMBRANE
Refills: 0 | Status: DISCONTINUED | OUTPATIENT
Start: 2020-11-20 | End: 2020-11-21

## 2020-11-20 RX ADMIN — ESCITALOPRAM OXALATE 20 MILLIGRAM(S): 10 TABLET, FILM COATED ORAL at 09:20

## 2020-11-20 RX ADMIN — GABAPENTIN 300 MILLIGRAM(S): 400 CAPSULE ORAL at 21:05

## 2020-11-20 RX ADMIN — PALIPERIDONE 3 MILLIGRAM(S): 1.5 TABLET, EXTENDED RELEASE ORAL at 09:24

## 2020-11-20 RX ADMIN — GABAPENTIN 300 MILLIGRAM(S): 400 CAPSULE ORAL at 12:43

## 2020-11-20 RX ADMIN — Medication 15 MILLILITER(S): at 20:31

## 2020-11-20 RX ADMIN — Medication 1 MILLIGRAM(S): at 21:05

## 2020-11-20 RX ADMIN — Medication 1 PATCH: at 09:24

## 2020-11-20 RX ADMIN — ATORVASTATIN CALCIUM 20 MILLIGRAM(S): 80 TABLET, FILM COATED ORAL at 21:05

## 2020-11-20 RX ADMIN — Medication 10 MILLIGRAM(S): at 09:20

## 2020-11-20 RX ADMIN — Medication 1 MILLIGRAM(S): at 09:20

## 2020-11-20 RX ADMIN — Medication 50 MILLIGRAM(S): at 20:32

## 2020-11-20 RX ADMIN — GABAPENTIN 300 MILLIGRAM(S): 400 CAPSULE ORAL at 09:20

## 2020-11-20 RX ADMIN — Medication 10 MILLIGRAM(S): at 12:43

## 2020-11-20 RX ADMIN — Medication 25 MILLIGRAM(S): at 09:21

## 2020-11-20 RX ADMIN — Medication 15 MILLILITER(S): at 09:19

## 2020-11-20 RX ADMIN — Medication 75 MILLIGRAM(S): at 12:43

## 2020-11-20 RX ADMIN — Medication 81 MILLIGRAM(S): at 09:21

## 2020-11-20 RX ADMIN — Medication 1 PATCH: at 19:04

## 2020-11-20 RX ADMIN — Medication 1 PATCH: at 09:19

## 2020-11-20 RX ADMIN — Medication 10 MILLIGRAM(S): at 21:05

## 2020-11-20 RX ADMIN — MIRTAZAPINE 15 MILLIGRAM(S): 45 TABLET, ORALLY DISINTEGRATING ORAL at 21:05

## 2020-11-20 RX ADMIN — Medication 75 MILLIGRAM(S): at 09:21

## 2020-11-20 RX ADMIN — PALIPERIDONE 6 MILLIGRAM(S): 1.5 TABLET, EXTENDED RELEASE ORAL at 21:06

## 2020-11-20 RX ADMIN — TAMSULOSIN HYDROCHLORIDE 0.4 MILLIGRAM(S): 0.4 CAPSULE ORAL at 21:05

## 2020-11-20 RX ADMIN — Medication 1 TABLET(S): at 09:20

## 2020-11-20 RX ADMIN — DIVALPROEX SODIUM 500 MILLIGRAM(S): 500 TABLET, DELAYED RELEASE ORAL at 21:05

## 2020-11-20 NOTE — PROGRESS NOTE BEHAVIORAL HEALTH - RISK ASSESSMENT
low risk with better overall prognosis now that the pt has agreed to allow for Invega Sustenna long acting IM treatment in an effort to improve pt treatment adherence with the goal of decreasing his risk for clinical relapse. Pt chronic severe paranoia continues to impede clinical progress, but new trial of Invega Sustenna is a hopeful start . Pt sleep and appetite are slowly normalizing with treatment  acute now with a DC of  auditory hallucinations but with ongoing paranoia  now restarted on Invega in hopes of alleviating the pt's paranoid psychosis, feeling depressed and lonely. AH persist but reportedly non command type. Pt beginning to slowly elaborate on his paranoid fears so that staff can work with the pt to decrease the pt's fears  mitigating : Pt agreed to enter inpatient hospital safe setting, pt amenable to resume medications, , pt able to form limited selective therapeutic alliance with staff, pt now agreeing to Invega Sustenna IM to help alleviate the pt's longstanding paranoid schizophrenia. The pt remains med compliant in hospital and will continue with long acting Invega Sustenna after DC in an effort to improve the pt's overall treatment adherence and thus decrease the risk of clinical relapse    protective :  pt with help seeking behavior, support of brother and group home ,stable place to live  chronic: prior psych hospitalization ,alcohol use

## 2020-11-20 NOTE — PROGRESS NOTE BEHAVIORAL HEALTH - PROBLEM SELECTOR PLAN 1
1.Restarted  Invega   3 mg po q am and 6 mg po qhs for alleviation of psychosis  2To begin slow taper of standing .chlorpromazine  to 50  mg po tid  ( paranoid psychosis0  3.diVALproex  milliGRAM(s) po qhs  and to check Depakote level on 10/31/2020  4.escitalopram increased to 20 mg po q am    5.gabapentin 300 milliGRAM(s) Oral three times a day  6.mirtazapine 15 milliGRAM(s) Oral at bedtime  7. To gather collateral pt clinical history with pt consent   8.Disposition planning ongoing with ongoing pt discussions as to staff   9.Invega Sustenna 234 mg IM on 11/10/2020  10. Invega Sustenna 156 mg IM in 1 week on 1/17/2020  to decrease pt risk of clinical relapse in the context of treatment noncompliance

## 2020-11-20 NOTE — PROGRESS NOTE BEHAVIORAL HEALTH - OTHER
furtive, intense but a bit less wary  overall in clipped, rushed frightened spurts " I'm still paranoid  but a little better." still paranoid and apprehensive appearing paranoid, persecutory delusional thinking, impoverished

## 2020-11-20 NOTE — PROGRESS NOTE BEHAVIORAL HEALTH - NSBHCHARTREVIEWVS_PSY_A_CORE FT
Vital Signs Last 24 Hrs  T(C): 36.1 (20 Nov 2020 08:02), Max: 36.1 (20 Nov 2020 08:02)  T(F): 97 (20 Nov 2020 08:02), Max: 97 (20 Nov 2020 08:02)  HR: --  BP: --  BP(mean): --  RR: 16 (20 Nov 2020 08:02) (16 - 16)  SpO2: 98% (20 Nov 2020 08:02) (98% - 98%)

## 2020-11-20 NOTE — PROGRESS NOTE BEHAVIORAL HEALTH - SUMMARY
The pt is a   60 year-old single WM  s, disabled , unemployed, and [facility] other [facility] drug use, The pt had most recently been discharged from [facility]  after a 6 week stay with a similar pt clinical presentation.             The pt. was BIB  EMS to [facility] on 10/26/2020 for evaluation of pt's worsening paranoid psychosis with reported SI  and ETOH use d/o.  for suicidal thoughts.    The pt was then transferred to [facility] and admitted to  on a 9.13 voluntary legal status for acute safety and for further evaluation and treatment of his schizophrenia and his ETOH use d/o.  Plan  1.Pt restarted Invega 3 mg po q am and 6 mg po qhs  2. Collateral clinical pt history with pt permission  3. Plan to titrate Lexapro and to add low dose Buspar to alleviate the pt's significant anxiety and depression

## 2020-11-20 NOTE — PROGRESS NOTE BEHAVIORAL HEALTH - NSBHFUPINTERVALHXFT_PSY_A_CORE
Pt seen in his  room  after the pt had met with SW to review pt disposition planning including telephone contact  with the pt's residence staff at [facility] in [place].  Pt remains anxious, tense and hypervigilant with ongoing dry mouth likely due to combination medications and pt anxiety.    Discussed with the pt the plan to taper and DC Thorazine( very anticholinergic and likely exacerbating the pt's dry mouth)  as Invega PO and Sustenna become the sole antipsychotic medication treatments over time to alleviate the pt's severe chronic paranoid schizophrenia.  Discussed with the pt the plan to increase frequency of Biotene  swish and spit to 5 times a day to further alleviate pt dry mouth.  .The pt remains  suspicious and apprehensive as to his current med regimen despite the fact that the only change was in having the pt begin Invega Sustenna  as above in an effort to improve pt treatment compliance with the hope of decreasing the pt's risk of clinical relapse and need for readmission to hospital. Pt remains suspicious, guarded and hypervigilant with accentuated startle response. Pt with effort to smile but appearing tense and mistrustful.  Pt received his first dose of  Invega Sustenna on 11/10/2020  with plan to taper and DC po Invega after 2 weeks after second 1/2 of loading dose is completed in 1 week. . The pt was amenable to begin loading dose of 234 mg IM Invega Sustenna on 11/10/2020 with second dose in 1 week of 156 mg IM  followed by q 4 weekly  mg dose to alleviate pt symptoms of paranoid schizophrenia in  the context of a h/o treatment noncompliance.   The pt did now accept his Invega Sustenna  mg injection on 11/10/2020 with plan discussed with the pt to complete the loading dose with a second dose of Invega Sustenna 156 mg IM in 1 week, followed by q monthly IM Invega Sustenna 234 mg and with plan to DC PO Invega tabs after about 2-3 weeks.   The pt is dressed, still disheveled and unkempt despite ongoing staff efforts to offer the pt support and aid with his ADLs.  The pt continues  fearful in the context of his ongoing paranoid delusional thinking. but he appeared  a bit less tense and apprehensive overall.  The pt continues to remain in his room and is not yet able to attend therapy groups despite ongoing staff support . However, the pt has begun venturing out of his room for meals with peers in the day room though the pt is not interacting with peers during these times.  The pt's skin remains very dry and reddened but the pt continues to decline staff efforts to offer the pt Lac Hydrin  to ease skin dryness.    The pt is reportedly tolerating his now restarted Invega 3 mg po q am and 6 mg po q hs without reported side effects. The pt was amenable to resume low dose Depakote , Lexapro, Remeron  and Neurontin as well for affective lability. The pt was also agreeable to having low dose FGA standing Thorazine to be increased to 100 mg po tid added in the short term to help alleviate the pt's AH while awaiting Invega starkey clinical response.    The pt's judgment remains quite impaired with virtually no insight into his  medical and psychiatric comorbid illnesses and the need for ongoing consistent treatment both in and out of hospital.    Plan to continue pt trial of Buspar  10 mg po tid in an effort to alleviate pt anxiety and to serve as an antidepressant adjunct . Writer discussed plan to avoid benzodiazepines  in light of the pt's h/o ETOH use d/o. The pt continues to deny SI /HI /AH /VH . though he remains paranoid and suspicious.    Writer and other hospital staff  continue to educate the  pt as to COVID-19 CDC guidelines to decrease risk of infection including wearing  a face mask, social distancing and good hand hygiene. The pt will wear a face mask when reminded by staff. He expresses an understanding of the need to continue with CDC guidelines  of face mask wearing, social distancing, avoiding large gatherings and maintaining good hand hygiene  after DC from hospital. The pt was also reminded to contact his PCP should the pt develop sudden cough, fever or SOB so that he can be retested for COVID-19 infection.

## 2020-11-21 RX ORDER — SALIVA SUBSTITUTE COMB NO.11 351 MG
15 POWDER IN PACKET (EA) MUCOUS MEMBRANE
Refills: 0 | Status: DISCONTINUED | OUTPATIENT
Start: 2020-11-21 | End: 2020-11-25

## 2020-11-21 RX ADMIN — Medication 1 PATCH: at 07:23

## 2020-11-21 RX ADMIN — GABAPENTIN 300 MILLIGRAM(S): 400 CAPSULE ORAL at 21:02

## 2020-11-21 RX ADMIN — GABAPENTIN 300 MILLIGRAM(S): 400 CAPSULE ORAL at 09:38

## 2020-11-21 RX ADMIN — Medication 1 MILLIGRAM(S): at 09:38

## 2020-11-21 RX ADMIN — Medication 1 PATCH: at 09:39

## 2020-11-21 RX ADMIN — Medication 1 TABLET(S): at 09:38

## 2020-11-21 RX ADMIN — MIRTAZAPINE 15 MILLIGRAM(S): 45 TABLET, ORALLY DISINTEGRATING ORAL at 21:02

## 2020-11-21 RX ADMIN — Medication 25 MILLIGRAM(S): at 09:38

## 2020-11-21 RX ADMIN — Medication 50 MILLIGRAM(S): at 21:02

## 2020-11-21 RX ADMIN — PALIPERIDONE 6 MILLIGRAM(S): 1.5 TABLET, EXTENDED RELEASE ORAL at 21:03

## 2020-11-21 RX ADMIN — Medication 10 MILLIGRAM(S): at 09:38

## 2020-11-21 RX ADMIN — Medication 1 PATCH: at 17:36

## 2020-11-21 RX ADMIN — TAMSULOSIN HYDROCHLORIDE 0.4 MILLIGRAM(S): 0.4 CAPSULE ORAL at 21:01

## 2020-11-21 RX ADMIN — Medication 81 MILLIGRAM(S): at 09:38

## 2020-11-21 RX ADMIN — DIVALPROEX SODIUM 500 MILLIGRAM(S): 500 TABLET, DELAYED RELEASE ORAL at 21:02

## 2020-11-21 RX ADMIN — Medication 50 MILLIGRAM(S): at 09:38

## 2020-11-21 RX ADMIN — Medication 10 MILLIGRAM(S): at 21:01

## 2020-11-21 RX ADMIN — PALIPERIDONE 3 MILLIGRAM(S): 1.5 TABLET, EXTENDED RELEASE ORAL at 09:39

## 2020-11-21 RX ADMIN — ATORVASTATIN CALCIUM 20 MILLIGRAM(S): 80 TABLET, FILM COATED ORAL at 21:02

## 2020-11-21 RX ADMIN — ESCITALOPRAM OXALATE 20 MILLIGRAM(S): 10 TABLET, FILM COATED ORAL at 09:38

## 2020-11-21 RX ADMIN — Medication 15 MILLILITER(S): at 21:01

## 2020-11-21 RX ADMIN — Medication 50 MILLIGRAM(S): at 12:28

## 2020-11-21 RX ADMIN — GABAPENTIN 300 MILLIGRAM(S): 400 CAPSULE ORAL at 12:28

## 2020-11-21 RX ADMIN — Medication 15 MILLILITER(S): at 09:38

## 2020-11-21 RX ADMIN — Medication 1 PATCH: at 09:38

## 2020-11-21 RX ADMIN — Medication 1 MILLIGRAM(S): at 21:02

## 2020-11-21 RX ADMIN — Medication 10 MILLIGRAM(S): at 12:28

## 2020-11-22 RX ADMIN — Medication 10 MILLIGRAM(S): at 09:36

## 2020-11-22 RX ADMIN — GABAPENTIN 300 MILLIGRAM(S): 400 CAPSULE ORAL at 21:13

## 2020-11-22 RX ADMIN — GABAPENTIN 300 MILLIGRAM(S): 400 CAPSULE ORAL at 09:36

## 2020-11-22 RX ADMIN — PALIPERIDONE 6 MILLIGRAM(S): 1.5 TABLET, EXTENDED RELEASE ORAL at 21:14

## 2020-11-22 RX ADMIN — Medication 1 PATCH: at 09:36

## 2020-11-22 RX ADMIN — Medication 15 MILLILITER(S): at 21:12

## 2020-11-22 RX ADMIN — MIRTAZAPINE 15 MILLIGRAM(S): 45 TABLET, ORALLY DISINTEGRATING ORAL at 21:13

## 2020-11-22 RX ADMIN — DIVALPROEX SODIUM 500 MILLIGRAM(S): 500 TABLET, DELAYED RELEASE ORAL at 21:13

## 2020-11-22 RX ADMIN — ESCITALOPRAM OXALATE 20 MILLIGRAM(S): 10 TABLET, FILM COATED ORAL at 09:36

## 2020-11-22 RX ADMIN — Medication 10 MILLIGRAM(S): at 12:23

## 2020-11-22 RX ADMIN — Medication 1 MILLIGRAM(S): at 21:12

## 2020-11-22 RX ADMIN — Medication 1 PATCH: at 07:18

## 2020-11-22 RX ADMIN — Medication 1 TABLET(S): at 09:36

## 2020-11-22 RX ADMIN — PALIPERIDONE 3 MILLIGRAM(S): 1.5 TABLET, EXTENDED RELEASE ORAL at 09:38

## 2020-11-22 RX ADMIN — Medication 50 MILLIGRAM(S): at 12:23

## 2020-11-22 RX ADMIN — Medication 25 MILLIGRAM(S): at 09:37

## 2020-11-22 RX ADMIN — GABAPENTIN 300 MILLIGRAM(S): 400 CAPSULE ORAL at 12:23

## 2020-11-22 RX ADMIN — ATORVASTATIN CALCIUM 20 MILLIGRAM(S): 80 TABLET, FILM COATED ORAL at 21:13

## 2020-11-22 RX ADMIN — Medication 1 PATCH: at 17:28

## 2020-11-22 RX ADMIN — Medication 1 MILLIGRAM(S): at 09:36

## 2020-11-22 RX ADMIN — Medication 50 MILLIGRAM(S): at 21:13

## 2020-11-22 RX ADMIN — TAMSULOSIN HYDROCHLORIDE 0.4 MILLIGRAM(S): 0.4 CAPSULE ORAL at 21:13

## 2020-11-22 RX ADMIN — Medication 50 MILLIGRAM(S): at 09:36

## 2020-11-22 RX ADMIN — Medication 10 MILLIGRAM(S): at 21:12

## 2020-11-22 RX ADMIN — Medication 81 MILLIGRAM(S): at 09:36

## 2020-11-22 RX ADMIN — Medication 1 PATCH: at 09:37

## 2020-11-23 LAB
ALBUMIN SERPL ELPH-MCNC: 3.9 G/DL — SIGNIFICANT CHANGE UP (ref 3.3–5)
ALP SERPL-CCNC: 88 U/L — SIGNIFICANT CHANGE UP (ref 40–120)
ALT FLD-CCNC: 46 U/L — SIGNIFICANT CHANGE UP (ref 12–78)
ANION GAP SERPL CALC-SCNC: 5 MMOL/L — SIGNIFICANT CHANGE UP (ref 5–17)
AST SERPL-CCNC: 24 U/L — SIGNIFICANT CHANGE UP (ref 15–37)
BASOPHILS # BLD AUTO: 0.04 K/UL — SIGNIFICANT CHANGE UP (ref 0–0.2)
BASOPHILS NFR BLD AUTO: 0.6 % — SIGNIFICANT CHANGE UP (ref 0–2)
BILIRUB SERPL-MCNC: 0.5 MG/DL — SIGNIFICANT CHANGE UP (ref 0.2–1.2)
BUN SERPL-MCNC: 24 MG/DL — HIGH (ref 7–23)
CALCIUM SERPL-MCNC: 10.4 MG/DL — HIGH (ref 8.5–10.1)
CHLORIDE SERPL-SCNC: 106 MMOL/L — SIGNIFICANT CHANGE UP (ref 96–108)
CO2 SERPL-SCNC: 27 MMOL/L — SIGNIFICANT CHANGE UP (ref 22–31)
CREAT SERPL-MCNC: 1.51 MG/DL — HIGH (ref 0.5–1.3)
EOSINOPHIL # BLD AUTO: 0.21 K/UL — SIGNIFICANT CHANGE UP (ref 0–0.5)
EOSINOPHIL NFR BLD AUTO: 3.2 % — SIGNIFICANT CHANGE UP (ref 0–6)
GLUCOSE SERPL-MCNC: 120 MG/DL — HIGH (ref 70–99)
HCT VFR BLD CALC: 46.2 % — SIGNIFICANT CHANGE UP (ref 39–50)
HGB BLD-MCNC: 15.1 G/DL — SIGNIFICANT CHANGE UP (ref 13–17)
IMM GRANULOCYTES NFR BLD AUTO: 0.2 % — SIGNIFICANT CHANGE UP (ref 0–1.5)
LYMPHOCYTES # BLD AUTO: 2.07 K/UL — SIGNIFICANT CHANGE UP (ref 1–3.3)
LYMPHOCYTES # BLD AUTO: 31.7 % — SIGNIFICANT CHANGE UP (ref 13–44)
MCHC RBC-ENTMCNC: 30.3 PG — SIGNIFICANT CHANGE UP (ref 27–34)
MCHC RBC-ENTMCNC: 32.7 GM/DL — SIGNIFICANT CHANGE UP (ref 32–36)
MCV RBC AUTO: 92.6 FL — SIGNIFICANT CHANGE UP (ref 80–100)
MONOCYTES # BLD AUTO: 0.78 K/UL — SIGNIFICANT CHANGE UP (ref 0–0.9)
MONOCYTES NFR BLD AUTO: 11.9 % — SIGNIFICANT CHANGE UP (ref 2–14)
NEUTROPHILS # BLD AUTO: 3.43 K/UL — SIGNIFICANT CHANGE UP (ref 1.8–7.4)
NEUTROPHILS NFR BLD AUTO: 52.4 % — SIGNIFICANT CHANGE UP (ref 43–77)
PLATELET # BLD AUTO: 221 K/UL — SIGNIFICANT CHANGE UP (ref 150–400)
POTASSIUM SERPL-MCNC: 4.9 MMOL/L — SIGNIFICANT CHANGE UP (ref 3.5–5.3)
POTASSIUM SERPL-SCNC: 4.9 MMOL/L — SIGNIFICANT CHANGE UP (ref 3.5–5.3)
PROT SERPL-MCNC: 8.2 GM/DL — SIGNIFICANT CHANGE UP (ref 6–8.3)
RBC # BLD: 4.99 M/UL — SIGNIFICANT CHANGE UP (ref 4.2–5.8)
RBC # FLD: 11.9 % — SIGNIFICANT CHANGE UP (ref 10.3–14.5)
SARS-COV-2 RNA SPEC QL NAA+PROBE: SIGNIFICANT CHANGE UP
SODIUM SERPL-SCNC: 138 MMOL/L — SIGNIFICANT CHANGE UP (ref 135–145)
WBC # BLD: 6.54 K/UL — SIGNIFICANT CHANGE UP (ref 3.8–10.5)
WBC # FLD AUTO: 6.54 K/UL — SIGNIFICANT CHANGE UP (ref 3.8–10.5)

## 2020-11-23 PROCEDURE — 71045 X-RAY EXAM CHEST 1 VIEW: CPT | Mod: 26

## 2020-11-23 PROCEDURE — 99232 SBSQ HOSP IP/OBS MODERATE 35: CPT

## 2020-11-23 RX ORDER — CHLORPROMAZINE HCL 10 MG
25 TABLET ORAL THREE TIMES A DAY
Refills: 0 | Status: DISCONTINUED | OUTPATIENT
Start: 2020-11-23 | End: 2020-11-24

## 2020-11-23 RX ORDER — INFLUENZA VIRUS VACCINE 15; 15; 15; 15 UG/.5ML; UG/.5ML; UG/.5ML; UG/.5ML
0.5 SUSPENSION INTRAMUSCULAR ONCE
Refills: 0 | Status: COMPLETED | OUTPATIENT
Start: 2020-11-23 | End: 2020-11-23

## 2020-11-23 RX ORDER — TUBERCULIN PURIFIED PROTEIN DERIVATIVE 5 [IU]/.1ML
5 INJECTION, SOLUTION INTRADERMAL ONCE
Refills: 0 | Status: DISCONTINUED | OUTPATIENT
Start: 2020-11-23 | End: 2020-11-23

## 2020-11-23 RX ADMIN — Medication 25 MILLIGRAM(S): at 21:04

## 2020-11-23 RX ADMIN — TAMSULOSIN HYDROCHLORIDE 0.4 MILLIGRAM(S): 0.4 CAPSULE ORAL at 21:07

## 2020-11-23 RX ADMIN — ATORVASTATIN CALCIUM 20 MILLIGRAM(S): 80 TABLET, FILM COATED ORAL at 21:07

## 2020-11-23 RX ADMIN — Medication 1 MILLIGRAM(S): at 09:26

## 2020-11-23 RX ADMIN — GABAPENTIN 300 MILLIGRAM(S): 400 CAPSULE ORAL at 21:07

## 2020-11-23 RX ADMIN — GABAPENTIN 300 MILLIGRAM(S): 400 CAPSULE ORAL at 09:27

## 2020-11-23 RX ADMIN — Medication 10 MILLIGRAM(S): at 09:26

## 2020-11-23 RX ADMIN — Medication 10 MILLIGRAM(S): at 21:07

## 2020-11-23 RX ADMIN — GABAPENTIN 300 MILLIGRAM(S): 400 CAPSULE ORAL at 12:42

## 2020-11-23 RX ADMIN — Medication 10 MILLIGRAM(S): at 12:43

## 2020-11-23 RX ADMIN — ESCITALOPRAM OXALATE 20 MILLIGRAM(S): 10 TABLET, FILM COATED ORAL at 09:26

## 2020-11-23 RX ADMIN — PALIPERIDONE 6 MILLIGRAM(S): 1.5 TABLET, EXTENDED RELEASE ORAL at 21:07

## 2020-11-23 RX ADMIN — DIVALPROEX SODIUM 500 MILLIGRAM(S): 500 TABLET, DELAYED RELEASE ORAL at 21:10

## 2020-11-23 RX ADMIN — Medication 1 PATCH: at 09:27

## 2020-11-23 RX ADMIN — MIRTAZAPINE 15 MILLIGRAM(S): 45 TABLET, ORALLY DISINTEGRATING ORAL at 21:07

## 2020-11-23 RX ADMIN — Medication 81 MILLIGRAM(S): at 09:27

## 2020-11-23 RX ADMIN — Medication 50 MILLIGRAM(S): at 09:26

## 2020-11-23 RX ADMIN — Medication 1 PATCH: at 14:57

## 2020-11-23 RX ADMIN — Medication 25 MILLIGRAM(S): at 09:26

## 2020-11-23 RX ADMIN — PALIPERIDONE 3 MILLIGRAM(S): 1.5 TABLET, EXTENDED RELEASE ORAL at 09:30

## 2020-11-23 RX ADMIN — Medication 1 TABLET(S): at 09:26

## 2020-11-23 RX ADMIN — Medication 25 MILLIGRAM(S): at 12:43

## 2020-11-23 NOTE — DISCHARGE NOTE BEHAVIORAL HEALTH - HPI (INCLUDE ILLNESS QUALITY, SEVERITY, DURATION, TIMING, CONTEXT, MODIFYING FACTORS, ASSOCIATED SIGNS AND SYMPTOMS)
Per ED BH Assessment authored by REGINA Oconnell on 10/24/2020: "This is a 61 y/o single, disabled,  male,  unemployed, domiciled at [facility] in [place]; with a psychiatric history significant for Paranoid Schizophrenia, alcohol use disorder, discharged from [facility] approx 6 weeks ago for alcohol dependence, multiple psych admissions and approx 2 ED visits per month for psychiatric complaints, last time 9/18/20 with similar complaints, today reported to ED attending SI with plan to jump off roof  medical history significant for BPH, GERD, HTN, and HLD,  Per record, has a history of multiple past inpatient psychiatric hospitalizations, last one was in January 2020; with a history of two self-reported prior suicide attempts (by aspirin overdose 6 years ago and in January 2020 by reportedly drinking 1 cup of bleach);  reported occasional MJ use no history of other illicit drug use, BIBA EMS for suicidal thoughts.   Pt reports he is suicidal with plan to jump off roof.   He reports that he has worsening depression due to multiple stressors, ie peers taking all of his money and feelings of isolation.  He reports he is in treatment with RAFAEL Arteaga NP, reports he is compliant with his medications and also drinks 1/4 pint of Vodka daily, last 2 days ago.  He endorsed fair sleep, anergia, feels hopeless, amotivated, and anhedonic.  He reports he does not feel safe outside of the hospital and has intention to kill himself if he is not admitted.  The patient denies other significant mood symptoms.  Specifically, the patient denies manic symptoms, past and present.  The patient denies auditory or visual hallucinations, and no delusions could be elicited on direct questioning.  The patient denies homicidal ideation, intent, or plan.    Brother Shoaib, confirmed Pt issues with giving money away and has been budgeting his money for him." Prinical diagnosis at discharge: Schizophrenia- chronic paranoid type  Per ED BH Assessment authored by REGIAN Oconnell on 10/24/2020: "This is a 61 y/o single, disabled,  male,  unemployed, domiciled at [facility] in [place]; with a psychiatric history significant for Paranoid Schizophrenia, alcohol use disorder, discharged from [facility] approx 6 weeks ago for alcohol dependence, multiple psych admissions and approx 2 ED visits per month for psychiatric complaints, last time 9/18/20 with similar complaints, today reported to ED attending SI with plan to jump off roof  medical history significant for BPH, GERD, HTN, and HLD,  Per record, has a history of multiple past inpatient psychiatric hospitalizations, last one was in January 2020; with a history of two self-reported prior suicide attempts (by aspirin overdose 6 years ago and in January 2020 by reportedly drinking 1 cup of bleach);  reported occasional MJ use no history of other illicit drug use, BIBA EMS for suicidal thoughts.   Pt reports he is suicidal with plan to jump off roof.   He reports that he has worsening depression due to multiple stressors, ie peers taking all of his money and feelings of isolation.  He reports he is in treatment with RAFAEL Arteaga NP, reports he is compliant with his medications and also drinks 1/4 pint of Vodka daily, last 2 days ago.  He endorsed fair sleep, anergia, feels hopeless, amotivated, and anhedonic.  He reports he does not feel safe outside of the hospital and has intention to kill himself if he is not admitted.  The patient denies other significant mood symptoms.  Specifically, the patient denies manic symptoms, past and present.  The patient denies auditory or visual hallucinations, and no delusions could be elicited on direct questioning.  The patient denies homicidal ideation, intent, or plan.    Brother Shoaib, confirmed Pt issues with giving money away and has been budgeting his money for him." Prinical diagnosis at discharge: Schizophrenia- chronic paranoid type  Per ED BH Assessment authored by REGINA Oconnell on 10/24/2020: "This is a 61 y/o single, disabled,  male,  unemployed, domiciled at [facility] in [place]; with a psychiatric history significant for Paranoid Schizophrenia, alcohol use disorder, discharged from [facility] approx 6 weeks ago for alcohol dependence, multiple psych admissions and approx 2 ED visits per month for psychiatric complaints, last time 9/18/20 with similar complaints, today reported to ED attending SI with plan to jump off roof  medical histo Principal diagnosis at discharge: Paranoid schizophrenia Principal diagnosis at discharge: Paranoid schizophrenia  The pt is a   60 year-old single WM  s, disabled , unemployed, and [facility] other [facility] drug use, The pt had most recently been discharged from [facility]  after a 6 week stay with a similar pt clinical presentation.    The pt. was BIB  EMS to [facility] on 10/26/2020 for evaluation of pt's worsening paranoid psychosis with reported SI  and ETOH use d/o.  for suicidal thoughts.    The pt was then transferred to [facility] and admitted to  on a 9.13 voluntary legal status for acute safety and for further evaluation and treatment of his schizophrenia and his ETOH use d/o. Principal diagnosis at discharge: Paranoid schizophrenia  The pt is a   60 year-old single WM  s, disabled , unemployed, and [facility] other [facility] drug use, The pt had most recently been discharged from [facility]  after a 6 week stay with a similar pt clinical presentation.    The pt. was BIB  EMS to [facility] on 10/26/2020 for evaluation of pt's worsening paranoid psychosis with reported SI  and ETOH use d/o.  for suicidal thoughts.    The pt was then transferred to [facility] and admitted to  on a 9.13 voluntary legal status for acute safety and for further evaluation and treatment of his schizophrenia and his ETOH use  	  Course of Hospitalization  	 Next 1/2 of Invega Sustenna 156 mg IM  given to the pt on 11/17/2020.  	 Next Invega Sustenna 234 mg IM due on 12/15/2020 and q monthly thereafter  	 Pt reported receiving influenza vaccine 1 month ago. Pt also noted a h/o a PPD ? reactive > 1 year ago. Pt amenable to portable CXR- r/o infection  Plan Rechecked  COVID- 19 PCR ( obtained 11/23/2020)- NEGATIVE  · Interval History: Pt seen in his  room  . The pt remains poised at the edge of his bed and facing the door in an ongoing though somewhat less intense hypervigilant, paranoid manner.   	  Discussed with the pt the plan to taper and DC Thorazine( very anticholinergic and likely exacerbating the pt's dry mouth)  as Invega PO and Sustenna become the sole antipsychotic medication treatments over time to alleviate the pt's severe chronic paranoid schizophrenia.  Discussed with the pt the plan to increase frequency of Biotene  swish and spit to 5 times a day to further alleviate pt dry mouth.  	.The pt continues suspicious and apprehensive as to his current med regimen despite the fact that the only change was in having the pt begin Invega Sustenna  as above in an effort to improve pt treatment compliance with the hope of decreasing the pt's risk of clinical relapse and need for readmission to hospital. Pt remains suspicious, guarded and hypervigilant with accentuated startle response. Pt with effort to smile but appearing tense and mistrustful.  Pt received his first dose of  Invega Sustenna on 11/10/2020  with plan to taper and DC po Invega after 2 weeks after second 1/2 of loading dose is completed in 1 week. . The pt was amenable to begin loading dose of 234 mg IM Invega Sustenna on 11/10/2020 with second dose in 1 week of 156 mg IM  followed by q 4 weekly  mg dose to alleviate pt symptoms of paranoid schizophrenia in  the context of a h/o treatment noncompliance.  	 The pt did now accept his Invega Sustenna  mg injection on 11/10/2020 with plan discussed with the pt to complete the loading dose with a second dose of Invega Sustenna 156 mg IM in 1 week, followed by q monthly IM Invega Sustenna 234 mg and with plan to DC PO Invega tabs after about 2-3 weeks.   	The pt is dressed, still disheveled and unkempt despite ongoing staff efforts to offer the pt support and aid with his ADLs.  The pt continues  fearful in the context of his ongoing paranoid delusional thinking. but he appeared  a bit less tense and apprehensive overall.  The pt continues to remain in his room and is not yet able to attend therapy groups despite ongoing staff support . However, the pt has begun venturing out of his room for meals with peers in the day room though the pt is not interacting with peers during these times.  The pt's skin remains very dry and reddened but the pt continues to decline staff efforts to offer the pt Lac Hydrin  to ease skin dryness.  	  The pt is reportedly tolerating his now restarted Invega 3 mg po q am and 6 mg po q hs without reported side effects. The pt was amenable to plan to taper and DC Thorazine now that Invega monotherapy is present. Next Invega Sustenna 234 mg IM will be due 12/15/2020.  	 The pt's judgment remains quite impaired with virtually no insight into his  medical and psychiatric comorbid illnesses and the need for ongoing consistent treatment both in and out of hospital.   	 Plan to continue pt trial of Buspar  10 mg po tid in an effort to alleviate pt anxiety and to serve as an antidepressant adjunct . Writer discussed plan to avoid benzodiazepines  in light of the pt's h/o ETOH use d/o. The pt continues to deny SI /HI /AH /VH . though he remains paranoid and suspicious.    Writer and other hospital staff  continue to educate the  pt as to COVID-19 CDC guidelines to decrease risk of infection including wearing  a face mask, social distancing and good hand hygiene. The pt will wear a face mask when reminded by staff. He expresses an understanding of the need to continue with CDC guidelines  of face mask wearing, social distancing, avoiding large gatherings and maintaining good hand hygiene  after DC from hospital. The pt was also reminded to contact his PCP should the pt develop sudden cough, fever or SOB so that he can be retested for COVID-19 infection.

## 2020-11-23 NOTE — DISCHARGE NOTE BEHAVIORAL HEALTH - NSBHDCREFEROTHERFT_PSY_A_CORE
[facility]- for psychiatric crises (available AFTER HOURS)  24/7 hotline: [phone]  Address:  [address], [place] [address]

## 2020-11-23 NOTE — PROGRESS NOTE BEHAVIORAL HEALTH - NSBHCHARTREVIEWVS_PSY_A_CORE FT
Vital Signs Last 24 Hrs  T(C): 36.4 (23 Nov 2020 08:29), Max: 36.4 (23 Nov 2020 08:29)  T(F): 97.6 (23 Nov 2020 08:29), Max: 97.6 (23 Nov 2020 08:29)  HR: --  BP: --  BP(mean): --  RR: 20 (23 Nov 2020 08:29) (20 - 20)  SpO2: 98% (23 Nov 2020 08:29) (98% - 98%)

## 2020-11-23 NOTE — DISCHARGE NOTE BEHAVIORAL HEALTH - PROVIDER TOKENS
FREE:[LAST:[shonda],FIRST:[np],PHONE:[(   )    -],FAX:[(   )    -],ADDRESS:[Pt has follow up med management appointment with REGINA Arteaga on 11/29/2020 ay 8 am]]

## 2020-11-23 NOTE — DISCHARGE NOTE BEHAVIORAL HEALTH - NSBHDCCRISISPLAN2FT_PSY_A_CORE
[facility] 5N: [phone]  SERENA- Regine Rod LMSW/ Denisha Buchanan LMSW  Psychiatrists- Dr. Minerva Mcconnell

## 2020-11-23 NOTE — PROGRESS NOTE BEHAVIORAL HEALTH - NSBHFUPINTERVALHXFT_PSY_A_CORE
Pt seen in his  room  . The pt remains poised at the edge of his bed and facing the door in an ongoing though somewhat less intense hypervigilant, paranoid manner.     Discussed with the pt the plan to taper and DC Thorazine( very anticholinergic and likely exacerbating the pt's dry mouth)  as Invega PO and Sustenna become the sole antipsychotic medication treatments over time to alleviate the pt's severe chronic paranoid schizophrenia.  Discussed with the pt the plan to increase frequency of Biotene  swish and spit to 5 times a day to further alleviate pt dry mouth.  .The pt continues suspicious and apprehensive as to his current med regimen despite the fact that the only change was in having the pt begin Invega Sustenna  as above in an effort to improve pt treatment compliance with the hope of decreasing the pt's risk of clinical relapse and need for readmission to hospital. Pt remains suspicious, guarded and hypervigilant with accentuated startle response. Pt with effort to smile but appearing tense and mistrustful.  Pt received his first dose of  Invega Sustenna on 11/10/2020  with plan to taper and DC po Invega after 2 weeks after second 1/2 of loading dose is completed in 1 week. . The pt was amenable to begin loading dose of 234 mg IM Invega Sustenna on 11/10/2020 with second dose in 1 week of 156 mg IM  followed by q 4 weekly  mg dose to alleviate pt symptoms of paranoid schizophrenia in  the context of a h/o treatment noncompliance.   The pt did now accept his Invega Sustenna  mg injection on 11/10/2020 with plan discussed with the pt to complete the loading dose with a second dose of Invega Sustenna 156 mg IM in 1 week, followed by q monthly IM Invega Sustenna 234 mg and with plan to DC PO Invega tabs after about 2-3 weeks.   The pt is dressed, still disheveled and unkempt despite ongoing staff efforts to offer the pt support and aid with his ADLs.  The pt continues  fearful in the context of his ongoing paranoid delusional thinking. but he appeared  a bit less tense and apprehensive overall.  The pt continues to remain in his room and is not yet able to attend therapy groups despite ongoing staff support . However, the pt has begun venturing out of his room for meals with peers in the day room though the pt is not interacting with peers during these times.  The pt's skin remains very dry and reddened but the pt continues to decline staff efforts to offer the pt Lac Hydrin  to ease skin dryness.    The pt is reportedly tolerating his now restarted Invega 3 mg po q am and 6 mg po q hs without reported side effects. The pt was amenable to plan to taper and DC Thorazine now that Invega monotherapy is present. Next Invega Sustenna 234 mg IM will be due 12/15/2020.   The pt's judgment remains quite impaired with virtually no insight into his  medical and psychiatric comorbid illnesses and the need for ongoing consistent treatment both in and out of hospital.    Plan to continue pt trial of Buspar  10 mg po tid in an effort to alleviate pt anxiety and to serve as an antidepressant adjunct . Writer discussed plan to avoid benzodiazepines  in light of the pt's h/o ETOH use d/o. The pt continues to deny SI /HI /AH /VH . though he remains paranoid and suspicious.    Writer and other hospital staff  continue to educate the  pt as to COVID-19 CDC guidelines to decrease risk of infection including wearing  a face mask, social distancing and good hand hygiene. The pt will wear a face mask when reminded by staff. He expresses an understanding of the need to continue with CDC guidelines  of face mask wearing, social distancing, avoiding large gatherings and maintaining good hand hygiene  after DC from hospital. The pt was also reminded to contact his PCP should the pt develop sudden cough, fever or SOB so that he can be retested for COVID-19 infection.

## 2020-11-23 NOTE — DISCHARGE NOTE BEHAVIORAL HEALTH - MEDICATION SUMMARY - MEDICATIONS TO STOP TAKING
I will STOP taking the medications listed below when I get home from the hospital:    benztropine 1 mg oral tablet  -- 1 tab(s) by mouth 2 times a day    Thorazine 50 mg oral tablet  -- 1 tab(s) by mouth once a day hs

## 2020-11-23 NOTE — DISCHARGE NOTE BEHAVIORAL HEALTH - PAST PSYCHIATRIC HISTORY
Multiple prior hospitalizations, most recently discharged from [facility] Jan 2020, past [facility] Admission

## 2020-11-23 NOTE — DISCHARGE NOTE BEHAVIORAL HEALTH - REASON FOR ADMISSION
Per ED BH Assessment authored by REGINA Oconnell on 10/24/2020: "I want to commit suicide" ' I'm very paranoid.'

## 2020-11-23 NOTE — DISCHARGE NOTE BEHAVIORAL HEALTH - CONDITIONS AT DISCHARGE
Patient alert, oriented x3, pleasant and cooperative. Patient denies any thoughts of self harm or harm others. Therapist and nurse reviewed safety plan with patient. SW and nurse reviewed discharge plan with patient who is in agreement with plan. Patient received a copy of his discharge instructions. All belongings returned to patient.

## 2020-11-23 NOTE — DISCHARGE NOTE BEHAVIORAL HEALTH - NSBHDCALCOHOLREFERFT_PSY_A_CORE
Patient declined referral for outpatient substance abuse treatment, but will address concerns related to substance abuse in treatment with NP.    [employer] Recovery Groups  *Can meet online   https://www.smartrecovery.org     Find a local AA (or NA) group:  [facility] Alcoholics Anonymous   https://www.jaa.org/  NELIDA ([employer] [place] [facility] [employer]) support groups  7-643-318-NELIDA (5743) or info@nelida.org Patient declined referral for outpatient substance abuse treatment, but will address concerns related to substance abuse in treatment with NP.    [facility] Recovery Groups  *Can meet online   https://www.smartrecovery.org     NELIDA ([employer]) support groups  4-028-404-NELIDA (6888) or info@nelida.org

## 2020-11-23 NOTE — PROGRESS NOTE BEHAVIORAL HEALTH - SUMMARY
The pt is a   60 year-old single WM  s, disabled , unemployed, and [facility] other [facility], The pt had most recently been discharged from [facility]  after a 6 week stay with a similar pt clinical presentation.             The pt. was BIB  EMS to [facility] on 10/26/2020 for evaluation of pt's worsening paranoid psychosis with reported SI  and ETOH use d/o.  for suicidal thoughts.    The pt was then transferred to [facility] and admitted to  on a 9.13 voluntary legal status for acute safety and for further evaluation and treatment of his schizophrenia and his ETOH use d/o.  Plan  1.Pt restarted Invega 3 mg po q am and 6 mg po qhs  2. Collateral clinical pt history with pt permission  3. Plan to titrate Lexapro and to add low dose Buspar to alleviate the pt's significant anxiety and depression

## 2020-11-23 NOTE — DISCHARGE NOTE BEHAVIORAL HEALTH - FAMILY HISTORY OF PSYCHIATRIC ILLNESS
The pt is single, with 6 brothers and 2 sisters. His parents are both .   The pt's brother Shoaib has been the pt's sole remaining active contact over the years ( tel [phone])  The pt has been residing most recently at the [facility] in [place] ( [phone])

## 2020-11-23 NOTE — DISCHARGE NOTE BEHAVIORAL HEALTH - NSBHDCCASEMGRFT_PSY_A_CORE
[place], [facility], [phone] [place], [facility], [phone]    [place], , [facility], [phone]

## 2020-11-23 NOTE — DISCHARGE NOTE BEHAVIORAL HEALTH - NSBHDCTHERAPYFT_PSY_A_CORE
individual and group therapies but pt unable to attend due to ongoing though somewhat improved paranoia musculoskeletal musculoskeletal/cognitive

## 2020-11-23 NOTE — PROGRESS NOTE BEHAVIORAL HEALTH - RISK ASSESSMENT
low risk with better overall prognosis now that the pt has agreed to allow for Invega Sustenna long acting IM treatment in an effort to improve pt treatment adherence with the goal of decreasing his risk for clinical relapse. Pt chronic severe paranoia continues to impede clinical progress, but ot continues to do well with new trial of Invega Sustenna is a hopeful start . Pt sleep and appetite are normalized with treatment  acute now with a DC of  auditory hallucinations but with ongoing paranoia  now restarted on Invega in hopes of alleviating the pt's paranoid psychosis, feeling depressed and lonely. AH persist but reportedly non command type. Pt beginning to slowly elaborate on his paranoid fears so that staff can work with the pt to decrease the pt's fears  mitigating : Pt agreed to enter inpatient hospital safe setting, pt amenable to resume medications, , pt able to form limited selective therapeutic alliance with staff, pt now agreeing to Invega Sustenna IM to help alleviate the pt's longstanding paranoid schizophrenia. The pt remains med compliant in hospital and will continue with long acting Invega Sustenna after DC in an effort to improve the pt's overall treatment adherence and thus decrease the risk of clinical relapse    protective :  pt with help seeking behavior, support of brother and group home ,stable place to live  chronic: prior psych hospitalization ,alcohol use

## 2020-11-23 NOTE — PROGRESS NOTE BEHAVIORAL HEALTH - PROBLEM SELECTOR PLAN 1
1.Restarted  Invega   3 mg po q am and 6 mg po qhs for alleviation of psychosis  2To continue taper of standing .chlorpromazine  to 25  mg po tid  ( paranoid psychosis) with plan to DC prior to DC .  3.diVALproex  milliGRAM(s) po qhs  and rechecked  Depakote level on 10/31/2020  4.escitalopram increased to 20 mg po q am    5.gabapentin 300 milliGRAM(s) Oral three times a day  6.mirtazapine 15 milliGRAM(s) Oral at bedtime  7. To gather collateral pt clinical history with pt consent   8.Disposition planning ongoing with ongoing pt discussions as to staff   9.Invega Sustenna 234 mg IM on 11/10/2020  10. Invega Sustenna 156 mg IM in 1 week on 1/17/2020  to decrease pt risk of   clinical relapse in the context of treatment noncompliance  11. Next Invega Sustenna 234 mg IM due on 12/15/2020  and q monthly thereafter  12. Plan to taper and DC PO Invega over the next several weeks

## 2020-11-23 NOTE — PROGRESS NOTE BEHAVIORAL HEALTH - NSBHCHARTREVIEWLAB_PSY_A_CORE FT
CBC Full  -  ( 23 Nov 2020 08:54 )  WBC Count : 6.54 K/uL  RBC Count : 4.99 M/uL  Hemoglobin : 15.1 g/dL  Hematocrit : 46.2 %  Platelet Count - Automated : 221 K/uL  Mean Cell Volume : 92.6 fl  Mean Cell Hemoglobin : 30.3 pg  Mean Cell Hemoglobin Concentration : 32.7 gm/dL  Auto Neutrophil # : 3.43 K/uL  Auto Lymphocyte # : 2.07 K/uL  Auto Monocyte # : 0.78 K/uL  Auto Eosinophil # : 0.21 K/uL  Auto Basophil # : 0.04 K/uL  Auto Neutrophil % : 52.4 %  Auto Lymphocyte % : 31.7 %  Auto Monocyte % : 11.9 %  Auto Eosinophil % : 3.2 %  Auto Basophil % : 0.6 %    11-23    138  |  106  |  24<H>  ----------------------------<  120<H>  4.9   |  27  |  1.51<H>    Ca    10.4<H>      23 Nov 2020 08:54    TPro  8.2  /  Alb  3.9  /  TBili  0.5  /  DBili  x   /  AST  24  /  ALT  46  /  AlkPhos  88  11-23

## 2020-11-23 NOTE — DISCHARGE NOTE BEHAVIORAL HEALTH - MEDICATION SUMMARY - MEDICATIONS TO TAKE
I will START or STAY ON the medications listed below when I get home from the hospital:    aspirin 81 mg oral delayed release tablet  -- 1 tab(s) by mouth once a day  -- Indication: For cardioprotective    tamsulosin 0.4 mg oral capsule  -- 1 cap(s) by mouth once a day (at bedtime)  -- Indication: For BPH    divalproex sodium 500 mg oral tablet, extended release  -- 1 tab(s) by mouth once a day (at bedtime)  -- Indication: For mood d/o    gabapentin 300 mg oral capsule  -- 1 cap(s) by mouth 3 times a day  -- Indication: For Neuropathic pain    escitalopram 20 mg oral tablet  -- 1 tab(s) by mouth once a day  -- Indication: For depression    mirtazapine 15 mg oral tablet  -- 1 tab(s) by mouth once a day (at bedtime)  -- Indication: For insomnia/    atorvastatin 20 mg oral tablet  -- 1 tab(s) by mouth once a day (at bedtime)  -- Indication: For Hyperlipidemia, unspecified hyperlipidemia type    paliperidone 3 mg oral tablet, extended release  -- 1 tab(s) by mouth once a day (in the morning)  -- Indication: For Paranoid schizophrenia    paliperidone 6 mg oral tablet, extended release  -- 1 tab(s) by mouth once a day (at bedtime)   -- Indication: For Paranoid schizophrenia    Invega Sustenna 234 mg/1.5 mL intramuscular suspension, extended release  -- 234 milligram(s) intramuscularly every 4 weeks   -- Check with your doctor before becoming pregnant.  May cause drowsiness or dizziness.  Obtain medical advice before taking any non-prescription drugs as some may affect the action of this medication.  This drug may impair the ability to drive or operate machinery.  Use care until you become familiar with its effects.    -- Indication: For Paranoid schizophrenia    busPIRone 10 mg oral tablet  -- 1 tab(s) by mouth 3 times a day  -- Indication: For Anxiety    metoprolol succinate 25 mg oral tablet, extended release  -- 1 tab(s) by mouth once a day  -- Indication: For HTN    saliva substitutes oral solution  -- 15 milliliter(s) by mouth 5 times a day   -- Indication: For dry mouth    nicotine 21 mg/24 hr transdermal film, extended release  -- 1 patch by transdermal patch once a day   -- Indication: For Smoking cessation    Multiple Vitamins oral tablet  -- 1 tab(s) by mouth once a day  -- Indication: For Supplement    thiamine 100 mg oral tablet  -- 1 tab(s) by mouth 3 times a day  -- Indication: For Supplement    folic acid 1 mg oral tablet  -- 1 tab(s) by mouth once a day  -- Indication: For Supplement

## 2020-11-23 NOTE — DISCHARGE NOTE BEHAVIORAL HEALTH - NSBHDCCRISISPLAN1FT_PSY_A_CORE
Tell a trusted friend/family member, tell housing staff, tell clinic staff, call a crisis line ( Crisis Center ph. [phone], [facility] DASH [phone], [facility] (peer support) ph. [phone]), return to the nearest emergency room. You may call 9-1-1 for assistance.

## 2020-11-23 NOTE — DISCHARGE NOTE BEHAVIORAL HEALTH - NSBHDCLABSFT_PSY_A_CORE
Depakote level, CBC with diff, CMP q 3-4 monthly  Fasting Lipids, HgA1C q 6 monthly with  second generation antipsychotic  Invega  Monthly monitoring of vital signs including weight and waist circumference with SGA Invega

## 2020-11-23 NOTE — DISCHARGE NOTE BEHAVIORAL HEALTH - NSTOBACCOREFERRAL_GEN_A_CS
Patient declined information Pt. declined smoking cessation referral at admissino and discharge/Patient declined information

## 2020-11-23 NOTE — DISCHARGE NOTE BEHAVIORAL HEALTH - CARE PROVIDER_API CALL
regina arteaga  Pt has follow up med management appointment with REGINA Arteaga on 11/29/2020 ay 8 am  Phone: (   )    -  Fax: (   )    -  Follow Up Time:

## 2020-11-23 NOTE — DISCHARGE NOTE BEHAVIORAL HEALTH - NSBHDCCRISISPLAN3FT_PSY_A_CORE
Discuss in treatment with counselor, attended a local/online self-help group, call a hotline ([facility] (Substance Abuse and Mental Health Services Administration)[phone])

## 2020-11-23 NOTE — DISCHARGE NOTE BEHAVIORAL HEALTH - NSBHDCSUBSTHXFT_PSY_A_CORE
hx of alcohol use disorder, discharged from [facility] 2 weeks prior to  admission, admits to increasing alcohol daily use since leaving [facility], occasional cannabis use

## 2020-11-23 NOTE — DISCHARGE NOTE BEHAVIORAL HEALTH - NSBHDCTESTSFT_PSY_A_CORE
TSH=0.75  Fasting lipids  Cholesterol= 159  NJ=801  HgA1C= 5.7  COVID-19 PCR NEGATIVE TWICE  10/24/2020 AND 11/23/2020  Depakote level= 60 ug/ml  EKG  VR= 74 QT /QTc= 406/450  RBBB  Tox screen negative for substances of abuse   BAL<10  No studies are pending TSH=0.75  Fasting lipids  Cholesterol= 159  ZT=962  HgA1C= 5.7  COVID-19 PCR NEGATIVE TWICE  10/24/2020 AND 11/23/2020  Depakote level= 51 ug/ml  Platelets = 221 K  EKG  VR= 74 QT /QTc= 406/450  RBBB  Tox screen negative for substances of abuse   BAL<10  No studies are pending

## 2020-11-23 NOTE — DISCHARGE NOTE BEHAVIORAL HEALTH - NSBHDCPURPOSE1FT_PSY_A_CORE
Outpatient Mental Health Treatment. Patient to be seen at [facility] by NP for medication management.

## 2020-11-23 NOTE — DISCHARGE NOTE BEHAVIORAL HEALTH - NSBHDCSWCOMMENTSFT_PSY_A_CORE
Pt was educated on the importance of taking medications as prescribed and to not stop or miss any doses unless directed to do so by the prescribing provider. Pt was counseled on the importance of attending outpatient appointments. Pt was made aware of crisis resources to use after hours as needed.Pt was educated regarding safety precautions for COVID-19 including hand hygiene, wearing a mask, and maintaining social distancing. Pt provided with information on symptoms if COVID 19 prior to discharge, and educated to seek testing in the event that any symptoms occur.

## 2020-11-23 NOTE — DISCHARGE NOTE BEHAVIORAL HEALTH - NSBHDCRESOURCESOTHERFT_PSY_A_CORE
GIL DASH- for psychiatric crises (available AFTER HOURS)  24/7 hotline: [phone]    [facility], [facility] Term [facility]  [facility], [phone]  Address:  [place] [address] GIL DASH- for psychiatric crises (available AFTER HOURS)  24/7 hotline: [phone]    [facility], Long Term [facility]  [facility], [phone]  Address: [address] [place] [address] [place] [address]    [place], , [facility], [phone] GIL DASH- for psychiatric crises (available AFTER HOURS)  24/7 hotline: [phone]    [facility], Long Term Home Care  [facility], [phone]  Address[place] [address]

## 2020-11-23 NOTE — DISCHARGE NOTE BEHAVIORAL HEALTH - NSBHDCMEDSFT_PSY_A_CORE
Discharge Medications   aspirin enteric coated 81 milliGRAM(s) Oral daily  atorvastatin 20 milliGRAM(s) Oral at bedtime  busPIRone 10 milliGRAM(s) Oral three times a day  diVALproex  milliGRAM(s) Oral at bedtime  escitalopram 20 milliGRAM(s) Oral daily  gabapentin 300 milliGRAM(s) Oral three times a day  metoprolol succinate ER 25 milliGRAM(s) Oral daily  mirtazapine 15 milliGRAM(s) Oral at bedtime  multivitamin 1 Tablet(s) Oral daily  nicotine - 21 mG/24Hr(s) Patch 1 patch Transdermal daily  paliperidone ER 3 milliGRAM(s) Oral daily  paliperidone ER 6 milliGRAM(s) Oral at bedtime  tamsulosin 0.4 milliGRAM(s) Oral at bedtime    MEDICATIONS  (PRN):    Biotene Dry Mouth Oral Rinse 15 milliLiter(s) Swish and Spit five times a day PRN dry mouth     The pt. has been educated to continue the medications unless told by his outpatient provider to stop

## 2020-11-24 PROCEDURE — 99232 SBSQ HOSP IP/OBS MODERATE 35: CPT

## 2020-11-24 RX ORDER — PALIPERIDONE 1.5 MG/1
1 TABLET, EXTENDED RELEASE ORAL
Qty: 0 | Refills: 0 | DISCHARGE

## 2020-11-24 RX ORDER — GABAPENTIN 400 MG/1
1 CAPSULE ORAL
Qty: 90 | Refills: 0
Start: 2020-11-24 | End: 2020-12-23

## 2020-11-24 RX ORDER — PALIPERIDONE 1.5 MG/1
1 TABLET, EXTENDED RELEASE ORAL
Qty: 15 | Refills: 0
Start: 2020-11-24 | End: 2020-12-08

## 2020-11-24 RX ORDER — SALIVA SUBSTITUTE COMB NO.11 351 MG
15 POWDER IN PACKET (EA) MUCOUS MEMBRANE
Qty: 2250 | Refills: 0
Start: 2020-11-24 | End: 2020-12-23

## 2020-11-24 RX ORDER — LIPASE/PROTEASE/AMYLASE 16-48-48K
1 CAPSULE,DELAYED RELEASE (ENTERIC COATED) ORAL
Qty: 0 | Refills: 0 | DISCHARGE

## 2020-11-24 RX ORDER — ASPIRIN/CALCIUM CARB/MAGNESIUM 324 MG
1 TABLET ORAL
Qty: 30 | Refills: 0
Start: 2020-11-24 | End: 2020-12-23

## 2020-11-24 RX ORDER — TAMSULOSIN HYDROCHLORIDE 0.4 MG/1
1 CAPSULE ORAL
Qty: 30 | Refills: 0
Start: 2020-11-24 | End: 2020-12-23

## 2020-11-24 RX ORDER — ATORVASTATIN CALCIUM 80 MG/1
1 TABLET, FILM COATED ORAL
Qty: 30 | Refills: 0
Start: 2020-11-24 | End: 2020-12-23

## 2020-11-24 RX ORDER — PALIPERIDONE 1.5 MG/1
0 TABLET, EXTENDED RELEASE ORAL
Qty: 0 | Refills: 0 | DISCHARGE

## 2020-11-24 RX ORDER — MIRTAZAPINE 45 MG/1
1 TABLET, ORALLY DISINTEGRATING ORAL
Qty: 0 | Refills: 0 | DISCHARGE

## 2020-11-24 RX ORDER — CHLORPROMAZINE HCL 10 MG
1 TABLET ORAL
Qty: 0 | Refills: 0 | DISCHARGE

## 2020-11-24 RX ORDER — ESCITALOPRAM OXALATE 10 MG/1
1 TABLET, FILM COATED ORAL
Qty: 30 | Refills: 0
Start: 2020-11-24 | End: 2020-12-23

## 2020-11-24 RX ORDER — PALIPERIDONE 1.5 MG/1
234 TABLET, EXTENDED RELEASE ORAL
Qty: 251 | Refills: 1
Start: 2020-11-24 | End: 2021-01-22

## 2020-11-24 RX ORDER — DIVALPROEX SODIUM 500 MG/1
1 TABLET, DELAYED RELEASE ORAL
Qty: 30 | Refills: 0
Start: 2020-11-24 | End: 2020-12-23

## 2020-11-24 RX ORDER — NICOTINE POLACRILEX 2 MG
1 GUM BUCCAL
Qty: 30 | Refills: 0
Start: 2020-11-24 | End: 2020-12-23

## 2020-11-24 RX ORDER — MIRTAZAPINE 45 MG/1
1 TABLET, ORALLY DISINTEGRATING ORAL
Qty: 30 | Refills: 0
Start: 2020-11-24 | End: 2020-12-23

## 2020-11-24 RX ORDER — METOPROLOL TARTRATE 50 MG
1 TABLET ORAL
Qty: 30 | Refills: 0
Start: 2020-11-24 | End: 2020-12-23

## 2020-11-24 RX ADMIN — Medication 10 MILLIGRAM(S): at 13:04

## 2020-11-24 RX ADMIN — Medication 25 MILLIGRAM(S): at 09:17

## 2020-11-24 RX ADMIN — GABAPENTIN 300 MILLIGRAM(S): 400 CAPSULE ORAL at 19:51

## 2020-11-24 RX ADMIN — GABAPENTIN 300 MILLIGRAM(S): 400 CAPSULE ORAL at 13:04

## 2020-11-24 RX ADMIN — MIRTAZAPINE 15 MILLIGRAM(S): 45 TABLET, ORALLY DISINTEGRATING ORAL at 19:52

## 2020-11-24 RX ADMIN — ATORVASTATIN CALCIUM 20 MILLIGRAM(S): 80 TABLET, FILM COATED ORAL at 19:51

## 2020-11-24 RX ADMIN — Medication 81 MILLIGRAM(S): at 09:17

## 2020-11-24 RX ADMIN — PALIPERIDONE 3 MILLIGRAM(S): 1.5 TABLET, EXTENDED RELEASE ORAL at 09:18

## 2020-11-24 RX ADMIN — Medication 10 MILLIGRAM(S): at 19:52

## 2020-11-24 RX ADMIN — Medication 10 MILLIGRAM(S): at 09:17

## 2020-11-24 RX ADMIN — DIVALPROEX SODIUM 500 MILLIGRAM(S): 500 TABLET, DELAYED RELEASE ORAL at 19:51

## 2020-11-24 RX ADMIN — ESCITALOPRAM OXALATE 20 MILLIGRAM(S): 10 TABLET, FILM COATED ORAL at 09:17

## 2020-11-24 RX ADMIN — PALIPERIDONE 6 MILLIGRAM(S): 1.5 TABLET, EXTENDED RELEASE ORAL at 19:54

## 2020-11-24 RX ADMIN — GABAPENTIN 300 MILLIGRAM(S): 400 CAPSULE ORAL at 09:17

## 2020-11-24 RX ADMIN — TAMSULOSIN HYDROCHLORIDE 0.4 MILLIGRAM(S): 0.4 CAPSULE ORAL at 19:51

## 2020-11-24 RX ADMIN — Medication 1 PATCH: at 09:22

## 2020-11-24 RX ADMIN — Medication 1 TABLET(S): at 09:17

## 2020-11-24 RX ADMIN — Medication 1 PATCH: at 09:21

## 2020-11-24 NOTE — PROGRESS NOTE BEHAVIORAL HEALTH - NSBHFUPINTERVALHXFT_PSY_A_CORE
Pt seen in his  room  after the pt had met with SW to review pt disposition planning including telephone contact  with the pt's residence staff at [facility] in [place].  Pt remains anxious, tense and hypervigilant with ongoing dry mouth likely due to combination medications and pt anxiety. Also discussed plan to DC Cogentin as no longer needed and another potential source of pt dry mouth.    Discussed with the pt the plan to taper and  now DC Thorazine( very anticholinergic and likely exacerbating the pt's dry mouth)  as Invega PO and Sustenna become the sole antipsychotic medication treatments over time to alleviate the pt's severe chronic paranoid schizophrenia.  Discussed with the pt the plan to increase frequency of Biotene  swish and spit to 5 times a day to further alleviate pt dry mouth.  .The pt remains  suspicious and apprehensive as to his current med regimen despite the fact that the only change was in having the pt begin Invega Sustenna  as above in an effort to improve pt treatment compliance with the hope of decreasing the pt's risk of clinical relapse and need for readmission to hospital. Pt remains suspicious, guarded and hypervigilant with accentuated startle response. Pt with effort to smile but appearing tense and mistrustful.  Pt received his first dose of  Invega Sustenna on 11/10/2020  with plan to taper and DC po Invega after 2 weeks after second 1/2 of loading dose is completed in 1 week. . The pt was amenable to begin loading dose of 234 mg IM Invega Sustenna on 11/10/2020 with second dose in 1 week of 156 mg IM  followed by q 4 weekly  mg dose to alleviate pt symptoms of paranoid schizophrenia in  the context of a h/o treatment noncompliance.   The pt did now accept his Invega Sustenna  mg injection on 11/10/2020 with plan discussed with the pt to complete the loading dose with a second dose of Invega Sustenna 156 mg IM in 1 week, followed by q monthly IM Invega Sustenna 234 mg and with plan to DC PO Invega tabs after about 2-3 weeks.   The pt is dressed, still disheveled and unkempt despite ongoing staff efforts to offer the pt support and aid with his ADLs.  The pt continues  fearful in the context of his ongoing paranoid delusional thinking. but he appeared  a bit less tense and apprehensive overall.  The pt continues to remain in his room and is not yet able to attend therapy groups despite ongoing staff support . However, the pt has begun venturing out of his room for meals with peers in the day room though the pt is not interacting with peers during these times.  The pt's skin remains very dry and reddened but the pt continues to decline staff efforts to offer the pt Lac Hydrin  to ease skin dryness.    The pt is reportedly tolerating his now restarted Invega 3 mg po q am and 6 mg po q hs without reported side effects. The pt was amenable to resume low dose Depakote , Lexapro, Remeron  and Neurontin as well for affective lability. The pt was also agreeable to having low dose FGA standing Thorazine to be increased to 100 mg po tid added in the short term to help alleviate the pt's AH while awaiting Invega starkey clinical response.    The pt's judgment remains quite impaired with virtually no insight into his  medical and psychiatric comorbid illnesses and the need for ongoing consistent treatment both in and out of hospital.    Plan to continue pt trial of Buspar  10 mg po tid in an effort to alleviate pt anxiety and to serve as an antidepressant adjunct . Writer discussed plan to avoid benzodiazepines  in light of the pt's h/o ETOH use d/o. The pt continues to deny SI /HI /AH /VH . though he remains paranoid and suspicious.    Writer and other hospital staff  continue to educate the  pt as to COVID-19 CDC guidelines to decrease risk of infection including wearing  a face mask, social distancing and good hand hygiene. The pt will wear a face mask when reminded by staff. He expresses an understanding of the need to continue with CDC guidelines  of face mask wearing, social distancing, avoiding large gatherings and maintaining good hand hygiene  after DC from hospital. The pt was also reminded to contact his PCP should the pt develop sudden cough, fever or SOB so that he can be retested for COVID-19 infection.

## 2020-11-24 NOTE — PROGRESS NOTE BEHAVIORAL HEALTH - PROBLEM SELECTOR PLAN 1
1.Restarted  Invega   3 mg po q am and 6 mg po qhs for alleviation of psychosis  2To DC .chlorpromazine  as no longer needed with Invega monotherapy ( paranoid psychosis0  3.diVALproex  milliGRAM(s) po qhs  and to check Depakote level on 10/31/2020  4.escitalopram increased to 20 mg po q am    5.gabapentin 300 milliGRAM(s) Oral three times a day  6.mirtazapine 15 milliGRAM(s) Oral at bedtime  7. To gather collateral pt clinical history with pt consent   8.Disposition planning ongoing with ongoing pt discussions as to staff   9.Invega Sustenna 234 mg IM on 11/10/2020  10. Invega Sustenna 156 mg IM given  on 1/17/2020  to decrease pt risk of clinical relapse in the context of treatment noncompliance  11. Pt for DC back to [facility] in [place] on 11/25/2020  12. Follow up med management with REGINA Martin  13.Pt declined ETOH dual diagnosis treatment but was amenable to individual therapy

## 2020-11-24 NOTE — PROGRESS NOTE BEHAVIORAL HEALTH - SUMMARY
The pt is a   60 year-old single WM  s, disabled , unemployed, [facility], The pt had most recently been discharged from [facility]  after a 6 week stay with a similar pt clinical presentation.             The pt. was BIB  EMS to [facility] on 10/26/2020 for evaluation of pt's worsening paranoid psychosis with reported SI  and ETOH use d/o.  for suicidal thoughts.    The pt was then transferred to [facility] and admitted to  on a 9.13 voluntary legal status for acute safety and for further evaluation and treatment of his schizophrenia and his ETOH use d/o.  Plan  1.Pt restarted Invega 3 mg po q am and 6 mg po qhs  2. Collateral clinical pt history with pt permission  3. Plan to titrate Lexapro and to add low dose Buspar to alleviate the pt's significant anxiety and depression

## 2020-11-24 NOTE — PROGRESS NOTE BEHAVIORAL HEALTH - NSBHCHARTREVIEWVS_PSY_A_CORE FT
Vital Signs Last 24 Hrs  T(C): 36.4 (24 Nov 2020 07:57), Max: 36.4 (24 Nov 2020 07:57)  T(F): 97.5 (24 Nov 2020 07:57), Max: 97.5 (24 Nov 2020 07:57)  HR: --  BP: --  BP(mean): --  RR: 16 (24 Nov 2020 07:57) (16 - 16)  SpO2: 98% (24 Nov 2020 07:57) (98% - 98%)

## 2020-11-25 VITALS — RESPIRATION RATE: 14 BRPM | OXYGEN SATURATION: 100 % | TEMPERATURE: 99 F

## 2020-11-25 PROCEDURE — 99239 HOSP IP/OBS DSCHRG MGMT >30: CPT

## 2020-11-25 RX ADMIN — Medication 25 MILLIGRAM(S): at 09:13

## 2020-11-25 RX ADMIN — Medication 10 MILLIGRAM(S): at 12:50

## 2020-11-25 RX ADMIN — Medication 10 MILLIGRAM(S): at 09:13

## 2020-11-25 RX ADMIN — GABAPENTIN 300 MILLIGRAM(S): 400 CAPSULE ORAL at 09:13

## 2020-11-25 RX ADMIN — GABAPENTIN 300 MILLIGRAM(S): 400 CAPSULE ORAL at 12:50

## 2020-11-25 RX ADMIN — PALIPERIDONE 3 MILLIGRAM(S): 1.5 TABLET, EXTENDED RELEASE ORAL at 09:13

## 2020-11-25 RX ADMIN — Medication 1 PATCH: at 09:14

## 2020-11-25 RX ADMIN — Medication 1 PATCH: at 10:41

## 2020-11-25 RX ADMIN — ESCITALOPRAM OXALATE 20 MILLIGRAM(S): 10 TABLET, FILM COATED ORAL at 09:13

## 2020-11-25 RX ADMIN — Medication 81 MILLIGRAM(S): at 09:13

## 2020-11-25 RX ADMIN — Medication 1 TABLET(S): at 10:41

## 2020-11-25 NOTE — PROGRESS NOTE BEHAVIORAL HEALTH - NSBHCHARTREVIEWVS_PSY_A_CORE FT
Vital Signs Last 24 Hrs  T(C): 37 (25 Nov 2020 07:44), Max: 37 (25 Nov 2020 07:44)  T(F): 98.6 (25 Nov 2020 07:44), Max: 98.6 (25 Nov 2020 07:44)  HR: --  BP: --  BP(mean): --  RR: 14 (25 Nov 2020 07:44) (14 - 14)  SpO2: 100% (25 Nov 2020 07:44) (100% - 100%)

## 2020-11-25 NOTE — PROGRESS NOTE BEHAVIORAL HEALTH - NSBHADMITIPBHPROVIDER_PSY_A_CORE
normal...
yes

## 2020-11-25 NOTE — PROGRESS NOTE BEHAVIORAL HEALTH - LANGUAGE
No abnormalities noted

## 2020-11-25 NOTE — PROGRESS NOTE BEHAVIORAL HEALTH - BODY HABITUS
Average build/Well nourished
Well nourished
Well nourished/Average build
Average build/Well nourished
Well nourished/Average build
Well nourished

## 2020-11-25 NOTE — PROGRESS NOTE BEHAVIORAL HEALTH - MUSCLE TONE / STRENGTH
Normal muscle tone/strength

## 2020-11-25 NOTE — PROGRESS NOTE BEHAVIORAL HEALTH - NS ED BHA MSE GENERAL APPEARANCE
No deformities present/Well developed
Well developed
Well developed/No deformities present
No deformities present/Well developed
Well developed/No deformities present
Well developed

## 2020-11-25 NOTE — PROGRESS NOTE BEHAVIORAL HEALTH - SUMMARY
The pt is a   60 year-old single WM  s, disabled , unemployed, [facility], The pt had most recently been discharged from [facility]  after a 6 week stay with a similar pt clinical presentation.             The pt. was BIB  EMS to [facility] on 10/26/2020 for evaluation of pt's worsening paranoid psychosis with reported SI  and ETOH use d/o.  for suicidal thoughts.    The pt was then transferred to [facility] and admitted to  on a 9.13 voluntary legal status for acute safety and for further evaluation and treatment of his schizophrenia and his ETOH use d/o.  Plan  1.Pt restarted Invega 3 mg po q am and 6 mg po qhs  2. Collateral clinical pt history with pt permission  3. Plan to titrate Lexapro and to add low dose Buspar to alleviate the pt's significant anxiety and depression  4.Pt for DC back to [facility] with q monthly Invega Sustenna  and DC of PO Invega over the next few weeks

## 2020-11-25 NOTE — PROGRESS NOTE BEHAVIORAL HEALTH - NSBHATTESTSEENBY_PSY_A_CORE
attending Psychiatrist without NP/Trainee

## 2020-11-25 NOTE — PROGRESS NOTE BEHAVIORAL HEALTH - BEHAVIOR
Cooperative
Uncooperative
Cooperative

## 2020-11-25 NOTE — PROGRESS NOTE BEHAVIORAL HEALTH - NSBHCONSORIP_PSY_A_CORE
Inpatient Admission...

## 2020-11-25 NOTE — PROGRESS NOTE BEHAVIORAL HEALTH - THOUGHT CONTENT
Suicidality/Other/Delusions/Ruminations
Other/Delusions/Ruminations
Delusions/Other/Ruminations
Delusions/Other/Ruminations
Delusions/Suicidality/Other/Ruminations
Other/Delusions/Ruminations
Other/Delusions/Ruminations
Ruminations/Delusions/Other
Ruminations/Suicidality/Other/Delusions
Suicidality/Delusions/Other/Ruminations
Suicidality/Delusions/Other/Ruminations
Suicidality/Other/Delusions/Ruminations
Delusions/Other/Suicidality/Ruminations
Delusions/Suicidality/Other/Ruminations
Delusions/Suicidality/Other/Ruminations
Other/Delusions/Suicidality/Ruminations
Other/Suicidality/Delusions/Ruminations
Ruminations/Delusions/Suicidality/Other
Ruminations/Delusions/Suicidality/Other
Ruminations/Other/Delusions/Suicidality
Ruminations/Suicidality/Other/Delusions
Suicidality/Delusions/Other/Ruminations
Suicidality

## 2020-11-25 NOTE — PROGRESS NOTE BEHAVIORAL HEALTH - PRIMARY DX
Paranoid schizophrenia
Schizophrenia, unspecified type

## 2020-11-25 NOTE — PROGRESS NOTE BEHAVIORAL HEALTH - PROBLEM SELECTOR PROBLEM 1
Paranoid schizophrenia

## 2020-11-25 NOTE — PROGRESS NOTE BEHAVIORAL HEALTH - GROOMING
Fair
Poor
Fair

## 2020-11-25 NOTE — PROGRESS NOTE BEHAVIORAL HEALTH - REMOTE MEMORY
Normal

## 2020-11-25 NOTE — PROGRESS NOTE BEHAVIORAL HEALTH - PROBLEM SELECTOR PLAN 1
1.Restarted  Invega   3 mg po q am and 6 mg po qhs for alleviation of psychosis  2To DC .chlorpromazine  as no longer needed with Invega monotherapy ( paranoid psychosis0  3.diVALproex  milliGRAM(s) po qhs  and to check Depakote level on 10/31/2020  4.escitalopram increased to 20 mg po q am    5.gabapentin 300 milliGRAM(s) Oral three times a day  6.mirtazapine 15 milliGRAM(s) Oral at bedtime  7. To gather collateral pt clinical history with pt consent   8.Disposition planning ongoing with ongoing pt discussions as to staff   9.Invega Sustenna 234 mg IM on 11/10/2020  10. Invega Sustenna 156 mg IM given  on 1/17/2020  to decrease pt risk of clinical relapse in the context of treatment noncompliance  11. Pt for DC back to [facility] in [place] on 11/25/2020  12. Follow up med management with REGINA Martin on 11/29/2020 at 8 am for med management  13. Next Invega Sustenna 234 mg IM due on 12/15/2020 and q monthly thereafter  13.Pt declined ETOH dual diagnosis treatment but was amenable to individual therapy

## 2020-11-25 NOTE — PROGRESS NOTE BEHAVIORAL HEALTH - ESTIMATED INTELLIGENCE
Below Average
Average
Below Average

## 2020-11-25 NOTE — PROGRESS NOTE BEHAVIORAL HEALTH - ABNORMAL MOVEMENTS
No abnormal movements
Tremors/No abnormal movements
No abnormal movements
Tremors/No abnormal movements

## 2020-11-25 NOTE — PROGRESS NOTE BEHAVIORAL HEALTH - INSIGHT (REGARDING PSYCHIATRIC ILLNESS)
Poor

## 2020-11-25 NOTE — PROGRESS NOTE BEHAVIORAL HEALTH - RISK ASSESSMENT
low risk with better overall prognosis now that the pt has agreed to allow for Invega Sustenna long acting IM treatment in an effort to improve pt treatment adherence with the goal of decreasing his risk for clinical relapse. Pt chronic severe paranoia continues to impede clinical progress, but new trial of Invega Sustenna is a hopeful start . Pt sleep and appetite are slowly normalizing with treatment  acute now with a DC of  auditory hallucinations but with ongoing paranoia  now restarted on Invega in hopes of alleviating the pt's paranoid psychosis, feeling depressed and lonely. AH persist but reportedly non command type. Pt beginning to slowly elaborate on his paranoid fears so that staff can work with the pt to decrease the pt's fears. The pt has responded well to Invega Sustenna  with plan for q monthly  mg to help further alleviate the pt's ongoing but slowly improving paranoid schizophrenia  mitigating : Pt agreed to enter inpatient hospital safe setting, pt amenable to resume medications, , pt able to form limited selective therapeutic alliance with staff, pt now agreeing to Invega Sustenna IM to help alleviate the pt's longstanding paranoid schizophrenia. The pt remains med compliant in hospital and will continue with long acting Invega Sustenna after DC in an effort to improve the pt's overall treatment adherence and thus decrease the risk of clinical relapse    protective :  pt with help seeking behavior, support of brother and group home ,stable place to live  chronic: prior psych hospitalization ,alcohol use

## 2020-11-25 NOTE — PROGRESS NOTE BEHAVIORAL HEALTH - NS ED [facility] MED ROS PSYCHIATRIC
See HPI

## 2020-11-25 NOTE — PROGRESS NOTE BEHAVIORAL HEALTH - IMPULSE CONTROL
Normal
Impaired
Normal

## 2020-11-25 NOTE — PROGRESS NOTE BEHAVIORAL HEALTH - SECONDARY DX1
Alcohol use disorder, severe, dependence
ETOH abuse

## 2020-11-25 NOTE — PROGRESS NOTE BEHAVIORAL HEALTH - THOUGHT PROCESS
Disorganized/Illogical/Impaired reasoning
Impaired reasoning/Illogical/Linear
Illogical/Impaired reasoning/Linear
Illogical/Linear/Impaired reasoning
Impaired reasoning/Linear/Illogical
Linear/Illogical/Impaired reasoning
Illogical/Impaired reasoning/Disorganized
Illogical/Impaired reasoning/Disorganized
Impaired reasoning/Illogical/Disorganized
Impaired reasoning/Linear/Illogical
Linear/Illogical/Impaired reasoning
Linear/Illogical/Impaired reasoning
Linear/Impaired reasoning/Illogical
Disorganized/Impaired reasoning/Illogical
Illogical/Impaired reasoning/Disorganized
Impaired reasoning/Illogical/Disorganized
Linear/Perseverative

## 2020-11-25 NOTE — PROGRESS NOTE BEHAVIORAL HEALTH - AFFECT CONGRUENCE
Congruent
Not congruent
Congruent
Not congruent
Congruent

## 2020-11-25 NOTE — PROGRESS NOTE BEHAVIORAL HEALTH - NS ED [facility] AXIS I PRIMARY CODE FT
F20.0
F20.9

## 2020-11-25 NOTE — PROGRESS NOTE BEHAVIORAL HEALTH - NSBHADMITMEDEDU_PSY_A_CORE
yes...

## 2020-11-25 NOTE — PROGRESS NOTE BEHAVIORAL HEALTH - NS ED [facility] REVIEW OF ED CHART AVAILABLE IMAGING REVIEWED
Yes
None available

## 2020-11-25 NOTE — PROGRESS NOTE BEHAVIORAL HEALTH - JUDGMENT (REGARDING EVERYDAY EVENTS)
Poor
Fair
Poor

## 2020-11-25 NOTE — PROGRESS NOTE BEHAVIORAL HEALTH - NSBHADMITIPBHPROVFT_PSY_A_CORE
as above

## 2020-11-25 NOTE — PROGRESS NOTE BEHAVIORAL HEALTH - NS ED BHA AXIS I SECONDARY2 CODE FT
Z91.19

## 2020-11-25 NOTE — PROGRESS NOTE BEHAVIORAL HEALTH - OTHER
furtive, intense but a bit less wary  overall in brief anxious spurts but  calmer overall " I'm still paranoid  but a little better." still paranoid and apprehensive appearing paranoid, persecutory delusional thinking, impoverished

## 2020-11-25 NOTE — PROGRESS NOTE BEHAVIORAL HEALTH - NSBHFUPINTERVALCCFT_PSY_A_CORE
" Hi. I'm doing a little better. The voices are a little better now."
" Hi. I'm doing a little better. The voices are a little better now."
" I am doing terrible. Every day is torture!"   Pt then reported " the voices are gone but the paranoia is still there."    Pt received first part of loading dose of Invega Sustenna 234 mg IM on 11/10/2020  with second 1/2 due in 1 week of Invega Sustenna 156 mg IM .   On 11/9/2020 this writer had the pt's permission to speak with the pt's brother Shoaib  ( tel [phone]) once again to provide a pt clinical update. The pt's brother reiterated his safety concerns for the pt who has spent much of his life in and out of hospitals due to a reported h/o treatment noncompliance with medication for his paranoid schizophrenia with a pt h/o ETOH use d/o further compounding the pt's already impaired functioning.
" I am feeling good. Better. "      Next 1/2 of Invega Sustenna 156 mg IM  given to the pt on 11/17/2020.     Next Invega Sustenna 234 mg IM due on 12/15/2020 and q monthly thereafter   Pt reported receiving influenza vaccine 1 month ago. Pt also noted a h/o a PPD ? reactive > 1 year ago. Pt amenable to portable CXR- r/o infection  Plan to recheck COVID- 19 PCR ( obtained 11/23/2020)
" I am feeling good."      Next 1/2 of Invega Sustenna 156 mg IM  given to the pt on 11/17/2020.   Next Invega Sustenna 234 mg IM due on 12/15/2020 and q monthly thereafter
" I am feeling very depressed and anxious and paranoid. I didn't have lunch because I was afraid the people here would attack me."     On 10/30/2020, the pt gave writer and other hospital staff permission to speak with the pt's group home residence staff  Silvia ( tel [phone]) and also to contact the pt's brother Shoaib  ( tel [phone]) to gather more pt clinical history to aid in pt treatment  and disposition planning.  No answer at pt's group home . Writer spoke with pt's brother at length. He described pt long h/o depression and paranoia with ETOH use  and then worsening SI.
" I am glad to be going back before Thanksgiving."      Next 1/2 of Invega Sustenna 156 mg IM  given to the pt on 11/17/2020.   Next Invega Sustenna 234 mg IM due on 12/15/2020 and q monthly thereafter
" I can't talk now. I can't talk now. Go away . I am hearing voices. I need medicine for the voices but nobody is giving me medicine for the voices."
" I didn't sleep good last night. I don't know why."
" I need more time before I can go to groups. I'm still very paranoid."    Pt amenable to Biotene for dry mouth and MVI ordered as a supplement.   Next 1/2 of Invega Sustenna 156 mg IM due on 11/17/2020.
" I think I need more protein. My mouth is very dry I think from the medicine."    Pt amenable to Biotene for dry mouth and MVI ordered as a supplement.   Next 1/2 of Invega Sustenna 156 mg IM due on 11/17/2020.
" I'll try to go outside my room for lunch."        Next 1/2 of Invega Sustenna 156 mg IM to be given to the pt on 11/17/2020.
" I'm doing a little better. The voices are a little less now. But I don't feel safe enough to go outside or to groups because I'm still worried I will be attacked."    Writer continuing discussion with the pt as to a pt trial of Invega Sustenna in order to alleviate pt severe paranoid psychosis in a pt with a h/o entrenced treatment noncompliance.
" I'm doing a little better."         Next 1/2 of Invega Sustenna 156 mg IM  given to the pt on 11/17/2020.
" I'm doing a little better." Did you talk to my brother? He says I should be at [facility]."       Of note, the pt's brother did NOT advocate for pt placement in a state hospital. Rather the pt himself had requested this when he initially was admitted and was more overtly psychotic.    Next 1/2 of Invega Sustenna 156 mg IM  given to the pt on 11/17/2020.
" I'm not doing too good. The voices are bad. Just tell my brother that the reason I haven't been in touch with him is because I wasn't doing good and I'm in here."    On 10/29/2020, the pt gave writer and other hospital staff permission to speak with the pt's group home residence staff  Silvia ( tel [phone]) and also to contact the pt's brother Shoaib  ( tel [phone]) to gather more pt clinical history to aid in pt treatment  and disposition planning.
" I'm not ready to go to groups yet. I know  I'm safe because I stay in my room. so no one can attack me."    On 10/30/2020, the pt gave writer and other hospital staff permission to speak with the pt's group home residence staff  Silvia ( tel [phone]) and also to contact the pt's brother Shoaib  ( tel [phone]) to gather more pt clinical history to aid in pt treatment  and disposition planning.  No answer at pt's group home . Writer spoke with pt's brother at length. He described pt long h/o depression and paranoia with ETOH use  and then worsening SI.
" My brother said you are going to send me to a [facility] again. I don't want to go to one."    Pt  still with c/o dry mouth, likely accentuated by the pt's considerable anxiety. Pt amenable to Biotene for dry mouth and MVI ordered as a supplement.   Next 1/2 of Invega Sustenna 156 mg IM due on 11/17/2020.
" The voices are gone but I'm still very paranoid."     Pt received first part of loading dose of Invega Sustenna 234 mg IM on 11/10/2020  with second 1/2 due in 1 week of Invega Sustenna 156 mg IM .   On 11/9/2020 this writer had the pt's permission to speak with the pt's brother Shoaib  ( tel [phone]) once again to provide a pt clinical update. The pt's brother reiterated his safety concerns for the pt who has spent much of his life in and out of hospitals due to a reported h/o treatment noncompliance with medication for his paranoid schizophrenia with a pt h/o ETOH use d/o further compounding the pt's already impaired functioning.
" They changed my room. I can't go to groups yet. I don't feel safe. It will take time.'
" Today I think I am doing better. It's a better day for me."      Next 1/2 of Invega Sustenna 156 mg IM  given to the pt on 11/17/2020.   Next Invega Sustenna 234 mg IM due on 12/15/2020 and q monthly thereafter
" Who is it? Can you come back later?"
"I want to be discharged"

## 2020-11-25 NOTE — PROGRESS NOTE BEHAVIORAL HEALTH - SECONDARY DX2
Nonadherence to medical treatment

## 2020-11-25 NOTE — PROGRESS NOTE BEHAVIORAL HEALTH - AFFECT QUALITY
Anxious/Other
Depressed/Irritable
Anxious/Other
Other/Anxious
Other/Anxious
Anxious/Other
Other/Anxious
Anxious/Other
Depressed

## 2020-11-25 NOTE — PROGRESS NOTE BEHAVIORAL HEALTH - NSBHADMITMEDEDUDETAILS_A_CORE FT
Ongoing though still limited informed consent discussion  regarding Invega and Thorazine currently due to pt's current paranoid psychosis related to med noncompliance
Unable to pursue in depth informed consent discussion currently due to pt's current paranoid psychosis related to med noncompliance
Ongoing though still limited informed consent discussion  regarding Invega and Thorazine currently due to pt's current paranoid psychosis related to med noncompliance
Discussed risks/benefits/s-e

## 2020-11-25 NOTE — PROGRESS NOTE BEHAVIORAL HEALTH - RELATEDNESS
Poor
Poor/Other
Fair

## 2020-11-25 NOTE — PROGRESS NOTE BEHAVIORAL HEALTH - EYE CONTACT
Other
Fair/Other
Other
Other/Fair
Fair/Other
Other/Fair
Fair

## 2020-11-25 NOTE — PROGRESS NOTE BEHAVIORAL HEALTH - ATTENTION / CONCENTRATION
Impaired
Normal

## 2020-11-25 NOTE — PROGRESS NOTE BEHAVIORAL HEALTH - NS ED [facility] MED ROS RESPIRATORY
No complaints

## 2020-11-25 NOTE — PROGRESS NOTE BEHAVIORAL HEALTH - NSBHCHARTREVIEWIMAGING_PSY_A_CORE FT
MEDICATIONS  (STANDING):  aspirin enteric coated 81 milliGRAM(s) Oral daily  atorvastatin 20 milliGRAM(s) Oral at bedtime  benztropine 1 milliGRAM(s) Oral two times a day  Biotene Dry Mouth Oral Rinse 15 milliLiter(s) Swish and Spit five times a day  busPIRone 10 milliGRAM(s) Oral three times a day  chlorproMAZINE    Tablet 50 milliGRAM(s) Oral three times a day  diVALproex  milliGRAM(s) Oral at bedtime  escitalopram 20 milliGRAM(s) Oral daily  gabapentin 300 milliGRAM(s) Oral three times a day  metoprolol succinate ER 25 milliGRAM(s) Oral daily  mirtazapine 15 milliGRAM(s) Oral at bedtime  multivitamin 1 Tablet(s) Oral daily  nicotine - 21 mG/24Hr(s) Patch 1 patch Transdermal daily  paliperidone ER 3 milliGRAM(s) Oral daily  paliperidone ER 6 milliGRAM(s) Oral at bedtime  tamsulosin 0.4 milliGRAM(s) Oral at bedtime    MEDICATIONS  (PRN):  acetaminophen   Tablet .. 650 milliGRAM(s) Oral every 6 hours PRN Temp greater or equal to 38C (100.4F), Mild Pain (1 - 3), Moderate Pain (4 - 6)  ALBUTerol    90 MICROgram(s) HFA Inhaler 2 Puff(s) Inhalation every 6 hours PRN SOB  aluminum hydroxide/magnesium hydroxide/simethicone Suspension 30 milliLiter(s) Oral every 6 hours PRN Dyspepsia  chlorproMAZINE    Injectable 50 milliGRAM(s) IntraMuscular every 6 hours PRN severe psychosis and related agitation  chlorproMAZINE    Tablet 50 milliGRAM(s) Oral every 6 hours PRN mild to moderate psychosis and related agitation  magnesium hydroxide Suspension 30 milliLiter(s) Oral daily PRN Constipation  nicotine  Polacrilex Gum 2 milliGRAM(s) Oral every 2 hours PRN Nicotine Withdrawal
MEDICATIONS  (STANDING):  aspirin enteric coated 81 milliGRAM(s) Oral daily  atorvastatin 20 milliGRAM(s) Oral at bedtime  benztropine 1 milliGRAM(s) Oral two times a day  busPIRone 10 milliGRAM(s) Oral three times a day  chlorproMAZINE    Tablet 100 milliGRAM(s) Oral three times a day  diVALproex  milliGRAM(s) Oral at bedtime  escitalopram 20 milliGRAM(s) Oral daily  gabapentin 300 milliGRAM(s) Oral three times a day  metoprolol succinate ER 25 milliGRAM(s) Oral daily  mirtazapine 15 milliGRAM(s) Oral at bedtime  nicotine - 21 mG/24Hr(s) Patch 1 patch Transdermal daily  paliperidone ER 3 milliGRAM(s) Oral daily  paliperidone ER 6 milliGRAM(s) Oral at bedtime  tamsulosin 0.4 milliGRAM(s) Oral at bedtime    MEDICATIONS  (PRN):  acetaminophen   Tablet .. 650 milliGRAM(s) Oral every 6 hours PRN Temp greater or equal to 38C (100.4F), Mild Pain (1 - 3), Moderate Pain (4 - 6)  ALBUTerol    90 MICROgram(s) HFA Inhaler 2 Puff(s) Inhalation every 6 hours PRN SOB  aluminum hydroxide/magnesium hydroxide/simethicone Suspension 30 milliLiter(s) Oral every 6 hours PRN Dyspepsia  chlorproMAZINE    Injectable 50 milliGRAM(s) IntraMuscular every 6 hours PRN severe psychosis and related agitation  chlorproMAZINE    Tablet 50 milliGRAM(s) Oral every 6 hours PRN mild to moderate psychosis and related agitation  magnesium hydroxide Suspension 30 milliLiter(s) Oral daily PRN Constipation  nicotine  Polacrilex Gum 2 milliGRAM(s) Oral every 2 hours PRN Nicotine Withdrawal
MEDICATIONS  (STANDING):  aspirin enteric coated 81 milliGRAM(s) Oral daily  atorvastatin 20 milliGRAM(s) Oral at bedtime  benztropine 1 milliGRAM(s) Oral two times a day  busPIRone 10 milliGRAM(s) Oral three times a day  chlorproMAZINE    Tablet 25 milliGRAM(s) Oral three times a day  diVALproex  milliGRAM(s) Oral at bedtime  escitalopram 20 milliGRAM(s) Oral daily  gabapentin 300 milliGRAM(s) Oral three times a day  metoprolol succinate ER 25 milliGRAM(s) Oral daily  mirtazapine 15 milliGRAM(s) Oral at bedtime  multivitamin 1 Tablet(s) Oral daily  nicotine - 21 mG/24Hr(s) Patch 1 patch Transdermal daily  paliperidone ER 3 milliGRAM(s) Oral daily  paliperidone ER 6 milliGRAM(s) Oral at bedtime  tamsulosin 0.4 milliGRAM(s) Oral at bedtime    MEDICATIONS  (PRN):  acetaminophen   Tablet .. 650 milliGRAM(s) Oral every 6 hours PRN Temp greater or equal to 38C (100.4F), Mild Pain (1 - 3), Moderate Pain (4 - 6)  ALBUTerol    90 MICROgram(s) HFA Inhaler 2 Puff(s) Inhalation every 6 hours PRN SOB  aluminum hydroxide/magnesium hydroxide/simethicone Suspension 30 milliLiter(s) Oral every 6 hours PRN Dyspepsia  Biotene Dry Mouth Oral Rinse 15 milliLiter(s) Swish and Spit five times a day PRN dry mouth  chlorproMAZINE    Injectable 50 milliGRAM(s) IntraMuscular every 6 hours PRN severe psychosis and related agitation  chlorproMAZINE    Tablet 50 milliGRAM(s) Oral every 6 hours PRN mild to moderate psychosis and related agitation  magnesium hydroxide Suspension 30 milliLiter(s) Oral daily PRN Constipation  nicotine  Polacrilex Gum 2 milliGRAM(s) Oral every 2 hours PRN Nicotine Withdrawal
MEDICATIONS  (STANDING):  aspirin enteric coated 81 milliGRAM(s) Oral daily  atorvastatin 20 milliGRAM(s) Oral at bedtime  benztropine 1 milliGRAM(s) Oral two times a day  busPIRone 10 milliGRAM(s) Oral three times a day  chlorproMAZINE    Tablet 75 milliGRAM(s) Oral three times a day  diVALproex  milliGRAM(s) Oral at bedtime  escitalopram 20 milliGRAM(s) Oral daily  gabapentin 300 milliGRAM(s) Oral three times a day  metoprolol succinate ER 25 milliGRAM(s) Oral daily  mirtazapine 15 milliGRAM(s) Oral at bedtime  nicotine - 21 mG/24Hr(s) Patch 1 patch Transdermal daily  paliperidone ER 3 milliGRAM(s) Oral daily  paliperidone ER 6 milliGRAM(s) Oral at bedtime  tamsulosin 0.4 milliGRAM(s) Oral at bedtime    MEDICATIONS  (PRN):  acetaminophen   Tablet .. 650 milliGRAM(s) Oral every 6 hours PRN Temp greater or equal to 38C (100.4F), Mild Pain (1 - 3), Moderate Pain (4 - 6)  ALBUTerol    90 MICROgram(s) HFA Inhaler 2 Puff(s) Inhalation every 6 hours PRN SOB  aluminum hydroxide/magnesium hydroxide/simethicone Suspension 30 milliLiter(s) Oral every 6 hours PRN Dyspepsia  chlorproMAZINE    Injectable 50 milliGRAM(s) IntraMuscular every 6 hours PRN severe psychosis and related agitation  chlorproMAZINE    Tablet 50 milliGRAM(s) Oral every 6 hours PRN mild to moderate psychosis and related agitation  magnesium hydroxide Suspension 30 milliLiter(s) Oral daily PRN Constipation  nicotine  Polacrilex Gum 2 milliGRAM(s) Oral every 2 hours PRN Nicotine Withdrawal
MEDICATIONS  (STANDING):  aspirin enteric coated 81 milliGRAM(s) Oral daily  atorvastatin 20 milliGRAM(s) Oral at bedtime  benztropine 1 milliGRAM(s) Oral two times a day  busPIRone 10 milliGRAM(s) Oral three times a day  chlorproMAZINE    Tablet 75 milliGRAM(s) Oral three times a day  diVALproex  milliGRAM(s) Oral at bedtime  escitalopram 20 milliGRAM(s) Oral daily  gabapentin 300 milliGRAM(s) Oral three times a day  metoprolol succinate ER 25 milliGRAM(s) Oral daily  mirtazapine 15 milliGRAM(s) Oral at bedtime  nicotine - 21 mG/24Hr(s) Patch 1 patch Transdermal daily  paliperidone ER 3 milliGRAM(s) Oral daily  paliperidone ER 6 milliGRAM(s) Oral at bedtime  tamsulosin 0.4 milliGRAM(s) Oral at bedtime    MEDICATIONS  (PRN):  acetaminophen   Tablet .. 650 milliGRAM(s) Oral every 6 hours PRN Temp greater or equal to 38C (100.4F), Mild Pain (1 - 3), Moderate Pain (4 - 6)  ALBUTerol    90 MICROgram(s) HFA Inhaler 2 Puff(s) Inhalation every 6 hours PRN SOB  aluminum hydroxide/magnesium hydroxide/simethicone Suspension 30 milliLiter(s) Oral every 6 hours PRN Dyspepsia  chlorproMAZINE    Injectable 50 milliGRAM(s) IntraMuscular every 6 hours PRN severe psychosis and related agitation  chlorproMAZINE    Tablet 50 milliGRAM(s) Oral every 6 hours PRN mild to moderate psychosis and related agitation  magnesium hydroxide Suspension 30 milliLiter(s) Oral daily PRN Constipation  nicotine  Polacrilex Gum 2 milliGRAM(s) Oral every 2 hours PRN Nicotine Withdrawal
MEDICATIONS  (STANDING):  aspirin enteric coated 81 milliGRAM(s) Oral daily  atorvastatin 20 milliGRAM(s) Oral at bedtime  benztropine 1 milliGRAM(s) Oral two times a day  busPIRone 5 milliGRAM(s) Oral three times a day  chlorproMAZINE    Tablet 50 milliGRAM(s) Oral three times a day  diVALproex  milliGRAM(s) Oral at bedtime  gabapentin 300 milliGRAM(s) Oral three times a day  metoprolol succinate ER 25 milliGRAM(s) Oral daily  mirtazapine 15 milliGRAM(s) Oral at bedtime  nicotine - 21 mG/24Hr(s) Patch 1 patch Transdermal daily  paliperidone ER 3 milliGRAM(s) Oral daily  paliperidone ER 6 milliGRAM(s) Oral at bedtime  tamsulosin 0.4 milliGRAM(s) Oral at bedtime    MEDICATIONS  (PRN):  acetaminophen   Tablet .. 650 milliGRAM(s) Oral every 6 hours PRN Temp greater or equal to 38C (100.4F), Mild Pain (1 - 3), Moderate Pain (4 - 6)  ALBUTerol    90 MICROgram(s) HFA Inhaler 2 Puff(s) Inhalation every 6 hours PRN SOB  aluminum hydroxide/magnesium hydroxide/simethicone Suspension 30 milliLiter(s) Oral every 6 hours PRN Dyspepsia  chlorproMAZINE    Injectable 50 milliGRAM(s) IntraMuscular every 6 hours PRN severe psychosis and related agitation  chlorproMAZINE    Tablet 50 milliGRAM(s) Oral every 6 hours PRN mild to moderate psychosis and related agitation  magnesium hydroxide Suspension 30 milliLiter(s) Oral daily PRN Constipation  nicotine  Polacrilex Gum 2 milliGRAM(s) Oral every 2 hours PRN Nicotine Withdrawal
MEDICATIONS  (STANDING):  aspirin enteric coated 81 milliGRAM(s) Oral daily  atorvastatin 20 milliGRAM(s) Oral at bedtime  benztropine 1 milliGRAM(s) Oral two times a day  chlorproMAZINE    Tablet 50 milliGRAM(s) Oral at bedtime  diVALproex  milliGRAM(s) Oral at bedtime  escitalopram 15 milliGRAM(s) Oral daily  gabapentin 300 milliGRAM(s) Oral three times a day  metoprolol succinate ER 25 milliGRAM(s) Oral daily  mirtazapine 15 milliGRAM(s) Oral at bedtime  nicotine - 21 mG/24Hr(s) Patch 1 patch Transdermal daily  paliperidone ER 3 milliGRAM(s) Oral daily  paliperidone ER 6 milliGRAM(s) Oral at bedtime  tamsulosin 0.4 milliGRAM(s) Oral at bedtime    MEDICATIONS  (PRN):  acetaminophen   Tablet .. 650 milliGRAM(s) Oral every 6 hours PRN Temp greater or equal to 38C (100.4F), Mild Pain (1 - 3), Moderate Pain (4 - 6)  ALBUTerol    90 MICROgram(s) HFA Inhaler 2 Puff(s) Inhalation every 6 hours PRN SOB  aluminum hydroxide/magnesium hydroxide/simethicone Suspension 30 milliLiter(s) Oral every 6 hours PRN Dyspepsia  chlorproMAZINE    Injectable 50 milliGRAM(s) IntraMuscular every 6 hours PRN severe psychosis and related agitation  chlorproMAZINE    Tablet 50 milliGRAM(s) Oral every 6 hours PRN mild to moderate psychosis and related agitation  magnesium hydroxide Suspension 30 milliLiter(s) Oral daily PRN Constipation  nicotine  Polacrilex Gum 2 milliGRAM(s) Oral every 2 hours PRN Nicotine Withdrawal
MEDICATIONS  (STANDING):  aspirin enteric coated 81 milliGRAM(s) Oral daily  atorvastatin 20 milliGRAM(s) Oral at bedtime  benztropine 1 milliGRAM(s) Oral two times a day  chlorproMAZINE    Tablet 50 milliGRAM(s) Oral three times a day  diVALproex  milliGRAM(s) Oral at bedtime  escitalopram 15 milliGRAM(s) Oral daily  gabapentin 300 milliGRAM(s) Oral three times a day  metoprolol succinate ER 25 milliGRAM(s) Oral daily  mirtazapine 15 milliGRAM(s) Oral at bedtime  nicotine - 21 mG/24Hr(s) Patch 1 patch Transdermal daily  paliperidone ER 3 milliGRAM(s) Oral daily  paliperidone ER 6 milliGRAM(s) Oral at bedtime  tamsulosin 0.4 milliGRAM(s) Oral at bedtime    MEDICATIONS  (PRN):  acetaminophen   Tablet .. 650 milliGRAM(s) Oral every 6 hours PRN Temp greater or equal to 38C (100.4F), Mild Pain (1 - 3), Moderate Pain (4 - 6)  ALBUTerol    90 MICROgram(s) HFA Inhaler 2 Puff(s) Inhalation every 6 hours PRN SOB  aluminum hydroxide/magnesium hydroxide/simethicone Suspension 30 milliLiter(s) Oral every 6 hours PRN Dyspepsia  chlorproMAZINE    Injectable 50 milliGRAM(s) IntraMuscular every 6 hours PRN severe psychosis and related agitation  chlorproMAZINE    Tablet 50 milliGRAM(s) Oral every 6 hours PRN mild to moderate psychosis and related agitation  magnesium hydroxide Suspension 30 milliLiter(s) Oral daily PRN Constipation  nicotine  Polacrilex Gum 2 milliGRAM(s) Oral every 2 hours PRN Nicotine Withdrawal
MEDICATIONS  (STANDING):  aspirin enteric coated 81 milliGRAM(s) Oral daily  atorvastatin 20 milliGRAM(s) Oral at bedtime  busPIRone 10 milliGRAM(s) Oral three times a day  chlorproMAZINE    Tablet 25 milliGRAM(s) Oral three times a day  diVALproex  milliGRAM(s) Oral at bedtime  escitalopram 20 milliGRAM(s) Oral daily  gabapentin 300 milliGRAM(s) Oral three times a day  metoprolol succinate ER 25 milliGRAM(s) Oral daily  mirtazapine 15 milliGRAM(s) Oral at bedtime  multivitamin 1 Tablet(s) Oral daily  nicotine - 21 mG/24Hr(s) Patch 1 patch Transdermal daily  paliperidone ER 3 milliGRAM(s) Oral daily  paliperidone ER 6 milliGRAM(s) Oral at bedtime  tamsulosin 0.4 milliGRAM(s) Oral at bedtime    MEDICATIONS  (PRN):  acetaminophen   Tablet .. 650 milliGRAM(s) Oral every 6 hours PRN Temp greater or equal to 38C (100.4F), Mild Pain (1 - 3), Moderate Pain (4 - 6)  ALBUTerol    90 MICROgram(s) HFA Inhaler 2 Puff(s) Inhalation every 6 hours PRN SOB  aluminum hydroxide/magnesium hydroxide/simethicone Suspension 30 milliLiter(s) Oral every 6 hours PRN Dyspepsia  Biotene Dry Mouth Oral Rinse 15 milliLiter(s) Swish and Spit five times a day PRN dry mouth  chlorproMAZINE    Injectable 50 milliGRAM(s) IntraMuscular every 6 hours PRN severe psychosis and related agitation  chlorproMAZINE    Tablet 50 milliGRAM(s) Oral every 6 hours PRN mild to moderate psychosis and related agitation  magnesium hydroxide Suspension 30 milliLiter(s) Oral daily PRN Constipation  nicotine  Polacrilex Gum 2 milliGRAM(s) Oral every 2 hours PRN Nicotine Withdrawal
MEDICATIONS  (STANDING):  aspirin enteric coated 81 milliGRAM(s) Oral daily  atorvastatin 20 milliGRAM(s) Oral at bedtime  busPIRone 10 milliGRAM(s) Oral three times a day  diVALproex  milliGRAM(s) Oral at bedtime  escitalopram 20 milliGRAM(s) Oral daily  gabapentin 300 milliGRAM(s) Oral three times a day  metoprolol succinate ER 25 milliGRAM(s) Oral daily  mirtazapine 15 milliGRAM(s) Oral at bedtime  multivitamin 1 Tablet(s) Oral daily  nicotine - 21 mG/24Hr(s) Patch 1 patch Transdermal daily  paliperidone ER 3 milliGRAM(s) Oral daily  paliperidone ER 6 milliGRAM(s) Oral at bedtime  tamsulosin 0.4 milliGRAM(s) Oral at bedtime    MEDICATIONS  (PRN):  acetaminophen   Tablet .. 650 milliGRAM(s) Oral every 6 hours PRN Temp greater or equal to 38C (100.4F), Mild Pain (1 - 3), Moderate Pain (4 - 6)  ALBUTerol    90 MICROgram(s) HFA Inhaler 2 Puff(s) Inhalation every 6 hours PRN SOB  aluminum hydroxide/magnesium hydroxide/simethicone Suspension 30 milliLiter(s) Oral every 6 hours PRN Dyspepsia  Biotene Dry Mouth Oral Rinse 15 milliLiter(s) Swish and Spit five times a day PRN dry mouth  chlorproMAZINE    Injectable 50 milliGRAM(s) IntraMuscular every 6 hours PRN severe psychosis and related agitation  chlorproMAZINE    Tablet 50 milliGRAM(s) Oral every 6 hours PRN mild to moderate psychosis and related agitation  magnesium hydroxide Suspension 30 milliLiter(s) Oral daily PRN Constipation  nicotine  Polacrilex Gum 2 milliGRAM(s) Oral every 2 hours PRN Nicotine Withdrawal

## 2020-11-25 NOTE — PROGRESS NOTE BEHAVIORAL HEALTH - NS ED BHA MSE SPEECH RATE
Normal
Other
Normal
Slowed

## 2020-11-25 NOTE — PROGRESS NOTE BEHAVIORAL HEALTH - GAIT / STATION
Normal gait / station

## 2020-11-25 NOTE — PROGRESS NOTE BEHAVIORAL HEALTH - AFFECT RANGE
Labile
Blunted

## 2020-11-25 NOTE — PROGRESS NOTE BEHAVIORAL HEALTH - NS ED BHA MSE SPEECH SPONTANEITY
Other
Normal
Other
Increased latency

## 2020-11-25 NOTE — PROGRESS NOTE BEHAVIORAL HEALTH - NSBHFUPINTERVALHXFT_PSY_A_CORE
Pt seen in his  room  prior to his discharge from hospital on 11/25/2020 with return to [facility]  in [place]. The pt met again with SW  to review the pt 's aftercare treatment plan disposition planning including telephone contact  with the pt's residence staff at [facility] in [place].  Pt remains anxious, tense and hypervigilant with ongoing dry mouth likely due to combination medications and pt anxiety. Also discussed plan to DC Cogentin as no longer needed and another potential source of pt dry mouth.    Discussed with the pt the plan to taper and  now DC Thorazine( very anticholinergic and likely exacerbating the pt's dry mouth)  as Invega PO and Sustenna become the sole antipsychotic medication treatments over time to alleviate the pt's severe chronic paranoid schizophrenia.  Discussed with the pt the plan to increase frequency of Biotene  swish and spit to 5 times a day to further alleviate pt dry mouth.  .The pt remains  suspicious and apprehensive as to his current med regimen despite the fact that the only change was in having the pt begin Invega Sustenna  as above in an effort to improve pt treatment compliance with the hope of decreasing the pt's risk of clinical relapse and need for readmission to hospital. Pt remains suspicious, guarded and hypervigilant with accentuated startle response. Pt with effort to smile but appearing tense and mistrustful.  Pt received his first dose of  Invega Sustenna on 11/10/2020  with plan to taper and DC po Invega after 2 weeks after second 1/2 of loading dose is completed in 1 week. . The pt was amenable to begin loading dose of 234 mg IM Invega Sustenna on 11/10/2020 with second dose in 1 week of 156 mg IM  followed by q 4 weekly  mg dose to alleviate pt symptoms of paranoid schizophrenia in  the context of a h/o treatment noncompliance.   The pt did now accept his Invega Sustenna  mg injection on 11/10/2020 with plan discussed with the pt to complete the loading dose with a second dose of Invega Sustenna 156 mg IM in 1 week, followed by q monthly IM Invega Sustenna 234 mg and with plan to DC PO Invega tabs after about 2-3 weeks.   The pt is dressed, still disheveled and unkempt despite ongoing staff efforts to offer the pt support and aid with his ADLs.  The pt continues  fearful in the context of his ongoing paranoid delusional thinking. but he appeared  a bit less tense and apprehensive overall.  The pt continues to remain in his room and is not yet able to attend therapy groups despite ongoing staff support . However, the pt has begun venturing out of his room for meals with peers in the day room though the pt is not interacting with peers during these times.  The pt's skin remains very dry and reddened but the pt continues to decline staff efforts to offer the pt Lac Hydrin  to ease skin dryness.    The pt is reportedly tolerating his now restarted Invega 3 mg po q am and 6 mg po q hs without reported side effects. The pt was amenable to resume low dose Depakote , Lexapro, Remeron  and Neurontin as well for affective lability. The pt was also agreeable to having low dose FGA standing Thorazine to be increased to 100 mg po tid added in the short term to help alleviate the pt's AH while awaiting Invega starkey clinical response.    The pt's judgment remains quite impaired with virtually no insight into his  medical and psychiatric comorbid illnesses and the need for ongoing consistent treatment both in and out of hospital.    Plan to continue pt trial of Buspar  10 mg po tid in an effort to alleviate pt anxiety and to serve as an antidepressant adjunct . Writer discussed plan to avoid benzodiazepines  in light of the pt's h/o ETOH use d/o. The pt continues to deny SI /HI /AH /VH . though he remains paranoid and suspicious.    Writer and other hospital staff  continue to educate the  pt as to COVID-19 CDC guidelines to decrease risk of infection including wearing  a face mask, social distancing and good hand hygiene. The pt will wear a face mask when reminded by staff. He expresses an understanding of the need to continue with CDC guidelines  of face mask wearing, social distancing, avoiding large gatherings and maintaining good hand hygiene  after DC from hospital. The pt was also reminded to contact his PCP should the pt develop sudden cough, fever or SOB so that he can be retested for COVID-19 infection.

## 2020-11-25 NOTE — PROGRESS NOTE BEHAVIORAL HEALTH - NS ED BHA MED ROS ENDOCRINE
No complaints
Yes

## 2020-11-25 NOTE — PROGRESS NOTE BEHAVIORAL HEALTH - NS ED BHA AXIS I SECONDARY1 CODE FT
F10.20
F10.10

## 2020-11-25 NOTE — PROGRESS NOTE BEHAVIORAL HEALTH - MOOD
Other
Depressed/Irritable
Other
Depressed/Other
Other
Other/Depressed
Other/Depressed
Depressed/Other
Depressed/Other
Other/Depressed
Depressed

## 2020-11-25 NOTE — PROGRESS NOTE BEHAVIORAL HEALTH - NS ED BHA MSE SPEECH VOLUME
Normal
Loud
Normal
Soft

## 2020-11-25 NOTE — PROGRESS NOTE BEHAVIORAL HEALTH - NSBHADMITIPOBSFT_PSY_A_CORE
Routine admission

## 2020-11-30 DIAGNOSIS — E78.5 HYPERLIPIDEMIA, UNSPECIFIED: ICD-10-CM

## 2020-11-30 DIAGNOSIS — K21.9 GASTRO-ESOPHAGEAL REFLUX DISEASE WITHOUT ESOPHAGITIS: ICD-10-CM

## 2020-11-30 DIAGNOSIS — R44.0 AUDITORY HALLUCINATIONS: ICD-10-CM

## 2020-11-30 DIAGNOSIS — Z88.8 ALLERGY STATUS TO OTHER DRUGS, MEDICAMENTS AND BIOLOGICAL SUBSTANCES: ICD-10-CM

## 2020-11-30 DIAGNOSIS — I10 ESSENTIAL (PRIMARY) HYPERTENSION: ICD-10-CM

## 2020-11-30 DIAGNOSIS — F10.29 ALCOHOL DEPENDENCE WITH UNSPECIFIED ALCOHOL-INDUCED DISORDER: ICD-10-CM

## 2020-11-30 DIAGNOSIS — R45.851 SUICIDAL IDEATIONS: ICD-10-CM

## 2020-11-30 DIAGNOSIS — K44.9 DIAPHRAGMATIC HERNIA WITHOUT OBSTRUCTION OR GANGRENE: ICD-10-CM

## 2020-11-30 DIAGNOSIS — F41.9 ANXIETY DISORDER, UNSPECIFIED: ICD-10-CM

## 2020-11-30 DIAGNOSIS — Z91.14 PATIENT'S OTHER NONCOMPLIANCE WITH MEDICATION REGIMEN: ICD-10-CM

## 2020-11-30 DIAGNOSIS — N40.0 BENIGN PROSTATIC HYPERPLASIA WITHOUT LOWER URINARY TRACT SYMPTOMS: ICD-10-CM

## 2020-11-30 DIAGNOSIS — Z91.5 PERSONAL HISTORY OF SELF-HARM: ICD-10-CM

## 2020-11-30 DIAGNOSIS — F20.0 PARANOID SCHIZOPHRENIA: ICD-10-CM
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
## 2020-11-30 DIAGNOSIS — I45.2 BIFASCICULAR BLOCK: ICD-10-CM

## 2020-11-30 DIAGNOSIS — F32.9 MAJOR DEPRESSIVE DISORDER, SINGLE EPISODE, UNSPECIFIED: ICD-10-CM

## 2020-12-03 NOTE — CHART NOTE - NSCHARTNOTEFT_GEN_A_CORE
In the process of completing [employer] follow up to determine if patient linked, SERENA called [facility] home and spoke to Maxwell (supervisor). Maxwell confirmed that pt was seen by NP Nguyen Todd on 11/29 and received his injection. Per Maxwell, pt unable to sleep 5 days following injection and was not feeling well. Maxwell denied that pt was with any SI/HI. Per Maxwell he is waiting for a call back from pt's NP. SERENA educated Maxwell that should pt's symptoms worsen, he should return to the hospital or that 911 could be called. Maxwell verbalized understanding. Treatment team to be informed.

## 2020-12-05 ENCOUNTER — EMERGENCY (EMERGENCY)
Facility: HOSPITAL | Age: 60
LOS: 1 days | Discharge: HOME | End: 2020-12-05
Attending: STUDENT IN AN ORGANIZED HEALTH CARE EDUCATION/TRAINING PROGRAM
Payer: MEDICARE

## 2020-12-05 VITALS
WEIGHT: 160.06 LBS | TEMPERATURE: 98 F | SYSTOLIC BLOOD PRESSURE: 141 MMHG | HEART RATE: 90 BPM | RESPIRATION RATE: 22 BRPM | HEIGHT: 66 IN | OXYGEN SATURATION: 99 % | DIASTOLIC BLOOD PRESSURE: 83 MMHG

## 2020-12-05 LAB
ALBUMIN SERPL ELPH-MCNC: 3.9 G/DL — SIGNIFICANT CHANGE UP (ref 3.3–5.2)
ALP SERPL-CCNC: 112 U/L — SIGNIFICANT CHANGE UP (ref 40–120)
ALT FLD-CCNC: 32 U/L — SIGNIFICANT CHANGE UP
AMPHET UR-MCNC: NEGATIVE — SIGNIFICANT CHANGE UP
ANION GAP SERPL CALC-SCNC: 10 MMOL/L — SIGNIFICANT CHANGE UP (ref 5–17)
APAP SERPL-MCNC: <3 UG/ML — LOW (ref 10–26)
APPEARANCE UR: CLEAR — SIGNIFICANT CHANGE UP
AST SERPL-CCNC: 28 U/L — SIGNIFICANT CHANGE UP
BARBITURATES UR SCN-MCNC: NEGATIVE — SIGNIFICANT CHANGE UP
BASOPHILS # BLD AUTO: 0.03 K/UL — SIGNIFICANT CHANGE UP (ref 0–0.2)
BASOPHILS NFR BLD AUTO: 0.5 % — SIGNIFICANT CHANGE UP (ref 0–2)
BENZODIAZ UR-MCNC: NEGATIVE — SIGNIFICANT CHANGE UP
BILIRUB SERPL-MCNC: 0.3 MG/DL — LOW (ref 0.4–2)
BILIRUB UR-MCNC: NEGATIVE — SIGNIFICANT CHANGE UP
BUN SERPL-MCNC: 14 MG/DL — SIGNIFICANT CHANGE UP (ref 8–20)
CALCIUM SERPL-MCNC: 9.1 MG/DL — SIGNIFICANT CHANGE UP (ref 8.6–10.2)
CHLORIDE SERPL-SCNC: 109 MMOL/L — HIGH (ref 98–107)
CO2 SERPL-SCNC: 21 MMOL/L — LOW (ref 22–29)
COCAINE METAB.OTHER UR-MCNC: NEGATIVE — SIGNIFICANT CHANGE UP
COLOR SPEC: YELLOW — SIGNIFICANT CHANGE UP
CREAT SERPL-MCNC: 1 MG/DL — SIGNIFICANT CHANGE UP (ref 0.5–1.3)
DIFF PNL FLD: NEGATIVE — SIGNIFICANT CHANGE UP
EOSINOPHIL # BLD AUTO: 0.06 K/UL — SIGNIFICANT CHANGE UP (ref 0–0.5)
EOSINOPHIL NFR BLD AUTO: 0.9 % — SIGNIFICANT CHANGE UP (ref 0–6)
ETHANOL SERPL-MCNC: <10 MG/DL — HIGH (ref 0–9)
GLUCOSE SERPL-MCNC: 112 MG/DL — HIGH (ref 70–99)
GLUCOSE UR QL: NEGATIVE MG/DL — SIGNIFICANT CHANGE UP
HCT VFR BLD CALC: 38 % — LOW (ref 39–50)
HGB BLD-MCNC: 13.1 G/DL — SIGNIFICANT CHANGE UP (ref 13–17)
IMM GRANULOCYTES NFR BLD AUTO: 0.5 % — SIGNIFICANT CHANGE UP (ref 0–1.5)
KETONES UR-MCNC: NEGATIVE — SIGNIFICANT CHANGE UP
LEUKOCYTE ESTERASE UR-ACNC: NEGATIVE — SIGNIFICANT CHANGE UP
LYMPHOCYTES # BLD AUTO: 1.32 K/UL — SIGNIFICANT CHANGE UP (ref 1–3.3)
LYMPHOCYTES # BLD AUTO: 20 % — SIGNIFICANT CHANGE UP (ref 13–44)
MCHC RBC-ENTMCNC: 31 PG — SIGNIFICANT CHANGE UP (ref 27–34)
MCHC RBC-ENTMCNC: 34.5 GM/DL — SIGNIFICANT CHANGE UP (ref 32–36)
MCV RBC AUTO: 90 FL — SIGNIFICANT CHANGE UP (ref 80–100)
METHADONE UR-MCNC: NEGATIVE — SIGNIFICANT CHANGE UP
MONOCYTES # BLD AUTO: 0.7 K/UL — SIGNIFICANT CHANGE UP (ref 0–0.9)
MONOCYTES NFR BLD AUTO: 10.6 % — SIGNIFICANT CHANGE UP (ref 2–14)
NEUTROPHILS # BLD AUTO: 4.45 K/UL — SIGNIFICANT CHANGE UP (ref 1.8–7.4)
NEUTROPHILS NFR BLD AUTO: 67.5 % — SIGNIFICANT CHANGE UP (ref 43–77)
NITRITE UR-MCNC: NEGATIVE — SIGNIFICANT CHANGE UP
OPIATES UR-MCNC: NEGATIVE — SIGNIFICANT CHANGE UP
PCP SPEC-MCNC: SIGNIFICANT CHANGE UP
PCP UR-MCNC: NEGATIVE — SIGNIFICANT CHANGE UP
PH UR: 8 — SIGNIFICANT CHANGE UP (ref 5–8)
PLATELET # BLD AUTO: 187 K/UL — SIGNIFICANT CHANGE UP (ref 150–400)
POTASSIUM SERPL-MCNC: 4 MMOL/L — SIGNIFICANT CHANGE UP (ref 3.5–5.3)
POTASSIUM SERPL-SCNC: 4 MMOL/L — SIGNIFICANT CHANGE UP (ref 3.5–5.3)
PROT SERPL-MCNC: 6.7 G/DL — SIGNIFICANT CHANGE UP (ref 6.6–8.7)
PROT UR-MCNC: NEGATIVE MG/DL — SIGNIFICANT CHANGE UP
RBC # BLD: 4.22 M/UL — SIGNIFICANT CHANGE UP (ref 4.2–5.8)
RBC # FLD: 12.2 % — SIGNIFICANT CHANGE UP (ref 10.3–14.5)
SALICYLATES SERPL-MCNC: <0.6 MG/DL — LOW (ref 10–20)
SARS-COV-2 RNA SPEC QL NAA+PROBE: SIGNIFICANT CHANGE UP
SODIUM SERPL-SCNC: 140 MMOL/L — SIGNIFICANT CHANGE UP (ref 135–145)
SP GR SPEC: 1.01 — SIGNIFICANT CHANGE UP (ref 1.01–1.02)
THC UR QL: NEGATIVE — SIGNIFICANT CHANGE UP
UROBILINOGEN FLD QL: 8 MG/DL
VALPROATE SERPL-MCNC: 28.3 UG/ML — LOW (ref 50–100)
WBC # BLD: 6.59 K/UL — SIGNIFICANT CHANGE UP (ref 3.8–10.5)
WBC # FLD AUTO: 6.59 K/UL — SIGNIFICANT CHANGE UP (ref 3.8–10.5)

## 2020-12-05 PROCEDURE — 99283 EMERGENCY DEPT VISIT LOW MDM: CPT

## 2020-12-05 PROCEDURE — 99284 EMERGENCY DEPT VISIT MOD MDM: CPT | Mod: CS

## 2020-12-05 RX ORDER — TAMSULOSIN HYDROCHLORIDE 0.4 MG/1
0.4 CAPSULE ORAL AT BEDTIME
Refills: 0 | Status: DISCONTINUED | OUTPATIENT
Start: 2020-12-05 | End: 2020-12-09

## 2020-12-05 RX ORDER — ASPIRIN/CALCIUM CARB/MAGNESIUM 324 MG
81 TABLET ORAL DAILY
Refills: 0 | Status: DISCONTINUED | OUTPATIENT
Start: 2020-12-06 | End: 2020-12-09

## 2020-12-05 RX ORDER — ATORVASTATIN CALCIUM 80 MG/1
20 TABLET, FILM COATED ORAL AT BEDTIME
Refills: 0 | Status: DISCONTINUED | OUTPATIENT
Start: 2020-12-05 | End: 2020-12-09

## 2020-12-05 RX ORDER — CHLORPROMAZINE HCL 10 MG
25 TABLET ORAL THREE TIMES A DAY
Refills: 0 | Status: DISCONTINUED | OUTPATIENT
Start: 2020-12-05 | End: 2020-12-09

## 2020-12-05 RX ORDER — METOPROLOL TARTRATE 50 MG
25 TABLET ORAL DAILY
Refills: 0 | Status: DISCONTINUED | OUTPATIENT
Start: 2020-12-06 | End: 2020-12-09

## 2020-12-05 RX ORDER — ESCITALOPRAM OXALATE 10 MG/1
20 TABLET, FILM COATED ORAL DAILY
Refills: 0 | Status: DISCONTINUED | OUTPATIENT
Start: 2020-12-06 | End: 2020-12-09

## 2020-12-05 RX ORDER — DIVALPROEX SODIUM 500 MG/1
500 TABLET, DELAYED RELEASE ORAL AT BEDTIME
Refills: 0 | Status: DISCONTINUED | OUTPATIENT
Start: 2020-12-05 | End: 2020-12-09

## 2020-12-05 RX ORDER — DIPHENHYDRAMINE HCL 50 MG
50 CAPSULE ORAL ONCE
Refills: 0 | Status: COMPLETED | OUTPATIENT
Start: 2020-12-05 | End: 2020-12-05

## 2020-12-05 RX ORDER — MIRTAZAPINE 45 MG/1
7.5 TABLET, ORALLY DISINTEGRATING ORAL AT BEDTIME
Refills: 0 | Status: DISCONTINUED | OUTPATIENT
Start: 2020-12-05 | End: 2020-12-09

## 2020-12-05 RX ORDER — LIPASE/PROTEASE/AMYLASE 16-48-48K
1 CAPSULE,DELAYED RELEASE (ENTERIC COATED) ORAL
Refills: 0 | Status: DISCONTINUED | OUTPATIENT
Start: 2020-12-05 | End: 2020-12-09

## 2020-12-05 RX ORDER — GABAPENTIN 400 MG/1
300 CAPSULE ORAL THREE TIMES A DAY
Refills: 0 | Status: DISCONTINUED | OUTPATIENT
Start: 2020-12-05 | End: 2020-12-09

## 2020-12-05 RX ADMIN — TAMSULOSIN HYDROCHLORIDE 0.4 MILLIGRAM(S): 0.4 CAPSULE ORAL at 21:21

## 2020-12-05 RX ADMIN — Medication 1 MILLIGRAM(S): at 21:21

## 2020-12-05 RX ADMIN — Medication 1 CAPSULE(S): at 16:39

## 2020-12-05 RX ADMIN — DIVALPROEX SODIUM 500 MILLIGRAM(S): 500 TABLET, DELAYED RELEASE ORAL at 21:20

## 2020-12-05 RX ADMIN — Medication 50 MILLIGRAM(S): at 11:26

## 2020-12-05 RX ADMIN — Medication 10 MILLIGRAM(S): at 21:20

## 2020-12-05 RX ADMIN — GABAPENTIN 300 MILLIGRAM(S): 400 CAPSULE ORAL at 21:21

## 2020-12-05 RX ADMIN — Medication 1 MILLIGRAM(S): at 11:37

## 2020-12-05 RX ADMIN — Medication 25 MILLIGRAM(S): at 21:20

## 2020-12-05 RX ADMIN — Medication 1 MILLIGRAM(S): at 13:15

## 2020-12-05 RX ADMIN — ATORVASTATIN CALCIUM 20 MILLIGRAM(S): 80 TABLET, FILM COATED ORAL at 21:20

## 2020-12-05 RX ADMIN — MIRTAZAPINE 7.5 MILLIGRAM(S): 45 TABLET, ORALLY DISINTEGRATING ORAL at 21:21

## 2020-12-05 NOTE — ED BEHAVIORAL HEALTH ASSESSMENT NOTE - RISK ASSESSMENT
Patient denies SI/HI plan or intent at this time;  He identifies protective factors and is able to effectively engage in safety planning. He has a history of suicide attempts and freqeunt suicidal ideation but denies at this time.  He Is not an acute risk at this time. Low Acute Suicide Risk

## 2020-12-05 NOTE — ED BEHAVIORAL HEALTH ASSESSMENT NOTE - PSYCHIATRIC ISSUES AND PLAN (INCLUDE STANDING AND PRN MEDICATION)
Thorazine 25 mg TID; Buspar 10 mg tid; depakote er 500 mg hs; lexapro 20 mg daily; neurontin 300 mg TID; Remeron 7.5 mg hs;

## 2020-12-05 NOTE — ED PROVIDER NOTE - OBJECTIVE STATEMENT
61 yo male PMHx paranoid schizophrenia, anxiety, HTN, HLD presenting with insomnia for 5 days. Pt states he got Invega Sustenna  on Monday and has not slept since. Pt is restless and anxious. Denies visual or auditory hallucinations. Denies SI/HI. Pt endorses lightheadedness, tremors, nausea with emesis, decreased appetite, and dry mouth. Denies sick contacts. Pt states he smokes cigarettes, last smoked 1 hr ago. Last used ETOH 1 month ago. No elicit drug use.

## 2020-12-05 NOTE — ED PROVIDER NOTE - ATTENDING CONTRIBUTION TO CARE
I performed a face to face history and physical exam of the patient and discussed their management with the student/resident/ACP. I reviewed the student/resident/ACP's note and agree with the documented findings and plan of care.    Pt recently had a psych admission at [facility] and had his meds changed. since his invega injection 5 d ago he has felt restless and cannot sleep. no si/hi. no hallucinations.    physical - rrr. ctab. abd - soft, nt. no edema. no rash.    plan - labs and imaging reviewed.  psych evaluated patient and will reassess in am. Pt signed out to DR. Cosme.

## 2020-12-05 NOTE — ED BEHAVIORAL HEALTH ASSESSMENT NOTE - OTHER PAST PSYCHIATRIC HISTORY (INCLUDE DETAILS REGARDING ONSET, COURSE OF ILLNESS, INPATIENT/OUTPATIENT TREATMENT)
Multiple prior hospitalizations.  Most recently discharged from [facility] 11/25/20  Past [facility] admission.  [facility]  detox 4 months ago

## 2020-12-05 NOTE — ED BEHAVIORAL HEALTH ASSESSMENT NOTE - DESCRIPTION
tremulous, difficult to engage  Vital Signs Last 24 Hrs  T(C): 36.7 (05 Dec 2020 09:47), Max: 36.7 (05 Dec 2020 09:47)  T(F): 98.1 (05 Dec 2020 09:47), Max: 98.1 (05 Dec 2020 09:47)  HR: 90 (05 Dec 2020 09:47) (90 - 90)  BP: 141/83 (05 Dec 2020 09:47) (141/83 - 141/83)  BP(mean): --  RR: 22 (05 Dec 2020 09:47) (22 - 22)  SpO2: 99% (05 Dec 2020 09:47) (99% - 99%) HLD, HTN, BPH, GERD 59 yo male never  no children

## 2020-12-05 NOTE — ED BEHAVIORAL HEALTH ASSESSMENT NOTE - DETAILS
d/c from  11/25/20 20 yrs ago reportedly overdosed on Vitamins, 6 years ago took one bottle of aspirin; in 01/2020 Ingested 1 cup bleach. tremor treated with cogentin, Haldol = Spasmodic torticollis mom had what was described as "nervous breakdown" tremors self referred Informed unavailable N/A

## 2020-12-05 NOTE — ED BEHAVIORAL HEALTH ASSESSMENT NOTE - SUMMARY
Patient is a 59 y/o single, disabled,  male,  unemployed, domiciled at [facility] in [place]; with a psychiatric history significant for Paranoid Schizophrenia, alcohol use disorder, discharged from [facility] 4 months ago for alcohol dependence, multiple psych admissions and ED visits per month for psychiatric complaints, last d/c HH on 11/25/20 after a month long stay for c/o suicidal ideation;    During that admission, Per discharge note from Dr. Mcconnell:  Patient was initiated on Invega Sustenna 234 mg IM given on 11/10/20, followed by 2nd dose of 156 mg on 11/17/20 with plan to continue with oral Invega 3 mg am, 6 mg hs x 2-3 weeks.  Additionally,  The pt is reportedly tolerating his now restarted Invega 3 mg po q am and 6 mg po q hs without reported side effects. The pt was amenable to resume low dose Depakote , Lexapro, Remeron  and Neurontin as well for affective lability. The pt was also agreeable to having low dose FGA standing Thorazine to be increased to 100 mg po tid added in the short term to help alleviate the pt's AH while awaiting Invega starkey clinical response.   The pt's judgment remains quite impaired with virtually no insight into his  medical and psychiatric comorbid illnesses and the need for ongoing consistent treatment both in and out of hospital.  Plan to continue pt trial of Buspar  10 mg po tid in an effort to alleviate pt anxiety and to serve as an antidepressant adjunct .    Today, patient is tremulous, bilateral gross hand tremors, c/o anxiety, insomnia x 5 days.   He reports poor concentration and memory, stating "since they gave me the shot, I can't think, I'm shaking all over".  Patient is difficult to engage, unable to answer questions, stating "I can't think".  Patient medicated with Benadryl earlier, remains awake and alert.    Discussed with Dr. Jackman, will hold patient in ED for re-assess; d/c oral Invega, add ativan for exacerbation of tremors, c/o anxiety and insomnia, since started on Invega sustenna (loaded with 2 MURPHY) and continued with oral (3 mg am, 6 mg hs);  Also, hold Cogentin and decrease remeron to 7.5 mg for improved coverage of c/o insomnia. Patient is a 59 y/o single, disabled,  male,  unemployed, domiciled at [facility] in [place]; with a psychiatric history significant for Paranoid Schizophrenia, alcohol use disorder, discharged from [facility] 4 months ago for alcohol dependence, multiple psych admissions and ED visits per month for psychiatric complaints, last d/c HH on 11/25/20 after a month long stay for c/o suicidal ideation;    During that admission, Per discharge note from Dr. Mcconnell:  Patient was initiated on Invega Sustenna 234 mg IM given on 11/10/20, followed by 2nd dose of 156 mg on 11/17/20 with plan to continue with oral Invega 3 mg am, 6 mg hs x 2-3 weeks.  Additionally,  The pt is reportedly tolerating his now restarted Invega 3 mg po q am and 6 mg po q hs without reported side effects. The pt was amenable to resume low dose Depakote , Lexapro, Remeron  and Neurontin as well for affective lability. The pt was also agreeable to having low dose FGA standing Thorazine to be increased to 100 mg po tid added in the short term to help alleviate the pt's AH while awaiting Invega starkey clinical response.   The pt's judgment remains quite impaired with virtually no insight into his  medical and psychiatric comorbid illnesses and the need for ongoing consistent treatment both in and out of hospital.  Plan to continue pt trial of Buspar  10 mg po tid in an effort to alleviate pt anxiety and to serve as an antidepressant adjunct .    Today, patient is tremulous, bilateral gross hand tremors, c/o anxiety, insomnia x 5 days.   He reports poor concentration and memory, stating "since they gave me the shot, I can't think, I'm shaking all over".  Patient is difficult to engage, unable to answer questions, stating "I can't think".  Patient medicated with Benadryl earlier, remains awake and alert.    Discussed with Dr. Jackman, will hold patient in ED for re-assess; d/c oral Invega, add ativan for exacerbation of tremors, c/o anxiety and insomnia, since started on Invega sustenna (loaded with 2 MURPHY) and continued with oral (3 mg am, 6 mg hs);  Also, hold Cogentin and decrease Remeron to 7.5 mg for improved coverage of c/o insomnia.    12/06/2020 Reassessment: Patient was reassessed this morning 12/06/2020 per report received from Telepsychiatry. Patient reports current mood as "tired", which he attributes to lack of sleep over a 4 day period after having being given an injection at his outpatient psychiatric clinic. He states he slept better last night in the ED and he denies current suicidal or homicidal ideations. He denies current intent or plans to die. He denies current psychotic symptoms including current paranoia, delusions, hallucinations, ideas of reference and no internal preoccupations noted. He is observed to have notable tremors at the right arm. He states he keeps himself busy at home by playing puzzles. He denies acute psychiatric symptoms and no evidence pf paranoid or delusional thoughts noted. He does not appear to be an acute risk to self pr others to warrant inpatient psychiatric admission. Patient is psychiatrically stable at this time and will follow-up with is outpatient psychiatric provider for any medication adjustment that may be warranted.    PLAN: Discharge patient home; he will follow-up with his outpatient psychiatric provider. Patient is a 61 y/o single, disabled,  male,  unemployed, domiciled at [facility] in [place]; with a psychiatric history significant for Paranoid Schizophrenia, alcohol use disorder, discharged from [facility] 4 months ago for alcohol dependence, multiple psych admissions and ED visits per month for psychiatric complaints, last d/c HH on 11/25/20 after a month long stay for c/o suicidal ideation;    During that admission, Per discharge note from Dr. Mcconnell:  Patient was initiated on Invega Sustenna 234 mg IM given on 11/10/20, followed by 2nd dose of 156 mg on 11/17/20 with plan to continue with oral Invega 3 mg am, 6 mg hs x 2-3 weeks.  Additionally,  The pt is reportedly tolerating his now restarted Invega 3 mg po q am and 6 mg po q hs without reported side effects. The pt was amenable to resume low dose Depakote , Lexapro, Remeron  and Neurontin as well for affective lability. The pt was also agreeable to having low dose FGA standing Thorazine to be increased to 100 mg po tid added in the short term to help alleviate the pt's AH while awaiting Invega starkey clinical response.   The pt's judgment remains quite impaired with virtually no insight into his  medical and psychiatric comorbid illnesses and the need for ongoing consistent treatment both in and out of hospital.  Plan to continue pt trial of Buspar  10 mg po tid in an effort to alleviate pt anxiety and to serve as an antidepressant adjunct .    Today, patient is tremulous, bilateral gross hand tremors, c/o anxiety, insomnia x 5 days.   He reports poor concentration and memory, stating "since they gave me the shot, I can't think, I'm shaking all over".  Patient is difficult to engage, unable to answer questions, stating "I can't think".  Patient medicated with Benadryl earlier, remains awake and alert.    Discussed with Dr. Jackman, will hold patient in ED for re-assess; d/c oral Invega, add ativan for exacerbation of tremors, c/o anxiety and insomnia, since started on Invega sustenna (loaded with 2 MURPHY) and continued with oral (3 mg am, 6 mg hs);  Also, hold Cogentin and decrease Remeron to 7.5 mg for improved coverage of c/o insomnia.    12/06/2020 Reassessment: Patient was reassessed this morning 12/06/2020 per report received from Telepsychiatry. Patient reports current mood as "tired", which he attributes to lack of sleep over a 4 day period after having being given an injection at his outpatient psychiatric clinic. He states he slept better last night in the ED and he denies current suicidal or homicidal ideations. He denies current intent or plans to die. He denies current psychotic symptoms including current paranoia, delusions, hallucinations, ideas of reference and no internal preoccupations noted. He is observed to have notable tremors at the right arm. He states he keeps himself busy at home by playing puzzles. He denies acute psychiatric symptoms and no evidence pf paranoid or delusional thoughts noted. He does not appear to be an acute risk to self pr others to warrant inpatient psychiatric admission. Patient is psychiatrically stable at this time and will follow-up with is outpatient psychiatric provider for any medication adjustment that may be warranted. Rina Burnette, Psychiatric Nurse practitioner 12/06/2020    PLAN: Discharge patient home; he will follow-up with his outpatient psychiatric provider.

## 2020-12-05 NOTE — ED BEHAVIORAL HEALTH ASSESSMENT NOTE - SUICIDE PROTECTIVE FACTORS
Identifies reasons for living/Has future plans/Supportive social network of family or friends/Responsibility to family and others/Positive therapeutic relationships

## 2020-12-05 NOTE — ED ADULT NURSE NOTE - OBJECTIVE STATEMENT
pt SRAVAN from [facility] for reports of insomnia, states he has been up for 5 days and 5 nights. states they gave him a shot of "something that he doesn't remember being given before" and this is what happened. as per paperwork pt given Invega. pt with no chest pain no SOB, no nausea/vomiting. pt denies any visual or auditory hallucinations. pt with even and unlabored resps present., skin warm dry and intact. patient denies any homicidal or suicidal ideations. states drinks vodka everyday but hasn't in over a month.

## 2020-12-05 NOTE — CHART NOTE - NSCHARTNOTEFT_GEN_A_CORE
Patient known to writer as he was recently discharged from . Uncooperative, refusing to talk, has limited shakes probably from Invega Sustenna with oral Invega as well. Dr. Valderrama informed. to see patient later. 1 dose of Benadryl with 1 mg Ativan ordered by Dr. Valderrama.

## 2020-12-05 NOTE — ED ADULT TRIAGE NOTE - CHIEF COMPLAINT QUOTE
patient c/o restlessness and lightheadedness, denies CP or SOB. hx of schizophrenia, no voices, denies SI/HI

## 2020-12-05 NOTE — ED BEHAVIORAL HEALTH ASSESSMENT NOTE - HPI (INCLUDE ILLNESS QUALITY, SEVERITY, DURATION, TIMING, CONTEXT, MODIFYING FACTORS, ASSOCIATED SIGNS AND SYMPTOMS)
Patient is a 61 y/o single, disabled,  male,  unemployed, domiciled at [facility] in [place]; with a psychiatric history significant for Paranoid Schizophrenia, alcohol use disorder, discharged from [facility] 4 months ago for alcohol dependence, multiple psych admissions and ED visits per month for psychiatric complaints, last d/c HH on 11/25/20 after a month long stay for c/o suicidal ideation;    During that admission, Per discharge note from Dr. Mcconnell:  Patient was initiated on Invega Sustenna 234 mg IM given on 11/10/20, followed by 2nd dose of 156 mg on 11/17/20 with plan to continue with oral Invega 3 mg am, 6 mg hs x 2-3 weeks.  Additionally,  The pt is reportedly tolerating his now restarted Invega 3 mg po q am and 6 mg po q hs without reported side effects. The pt was amenable to resume low dose Depakote , Lexapro, Remeron  and Neurontin as well for affective lability. The pt was also agreeable to having low dose FGA standing Thorazine to be increased to 100 mg po tid added in the short term to help alleviate the pt's AH while awaiting Invega starkey clinical response.   The pt's judgment remains quite impaired with virtually no insight into his  medical and psychiatric comorbid illnesses and the need for ongoing consistent treatment both in and out of hospital.  Plan to continue pt trial of Buspar  10 mg po tid in an effort to alleviate pt anxiety and to serve as an antidepressant adjunct .    Today, patient is tremulous, bilateral gross hand tremors, c/o anxiety, insomnia x 5 days.   He reports poor concentration and memory, stating "since they gave me the shot, I can't think, I'm shaking all over".  Patient is difficult to engage, unable to answer questions, stating "I can't think".  Patient medicated with Benadryl earlier, remains awake and alert.      no collateral available Patient is a 61 y/o single, disabled,  male,  unemployed, domiciled at [facility] in [place]; with a psychiatric history significant for Paranoid Schizophrenia, alcohol use disorder, discharged from [facility] 4 months ago for alcohol dependence, multiple psych admissions and ED visits per month for psychiatric complaints, last d/c HH on 11/25/20 after a month long stay for c/o suicidal ideation;    During that admission, Per discharge note from Dr. Mcconnell:  Patient was initiated on Invega Sustenna 234 mg IM given on 11/10/20, followed by 2nd dose of 156 mg on 11/17/20 with plan to continue with oral Invega 3 mg am, 6 mg hs x 2-3 weeks.  Additionally,  The pt is reportedly tolerating his now restarted Invega 3 mg po q am and 6 mg po q hs without reported side effects. The pt was amenable to resume low dose Depakote , Lexapro, Remeron  and Neurontin as well for affective lability. The pt was also agreeable to having low dose FGA standing Thorazine to be increased to 100 mg po tid added in the short term to help alleviate the pt's AH while awaiting Invega starkey clinical response.   The pt's judgment remains quite impaired with virtually no insight into his  medical and psychiatric comorbid illnesses and the need for ongoing consistent treatment both in and out of hospital.  Plan to continue pt trial of Buspar  10 mg po tid in an effort to alleviate pt anxiety and to serve as an antidepressant adjunct .    Today, patient is tremulous, bilateral gross hand tremors, c/o anxiety, insomnia x 5 days.   He reports poor concentration and memory, stating "since they gave me the shot, I can't think, I'm shaking all over".  Patient is difficult to engage, unable to answer questions, stating "I can't think".  Patient medicated with Benadryl earlier, remains awake and alert.      12/06/2020 Reassessment: Patient was reassessed this morning 12/06/2020 per report received from Telepsychiatry. Patient reports current mood as "tired", which he attributes to lack of sleep over a 4 day period after having being given an injection at his outpatient psychiatric clinic. He states he slept better last night in the ED and he denies current suicidal or homicidal ideations. He denies current intent or plans to die. He denies current psychotic symptoms including current paranoia, delusions, hallucinations, ideas of reference and no internal preoccupations noted. He is observed to have notable tremors at the right arm. He states he keeps himself busy at home by playing puzzles. He denies acute psychiatric symptoms and no evidence pf paranoid or delusional thoughts noted. He does not appear to be an acute risk to self pr others to warrant inpatient psychiatric admission. Patient is psychiatrically stable at this time and will follow-up with is outpatient psychiatric provider for any medication adjustment that may be warranted.  Vital Signs Last 24 Hrs  T(C): 36.7 (06 Dec 2020 07:33), Max: 37.2 (05 Dec 2020 23:20)  T(F): 98 (06 Dec 2020 07:33), Max: 98.9 (05 Dec 2020 23:20)  HR: 85 (06 Dec 2020 07:33) (76 - 88)  BP: 150/91 (06 Dec 2020 07:33) (127/82 - 159/70)  BP(mean): --  RR: 20 (06 Dec 2020 07:33) (18 - 20)  SpO2: 96% (06 Dec 2020 07:33) (94% - 99%) Patient is a 59 y/o single, disabled,  male,  unemployed, domiciled at [facility] in [place]; with a psychiatric history significant for Paranoid Schizophrenia, alcohol use disorder, discharged from [facility] 4 months ago for alcohol dependence, multiple psych admissions and ED visits per month for psychiatric complaints, last d/c HH on 11/25/20 after a month long stay for c/o suicidal ideation;    During that admission, Per discharge note from Dr. Mcconnell:  Patient was initiated on Invega Sustenna 234 mg IM given on 11/10/20, followed by 2nd dose of 156 mg on 11/17/20 with plan to continue with oral Invega 3 mg am, 6 mg hs x 2-3 weeks.  Additionally,  The pt is reportedly tolerating his now restarted Invega 3 mg po q am and 6 mg po q hs without reported side effects. The pt was amenable to resume low dose Depakote , Lexapro, Remeron  and Neurontin as well for affective lability. The pt was also agreeable to having low dose FGA standing Thorazine to be increased to 100 mg po tid added in the short term to help alleviate the pt's AH while awaiting Invega starkey clinical response.   The pt's judgment remains quite impaired with virtually no insight into his  medical and psychiatric comorbid illnesses and the need for ongoing consistent treatment both in and out of hospital.  Plan to continue pt trial of Buspar  10 mg po tid in an effort to alleviate pt anxiety and to serve as an antidepressant adjunct .    Today, patient is tremulous, bilateral gross hand tremors, c/o anxiety, insomnia x 5 days.   He reports poor concentration and memory, stating "since they gave me the shot, I can't think, I'm shaking all over".  Patient is difficult to engage, unable to answer questions, stating "I can't think".  Patient medicated with Benadryl earlier, remains awake and alert.      12/06/2020 Reassessment: Patient was reassessed this morning 12/06/2020 per report received from Telepsychiatry. Patient reports current mood as "tired", which he attributes to lack of sleep over a 4 day period after having being given an injection at his outpatient psychiatric clinic. He states he slept better last night in the ED and he denies current suicidal or homicidal ideations. He denies current intent or plans to die. He denies current psychotic symptoms including current paranoia, delusions, hallucinations, ideas of reference and no internal preoccupations noted. He is observed to have notable tremors at the right arm. He states he keeps himself busy at home by playing puzzles. He denies acute psychiatric symptoms and no evidence pf paranoid or delusional thoughts noted. He does not appear to be an acute risk to self pr others to warrant inpatient psychiatric admission. Patient is psychiatrically stable at this time and will follow-up with is outpatient psychiatric provider for any medication adjustment that may be warranted. Rina Burnette, NPP  Vital Signs Last 24 Hrs  T(C): 36.7 (06 Dec 2020 07:33), Max: 37.2 (05 Dec 2020 23:20)  T(F): 98 (06 Dec 2020 07:33), Max: 98.9 (05 Dec 2020 23:20)  HR: 85 (06 Dec 2020 07:33) (76 - 88)  BP: 150/91 (06 Dec 2020 07:33) (127/82 - 159/70)  BP(mean): --  RR: 20 (06 Dec 2020 07:33) (18 - 20)  SpO2: 96% (06 Dec 2020 07:33) (94% - 99%)

## 2020-12-05 NOTE — ED BEHAVIORAL HEALTH ASSESSMENT NOTE - REASON
asa 81 mg daily; Toprol xl 25 mg daily; lipitor 20 mg hs; Creon 12,000 units tid with meals; flomax 0.4 mg hs;

## 2020-12-05 NOTE — ED BEHAVIORAL HEALTH ASSESSMENT NOTE - CURRENT MEDICATION
ASA 81 mg daily; Thorazine 25 mg TID; Lipitor 20 mg HS; Cogentin 1 mg BID; BUspar 10 mg TID; Depakote  mg hs; lexapro 20  mg daily; neurontin 300 mg tid; remeron 15 mg hs; creon 12,000 units tid with food; flomax 0.4 mg hs;     Remeron 15 hs, Gabapentin 300 mg tid, Cogentin 1 mg bid,  Depakote  mg hs, Flomax 0.4 mg hs, Lexapro 10 mg 1.5 tabs qd, Thorazine 50 hs, MVI, Thiamine 100 qd, Zenpep 1000 tid,  ASA 81 mg daily,  Folic acid 1 mg daily,  Ventolin 90 mcg 2 puff Q 6hrs PRN SOB   Paliperidone 3 mg qd and 6 mg hs (confirmed with group home fax).

## 2020-12-06 VITALS
RESPIRATION RATE: 20 BRPM | DIASTOLIC BLOOD PRESSURE: 94 MMHG | SYSTOLIC BLOOD PRESSURE: 138 MMHG | TEMPERATURE: 98 F | HEART RATE: 84 BPM | OXYGEN SATURATION: 96 %

## 2020-12-06 PROCEDURE — 80307 DRUG TEST PRSMV CHEM ANLYZR: CPT

## 2020-12-06 PROCEDURE — 81003 URINALYSIS AUTO W/O SCOPE: CPT

## 2020-12-06 PROCEDURE — 80164 ASSAY DIPROPYLACETIC ACD TOT: CPT

## 2020-12-06 PROCEDURE — 99284 EMERGENCY DEPT VISIT MOD MDM: CPT | Mod: 25

## 2020-12-06 PROCEDURE — G0378: CPT

## 2020-12-06 PROCEDURE — 99218: CPT | Mod: CS

## 2020-12-06 PROCEDURE — 36415 COLL VENOUS BLD VENIPUNCTURE: CPT

## 2020-12-06 PROCEDURE — 80053 COMPREHEN METABOLIC PANEL: CPT

## 2020-12-06 PROCEDURE — 85025 COMPLETE CBC W/AUTO DIFF WBC: CPT

## 2020-12-06 PROCEDURE — U0003: CPT

## 2020-12-06 PROCEDURE — 96372 THER/PROPH/DIAG INJ SC/IM: CPT

## 2020-12-06 RX ADMIN — GABAPENTIN 300 MILLIGRAM(S): 400 CAPSULE ORAL at 07:06

## 2020-12-06 RX ADMIN — ESCITALOPRAM OXALATE 20 MILLIGRAM(S): 10 TABLET, FILM COATED ORAL at 11:52

## 2020-12-06 RX ADMIN — Medication 1 MILLIGRAM(S): at 07:06

## 2020-12-06 RX ADMIN — Medication 25 MILLIGRAM(S): at 07:07

## 2020-12-06 RX ADMIN — Medication 10 MILLIGRAM(S): at 07:06

## 2020-12-06 RX ADMIN — Medication 81 MILLIGRAM(S): at 11:52

## 2020-12-06 RX ADMIN — Medication 1 CAPSULE(S): at 11:53

## 2020-12-06 RX ADMIN — Medication 25 MILLIGRAM(S): at 07:06

## 2020-12-06 RX ADMIN — Medication 1 CAPSULE(S): at 08:24

## 2020-12-06 NOTE — ED CDU PROVIDER DISPOSITION NOTE - CLINICAL COURSE
seen  for insomnia and restlessness. evaluated by psychiatry, deemed safe for discharge with close outpatient follow up

## 2020-12-06 NOTE — CHART NOTE - NSCHARTNOTEFT_GEN_A_CORE
Cr: PER  NP RICHARD, PT IS STABLE TO RETURN BACK TO [facility]. SWER PLACED CALL TO [facility]  WHO STATED PT CAN BE SENT VIA [employer] TAXI. SWER PLACED CALL TO [facility] WHO STATES PT'S MCAID IS NOT ACTIVE FOR TRANSPORTATION. SWER INFORMED SANDRA AT . PER SANDRA, PT CAN BE SET VIA [employer] TAXI [phone] AND PAYMENT WILL BE MADE ON ARRIVAL. [facility] TAXI STAFF NOT ACCEPTING CASE. P/C FROM [facility] WHO REQUESTED SWER TO CALLED [employer] [phone] TO REQUEST TAXI. WORKER CALLED AND PROVIDED INFO TO STAFF-MALA. PER MALA SOMEONE FROM [employer] WILL CALL SWER ASAP Cr: PER  NP RICHARD, PT IS STABLE TO RETURN BACK TO [facility]. SWER PLACED CALL TO [facility]  WHO STATED PT CAN BE SENT VIA [employer] TAXI. SWER PLACED CALL TO [facility] WHO STATES PT'S MCAID IS NOT ACTIVE FOR TRANSPORTATION. SWER INFORMED SANDRA AT . PER SANDRA, PT CAN BE SET VIA [facility] TAXI [phone] AND PAYMENT WILL BE MADE ON ARRIVAL. [facility] TAXI STAFF NOT ACCEPTING CASE. P/C FROM [facility] WHO REQUESTED SWER TO CALL [employer] [phone] TO REQUEST TAXI. WORKER CALLED AND PROVIDED INFO TO STAFF-MALA. PER MALA SOMEONE FROM [employer] WILL CALL SWER ASAP

## 2020-12-06 NOTE — ED CDU PROVIDER DISPOSITION NOTE - PATIENT PORTAL LINK FT
You can access the FollowMyHealth Patient Portal offered by [facility] by registering at the following website: [email or web address]. By joining [employer]’s FollowMyHealth portal, you will also be able to view your health information using other applications (apps) compatible with our system.

## 2020-12-06 NOTE — ED ADULT NURSE REASSESSMENT NOTE - NS ED NURSE REASSESS COMMENT FT1
oob to doorway return to bed
pt awake oob to door way return to bed
pt continuously  ambulatory to desk requesting medication. informed several times night meds to be given at ten.  pt made a ware of clock.  now to desk looking at clock multiple times
pt reports still feels as though he is jittery and needs more medication to sleep. ER MD aware, will medicate as ordered. even and unlabored resps, awaiting psych consult.
received pt in room awake alert concerned over received medication.   reassurance given by prior rn and this rn..   po fluid ands snacks given.  aware to remain over night.
Assumed care of pt resting/sleeping. pt appears to be in NAD. pt has made no attempts to harm himself or others. pt's safety being maintained.
Patient ate 50% of his dinner.  Patient noted resting in bed and when engaged he endorses anxiety.  Patient calmer after being educated about his medication being ordered.  No attempts to harm self or others.
Patient dressed in casual attire and awake.  Patient reports he has been unable to sleep the last several days since receiving "injection of medication".  Patient denies suicidal or homicidal ideations.  Patient denies auditory or visual hallucinations.  Patient cooperative with security contraband assessment and securing belongings in  locker.

## 2020-12-06 NOTE — ED CDU PROVIDER INITIAL DAY NOTE - OBJECTIVE STATEMENT
59 yo male PMHx paranoid schizophrenia, anxiety, HTN, HLD presenting with insomnia for 5 days. Pt states he got Invega Sustenna  on Monday and has not slept since. Pt is restless and anxious. Denies visual or auditory hallucinations. Denies SI/HI. Pt endorses lightheadedness, tremors, nausea with emesis, decreased appetite, and dry mouth. Denies sick contacts. Pt states he smokes cigarettes, last smoked 1 hr ago. Last used ETOH 1 month ago. No elicit drug use.

## 2020-12-06 NOTE — ED CDU PROVIDER DISPOSITION NOTE - NSFOLLOWUPINSTRUCTIONS_ED_ALL_ED_FT
Follow up with your primary doctor and psychiatrist. Continue all your home medications. Return for any worsening or concerning symptoms

## 2020-12-15 ENCOUNTER — EMERGENCY (EMERGENCY)
Facility: HOSPITAL | Age: 60
LOS: 1 days | Discharge: OTHER INSTITUTION | End: 2020-12-15
Attending: EMERGENCY MEDICINE
Payer: MEDICARE

## 2020-12-15 VITALS
OXYGEN SATURATION: 98 % | WEIGHT: 160.06 LBS | SYSTOLIC BLOOD PRESSURE: 104 MMHG | RESPIRATION RATE: 20 BRPM | DIASTOLIC BLOOD PRESSURE: 63 MMHG | HEART RATE: 98 BPM | HEIGHT: 66 IN | TEMPERATURE: 99 F

## 2020-12-15 LAB
ALBUMIN SERPL ELPH-MCNC: 4.2 G/DL — SIGNIFICANT CHANGE UP (ref 3.3–5.2)
ALP SERPL-CCNC: 91 U/L — SIGNIFICANT CHANGE UP (ref 40–120)
ALT FLD-CCNC: 22 U/L — SIGNIFICANT CHANGE UP
AMPHET UR-MCNC: NEGATIVE — SIGNIFICANT CHANGE UP
ANION GAP SERPL CALC-SCNC: 11 MMOL/L — SIGNIFICANT CHANGE UP (ref 5–17)
APAP SERPL-MCNC: <3 UG/ML — LOW (ref 10–26)
APPEARANCE UR: CLEAR — SIGNIFICANT CHANGE UP
AST SERPL-CCNC: 21 U/L — SIGNIFICANT CHANGE UP
BARBITURATES UR SCN-MCNC: NEGATIVE — SIGNIFICANT CHANGE UP
BASOPHILS # BLD AUTO: 0.03 K/UL — SIGNIFICANT CHANGE UP (ref 0–0.2)
BASOPHILS NFR BLD AUTO: 0.5 % — SIGNIFICANT CHANGE UP (ref 0–2)
BENZODIAZ UR-MCNC: NEGATIVE — SIGNIFICANT CHANGE UP
BILIRUB SERPL-MCNC: 0.2 MG/DL — LOW (ref 0.4–2)
BILIRUB UR-MCNC: NEGATIVE — SIGNIFICANT CHANGE UP
BUN SERPL-MCNC: 11 MG/DL — SIGNIFICANT CHANGE UP (ref 8–20)
CALCIUM SERPL-MCNC: 10 MG/DL — SIGNIFICANT CHANGE UP (ref 8.6–10.2)
CHLORIDE SERPL-SCNC: 106 MMOL/L — SIGNIFICANT CHANGE UP (ref 98–107)
CO2 SERPL-SCNC: 27 MMOL/L — SIGNIFICANT CHANGE UP (ref 22–29)
COCAINE METAB.OTHER UR-MCNC: NEGATIVE — SIGNIFICANT CHANGE UP
COLOR SPEC: YELLOW — SIGNIFICANT CHANGE UP
CREAT SERPL-MCNC: 1.17 MG/DL — SIGNIFICANT CHANGE UP (ref 0.5–1.3)
DIFF PNL FLD: NEGATIVE — SIGNIFICANT CHANGE UP
EOSINOPHIL # BLD AUTO: 0.05 K/UL — SIGNIFICANT CHANGE UP (ref 0–0.5)
EOSINOPHIL NFR BLD AUTO: 0.8 % — SIGNIFICANT CHANGE UP (ref 0–6)
ETHANOL SERPL-MCNC: <10 MG/DL — HIGH (ref 0–9)
GLUCOSE SERPL-MCNC: 84 MG/DL — SIGNIFICANT CHANGE UP (ref 70–99)
GLUCOSE UR QL: NEGATIVE MG/DL — SIGNIFICANT CHANGE UP
HCT VFR BLD CALC: 37.2 % — LOW (ref 39–50)
HGB BLD-MCNC: 12.4 G/DL — LOW (ref 13–17)
IMM GRANULOCYTES NFR BLD AUTO: 0.2 % — SIGNIFICANT CHANGE UP (ref 0–1.5)
KETONES UR-MCNC: NEGATIVE — SIGNIFICANT CHANGE UP
LEUKOCYTE ESTERASE UR-ACNC: NEGATIVE — SIGNIFICANT CHANGE UP
LYMPHOCYTES # BLD AUTO: 1.44 K/UL — SIGNIFICANT CHANGE UP (ref 1–3.3)
LYMPHOCYTES # BLD AUTO: 22.8 % — SIGNIFICANT CHANGE UP (ref 13–44)
MCHC RBC-ENTMCNC: 31 PG — SIGNIFICANT CHANGE UP (ref 27–34)
MCHC RBC-ENTMCNC: 33.3 GM/DL — SIGNIFICANT CHANGE UP (ref 32–36)
MCV RBC AUTO: 93 FL — SIGNIFICANT CHANGE UP (ref 80–100)
METHADONE UR-MCNC: NEGATIVE — SIGNIFICANT CHANGE UP
MONOCYTES # BLD AUTO: 0.58 K/UL — SIGNIFICANT CHANGE UP (ref 0–0.9)
MONOCYTES NFR BLD AUTO: 9.2 % — SIGNIFICANT CHANGE UP (ref 2–14)
NEUTROPHILS # BLD AUTO: 4.2 K/UL — SIGNIFICANT CHANGE UP (ref 1.8–7.4)
NEUTROPHILS NFR BLD AUTO: 66.5 % — SIGNIFICANT CHANGE UP (ref 43–77)
NITRITE UR-MCNC: NEGATIVE — SIGNIFICANT CHANGE UP
OPIATES UR-MCNC: NEGATIVE — SIGNIFICANT CHANGE UP
PCP SPEC-MCNC: SIGNIFICANT CHANGE UP
PCP UR-MCNC: NEGATIVE — SIGNIFICANT CHANGE UP
PH UR: 6 — SIGNIFICANT CHANGE UP (ref 5–8)
PLATELET # BLD AUTO: 271 K/UL — SIGNIFICANT CHANGE UP (ref 150–400)
POTASSIUM SERPL-MCNC: 3.8 MMOL/L — SIGNIFICANT CHANGE UP (ref 3.5–5.3)
POTASSIUM SERPL-SCNC: 3.8 MMOL/L — SIGNIFICANT CHANGE UP (ref 3.5–5.3)
PROT SERPL-MCNC: 7.1 G/DL — SIGNIFICANT CHANGE UP (ref 6.6–8.7)
PROT UR-MCNC: NEGATIVE MG/DL — SIGNIFICANT CHANGE UP
RBC # BLD: 4 M/UL — LOW (ref 4.2–5.8)
RBC # FLD: 12.3 % — SIGNIFICANT CHANGE UP (ref 10.3–14.5)
SALICYLATES SERPL-MCNC: <0.6 MG/DL — LOW (ref 10–20)
SARS-COV-2 RNA SPEC QL NAA+PROBE: SIGNIFICANT CHANGE UP
SODIUM SERPL-SCNC: 144 MMOL/L — SIGNIFICANT CHANGE UP (ref 135–145)
SP GR SPEC: 1.01 — SIGNIFICANT CHANGE UP (ref 1.01–1.02)
THC UR QL: NEGATIVE — SIGNIFICANT CHANGE UP
UROBILINOGEN FLD QL: NEGATIVE MG/DL — SIGNIFICANT CHANGE UP
VALPROATE SERPL-MCNC: 40.5 UG/ML — LOW (ref 50–100)
WBC # BLD: 6.31 K/UL — SIGNIFICANT CHANGE UP (ref 3.8–10.5)
WBC # FLD AUTO: 6.31 K/UL — SIGNIFICANT CHANGE UP (ref 3.8–10.5)

## 2020-12-15 PROCEDURE — 99218: CPT | Mod: CS

## 2020-12-15 PROCEDURE — 90792 PSYCH DIAG EVAL W/MED SRVCS: CPT

## 2020-12-15 PROCEDURE — 93010 ELECTROCARDIOGRAM REPORT: CPT

## 2020-12-15 RX ORDER — LIPASE/PROTEASE/AMYLASE 16-48-48K
1 CAPSULE,DELAYED RELEASE (ENTERIC COATED) ORAL
Refills: 0 | Status: DISCONTINUED | OUTPATIENT
Start: 2020-12-15 | End: 2020-12-16

## 2020-12-15 RX ORDER — ESCITALOPRAM OXALATE 10 MG/1
20 TABLET, FILM COATED ORAL DAILY
Refills: 0 | Status: DISCONTINUED | OUTPATIENT
Start: 2020-12-16 | End: 2020-12-20

## 2020-12-15 RX ORDER — METOPROLOL TARTRATE 50 MG
25 TABLET ORAL DAILY
Refills: 0 | Status: DISCONTINUED | OUTPATIENT
Start: 2020-12-16 | End: 2020-12-20

## 2020-12-15 RX ORDER — GABAPENTIN 400 MG/1
300 CAPSULE ORAL THREE TIMES A DAY
Refills: 0 | Status: DISCONTINUED | OUTPATIENT
Start: 2020-12-15 | End: 2020-12-20

## 2020-12-15 RX ORDER — DIVALPROEX SODIUM 500 MG/1
500 TABLET, DELAYED RELEASE ORAL AT BEDTIME
Refills: 0 | Status: DISCONTINUED | OUTPATIENT
Start: 2020-12-15 | End: 2020-12-20

## 2020-12-15 RX ORDER — TAMSULOSIN HYDROCHLORIDE 0.4 MG/1
0.4 CAPSULE ORAL AT BEDTIME
Refills: 0 | Status: DISCONTINUED | OUTPATIENT
Start: 2020-12-15 | End: 2020-12-20

## 2020-12-15 RX ORDER — MIRTAZAPINE 45 MG/1
15 TABLET, ORALLY DISINTEGRATING ORAL AT BEDTIME
Refills: 0 | Status: DISCONTINUED | OUTPATIENT
Start: 2020-12-15 | End: 2020-12-20

## 2020-12-15 RX ORDER — ASPIRIN/CALCIUM CARB/MAGNESIUM 324 MG
81 TABLET ORAL DAILY
Refills: 0 | Status: DISCONTINUED | OUTPATIENT
Start: 2020-12-16 | End: 2020-12-20

## 2020-12-15 RX ADMIN — GABAPENTIN 300 MILLIGRAM(S): 400 CAPSULE ORAL at 23:19

## 2020-12-15 RX ADMIN — TAMSULOSIN HYDROCHLORIDE 0.4 MILLIGRAM(S): 0.4 CAPSULE ORAL at 23:19

## 2020-12-15 RX ADMIN — DIVALPROEX SODIUM 500 MILLIGRAM(S): 500 TABLET, DELAYED RELEASE ORAL at 23:19

## 2020-12-15 RX ADMIN — MIRTAZAPINE 15 MILLIGRAM(S): 45 TABLET, ORALLY DISINTEGRATING ORAL at 23:19

## 2020-12-15 NOTE — ED BEHAVIORAL HEALTH ASSESSMENT NOTE - DETAILS
d/c from  11/25/20 20 yrs ago reportedly overdosed on Vitamins, 6 years ago took one bottle of aspirin; in 01/2020 Ingested 1 cup bleach. tremor treated with cogentin, Haldol = Spasmodic torticollis bed TBD n/a

## 2020-12-15 NOTE — ED PROVIDER NOTE - CLINICAL SUMMARY MEDICAL DECISION MAKING FREE TEXT BOX
pt with worsening depression, paranoia, SI. will place in  for psych consult and obtain medical clearance.

## 2020-12-15 NOTE — ED BEHAVIORAL HEALTH ASSESSMENT NOTE - DIFFERENTIAL
Depression Depression    COVID Exposure Screen- Patient    1.	*In the past 14 days, have you been around anyone with a positive COVID-19 test?*   (  ) Yes   (x  ) No   (  ) Unknown- Reason (e.g. patient uncertain, sedated, refusing to answer, etc.):  ______  IF YES PROCEED TO QUESTION #2. IF NO or UNKNOWN, PLEASE SKIP TO QUESTION #7  2.	Were you within 6 feet of them for at least 15 minutes? (  ) Yes   (  ) No   (  ) Unknown- Reason: ______    3.	Have you provided care for them? (  ) Yes   (  ) No   (  ) Unknown- Reason: ______    4.	Have you had direct physical contact with them (touched, hugged, or kissed them)?  (  ) Yes   (  ) No    (  ) Unknown- Reason: ______    5.	Have you shared eating or drinking utensils with them? (  ) Yes   (  ) No    (  ) Unknown- Reason: ______    6.	Have they sneezed, coughed, or somehow got respiratory droplets on you? (  ) Yes   (  ) No    (  ) Unknown- Reason: ______      7.	*Have you been out of [place] within the past 14 days?*  (  ) Yes   ( x ) No   (  ) Unknown- Reason (e.g. patient uncertain, sedated, refusing to answer, etc.): _______  IF YES PLEASE ANSWER THE FOLLOWING QUESTIONS:  8.	Which state/country have you been to? ______   9.	Were you there over 24 hours? (  ) Yes   (  ) No    (  ) Unknown- Reason: ______    10.	What date did you return to [facility]? ______

## 2020-12-15 NOTE — CHART NOTE - NSCHARTNOTEFT_GEN_A_CORE
SW Note: Psych MD notified SW that the plan will be to transfer pt for inpt psychiatric care due to S/I. Face sheet lists medicare and medicaid. Medicare covers O/P only so for inpt transfer medicaid will be billed.   Labs needed for transfer are pending. SW will explore options when pt medically cleared.

## 2020-12-15 NOTE — ED BEHAVIORAL HEALTH ASSESSMENT NOTE - DESCRIPTION
Vital Signs Last 24 Hrs  T(C): 37.3 (15 Dec 2020 12:40), Max: 37.3 (15 Dec 2020 12:40)  T(F): 99.1 (15 Dec 2020 12:40), Max: 99.1 (15 Dec 2020 12:40)  HR: 98 (15 Dec 2020 12:40) (98 - 98)  BP: 104/63 (15 Dec 2020 12:40) (104/63 - 104/63)  BP(mean): --  RR: 20 (15 Dec 2020 12:40) (20 - 20)  SpO2: 98% (15 Dec 2020 12:40) (98% - 98%) HLD, HTN, BPH, GERD 59 yo male never  no children

## 2020-12-15 NOTE — ED BEHAVIORAL HEALTH ASSESSMENT NOTE - PAST PSYCHOTROPIC MEDICATION
ASA 81 mg daily; Thorazine 25 mg TID; Lipitor 20 mg HS; Cogentin 1 mg BID; BUspar 10 mg TID; Depakote  mg hs; lexapro 20  mg daily; neurontin 300 mg tid; remeron 15 mg hs; creon 12,000 units tid with food; flomax 0.4 mg hs;    as per last note,  Remeron 15 hs, Gabapentin 300 mg tid, Cogentin 1 mg bid,  Depakote  mg hs, Flomax 0.4 mg hs, Lexapro 10 mg 1.5 tabs qd, Thorazine 50 hs, MVI, Thiamine 100 qd, Zenpep 1000 tid,  ASA 81 mg daily,  Folic acid 1 mg daily,  Ventolin 90 mcg 2 puff Q 6hrs PRN SOB   Paliperidone 3 mg qd and 6 mg hs (confirmed with group home fax). ,  Remeron 15 hs, Gabapentin 300 mg tid,  Depakote  mg hs, Flomax 0.4 mg hs, Lexapro 20 mg  MVI, Thiamine 100 qd, Zenpep 1000 tid,  ASA 81 mg daily,  Folic acid 1 mg daily,  Ventolin 90 mcg 2 puff Q 6hrs PRN SOB, Metoprolol succinate 25 daily,   Paliperidone 3 mg qd and 6 mg hs and Sustenna with last injection given on 12/ 7

## 2020-12-15 NOTE — ED PROVIDER NOTE - OBJECTIVE STATEMENT
61 yo male PMHx paranoid schizophrenia, anxiety, HTN, HLD presenting from group home with worsening depressions, paranoia, Si for the past week. No plans or Hi. + suicidal attempts in the past with ASA OD. No fevers, chills, cugh, abd pain, chest pain. no changes in meds. No AMS. No hallucinations. 59 yo male PMHx paranoid schizophrenia, anxiety, HTN, HLD presenting from group home with worsening depressions, paranoia, Si for the past week. No plans or Hi. + suicidal attempts in the past with ASA OD. No fevers, chills, cough, abd pain, chest pain. no changes in meds. No AMS. No hallucinations.

## 2020-12-15 NOTE — ED BEHAVIORAL HEALTH ASSESSMENT NOTE - SUMMARY
Patient is a 61 y/o single, disabled,  male,  unemployed, domiciled at [facility] in [place]; with a psychiatric history significant for Paranoid Schizophrenia, alcohol use disorder, discharged from [facility] approx 4 months ago for alcohol dependence, multiple psych admissions and ED visits per month for psychiatric complaints, last d/c HH on 11/25/20 after a month long stay for c/o suicidal ideation;    Patient has been evaluated and presents with worsening depression and suicidal ideations with thoughts to jump off the roof. patient to be admitted to inpatient psych under voluntary status

## 2020-12-15 NOTE — ED ADULT NURSE NOTE - NSIMPLEMENTINTERV_GEN_ALL_ED
Implemented All Universal Safety Interventions:  [place] to call system. Call bell, personal items and telephone within reach. Instruct patient to call for assistance. Room bathroom lighting operational. Non-slip footwear when patient is off stretcher. Physically safe environment: no spills, clutter or unnecessary equipment. Stretcher in lowest position, wheels locked, appropriate side rails in place.

## 2020-12-15 NOTE — ED CDU PROVIDER INITIAL DAY NOTE - OBJECTIVE STATEMENT
61 yo male PMHx paranoid schizophrenia, anxiety, HTN, HLD presenting from group home with worsening depressions, paranoia, Si for the past week. No plans or Hi. + suicidal attempts in the past with ASA OD. No fevers, chills, cough, abd pain, chest pain. no changes in meds. No AMS. No hallucinations.    seen by  who requests admission for Si. pending bed

## 2020-12-15 NOTE — ED ADULT NURSE NOTE - HPI (INCLUDE ILLNESS QUALITY, SEVERITY, DURATION, TIMING, CONTEXT, MODIFYING FACTORS, ASSOCIATED SIGNS AND SYMPTOMS)
Patient comes to  ED after being medically cleared in main ED. States he was having suicidal thoughts to jump off roof of group residence he lives in. He has multiple visits to  and hx of inpatient hospitalizations for similar complaints. States he has been feeling suicidal for a "few days", and is tired of living his life. Pt states he has been a "good krishan" and people take advantage of his kindness, but are not kind in return and treat him poorly. States he doesn't feel safe if he goes back to group residence. Endorses need for inpatient hospitalization. Polite and cooperative with intake process.

## 2020-12-15 NOTE — ED ADULT TRIAGE NOTE - CHIEF COMPLAINT QUOTE
Patient BIBA to ED today from a group home with c/o SI.  Patient states he wanted to jump off a building.  Patient denies drug or alcohol use today.

## 2020-12-15 NOTE — ED PROVIDER NOTE - CARDIAC, MLM
Normal rate, regular rhythm.  Heart sounds S1, S2.  No murmurs, rubs or gallops.
complains of pain/discomfort

## 2020-12-15 NOTE — ED BEHAVIORAL HEALTH ASSESSMENT NOTE - HPI (INCLUDE ILLNESS QUALITY, SEVERITY, DURATION, TIMING, CONTEXT, MODIFYING FACTORS, ASSOCIATED SIGNS AND SYMPTOMS)
Patient is a 61 y/o single, disabled,  male,  unemployed, domiciled at [facility] in [place]; with a psychiatric history significant for Paranoid Schizophrenia, alcohol use disorder, discharged from [facility] 4 months ago for alcohol dependence, multiple psych admissions and ED visits per month for psychiatric complaints, last d/c HH on 11/25/20 after a month long stay for c/o suicidal ideation;    who was BIBA for evaluation of suicidal ideation     Patient seen and evaluated and found to be calm and cooperative. Patient states he "cant stand" his life and is sick of being the nice krishan and wants to jump off the roof. patient states he has been having suicidal thoughts for days and was originally thinking of OD but realized he does not have anything ot OD with so he has been thinking of climbing to the roof and jumping. Patient states his depression is worsening and feels that others take advantage of him and hates where his life has gone. patient reports poor sleep, poor appetite and with suicidal thoughts to jump off th roof. Patient denies any homicidal thoughts and denies any symptoms of maddie or AV Hallucinations.     Attempted to get collateral from group rishabh but no answer- [phone]

## 2020-12-15 NOTE — ED BEHAVIORAL HEALTH ASSESSMENT NOTE - OTHER PAST PSYCHIATRIC HISTORY (INCLUDE DETAILS REGARDING ONSET, COURSE OF ILLNESS, INPATIENT/OUTPATIENT TREATMENT)
Multiple prior hospitalizations.  Most recently discharged from [facility] 11/25/20  Past [facility] admission.  [facility]  detox 4 months ago

## 2020-12-15 NOTE — ED ADULT NURSE NOTE - CAS EDN DISCHARGE ASSESSMENT
Alert and oriented to person, place and time cooperative with transfer/Alert and oriented to person, place and time

## 2020-12-16 PROCEDURE — 99226: CPT | Mod: CS

## 2020-12-16 RX ORDER — LIPASE/PROTEASE/AMYLASE 16-48-48K
1 CAPSULE,DELAYED RELEASE (ENTERIC COATED) ORAL
Refills: 0 | Status: DISCONTINUED | OUTPATIENT
Start: 2020-12-16 | End: 2020-12-20

## 2020-12-16 RX ADMIN — Medication 25 MILLIGRAM(S): at 06:05

## 2020-12-16 RX ADMIN — GABAPENTIN 300 MILLIGRAM(S): 400 CAPSULE ORAL at 06:05

## 2020-12-16 RX ADMIN — MIRTAZAPINE 15 MILLIGRAM(S): 45 TABLET, ORALLY DISINTEGRATING ORAL at 22:13

## 2020-12-16 RX ADMIN — ESCITALOPRAM OXALATE 20 MILLIGRAM(S): 10 TABLET, FILM COATED ORAL at 15:21

## 2020-12-16 RX ADMIN — DIVALPROEX SODIUM 500 MILLIGRAM(S): 500 TABLET, DELAYED RELEASE ORAL at 22:12

## 2020-12-16 RX ADMIN — GABAPENTIN 300 MILLIGRAM(S): 400 CAPSULE ORAL at 22:12

## 2020-12-16 RX ADMIN — Medication 1 CAPSULE(S): at 16:49

## 2020-12-16 RX ADMIN — TAMSULOSIN HYDROCHLORIDE 0.4 MILLIGRAM(S): 0.4 CAPSULE ORAL at 22:12

## 2020-12-16 RX ADMIN — GABAPENTIN 300 MILLIGRAM(S): 400 CAPSULE ORAL at 15:21

## 2020-12-16 RX ADMIN — Medication 81 MILLIGRAM(S): at 15:21

## 2020-12-16 NOTE — ED BEHAVIORAL HEALTH NOTE - BEHAVIORAL HEALTH NOTE
Telepsychiatry Reassessment Note:    MD received handoff on patient from Dr. Wilson, patient holding for voluntary admission.     MD evaluated patient via [email or web address] cart. he complains of depression and suicidal ideation. tremor appreciated one exam, patient states it is chronic. Discussed possibility of [facility] transfer overnight as there is an available bed, patient declines feeling it is too far. Advised patient of bed shortage, and indications for starting inpatient treatment as soon as possible, he continues to decline [facility].     A/P:   from initial assessment " Patient is a 59 y/o single, disabled,  male,  unemployed, domiciled at [facility] in [place]; with a psychiatric history significant for Paranoid Schizophrenia, alcohol use disorder, discharged from [facility] approx 4 months ago for alcohol dependence, multiple psych admissions and ED visits per month for psychiatric complaints, last d/c  on 11/25/20 after a month long stay for c/o suicidal ideation;    Patient has been evaluated and presents with worsening depression and suicidal ideations with thoughts to jump off the roof. patient to be admitted to inpatient psych under voluntary status"    - continue hold for bed. declines [facility].

## 2020-12-16 NOTE — ED BEHAVIORAL HEALTH NOTE - BEHAVIORAL HEALTH NOTE
61yo domiciled at group home ([facility] in [place]), unemployed on disability, single white M with hx of schizophrenia, depressive d/o w/ reported hx of 3 prior SA, w/ distant hx of etoh, cannabis use who presented w/ reported worsening depression, SI w/ plan to jump off the roof of his group home. Patient has been clinically determined to benefit from voluntary psychiatric admission by previous psychiatric providers and has been awaiting for bed availability.      Pt was seen and evaluated. Patient spoke of still feeling "depressed". When asked about ongoing suicidal thoughts, he replied: "I don't want to tell you. I will tell the doctors when I get there." Patient currently in agreement w/ inpatient psych disposition and is in agreement w/ possible transfer to [facility] if bed available.       VS: T98.2 HR 59 /82     Meds:  Zoloft 20mg qdaily  metoprolol ER 25mg qdaily  creon 12,000 lipase units TID with meals  VPA DR 500mg po HS  Gabapentin 300mg TID  Remeron 15mg HS  Flomax 0.4mg HS      MSE: appears older than stated age, fair eye contact, in hospital gown, poor grooming. No psychomotor agitation or retardation, +perioral TD like movement. Speech: lisp, nl rate, rhythm and vol. Mood: "depressed" Affect: congruent, euthymic, constricted TP: linear TC: reported SI. No delusions elicited. No AH/VH. Memory and cog: AAOx3, grossly intact memory. Insight and judgment: limited/limited Impulse control: fair      Diagnosis: Unspecified affective d/o, unspecified schiozphrenia    Assessment and Plan:  Pt w/ hx of unspecified schizophrenia, unspecified mood d/o w/ distant hx of etoh, cannabis use w/ reported hx of 3 SA presents from group home w/ depressive mood w/ reported SI w/ plan. Patient continues to endorse ongoing depressive mood and is provocatively guarded about suicidality. Patient continues to be in agreement w/ previous clinically determined dispo for inpatient psych hospitalization for potential harm to self. Currently there is no additional clinical information gathered to mitigate his risk factors for self harm to justify reversing clinical decision for inpatient psych. Patient is in agreement for [facility] if bed is available.    -continue 1:1 for suicidality  -continue w/ current psych regimen as ordered in EMR  -psych dispo: voluntary 9.13 admission to inpatient psych - pending psych bed availability (possible [facility])  -day in-house psych team to follow in am if patient remains in ED   -ED provider informed of dispo and plan 61yo domiciled at group home ([facility] in [place]), unemployed on disability, single white M with hx of schizophrenia, depressive d/o w/ reported hx of 3 prior SA, w/ distant hx of etoh, cannabis use who presented w/ reported worsening depression, SI w/ plan to jump off the roof of his group home. Patient has been clinically determined to benefit from voluntary psychiatric admission by previous psychiatric providers and has been awaiting for bed availability.      Pt was seen and evaluated. Patient spoke of still feeling "depressed". When asked about ongoing suicidal thoughts, he replied: "I don't want to tell you. I will tell the doctors when I get there." Patient currently in agreement w/ inpatient psych disposition and is in agreement w/ possible transfer to [facility] if bed available.       VS: T98.2 HR 59 /82     Meds:  Zoloft 20mg qdaily  metoprolol ER 25mg qdaily  creon 12,000 lipase units TID with meals  VPA DR 500mg po HS  Gabapentin 300mg TID  Remeron 15mg HS  Flomax 0.4mg HS      MSE: appears older than stated age, fair eye contact, in hospital gown, poor grooming. No psychomotor agitation or retardation, +perioral TD like movement. Speech: lisp, nl rate, rhythm and vol. Mood: "depressed" Affect: congruent, euthymic, constricted TP: linear TC: reported SI. No delusions elicited. No AH/VH. Memory and cog: AAOx3, grossly intact memory. Insight and judgment: limited/limited Impulse control: fair      Diagnosis: Unspecified affective d/o, unspecified schizophrenia    Assessment and Plan:  Pt w/ hx of unspecified schizophrenia, unspecified mood d/o w/ distant hx of etoh, cannabis use w/ reported hx of 3 SA presents from group home w/ depressive mood w/ reported SI w/ plan. Patient continues to endorse ongoing depressive mood and is provocatively guarded about suicidality. Patient continues to be in agreement w/ previous clinically determined dispo for inpatient psych hospitalization for potential harm to self. Currently there is no additional clinical information gathered to mitigate his risk factors for self harm to justify reversing clinical decision for inpatient psych. Patient is in agreement for [facility] if bed is available.    -continue 1:1 for suicidality  -continue w/ current psych regimen as ordered in EMR  -psych dispo: voluntary 9.13 admission to inpatient psych - pending psych bed availability (possible [facility])  -day in-house psych team to follow in am if patient remains in ED   -ED provider informed of dispo and plan

## 2020-12-16 NOTE — CHART NOTE - NSCHARTNOTEFT_GEN_A_CORE
SERENA Note: PLan is to transfer pt for inpt psychiatric treatment. Reviewed BH eval and telepsych notes. Met with pt. He is still agreeable to the plan. made aware of limited psych beds in the community and issues regarding placement due to his payer source, [facility] medicaid. The pt is open to all possible options.   [facility] reporting no beds. [facility] bed board is not fully updated. Referral made to [facility] [phone], spoke with Carol. Referral pending bed availability and MD review.   SW to follow

## 2020-12-17 ENCOUNTER — INPATIENT (INPATIENT)
Facility: HOSPITAL | Age: 60
LOS: 10 days | Discharge: HOME | End: 2020-12-28
Attending: PSYCHIATRY & NEUROLOGY | Admitting: PSYCHIATRY & NEUROLOGY
Payer: MEDICARE

## 2020-12-17 VITALS — SYSTOLIC BLOOD PRESSURE: 118 MMHG | DIASTOLIC BLOOD PRESSURE: 50 MMHG | TEMPERATURE: 98 F

## 2020-12-17 VITALS
RESPIRATION RATE: 18 BRPM | OXYGEN SATURATION: 95 % | TEMPERATURE: 98 F | SYSTOLIC BLOOD PRESSURE: 150 MMHG | DIASTOLIC BLOOD PRESSURE: 93 MMHG | HEART RATE: 71 BPM

## 2020-12-17 DIAGNOSIS — F71 MODERATE INTELLECTUAL DISABILITIES: ICD-10-CM

## 2020-12-17 DIAGNOSIS — F33.9 MAJOR DEPRESSIVE DISORDER, RECURRENT, UNSPECIFIED: ICD-10-CM

## 2020-12-17 PROCEDURE — 99217: CPT | Mod: CS

## 2020-12-17 PROCEDURE — 99285 EMERGENCY DEPT VISIT HI MDM: CPT

## 2020-12-17 PROCEDURE — U0003: CPT

## 2020-12-17 PROCEDURE — 81003 URINALYSIS AUTO W/O SCOPE: CPT

## 2020-12-17 PROCEDURE — G0378: CPT

## 2020-12-17 PROCEDURE — 80053 COMPREHEN METABOLIC PANEL: CPT

## 2020-12-17 PROCEDURE — 80164 ASSAY DIPROPYLACETIC ACD TOT: CPT

## 2020-12-17 PROCEDURE — 99222 1ST HOSP IP/OBS MODERATE 55: CPT

## 2020-12-17 PROCEDURE — 93005 ELECTROCARDIOGRAM TRACING: CPT

## 2020-12-17 PROCEDURE — 85025 COMPLETE CBC W/AUTO DIFF WBC: CPT

## 2020-12-17 PROCEDURE — 36415 COLL VENOUS BLD VENIPUNCTURE: CPT

## 2020-12-17 PROCEDURE — 80307 DRUG TEST PRSMV CHEM ANLYZR: CPT

## 2020-12-17 RX ORDER — FOLIC ACID 0.8 MG
1 TABLET ORAL DAILY
Refills: 0 | Status: DISCONTINUED | OUTPATIENT
Start: 2020-12-17 | End: 2020-12-19

## 2020-12-17 RX ORDER — ASPIRIN/CALCIUM CARB/MAGNESIUM 324 MG
81 TABLET ORAL DAILY
Refills: 0 | Status: DISCONTINUED | OUTPATIENT
Start: 2020-12-17 | End: 2020-12-28

## 2020-12-17 RX ORDER — ATORVASTATIN CALCIUM 80 MG/1
20 TABLET, FILM COATED ORAL AT BEDTIME
Refills: 0 | Status: DISCONTINUED | OUTPATIENT
Start: 2020-12-17 | End: 2020-12-28

## 2020-12-17 RX ORDER — GABAPENTIN 400 MG/1
300 CAPSULE ORAL THREE TIMES A DAY
Refills: 0 | Status: DISCONTINUED | OUTPATIENT
Start: 2020-12-17 | End: 2020-12-28

## 2020-12-17 RX ORDER — METOPROLOL TARTRATE 50 MG
25 TABLET ORAL DAILY
Refills: 0 | Status: DISCONTINUED | OUTPATIENT
Start: 2020-12-17 | End: 2020-12-28

## 2020-12-17 RX ORDER — MIRTAZAPINE 45 MG/1
15 TABLET, ORALLY DISINTEGRATING ORAL AT BEDTIME
Refills: 0 | Status: DISCONTINUED | OUTPATIENT
Start: 2020-12-17 | End: 2020-12-19

## 2020-12-17 RX ORDER — DIPHENHYDRAMINE HCL 50 MG
50 CAPSULE ORAL EVERY 6 HOURS
Refills: 0 | Status: DISCONTINUED | OUTPATIENT
Start: 2020-12-17 | End: 2020-12-17

## 2020-12-17 RX ORDER — NICOTINE POLACRILEX 2 MG
1 GUM BUCCAL DAILY
Refills: 0 | Status: DISCONTINUED | OUTPATIENT
Start: 2020-12-17 | End: 2020-12-28

## 2020-12-17 RX ORDER — DIVALPROEX SODIUM 500 MG/1
500 TABLET, DELAYED RELEASE ORAL AT BEDTIME
Refills: 0 | Status: DISCONTINUED | OUTPATIENT
Start: 2020-12-17 | End: 2020-12-22

## 2020-12-17 RX ORDER — TAMSULOSIN HYDROCHLORIDE 0.4 MG/1
0.4 CAPSULE ORAL AT BEDTIME
Refills: 0 | Status: DISCONTINUED | OUTPATIENT
Start: 2020-12-17 | End: 2020-12-28

## 2020-12-17 RX ORDER — HYDROXYZINE HCL 10 MG
50 TABLET ORAL EVERY 6 HOURS
Refills: 0 | Status: DISCONTINUED | OUTPATIENT
Start: 2020-12-17 | End: 2020-12-28

## 2020-12-17 RX ORDER — ESCITALOPRAM OXALATE 10 MG/1
20 TABLET, FILM COATED ORAL DAILY
Refills: 0 | Status: DISCONTINUED | OUTPATIENT
Start: 2020-12-17 | End: 2020-12-28

## 2020-12-17 RX ORDER — THIAMINE MONONITRATE (VIT B1) 100 MG
100 TABLET ORAL DAILY
Refills: 0 | Status: DISCONTINUED | OUTPATIENT
Start: 2020-12-17 | End: 2020-12-19

## 2020-12-17 RX ADMIN — MIRTAZAPINE 15 MILLIGRAM(S): 45 TABLET, ORALLY DISINTEGRATING ORAL at 20:52

## 2020-12-17 RX ADMIN — Medication 1 CAPSULE(S): at 07:36

## 2020-12-17 RX ADMIN — DIVALPROEX SODIUM 500 MILLIGRAM(S): 500 TABLET, DELAYED RELEASE ORAL at 20:52

## 2020-12-17 RX ADMIN — GABAPENTIN 300 MILLIGRAM(S): 400 CAPSULE ORAL at 06:31

## 2020-12-17 RX ADMIN — GABAPENTIN 300 MILLIGRAM(S): 400 CAPSULE ORAL at 22:03

## 2020-12-17 RX ADMIN — Medication 2 MILLIGRAM(S): at 16:21

## 2020-12-17 RX ADMIN — Medication 10 MILLIGRAM(S): at 20:53

## 2020-12-17 RX ADMIN — Medication 25 MILLIGRAM(S): at 06:31

## 2020-12-17 RX ADMIN — ATORVASTATIN CALCIUM 20 MILLIGRAM(S): 80 TABLET, FILM COATED ORAL at 20:51

## 2020-12-17 RX ADMIN — TAMSULOSIN HYDROCHLORIDE 0.4 MILLIGRAM(S): 0.4 CAPSULE ORAL at 20:52

## 2020-12-17 NOTE — BH PATIENT PROFILE - FUNCTIONAL SCREEN CURRENT LEVEL: SWALLOWING (IF SCORE 2 OR MORE FOR ANY ITEM, CONSULT REHAB SERVICES), MLM)
Telephone Encounter by Catina Pate RN at 05/30/17 04:17 PM     Author:  Catina Pate RN Service:  (none) Author Type:  Registered Nurse     Filed:  05/30/17 04:18 PM Encounter Date:  5/24/2017 Status:  Signed     :  Catina Pate RN (Registered Nurse)            Patient states she was able to stop at [place] to  her referral.  Patient states she is at her infusion appointment now, referral no longer needs to be faxed.[JL1.1M]      Revision History        User Key Date/Time User Provider Type Action    > JL1.1 05/30/17 04:18 PM Catina Pate, RN Registered Nurse Sign    M - Manual            
Telephone Encounter by Catina Pate RN at 05/30/17 10:20 AM     Author:  Catina Pate RN Service:  (none) Author Type:  Registered Nurse     Filed:  05/30/17 10:24 AM Encounter Date:  5/24/2017 Status:  Signed     :  Catina Pate RN (Registered Nurse)            Patient states that her referral is not visible in [employer] to print and she has her appointment today.  Patient states she is not able to stop in our office to pick it up before her appointment due to time constraint.  Patient requests to have her referral faxed to her office fax.  [facility] message sent to let patient know that we need her to sign a release form to give us permission to fax her referral.  Referral printed and can be faxed to [phone] once the release form is signed and received back by fax.[JL1.1M]      Revision History        User Key Date/Time User Provider Type Action    > JL1.1 05/30/17 10:24 AM Catina Pate RN Registered Nurse Sign    M - Manual            
Telephone Encounter by Eun Gotti RN, BSN at 05/25/17 10:41 AM     Author:  Eun Gotti RN, BSN Service:  (none) Author Type:  Registered Nurse     Filed:  05/25/17 10:46 AM Encounter Date:  5/24/2017 Status:  Signed     :  Eun Gotti RN, BSN (Registered Nurse)            Pt. called and stated her iron infusions are scheduled at [facility].  Pt. stated she is scheduled for infusions on the following dates:  5/30, 6/2, 6/5.  Pt. stated next infusion is due 6/8 and pt. is not able to be off work that day.    Pt. is requesting OK from Dr. Garnett to have next IV on 6/9/17 instead.    Pt. stated above info if OK for 6/9 infusion will then need to be communicated to Infusion Center.    Routed to Dr. Garnett:  Please advise.[NZ1.1M]      Revision History        User Key Date/Time User Provider Type Action    > NZ1.1 05/25/17 10:46 AM Eun Gotti RN, BSN Registered Nurse Sign    M - Manual            
Telephone Encounter by Kendrick Garcia RN at 05/24/17 05:20 PM     Author:  Kendrick Garcia RN Service:  (none) Author Type:  Registered Nurse     Filed:  05/24/17 05:20 PM Encounter Date:  5/24/2017 Status:  Signed     :  Kendrick Garcia RN (Registered Nurse)            Hold for kendrick BELL[MB1.1M]      Revision History        User Key Date/Time User Provider Type Action    > MB1.1 05/24/17 05:20 PM Kendrick Garcia RN Registered Nurse Sign    M - Manual            
Telephone Encounter by Rachell Ventura RN, BSN at 05/25/17 12:06 PM     Author:  Rachell Ventura RN, BSN Service:  (none) Author Type:  Registered Nurse     Filed:  05/25/17 12:15 PM Encounter Date:  5/24/2017 Status:  Signed     :  Rachell Ventura RN, BSN (Registered Nurse)            Patient notified.   Negrita verbalized understanding of information given.[HR1.1T]           Revision History        User Key Date/Time User Provider Type Action    > HR1.1 05/25/17 12:15 PM Rachell Ventura RN, BSN Registered Nurse Sign    T - Template            
Telephone Encounter by Rena Garnett MD at 05/24/17 04:03 PM     Author:  Rena Garnett MD Service:  (none) Author Type:  Physician     Filed:  05/24/17 04:04 PM Encounter Date:  5/24/2017 Status:  Signed     :  Rena Garnett MD (Physician)            Please contact pt for iron infusion instructions. Orders given to RN for this.[RE1.1M]       Revision History        User Key Date/Time User Provider Type Action    > RE1.1 05/24/17 04:04 PM Rena Garnett MD Physician Sign    M - Manual            
Telephone Encounter by Rena Garnett MD at 05/25/17 11:46 AM     Author:  Rena Garnett MD Service:  (none) Author Type:  Physician     Filed:  05/25/17 11:46 AM Encounter Date:  5/24/2017 Status:  Signed     :  Rena Garnett MD (Physician)            Yes ok for infusion 6-9 instead of 6-8[RE1.1M]      Revision History        User Key Date/Time User Provider Type Action    > RE1.1 05/25/17 11:46 AM Rena Garnett MD Physician Sign    M - Manual            
Telephone Encounter by Reva Garcia RN at 05/25/17 09:25 AM     Author:  Reva Garcia RN Service:  (none) Author Type:  Registered Nurse     Filed:  05/25/17 09:33 AM Encounter Date:  5/24/2017 Status:  Signed     :  Reav Garcia RN (Registered Nurse)            Iron infusion form completed by Dr Garnett and it was faxed to infusion center at [facility] along with demographic sheet and labs.[MB1.1M]  Patient notified.[MB1.1T]  Spoke to Brittanie  In the infusion center.  Stat referral entered[MB1.1M]      Revision History        User Key Date/Time User Provider Type Action    > MB1.1 05/25/17 09:33 AM Reva Garcia RN Registered Nurse Sign    M - Manual, T - Template            
0 = swallows foods/liquids without difficulty

## 2020-12-17 NOTE — ED CDU PROVIDER SUBSEQUENT DAY NOTE - PSH
No significant past surgical history    

## 2020-12-17 NOTE — BH PATIENT PROFILE - NSTOBACCOQUITATTEMPT_GEN_A_CORE_SD
[id number] [facility] at [facility]  [phone]    Hematology / Oncology Established Visit    Reason for Visit:   Tania Almendarez is a 42 y.o. male who is seen by synchronous (real-time) audio-video technology on 4/28/2020 for follow up of lymphoma as well as nausea, vomiting. Hematology Oncology Treatment History:     Diagnosis: Follicular lymphoma    Stage: IV    Pathology:   11/13/18 right inguinal LN excision: Follicular lymphoma, high-grade (grade 3a of 3). Comment   The delaney architecture is entirely effaced by atypical lymphocytic proliferation with prominent nodular growth pattern. The majority of the atypical lymphocytes are small to medium in size and have irregular nuclear outlines and inconspicuous nucleoli, morphologically consistent with centrocytes. Scattered large lymphocytes with prominent nucleoli, consistent with centroblasts are identified (> 15 per high-power field). Focal increase in mitotic figures is noted. Occasional follicular dendritic cells are seen. By immunohistochemistry, the atypical lymphocytes are positive for CD20, PAX5, CD10, BCL6 and BCL2 (weak, focal) and negative for MUM1. CD3, CD5 and CD43 stain numerous background T lymphocytes. Ki-67 reveals a high proliferation index in the neoplastic follicles (overall 64-98%). Clinical history indicates 14 cm retroperitoneal mass with bilateral inguinal lymphadenopathy. In summary, the combined morphologic and phenotypic findings are diagnostic of a high-grade follicular lymphoma (grade 3a of 3). There is no evidence of diffuse large B-cell lymphoma. Flow cytometry analysis:   Monoclonal B-cell population (47 % of all cells) with mild increase in side and forward scatter properties expressing CD19, CD20, CD23 and CD10 with surface lambda light chain restriction. No phenotypically aberrant T-cell population. Flow cytometry was performed at [facility]     Prior Treatment:   1. Obinutuzumab-CHOP. Obinutuzumab: 1000 mg weekly on days 1, 8, 15 for cycle 1, then 1000 mg on day 1 q21 days for cycles 2-6, then monotherapy 1000 mg every 21 days for cycle 7, 8 with Cyclophosphamide 750mg/m2, Doxorubicin 50mg/m2, Vincristine 1.4mg/m2 on day 1 and Prednisone 100mg on Days 1-5, every 21 days for a total of 2 cycles completed late 1/2019. Regimen discontinued due to NSTEMI. 2. Obinutuzumab + Bendamustine: 1000 mg Obinutuzumab on day 1 + Bendamustine 90mg/m2 on days 1-2 on a 28-day cycle x 4 cycles, completed 5/2019    Current Treatment: Surveillance      Oncologic History:  He states he was in his usual state of health in early November 2018 when he developed low back pain and presented to the ER. The pain was described as constant, radiating to the buttocks, without exacerbating or alleviating factors, associated w/ increased urination, no n/v/d, no dysuria, no fever, no significant weight loss at first, but he did later report 15-lb loss over 1 month due to low appetite. Work-up at outside hospital revealed a retroperitoneal mass seen on CT imaging, and he was transferred to [facility] for further work-up. CT there showed a large retroperitoneal mass encircling the aorta with invasion of the left renal hilum and left adrenal gland. There were bilateral inguinal lymph nodes and moderate left hydronephrosis. He was evaluated while at [facility] and was noted to have palpable nodes in his groin for approximately the past 1 month. No testicular mass, anorexia, weight loss, fevers, night sweats, chest pain, shortness of breath, abdominal pain or nausea. He underwent excisional LN biopsy of right inguinal LN. He was told that he had likely lymphoma. He was advised to follow up as outpatient for PET scan, bone marrow biopsy. However, patient states he was lost to f/u. He never received any calls or appointment details.  His aunt urged patient to seek care elsewhere and his PCP recommended [facility]. At our first meeting on 12/13/18, he tells me that he was working full time, feeling well until early Nov 2018. When he developed the left lower back pain, he went to [facility] and [address] [place] [address]. At time of diagnosis, he had no cardiac disease aside from HTN, hyperlipidemia. However, he did have an NSTEMI after cycle 2 of O-CHOP. Was likely unrelated to chemotherapy, but opted to switch treatment to Obinutuzumab-Bendamustine. He completed treatment in 5/2019 and had a CR based on PET. History of Present Illness:   Mr. Zapata is a 42 y.o. male with HTN is seen for follow up of Follicular lymphoma (now on surveillance) as well as recent n/v/d as well. He states he's been sick with n/v. He tried taking Zofran, but vomited within 15-20 min of taking. He tried Compazine yesterday - ate dinner last night and went to bed, then vomited this morning. Diarrhea improved now. No fevers. No med changes. No more lightheadedness, but does endorse some vertigo. His chronic back pain is same as before. No new abdominal pain. He has intermittent R ear pain and noted blood when he used a Q-tip. FAMILY HISTORY:  His father passed away from pancreatic cancer and also had a history of leukemia. Past Medical History:   Diagnosis Date    Anxiety     Cancer [facility])     lymphoma Nov 2018 receiving chemo    Chronic pain     lower back- lymphoma    Hyperlipidemia     Hypertension     Lymphadenopathy 11/12/2018      Past Surgical History:   Procedure Laterality Date    HX HEART CATHETERIZATION  02/2019    HX OTHER SURGICAL  02/2019    cardiac stent    IR INJECTION PSEUDOANEURYSM  2/26/2019      Social History     Tobacco Use    Smoking status: Current Every Day Smoker     Packs/day: 0.50     Years: 20.00     Pack years: 10.00    Smokeless tobacco: Never Used   Substance Use Topics    Alcohol use: No     Frequency: Never   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     Family History   Problem Relation Age of Onset    Hypertension Father     Diabetes Father     Diabetes Mother    Father had leukemia. Current Outpatient Medications   Medication Sig    prochlorperazine (Compazine) 10 mg tablet Take 1 Tab by mouth every six (6) hours as needed for Nausea or Vomiting for up to 7 days.  ondansetron hcl (ZOFRAN) 4 mg tablet Take 2 Tabs by mouth every eight (8) hours as needed for Nausea or Vomiting.  metoprolol succinate (TOPROL-XL) 50 mg XL tablet TAKE 1 TABLET BY MOUTH EVERY DAY    atorvastatin (LIPITOR) 40 mg tablet TAKE 1 TAB BY MOUTH NIGHTLY. INDICATIONS: TREATMENT TO SLOW PROGRESSION OF CORONARY ARTERY DISEASE    potassium chloride (K-DUR, KLOR-CON) 20 mEq tablet Take 1 Tab by mouth two (2) times a day.  lisinopriL (PRINIVIL, ZESTRIL) 40 mg tablet Take 1 Tab by mouth daily.  ALPRAZolam (XANAX) 1 mg tablet Take 1/2 tab in the morning, 1/2 tab mid-day, 1 tab in the evening  Indications: anxious    oxyCODONE IR (ROXICODONE) 5 mg immediate release tablet Take 1 Tab by mouth every four (4) hours as needed for Pain for up to 30 days. Max Daily Amount: 30 mg.    naloxone (NARCAN) 4 mg/actuation nasal spray Use 1 spray intranasally, then discard. Repeat with new spray every 2 min as needed for opioid overdose symptoms, alternating nostrils.  escitalopram oxalate (LEXAPRO) 20 mg tablet Take 1 Tab by mouth daily.  clopidogrel (PLAVIX) 75 mg tab 1 Tab by Per NG tube route daily.  CHANTIX 1 mg tablet TAKE 1 TABLET BY MOUTH TWICE A DAY    aspirin delayed-release (ASPIR-81) 81 mg tablet Take 1 Tab by mouth daily. No current facility-administered medications for this visit. No Known Allergies     Review of Systems: A complete review of systems was obtained, negative except as described above. Physical Exam:       Due to this being a TeleHealth evaluation, many elements of the physical examination are unable to be assessed.      Constitutional: Appears well-developed and well-nourished in no apparent distress Mental status: Alert and awake, Oriented to person/place/time, Able to follow commands  Eyes: EOM normal, Sclera normal, No visible ocular discharge  HENT: Normocephalic, atraumatic; Mouth/Throat: Moist mucous membranes, External Ears normal  Neck: No visualized mass  Pulmonary/Chest: Respiratory effort normal, No visualized signs of difficulty breathing or respiratory distress   Musculoskeletal: Normal gait with no signs of ataxia, Normal range of motion of neck  Neurological: No facial asymmetry (Cranial nerve 7 motor function), No gaze palsy  Skin: No significant exanthematous lesions or discoloration noted on facial skin  Psychiatric: Normal affect, normal judgment/insight. No hallucinations       Results:     Lab Results   Component Value Date/Time    WBC 6.8 04/16/2020 11:12 AM    HGB 12.2 04/16/2020 11:12 AM    HCT 35.9 (L) 04/16/2020 11:12 AM    PLATELET 251 71/49/5866 11:12 AM    .8 (H) 04/16/2020 11:12 AM    ABS.  NEUTROPHILS 5.0 04/16/2020 11:12 AM    Hemoglobin (POC) 15.0 06/05/2009 02:13 PM    Hematocrit (POC) 39 02/14/2019 01:24 PM     Lab Results   Component Value Date/Time    Sodium 142 04/16/2020 11:12 AM    Potassium 3.8 04/16/2020 11:12 AM    Chloride 111 (H) 04/16/2020 11:12 AM    CO2 28 04/16/2020 11:12 AM    Glucose 126 (H) 04/16/2020 11:12 AM    BUN 9 04/16/2020 11:12 AM    Creatinine 0.98 04/16/2020 11:12 AM    GFR est AA >60 04/16/2020 11:12 AM    GFR est non-AA >60 04/16/2020 11:12 AM    Calcium 9.1 04/16/2020 11:12 AM    Sodium (POC) 136 02/14/2019 01:24 PM    Potassium (POC) 3.9 02/14/2019 01:24 PM    Chloride (POC) 102 02/14/2019 01:24 PM    Glucose (POC) 249 (H) 02/15/2019 10:21 PM    BUN (POC) 14 02/14/2019 01:24 PM    Creatinine (POC) 0.9 02/14/2019 01:24 PM    Calcium, ionized (POC) 1.24 02/14/2019 01:24 PM     Lab Results   Component Value Date/Time    Bilirubin, total 0.2 04/16/2020 11:12 AM    ALT (SGPT) 23 04/16/2020 11:12 AM    AST (SGOT) 12 (L) 04/16/2020 11:12 AM Alk. phosphatase 118 (H) 2020 11:12 AM    Protein, total 6.2 (L) 2020 11:12 AM    Albumin 3.4 (L) 2020 11:12 AM    Globulin 2.8 2020 11:12 AM     No results found for: IRON, FE, TIBC, IBCT, PSAT, FERR    No results found for: B12LT, FOL, RBCF  Lab Results   Component Value Date/Time    TSH 1.53 2016 04:40 AM     18:     Lab Results   Component Value Date/Time    Hepatitis A, IgM NONREACTIVE 2018 04:53 PM    Hepatitis B surface Ag <0.10 2018 04:53 PM    Hepatitis B core, IgM NONREACTIVE 2018 04:53 PM         Imagin/9/18 Abd/pelvis CT: IMPRESSION:  1. Interval development of a large retroperitoneal mass encircling the aorta with invasion of the left renal hilum and left adrenal gland. Several adjacent lymph nodes are seen extending into the peritoneum and underneath the  diaphragmatic natalie. This most likely represents lymphoma. 2. Several new bilateral enlarged inguinal lymph nodes also likely representing lymphoma. 3. Moderate left hydronephrosis with a delayed renal nephrogram related to decreased renal function. This is related to the invasion of the renal hilum. 18 Chest CT: IMPRESSION:  Trace left pleural effusion. Bilateral lower lobe atelectasis. Large  retroperitoneal mass lesion again demonstrated. PET 18:  FINDINGS:  HEAD/NECK: Right palatine tonsil intense hypermetabolism, max SUV 18. Multilevel  bilateral cervical adenopathy, with max SUV 12 in a left supraclavicular node. Cerebral evaluation is limited by normal intense activity. CHEST: Solitary hypermetabolic left axillary node, max SUV 11. ABDOMEN/PELVIS: Bulky retroperitoneal mass max SUV 27, with several additional  small active abdomino-pelvic nodes. Bilateral inguinal nodes with max SUV 12 on  the left. SKELETON: No foci of abnormal hypermetabolism in the axial and visualized  appendicular skeleton. IMPRESSION:   1.  Right palatine tonsil tumor involvement (Deauville 5). 2. Bilateral cervical delaney involvement (Deauville 5). 3. Left axillary node involvement (Deauville 5). 4. Bulky retroperitoneal lymphoma mass and additional smaller hypermetabolic  abdomino-pelvic nodes (Deauville 5). 5. Bilateral inguinal delaney involvement (Deauville 5). Deauville Five Point Scale  1. No uptake or no residual uptake (when used interim)  2. Slight uptake, but below blood pool (mediastinum)  3. Uptake above mediastinal, but below/equal to uptake in the liver  4. Uptake slightly to moderately higher than liver  5. Markedly increased uptake or any new lesion (on response evaluation)  Each FDG-avid (or previously FDG avid) lesion is rated independently. Reference values:  Mediastinal blood pool: 2.1 SUV  Liver (background): 2.2 SUV    PET/CT 2/05/19:   IMPRESSION:  1. No Foci of Abnormal Hypermetabolism (Deauville 1). 2. Resolved activity in the right palatine tonsil, bilateral cervical nodes,left axillary node, retroperitoneal/abdominal pelvic adenopathy, bilateral inguinal nodes. Echo 2/14/19:  Normal cavity size, wall thickness and systolic function (ejection fraction normal). The muscle mass is normal. The cavity shape is normal. The estimated ejection fraction is 41 - 45%. Abnormal wall motion as described on the wall scoring diagram below. End-systolic volume is normal. Normal left ventricular strain. There is mild (grade 1) left ventricular diastolic dysfunction. Normal left ventricular diastolic pressure. End-diastolic volume is normal.    LE arterial duplex 2/22/19:  There is evidence of left groin pseudoaneurysm noted arising from distal common femoral artery, pseudo lobe measures 2.32cm x 2.58cm and pseudo neck length measuring 0.63cm. There is no evidence of hemodynamically significant left lower extremity arterial obstruction. JACLYN is 1.03 on the right and 1.02 on the left.        LE arterial duplex s/p Thrombin Injection to Pseudoaneurysm 2/26/19:  Successful thrombin injection procedure of the left groin with no further flow seen. No evidence of hemodnyamically significant obstruction in the left lower extremity. Left lower extremity arterial duplex performed. Confirmed pseudoaneurysm in left groin with small neck. Following thrombin injection, no further flow seen in the pseudoaneurysm. The left common femoral, profunda femoral, femoral, popliteal, posterior tibial and anterior arteries were imaged. Mainly triphasic flow was seen with no evidence of significantly elevated velocities. Repeat LE arterial duplex 2/27/19:  Continued thrombosed left groin pseudoaneurysm following thrombin injection on 02/26/2019. No flow or color fill is identified. The hematoma measures approximately 2.1 x 2.9 cm in diameter. The common femoral, deep femoral, femoral, and popliteal arteries are patent with mainly tri-phasic flow and no significant hemodynamically obstruction is noted. Stress 5/31/19:  · Normal stress myocardial perfusion without ischemia or infact at 84% MPHR. Normal LV function. LVEF 60%. · No EKG changes of ischemia at peak exercise. · Normal functional capacity. PET 6/03/19: IMPRESSION: No Foci of Abnormal Hypermetabolism (Deauville 1). -MRI lumbar spine 12/18/19:  Mild disc degenerative change at L3-4 and L4-5. Mild canal stenosis at L3-4 and mild left foraminal stenosis at L4-5. Other less severe degenerative findings are as described above. Continued diminished size of retroperitoneal mass-adenopathy,  with diminished soft tissue density at the left renal hilum. -CT chest/abdomen 12/16/19:  Findings are consistent with interval response to therapy       Assessment & Plan:   Rachell Stallings is a 42 y.o. male comes in for evaluation and management of lymphoma. 1. Follicular lymphoma: Grade 3a.  Although grade 3a disease is considered more indolent and can be treated like grade 1/2 disease, this patient has indications for treatment: bulky disease encircling the aorta causing symptoms. Bone marrow negative for lymphoma, but was hypercellular. BR better than RCHOP, but based on GALLIUM study, Obinutuzumab-based induction and maintenance prolongs PFS over that seen with rituximab-based therapy. Therefore, I have chosen to treat patient with O-CHOP regimen for 6 cycles, followed by possible maintenance Obinutuzumab. We discussed the risks and benefits of O-CHOP chemotherapy. The potential side effects include, but are not limited to: nausea, vomiting, diarrhea, constipation, Flu-like symptoms, infusion reaction, allergic reaction, flushing, taste changes, increased risk of infection, anemia, fatigue, alopecia, neuropathy, nail changes, cardiac damage, mucositis, myleosuppression, infertility and rarely, death. Patient completed chemoteaching and received a chemotherapy education folder. CR after 2 cycles of O-CHOP, but switched regiment Bendamustine-O due to cardiac event. Chemotherapy consent signed and patient educated about side effects including: cytopenias, infections, bleeding, n/v/d, hair loss, skin rash, secondary malignancy, kidney or liver toxicity. Completed a total of 4 cycles of Bendamustine-O, completed 5/2019. PET completed 6/3/19 showed CR. CT 12/19/19 negative for disease. -- Surveillance labs, MD visit every 3 months, and CT imaging q6mo x 2 yrs. -- 6 weeks port flush  -- Repeat CT c/a/p due 6/2020.   -- Follow up in 3 months labs, MD visit. 2. Nausea, vomiting: Presence for past 5 days makes viral gastroenteritis less likely. No fevers. Diarrhea now improved. Zofran did not help. Asked pt to continue Compazine 2-3 times a day, wait 30 min and eat BRAT diet as well as sip on fluids (water, gingerale, gatorade) throughout the day. Recommended [facility], but they would not accept written insurance info - pt lost physical card. Asked pt to call us if not improving in the next 1-2 days.  No new abdominal pain, no change in chronic back pain and therefore I reassured pt that I am not worried about lymphoma recurrence. This may be 2/2 ear infection as well. 3. Otitis media: Augmentin 875/125 bid x 5 days. 4. Chronic lumbar back pain/anxiety: Left lower back pain is chronic and unchanged currently. Signed pain contract on 12/28/18 and following with Dr. Quin Lynn.   -- Oxycodone 5 mg PRN prescribed by Dr. Stephie Boston. -- Ativan 0.5mg bid taking TID  -- Lexapro 20mg daily for anxiety. Emotional well being: Pt is coping well with his/her disease and has excellent support. Met with SW in the past as well as today. I appreciate the opportunity to participate in Mr. Verna Watson .'s care. Total physician time spent on this encounter was 40 minutes. I was in the office while conducting this encounter. Consent:  He and/or his healthcare decision maker is aware that this patient-initiated Telehealth encounter is a billable service, with coverage as determined by his insurance carrier. He is aware that he may receive a bill and has provided verbal consent to proceed: Yes    Pursuant to the emergency declaration under the [address] and the [facility], 1135 waiver authority and the [employer] Act, this Virtual  Visit was conducted, with patient's consent, to reduce the patient's risk of exposure to COVID-19 and provide continuity of care for an established patient. Services were provided through a video synchronous discussion virtually to substitute for in-person clinic visit. This visit was completed virtually using [employer] me.         Signed By: Piter Yanez MD     April 28, 2020 none

## 2020-12-17 NOTE — ED CDU PROVIDER DISPOSITION NOTE - CLINICAL COURSE
pt presenting with worsening suicidality, depression. medicinally cleared and evaluated by  who arranged for voluntary admission to [facility] under dr murillo

## 2020-12-17 NOTE — ED CDU PROVIDER SUBSEQUENT DAY NOTE - MEDICAL DECISION MAKING DETAILS
pt pending  bed placement. stable at this time
pt pending BH placement. stable at this time
pt stable pending psych bed. accepted by [facility] under dr murillo for voluntary admit

## 2020-12-17 NOTE — BH INPATIENT PSYCHIATRY ASSESSMENT NOTE - DETAILS
20 yrs ago reportedly overdosed on Vitamins, 6 years ago took one bottle of aspirin; in 01/2020 Ingested 1 cup bleach. tremor treated with cogentin, Haldol = Spasmodic torticollis

## 2020-12-17 NOTE — BH INPATIENT PSYCHIATRY ASSESSMENT NOTE - HPI (INCLUDE ILLNESS QUALITY, SEVERITY, DURATION, TIMING, CONTEXT, MODIFYING FACTORS, ASSOCIATED SIGNS AND SYMPTOMS)
ED Behavioral Health Assessment from [facility] reviewed. On admission to  pt reports feeling depressed for several weeks with SI and plan to jump off roof of group home. Identifies financial stress as major contributor to depression (says that other patients at his group home harass him for money and that he gave them his stimulus check and now does not have any money). Says that he does not like his group home because other patients there "harass" him but does like the staff. With regards to suicidal thoughts says that he would "never do it" because he is "too scared" and does have hope that things will improve for him in the future. Reports ongoing suicidal thoughts at this time, but denies intent or plan, and agreeable to approach staff for help should suicidal thoughts intensify. Reports poor appetite though did eat lunch upon arrival to . Denies all psychotic symptoms including AH, VH, delusions of surveillance, IOR, and thought manipulation. Says that several weeks ago he was given Invega Sustenna injection but says he no longer wants it. Reports compliant with all PO meds. Denies recent EtOH or other drug use, though says about 1.5 months ago he was drinking heavily. Pt repeatedly asks to terminate interview early due to anxiety and wish for medication. Noted to have tremor of right extremity (and noted to be shaking his legs at times) which he attributes to anxiety. Denies any symptoms of EtOH withdrawal.     As per [facility] ED Assessment:    Patient is a 61 y/o single, disabled,  male,  unemployed, domiciled at [facility] in [place]; with a psychiatric history significant for Paranoid Schizophrenia, alcohol use disorder, discharged from [facility] approx 4 months ago for alcohol dependence, multiple psych admissions and ED visits per month for psychiatric complaints, last d/c  on 11/25/20 after a month long stay for c/o suicidal ideation;    who was BIBA for evaluation of suicidal ideation     Patient seen and evaluated and found to be calm and cooperative. Patient states he "cant stand" his life and is sick of being the nice krishan and wants to jump off the roof. patient states he has been having suicidal thoughts for days and was originally thinking of OD but realized he does not have anything ot OD with so he has been thinking of climbing to the roof and jumping. Patient states his depression is worsening and feels that others take advantage of him and hates where his life has gone. patient reports poor sleep, poor appetite and with suicidal thoughts to jump off th roof. Patient denies any homicidal thoughts and denies any symptoms of maddie or AV Hallucinations.     Attempted to get collateral from group r[facility] but no answer- [phone]

## 2020-12-17 NOTE — BH INPATIENT PSYCHIATRY ASSESSMENT NOTE - OTHER PAST PSYCHIATRIC HISTORY (INCLUDE DETAILS REGARDING ONSET, COURSE OF ILLNESS, INPATIENT/OUTPATIENT TREATMENT)
Multiple prior hospitalizations.  Most recently discharged from [facility] 11/25/20  Past [facility] admission.  [facility]  detox 4 months ago

## 2020-12-17 NOTE — BH INPATIENT PSYCHIATRY ASSESSMENT NOTE - NSBHCHARTREVIEWVS_PSY_A_CORE FT
Vital Signs Last 24 Hrs  T(C): 36.7 (17 Dec 2020 10:29), Max: 36.9 (17 Dec 2020 00:29)  T(F): 98.1 (17 Dec 2020 10:29), Max: 98.4 (17 Dec 2020 00:29)  HR: 71 (17 Dec 2020 10:29) (59 - 71)  BP: 150/93 (17 Dec 2020 10:29) (132/85 - 150/93)  BP(mean): --  RR: 18 (17 Dec 2020 10:29) (18 - 18)  SpO2: 95% (17 Dec 2020 10:29) (95% - 98%)

## 2020-12-17 NOTE — PSYCHIATRIC REHAB INITIAL EVALUATION - NSBHPRRECOMMEND_PSY_ALL_CORE
Writer met with patient in order to orient patient to unit, provide patient with a schedule and introduce patient to psychiatric rehabilitation staff and department functions. A copy of the unit schedule was provided for future reference. Patient will be provided a safety plan. In response to the COVID-19 outbreak, there has been a shift in hospital wide policies and protocols, resulting in unit structure and milieu activities continuously being reassessed, in order to promote patient and staff safety at this time. Patient was verbal, polite and cooperative during the initial interview. Patient presented as a limited historian. Writer and patient were able to establish collaborative psychiatric rehabilitation goal. Writer discussed post discharge resources to support patient's recovery including vocational, educational, and social programs, however patient declined enrollment at this time. Psychiatric rehabilitation staff will continue to engage patient daily in order to develop therapeutic rapport.

## 2020-12-17 NOTE — BH PATIENT PROFILE - NSSBIRTDRGUSEDOTHTHAN_GEN_A_CORE
CONSULTATION REPORT    PRIMARY PHYSICIAN:  Julianna Kan DO     HISTORY OF PRESENT ILLNESS:  46-year-old here for follow-up of celiac sprue. Since being seen at the EGD she states she is try to follow a gluten-free diet and she is been doing well however there are times when she doesn't and then she gets diarrhea and abdominal pain. Otherwise if she does avoid gluten she does well. She did have questions for me lines important to follow the gluten-free diet I told her yes did explain to her why her symptoms get worse when she takes gluten essentially all her questions were answered.    Past Medical History   Diagnosis Date   • Allergic rhinitis    • Anemia    • Breast cancer      left   • Cardiomyopathy      secondary to chemo for breast cancer   • Depressive disorder, not elsewhere classified    • Hernia of abdominal wall    • PENNIE (obstructive sleep apnea)      CPAP   • Palpitations 10/16/2013        ALLERGIES:   Allergen Reactions   • Erythromycin SWELLING   • Macrolides And Ketolides        Current Outpatient Prescriptions   Medication Sig Dispense Refill   • predniSONE (DELTASONE) 20 MG tablet Take 2 tablets by mouth daily. 10 tablet 0   • fluticasone (FLONASE) 50 MCG/ACT nasal spray USE ONE SPRAY IN EACH NOSTRIL DAILY 1 Inhaler 5   • PROAIR  (90 BASE) MCG/ACT inhaler INHALE 2 PUFFS INTO THE LUNGS FOUR TIMES DAILY 8.5 g 5   • lisinopril (ZESTRIL) 5 MG tablet TAKE 1 TABLET BY MOUTH EVERY DAY 90 tablet 0   • carvedilol (COREG) 6.25 MG tablet TAKE 1 TABLET BY MOUTH TWICE DAILY 180 tablet 0   • venlafaxine 150 MG TABLET SR 24 HR TAKE 1 TABLET BY MOUTH DAILY WITH BREAKFAST 90 tablet 3   • gabapentin (NEURONTIN) 300 MG capsule TAKE 1 CAPSULE BY MOUTH DAILY 30 capsule 11   • pantoprazole (PROTONIX) 40 MG tablet Take 1 tablet by mouth daily. 30 tablet 11   • cetirizine (ZYRTEC) 10 MG tablet Take 1 tablet by mouth daily. 30 tablet 3   • multivitamin (THERAGRAN) per tablet Take 1 tablet by mouth daily.       No  current facility-administered medications for this visit.        Family History   Problem Relation Age of Onset   • Mult Sclerosis Mother    • Diabetes Mother    • Stroke Father    • Diabetes Father    • Hypertension Father        REVIEW OF SYSTEMS:  Pertinent positives per History of Present Illness, otherwise unremarkable.    PHYSICAL EXAMINATION:  Visit Vitals   • LMP 12/30/2016 (Exact Date)     HEENT:  Normocephalic, atraumatic head.   PSYCHOLOGIC:  She is oriented to person, place and time.     IMPRESSION:  Celiac disease  Patient doing well on a gluten-free diet I reinforced the importance of staying on a gluten-free diet I also suggested to her that she take a general multivitamin that includes fat-soluble vitamins of ADEK and 80 as often these are not obtained through gluten-free diet and I can lead to things such as clotting issues or osteoporosis.    I told her that she should have regular screening for osteoporosis and that Dr. Kan would likely no those requirements. Given her celiac disease that can be an issue in the future.    She should get regular flu vaccination , and also Pneumovax as there are associations with hypersplenism associated with celiac sprue and poorly functioning spleen    She is at a risk for other autoimmune disorders such as diabetes thyroid dermatitis herpetiformis so if she develops any strange issue she should call otherwise I will see her in about a year.    Thank you very much for allowing me to participate in the care of your patient.    No

## 2020-12-17 NOTE — BH PATIENT PROFILE - NSSBIRTOFTENALCOHOL_GEN_A_CORE
Pt claims to have stopped drinking 1.5 months ago, and prior was a "heavy drinker". Pt shows signs of withdrawal (hand tremor) which he contributes to alcohol withdrawal. Pt admitted to having "three sips of a beer" a week ago./Never

## 2020-12-17 NOTE — BH INPATIENT PSYCHIATRY ASSESSMENT NOTE - NSBHMETABOLIC_PSY_ALL_CORE_FT
BMI: BMI (kg/m2): 25.8 (12-15-20 @ 12:40)  HbA1c: A1C with Estimated Average Glucose Result: 5.7 % (10-27-20 @ 09:09)    Glucose:   BP: --  Lipid Panel: Date/Time: 10-27-20 @ 09:09  Cholesterol, Serum: 159  Direct LDL: --  HDL Cholesterol, Serum: 34  Total Cholesterol/HDL Ration Measurement: --  Triglycerides, Serum: 146

## 2020-12-17 NOTE — BH PATIENT PROFILE - HOME MEDICATIONS
Lizy Sustenna 234 mg/1.5 mL intramuscular suspension, extended release , 234 milligram(s) intramuscularly every 4 weeks   nicotine 21 mg/24 hr transdermal film, extended release , 1 patch transdermal once a day   saliva substitutes oral solution , 15 milliliter(s) orally 5 times a day   metoprolol succinate 25 mg oral tablet, extended release , 1 tab(s) orally once a day  busPIRone 10 mg oral tablet , 1 tab(s) orally 3 times a day  paliperidone 6 mg oral tablet, extended release , 1 tab(s) orally once a day (at bedtime)   paliperidone 3 mg oral tablet, extended release , 1 tab(s) orally once a day (in the morning)  aspirin 81 mg oral delayed release tablet , 1 tab(s) orally once a day  atorvastatin 20 mg oral tablet , 1 tab(s) orally once a day (at bedtime)  mirtazapine 15 mg oral tablet , 1 tab(s) orally once a day (at bedtime)  escitalopram 20 mg oral tablet , 1 tab(s) orally once a day  gabapentin 300 mg oral capsule , 1 cap(s) orally 3 times a day  divalproex sodium 500 mg oral tablet, extended release , 1 tab(s) orally once a day (at bedtime)  tamsulosin 0.4 mg oral capsule , 1 cap(s) orally once a day (at bedtime)  thiamine 100 mg oral tablet , 1 tab(s) orally 3 times a day  Multiple Vitamins oral tablet , 1 tab(s) orally once a day  folic acid 1 mg oral tablet , 1 tab(s) orally once a day

## 2020-12-17 NOTE — BH INPATIENT PSYCHIATRY ASSESSMENT NOTE - NSBHASSESSSUMMFT_PSY_ALL_CORE
Pt presenting with symptoms of depression, including recent SI with plan to jump off roof of group home, in the context of stressors (disliking other residents at group home, financial stress). No evidence of acute psychosis. Pt presents as concrete with intellectual limitations.   Plan:  1. C/w home meds  Pt presenting with symptoms of depression, including recent SI with plan to jump off roof of group home, in the context of stressors (disliking other residents at group home, financial stress). No evidence of acute psychosis. Pt presents as concrete with intellectual limitations, unclear if pt has true psychotic illness.   Plan:  1. C/w home meds Depakote ER 500mg qhs, Lexapro 20mg daily, Buspar 10mg TID, Gabapentin 300mg TID, and Remeron 15mg qhs  2. Pt recently loaded with Invega Sustenna during prior hospitalization, likely due for monthly injection (234mg) now/soon, though pt currently refusing.  3. Called group home several times to verify meds and date of last injection [phone], however no one picked up the phone, will continue to attempt contact  4. Start CIWA protocol with symptom-triggered prn Ativan for possibility of EtOH withdrawal (though this is unlikely, can monitor and likely discontinue soon)

## 2020-12-17 NOTE — BH INPATIENT PSYCHIATRY ASSESSMENT NOTE - CURRENT MEDICATION
MEDICATIONS  (STANDING):  aspirin enteric coated 81 milliGRAM(s) Oral daily  atorvastatin 20 milliGRAM(s) Oral at bedtime  busPIRone 10 milliGRAM(s) Oral three times a day  diVALproex  milliGRAM(s) Oral at bedtime  escitalopram 20 milliGRAM(s) Oral daily  folic acid 1 milliGRAM(s) Oral daily  gabapentin 300 milliGRAM(s) Oral three times a day  metoprolol succinate ER 25 milliGRAM(s) Oral daily  mirtazapine 15 milliGRAM(s) Oral at bedtime  multivitamin 1 Tablet(s) Oral daily  nicotine - 21 mG/24Hr(s) Patch 1 patch Transdermal daily  tamsulosin 0.4 milliGRAM(s) Oral at bedtime  thiamine 100 milliGRAM(s) Oral daily    MEDICATIONS  (PRN):  hydrOXYzine hydrochloride 50 milliGRAM(s) Oral every 6 hours PRN anxiety  LORazepam     Tablet 2 milliGRAM(s) Oral every 2 hours PRN CIWA score increase by 2 points and current CIWA score GREATER THAN 9  LORazepam   Injectable 2 milliGRAM(s) IntraMuscular once PRN severe agitation

## 2020-12-17 NOTE — ED ADULT NURSE REASSESSMENT NOTE - GENERAL PATIENT STATE
comfortable appearance/resting/sleeping
comfortable appearance/cooperative/no change observed/resting/sleeping
resting/sleeping
resting/sleeping/comfortable appearance
comfortable appearance/cooperative/resting/sleeping

## 2020-12-18 PROCEDURE — 90832 PSYTX W PT 30 MINUTES: CPT

## 2020-12-18 PROCEDURE — 99232 SBSQ HOSP IP/OBS MODERATE 35: CPT

## 2020-12-18 RX ORDER — ALBUTEROL 90 UG/1
2 AEROSOL, METERED ORAL EVERY 6 HOURS
Refills: 0 | Status: DISCONTINUED | OUTPATIENT
Start: 2020-12-18 | End: 2020-12-28

## 2020-12-18 RX ADMIN — Medication 25 MILLIGRAM(S): at 09:20

## 2020-12-18 RX ADMIN — Medication 1 PATCH: at 09:20

## 2020-12-18 RX ADMIN — ATORVASTATIN CALCIUM 20 MILLIGRAM(S): 80 TABLET, FILM COATED ORAL at 21:33

## 2020-12-18 RX ADMIN — Medication 10 MILLIGRAM(S): at 13:33

## 2020-12-18 RX ADMIN — Medication 50 MILLIGRAM(S): at 10:53

## 2020-12-18 RX ADMIN — Medication 1 TABLET(S): at 09:20

## 2020-12-18 RX ADMIN — MIRTAZAPINE 15 MILLIGRAM(S): 45 TABLET, ORALLY DISINTEGRATING ORAL at 21:33

## 2020-12-18 RX ADMIN — ESCITALOPRAM OXALATE 20 MILLIGRAM(S): 10 TABLET, FILM COATED ORAL at 09:19

## 2020-12-18 RX ADMIN — GABAPENTIN 300 MILLIGRAM(S): 400 CAPSULE ORAL at 21:33

## 2020-12-18 RX ADMIN — GABAPENTIN 300 MILLIGRAM(S): 400 CAPSULE ORAL at 09:19

## 2020-12-18 RX ADMIN — Medication 81 MILLIGRAM(S): at 09:18

## 2020-12-18 RX ADMIN — Medication 1 MILLIGRAM(S): at 09:19

## 2020-12-18 RX ADMIN — DIVALPROEX SODIUM 500 MILLIGRAM(S): 500 TABLET, DELAYED RELEASE ORAL at 21:33

## 2020-12-18 RX ADMIN — TAMSULOSIN HYDROCHLORIDE 0.4 MILLIGRAM(S): 0.4 CAPSULE ORAL at 21:34

## 2020-12-18 RX ADMIN — Medication 10 MILLIGRAM(S): at 09:18

## 2020-12-18 RX ADMIN — Medication 100 MILLIGRAM(S): at 09:21

## 2020-12-18 RX ADMIN — GABAPENTIN 300 MILLIGRAM(S): 400 CAPSULE ORAL at 13:33

## 2020-12-18 RX ADMIN — Medication 50 MILLIGRAM(S): at 21:37

## 2020-12-18 NOTE — BH PSYCHOLOGY - CLINICIAN PSYCHOTHERAPY NOTE - NSBHPSYCHOLNARRATIVE_PSY_A_CORE FT
Patient was alert, cooperative, and in control. Oriented x3. Dressed in hospital gown, fairly groomed. Maintained intense eye contact. Speech monotone, normal in production, rate, and volume. Exhibited tremor in right hand. Mood "depressed 8 out of 10" with constricted, flat affect. Thought process logical, goal-directed. Thought content relevant to discussion, also exhibited paranoia regarding peers on the unit watching him. Denied auditory/visual hallucinations and suicidal/homicidal ideation, intent, plan, and urges to self-harm. Impulse control intact on unit. Insight and judgment fair.    Session focused on building rapport and identifying factors that contributed to hospitalization.  Patient reported being "Paranoid Schizophrenic," living in a group home, and being bullied by peers demanding his money, cigarettes, possessions, etc. He stated that he felt depressed, hopeless, helpless, and worthless, and experienced suicidal ideation with plan to jump off roof. Patient discussed a long history of mental illness, multiple psychiatric hospitalizations, substance abuse, and treatment non-compliance. He also discussed 3 prior suicide attempts, last "6 months ago when I drank bleach." He also stated he last used marijuana 3 months ago "but it made me more paranoid."  Although he committed to remaining safe on the unit and agreed to inform staff if he experienced urges to hurt himself, he expressed paranoia that peers on unit were watching him and thinking negatively of him. Provided CBT psychoeducation and encouraged patient to engage in unit activities.

## 2020-12-18 NOTE — BH INPATIENT PSYCHIATRY PROGRESS NOTE - NSBHASSESSSUMMFT_PSY_ALL_CORE
Mr. Godoy is a 61 yo M with h/o schizophrenia, depression and alcohol use d/o, multiple hospitalizations (last at  1.5 mo ago), 3 prior suicide attempts, domiciled in [facility], presenting with depression and SI  with ideas of plan to jump off roof of group home, in the context of stressors (disliking other residents at group home, financial stress).     Today patient continues to report sad mood and low appetite but denies passive/active SI. Pt presents as concrete with intellectual limitations, and today is more open about paranoia and delusions re: being haunted by his  father who is putting thoughts in his head. Requires continued inpatient level of care for safety, stabilization and medication adjustments.     Plan:  1. Continue voluntary admission  2. Routine obs appropriate, routine PRNs  3. Psych:   -C/w home meds for now pending collateral from psychiatrist (VM left today): Depakote ER 500mg qhs, Lexapro 20mg daily, Gabapentin 300mg TID, and Remeron 15mg qhs. d/c buspar as per most recent med list  -Pt recently loaded with Invega Sustenna during prior hospitalization,  due for monthly injection (234mg) , though pt currently refusing. Per med list also takes PO paliperidone but due to patient's c/f re: lack of efficacy and insomnia will hold for now pending further collateral. Pt requesting to restart zyprexa so will d/w outpatient psychiatrist  -[facility] protocol with symptom-triggered prn Ativan for possibility of EtOH withdrawal; d/c tomorrow if continue to score 0  4. I/G/M therapy  5. Collateral obtained from group home, pending from psychiatrist  6. Dispo back to group home when stable

## 2020-12-18 NOTE — BH INPATIENT PSYCHIATRY PROGRESS NOTE - NSBHFUPINTERVALHXFT_PSY_A_CORE
Chart reviewed. Case d/w interdisciplinary team. Patient seen and examined for f/u of depression and paranoia. No acute events overnight. No PRNs required or requested. Per staff slept well and was compliant with medications. Calm and in behavioral control. CIWA scores 0.     Patient states he has been feeling sad and has lost interest in hobbies like watching sports and doing number puzzles for about 6 months. Also reports low appetite and difficulties sleeping if he doesn't take his medications. Major stressor is conflict with peers at his residence and financial stress- which makes him feel like "I want to end it all." States he has been experiencing SI since his d/c from [facility] 1.5 months ago. Has thought about jumping off the roof but says he does not have the courage to do so. Denies current passive/active SI/intent plan in the hospital. He wants to find a new medication to help with his depression, a "miracle drug."     Denies AVH today. Does have paranoia that people are watching him while he eats and that other patients are staring at him and talking about him. Fears that his  father is haunting him and putting negative thoughts into his mind. Says Inevga isn't helpful and caused him to stay away for 5 days. Feels zyprexa helped him more with paranoia in the past.     Slept well last night. Denies physical complaints or c/f current medication side effects. Appetite is low but trying to eat. No w/d sxs or alcohol cravings.     Noe Pedraza RN  for [facility] ([phone]). Says pt frequently goes to the hospital for reporting suicidal ideation. Invega was a change to his medication regimen, and after this pt and staff noted he wasn’t feeling well—more tired, more paranoid, sad, irritable.  Confirmed medication list (from 12/15). Last paliperidone injection 234 mg IM on , and also taking paliperidone oral 3 mg/6mg. Also taking ventolin. No longer taking buspar.

## 2020-12-19 PROCEDURE — 99231 SBSQ HOSP IP/OBS SF/LOW 25: CPT

## 2020-12-19 RX ORDER — MIRTAZAPINE 45 MG/1
22.5 TABLET, ORALLY DISINTEGRATING ORAL AT BEDTIME
Refills: 0 | Status: DISCONTINUED | OUTPATIENT
Start: 2020-12-19 | End: 2020-12-22

## 2020-12-19 RX ADMIN — Medication 81 MILLIGRAM(S): at 09:56

## 2020-12-19 RX ADMIN — TAMSULOSIN HYDROCHLORIDE 0.4 MILLIGRAM(S): 0.4 CAPSULE ORAL at 22:40

## 2020-12-19 RX ADMIN — Medication 50 MILLIGRAM(S): at 11:05

## 2020-12-19 RX ADMIN — ATORVASTATIN CALCIUM 20 MILLIGRAM(S): 80 TABLET, FILM COATED ORAL at 22:40

## 2020-12-19 RX ADMIN — GABAPENTIN 300 MILLIGRAM(S): 400 CAPSULE ORAL at 14:17

## 2020-12-19 RX ADMIN — Medication 100 MILLIGRAM(S): at 09:55

## 2020-12-19 RX ADMIN — Medication 1 MILLIGRAM(S): at 09:55

## 2020-12-19 RX ADMIN — Medication 25 MILLIGRAM(S): at 11:05

## 2020-12-19 RX ADMIN — GABAPENTIN 300 MILLIGRAM(S): 400 CAPSULE ORAL at 22:41

## 2020-12-19 RX ADMIN — MIRTAZAPINE 22.5 MILLIGRAM(S): 45 TABLET, ORALLY DISINTEGRATING ORAL at 22:41

## 2020-12-19 RX ADMIN — DIVALPROEX SODIUM 500 MILLIGRAM(S): 500 TABLET, DELAYED RELEASE ORAL at 22:41

## 2020-12-19 RX ADMIN — Medication 1 PATCH: at 09:57

## 2020-12-19 RX ADMIN — Medication 1 TABLET(S): at 09:56

## 2020-12-19 RX ADMIN — ESCITALOPRAM OXALATE 20 MILLIGRAM(S): 10 TABLET, FILM COATED ORAL at 09:57

## 2020-12-19 RX ADMIN — GABAPENTIN 300 MILLIGRAM(S): 400 CAPSULE ORAL at 09:55

## 2020-12-19 NOTE — BH INPATIENT PSYCHIATRY PROGRESS NOTE - NSBHASSESSSUMMFT_PSY_ALL_CORE
Mr. Godoy is a 59 yo M with h/o schizophrenia, depression and alcohol use d/o, multiple hospitalizations (last at  1.5 mo ago), 3 prior suicide attempts, domiciled in [facility], presenting with depression and SI  with ideas of plan to jump off roof of group home, in the context of stressors (disliking other residents at group home, financial stress).     Today patient feeling bored but with good mood. Continues with low appetite but denies passive/active SI and is begging to express desire to return to his group home. Pt presents as concrete with intellectual limitations, and today continues with vague paranoia re: being bothered by others. Requires continued inpatient level of care for safety, stabilization and medication adjustments.     Plan:  1. Continue voluntary admission  2. Routine obs appropriate, routine PRNs  3. Psych:   -C/w home meds for now pending collateral from psychiatrist (VM left today): Depakote ER 500mg qhs, Lexapro 20mg daily, Gabapentin 300mg TID. Due to recent mood, low appetite and inc sleep latency will increase Remeron to 22.5 mg qhs.  -Pt recently loaded with Invega Sustenna during prior hospitalization,  due for monthly injection (234mg) 12/28, though pt currently refusing. Per med list also takes PO paliperidone but due to patient's c/f re: lack of efficacy and insomnia will hold for now pending further collateral. Pt requesting to restart zyprexa so will d/w outpatient psychiatrist  -d/c CIWA protocol as continues to score 0  4. I/G/M therapy  5. Collateral obtained from group home, pending from psychiatrist  6. Dispo back to group home when stable

## 2020-12-19 NOTE — BH INPATIENT PSYCHIATRY PROGRESS NOTE - NSBHFUPINTERVALHXFT_PSY_A_CORE
Chart reviewed. Case d/w interdisciplinary team. Patient seen and examined for f/u of depression, SI, paranoia. No acute events overnight. Pt slept well w/o issue. Compliant with meds, no PRNs. CIWA consistently 0.     Pt says he feels bored and wants to read and do soduku puzzles. Had difficulty falling to sleep last night and is a bit tired today. Other than feeling bored describes mood as pretty good. Appetite remains low but with RN help ate some breakfast this AM. Denies passive/active SI/intent/plan today and is looking forward to returning to his home soon and "having my freedom back." Says he still feels paranoid that someone might bother him if he leaves his room-says he only feels that way on 1S and not at his group home. Has some anxiety and worries but cannot elaborate. Denies physical complaints or c/f medication SE.

## 2020-12-20 PROCEDURE — 99231 SBSQ HOSP IP/OBS SF/LOW 25: CPT

## 2020-12-20 RX ADMIN — TAMSULOSIN HYDROCHLORIDE 0.4 MILLIGRAM(S): 0.4 CAPSULE ORAL at 21:43

## 2020-12-20 RX ADMIN — Medication 1 PATCH: at 10:42

## 2020-12-20 RX ADMIN — DIVALPROEX SODIUM 500 MILLIGRAM(S): 500 TABLET, DELAYED RELEASE ORAL at 21:43

## 2020-12-20 RX ADMIN — GABAPENTIN 300 MILLIGRAM(S): 400 CAPSULE ORAL at 21:43

## 2020-12-20 RX ADMIN — ATORVASTATIN CALCIUM 20 MILLIGRAM(S): 80 TABLET, FILM COATED ORAL at 21:42

## 2020-12-20 RX ADMIN — GABAPENTIN 300 MILLIGRAM(S): 400 CAPSULE ORAL at 10:40

## 2020-12-20 RX ADMIN — Medication 81 MILLIGRAM(S): at 10:42

## 2020-12-20 RX ADMIN — MIRTAZAPINE 22.5 MILLIGRAM(S): 45 TABLET, ORALLY DISINTEGRATING ORAL at 21:16

## 2020-12-20 RX ADMIN — ESCITALOPRAM OXALATE 20 MILLIGRAM(S): 10 TABLET, FILM COATED ORAL at 10:41

## 2020-12-20 RX ADMIN — GABAPENTIN 300 MILLIGRAM(S): 400 CAPSULE ORAL at 15:49

## 2020-12-20 RX ADMIN — Medication 25 MILLIGRAM(S): at 10:41

## 2020-12-20 RX ADMIN — Medication 50 MILLIGRAM(S): at 10:43

## 2020-12-20 NOTE — BH INPATIENT PSYCHIATRY PROGRESS NOTE - NSBHFUPINTERVALHXFT_PSY_A_CORE
Chart reviewed. Case d/w interdisciplinary team. Patient seen and examined for f/u of depression, SI, paranoia. No acute events overnight. Pt slept well w/o issue. Compliant with meds, no PRNs. On unit is isolative and presents as depressed and anxious.     Pt says that he is still depressed and paranoid, but responds "its really hard to describe" when asked to provide more details re: paranoia or s/s of depression. States he still didn't sleep well last night with frequent awakening and has been spending most of his time in bed. Ate better yesterday although appetite is still low. Denies passive/active SI, VI/HI. No physical complaints or c/f medication SE.

## 2020-12-20 NOTE — BH INPATIENT PSYCHIATRY PROGRESS NOTE - NSBHASSESSSUMMFT_PSY_ALL_CORE
Mr. Godoy is a 61 yo M with h/o schizophrenia, depression and alcohol use d/o, multiple hospitalizations (last at  1.5 mo ago), 3 prior suicide attempts, domiciled in [facility], presenting with depression and SI  with ideas of plan to jump off roof of group home, in the context of stressors (disliking other residents at group home, financial stress).     Today patient feeling depressed and paranoid but cannot elaborate. Continues with low appetite but with better PO intake, and denies passive/active SI. Pt presents as concrete with intellectual limitations. Requires continued inpatient level of care for safety, stabilization and medication adjustments.     Plan:  1. Continue voluntary admission  2. Routine obs appropriate, routine PRNs  3. Psych:   -C/w home meds for now pending collateral from psychiatrist (VM left today): Depakote ER 500mg qhs, Lexapro 20mg daily, Gabapentin 300mg TID. Due to recent mood, low appetite and inc sleep latency continue Remeron 22.5 mg qhs.  -Pt recently loaded with Invega Sustenna during prior hospitalization,  due for monthly injection (234mg) 12/28, though pt currently refusing. Per med list also takes PO paliperidone but due to patient's c/f re: lack of efficacy and insomnia will hold for now pending further collateral. Pt requesting to restart zyprexa so will d/w outpatient psychiatrist  -psychoed re: AMELIA provided  4. I/G/M therapy  5. Collateral obtained from group home, pending from psychiatrist  6. Dispo back to group home when stable

## 2020-12-21 PROCEDURE — 99232 SBSQ HOSP IP/OBS MODERATE 35: CPT

## 2020-12-21 RX ADMIN — Medication 1 PATCH: at 08:54

## 2020-12-21 RX ADMIN — TAMSULOSIN HYDROCHLORIDE 0.4 MILLIGRAM(S): 0.4 CAPSULE ORAL at 20:11

## 2020-12-21 RX ADMIN — GABAPENTIN 300 MILLIGRAM(S): 400 CAPSULE ORAL at 20:11

## 2020-12-21 RX ADMIN — Medication 81 MILLIGRAM(S): at 08:34

## 2020-12-21 RX ADMIN — GABAPENTIN 300 MILLIGRAM(S): 400 CAPSULE ORAL at 08:53

## 2020-12-21 RX ADMIN — DIVALPROEX SODIUM 500 MILLIGRAM(S): 500 TABLET, DELAYED RELEASE ORAL at 20:11

## 2020-12-21 RX ADMIN — MIRTAZAPINE 22.5 MILLIGRAM(S): 45 TABLET, ORALLY DISINTEGRATING ORAL at 20:10

## 2020-12-21 RX ADMIN — Medication 1 PATCH: at 13:00

## 2020-12-21 RX ADMIN — ATORVASTATIN CALCIUM 20 MILLIGRAM(S): 80 TABLET, FILM COATED ORAL at 20:11

## 2020-12-21 RX ADMIN — GABAPENTIN 300 MILLIGRAM(S): 400 CAPSULE ORAL at 13:18

## 2020-12-21 RX ADMIN — Medication 50 MILLIGRAM(S): at 13:17

## 2020-12-21 RX ADMIN — ESCITALOPRAM OXALATE 20 MILLIGRAM(S): 10 TABLET, FILM COATED ORAL at 08:34

## 2020-12-21 RX ADMIN — Medication 25 MILLIGRAM(S): at 08:53

## 2020-12-21 NOTE — BH INPATIENT PSYCHIATRY PROGRESS NOTE - NSBHCONSDANGERSELF_PSY_A_CORE
suicidal ideation with plan and means
suicidal ideation with plan and means
unable to care for self
suicidal ideation with plan and means

## 2020-12-21 NOTE — BH INPATIENT PSYCHIATRY PROGRESS NOTE - NSBHADMITIPREASONDETAILS_PSY_A_CORE FT
Patient refused to engage in interview this afternoon, appears paranoid. Continue current medications.

## 2020-12-21 NOTE — BH INPATIENT PSYCHIATRY PROGRESS NOTE - NSBHFUPINTERVALHXFT_PSY_A_CORE
Chart reviewed and case discussed with treatment team.  No events reported over the weekend. Sleep and appetite is fair.  Patient was sitting in the holland during interview and did not want to engage in interview at all. Patient stated he doesn't want to talk to anyone and that "I don't feel right". When asked why he doesn't feel right, patient just put his head down and was staring, refused to further engage. Attempted to speak with patient again this afternoon and patient remains uncooperative with interview. Patient is compliant with medications, no adverse effects reported.

## 2020-12-22 PROCEDURE — 99232 SBSQ HOSP IP/OBS MODERATE 35: CPT

## 2020-12-22 RX ORDER — OLANZAPINE 15 MG/1
5 TABLET, FILM COATED ORAL AT BEDTIME
Refills: 0 | Status: DISCONTINUED | OUTPATIENT
Start: 2020-12-22 | End: 2020-12-28

## 2020-12-22 RX ORDER — MIRTAZAPINE 45 MG/1
30 TABLET, ORALLY DISINTEGRATING ORAL AT BEDTIME
Refills: 0 | Status: DISCONTINUED | OUTPATIENT
Start: 2020-12-22 | End: 2020-12-28

## 2020-12-22 RX ADMIN — GABAPENTIN 300 MILLIGRAM(S): 400 CAPSULE ORAL at 20:18

## 2020-12-22 RX ADMIN — TAMSULOSIN HYDROCHLORIDE 0.4 MILLIGRAM(S): 0.4 CAPSULE ORAL at 20:17

## 2020-12-22 RX ADMIN — GABAPENTIN 300 MILLIGRAM(S): 400 CAPSULE ORAL at 10:00

## 2020-12-22 RX ADMIN — Medication 1 PATCH: at 10:00

## 2020-12-22 RX ADMIN — Medication 25 MILLIGRAM(S): at 10:00

## 2020-12-22 RX ADMIN — Medication 50 MILLIGRAM(S): at 20:25

## 2020-12-22 RX ADMIN — GABAPENTIN 300 MILLIGRAM(S): 400 CAPSULE ORAL at 13:47

## 2020-12-22 RX ADMIN — ESCITALOPRAM OXALATE 20 MILLIGRAM(S): 10 TABLET, FILM COATED ORAL at 10:00

## 2020-12-22 RX ADMIN — OLANZAPINE 5 MILLIGRAM(S): 15 TABLET, FILM COATED ORAL at 20:18

## 2020-12-22 RX ADMIN — ATORVASTATIN CALCIUM 20 MILLIGRAM(S): 80 TABLET, FILM COATED ORAL at 20:18

## 2020-12-22 RX ADMIN — Medication 50 MILLIGRAM(S): at 10:08

## 2020-12-22 RX ADMIN — Medication 81 MILLIGRAM(S): at 10:00

## 2020-12-22 RX ADMIN — MIRTAZAPINE 30 MILLIGRAM(S): 45 TABLET, ORALLY DISINTEGRATING ORAL at 20:18

## 2020-12-22 NOTE — CHART NOTE - NSCHARTNOTEFT_GEN_A_CORE
Asked to evaluate rash on face. rash consistent with seborrheic dermatitis.  Clotrimazole 1% cream twice a day to affected areas is the recommended treatment that is on the formulary at [facility]. The patient says the rash is not bothering him and he doesn't want treatment.
Screening Medical Evaluation  Patient Admitted from: [facility] admitting diagnosis: Recurrent major depressive disorder    PAST MEDICAL & SURGICAL HISTORY:  Elevated liver enzymes    Paranoid schizophrenia    Hyperlipidemia    BPH (benign prostatic hyperplasia)    GERD (gastroesophageal reflux disease)    HTN (hypertension)    Psychosis    Mood disorder    Anxiety    No significant past surgical history          Allergies    haloperidol (Other)    Intolerances        Social History:     FAMILY HISTORY:  No pertinent family history in first degree relatives        MEDICATIONS  (STANDING):  aspirin enteric coated 81 milliGRAM(s) Oral daily  atorvastatin 20 milliGRAM(s) Oral at bedtime  busPIRone 10 milliGRAM(s) Oral three times a day  diVALproex  milliGRAM(s) Oral at bedtime  escitalopram 20 milliGRAM(s) Oral daily  folic acid 1 milliGRAM(s) Oral daily  gabapentin 300 milliGRAM(s) Oral three times a day  metoprolol succinate ER 25 milliGRAM(s) Oral daily  mirtazapine 15 milliGRAM(s) Oral at bedtime  multivitamin 1 Tablet(s) Oral daily  nicotine - 21 mG/24Hr(s) Patch 1 patch Transdermal daily  tamsulosin 0.4 milliGRAM(s) Oral at bedtime  thiamine 100 milliGRAM(s) Oral daily    MEDICATIONS  (PRN):  hydrOXYzine hydrochloride 50 milliGRAM(s) Oral every 6 hours PRN anxiety  LORazepam     Tablet 2 milliGRAM(s) Oral every 2 hours PRN CIWA score increase by 2 points and current CIWA score GREATER THAN 9  LORazepam   Injectable 2 milliGRAM(s) IntraMuscular once PRN severe agitation      Vital Signs Last 24 Hrs  T(C): 36.4 (17 Dec 2020 21:16), Max: 36.9 (17 Dec 2020 00:29)  T(F): 97.5 (17 Dec 2020 21:16), Max: 98.4 (17 Dec 2020 00:29)  HR: 71 (17 Dec 2020 10:29) (61 - 71)  BP: 118/50 (17 Dec 2020 21:16) (118/50 - 150/93)  BP(mean): 80 (17 Dec 2020 21:16) (80 - 80)  RR: 18 (17 Dec 2020 10:29) (18 - 18)  SpO2: 95% (17 Dec 2020 10:29) (95% - 97%)  CAPILLARY BLOOD GLUCOSE            PHYSICAL EXAM:  GENERAL: NAD, well-developed  HEAD:  Atraumatic, Normocephalic  EYES: EOMI, PERRLA, conjunctiva and sclera clear  NECK: Supple, No JVD  CHEST/LUNG: Clear to auscultation bilaterally; No wheeze  HEART: Regular rate and rhythm; No murmurs, rubs, or gallops  ABDOMEN: Soft, Nontender, Nondistended; Bowel sounds present  EXTREMITIES:  2+ Peripheral Pulses, No clubbing, cyanosis, or edema  PSYCH: AAOx3  NEUROLOGY: non-focal  SKIN: No rashes or lesions    LABS:                    RADIOLOGY & ADDITIONAL TESTS:    Assessment and Plan: 61 yo M with PMH of HTN, HLD, CAD, and BPH is admitted to [facility] with a primary psychiatric diagnosis of Recurrent major depressive disorder. The pt currently denies having any medical complaints such as chest pain, sob, abdominal pain, n/v/d/c, or any problems with urination or bowel movements. The rest of his screening physical is unremarkable.    1.Recurrent major depressive disorder-Plan: continue with meds as per primary psychiatric team  2. CAD-Plan: continue with aspirin  3. HTN-Plan: continue with metoprolol  4. HLD-Plan: continue with atorvastatin  5. BPH-Plan: continue with tamsulosin

## 2020-12-22 NOTE — BH INPATIENT PSYCHIATRY PROGRESS NOTE - NSBHASSESSSUMMFT_PSY_ALL_CORE
Pt remains depressed though now denying SI/I/P. Continues to report vague paranoia though without any evidence of acute psychosis or fully-formed delusions.  Pt remains depressed though now denying SI/I/P. Continues to report vague paranoia though without any evidence of acute psychosis or fully-formed delusions. Pt presents as concrete with intellectual limitations, likely contributory to current clinical picture.   Plan:  1. Titrate Remeron to 30mg qhs  2. C/w Lexapro 20mg daily  3. D/c Depakote 500mg qhs given subtherapeutic dosing, unclear indication for this med, and goal to minimize polypharmacy   4. C/w Gabapentin 300mg TID  5. Pt due for monthly Invega Sustenna 234mg on 12/28/20, however pt currently refusing. Pt prefers Zyprexa, will start Zyprexa 5mg qhs.  Pt remains depressed and very isolative though now denying SI/I/P. Continues to report vague paranoia though without any evidence of acute psychosis or fully-formed delusions. Pt presents as concrete with intellectual limitations, likely contributory to current clinical picture.   Plan:  1. Titrate Remeron to 30mg qhs  2. C/w Lexapro 20mg daily  3. D/c Depakote 500mg qhs given subtherapeutic dosing, unclear indication for this med, and goal to minimize polypharmacy   4. C/w Gabapentin 300mg TID  5. Pt due for monthly Invega Sustenna 234mg on 12/28/20, however pt currently refusing (pt also previously taking PO Invega in addition to MURPHY, though for unclear reasons). Pt prefers Zyprexa, will start Zyprexa 5mg qhs.

## 2020-12-22 NOTE — BH INPATIENT PSYCHIATRY PROGRESS NOTE - NSBHFUPINTERVALHXFT_PSY_A_CORE
No acute events overnight. Pt remains compliant with meds and continuing to sleep well. This morning pt minimally engaged in interview, says he is feeling "too overwhelmed." Reports ongoing depressed mood. Denies SI/I/P. Reports poor appetite but has been eating his meals. Reports feeling "paranoid" but says he cannot get into and unable to describe such paranoia further. Denies delusions of surveillance, thought manipulation, AH, and VH. Denies feeling unsafe and worried that someone may harm him. Reports wish for more medications, including Zyprexa, to make him feel better.  No acute events overnight. Pt remains compliant with meds. This morning pt minimally engaged in interview, says he is feeling "too overwhelmed." Reports ongoing depressed mood. Denies SI/I/P. Reports poor sleep and appetite. Remains very isolative (in his bedroom all day laying in bed). Reports feeling "paranoid" but says he cannot get into and unable to describe such paranoia further. Denies delusions of surveillance, thought manipulation, AH, and VH. Denies feeling unsafe and worried that someone may harm him. Reports wish for more medications, including Zyprexa, to make him feel better.

## 2020-12-23 PROCEDURE — 99232 SBSQ HOSP IP/OBS MODERATE 35: CPT

## 2020-12-23 RX ADMIN — MIRTAZAPINE 30 MILLIGRAM(S): 45 TABLET, ORALLY DISINTEGRATING ORAL at 20:33

## 2020-12-23 RX ADMIN — Medication 50 MILLIGRAM(S): at 20:34

## 2020-12-23 RX ADMIN — Medication 50 MILLIGRAM(S): at 08:27

## 2020-12-23 RX ADMIN — Medication 1 PATCH: at 08:27

## 2020-12-23 RX ADMIN — OLANZAPINE 5 MILLIGRAM(S): 15 TABLET, FILM COATED ORAL at 20:33

## 2020-12-23 RX ADMIN — Medication 81 MILLIGRAM(S): at 08:28

## 2020-12-23 RX ADMIN — GABAPENTIN 300 MILLIGRAM(S): 400 CAPSULE ORAL at 08:28

## 2020-12-23 RX ADMIN — ESCITALOPRAM OXALATE 20 MILLIGRAM(S): 10 TABLET, FILM COATED ORAL at 08:28

## 2020-12-23 RX ADMIN — ATORVASTATIN CALCIUM 20 MILLIGRAM(S): 80 TABLET, FILM COATED ORAL at 20:33

## 2020-12-23 RX ADMIN — TAMSULOSIN HYDROCHLORIDE 0.4 MILLIGRAM(S): 0.4 CAPSULE ORAL at 20:33

## 2020-12-23 RX ADMIN — Medication 25 MILLIGRAM(S): at 08:28

## 2020-12-23 RX ADMIN — GABAPENTIN 300 MILLIGRAM(S): 400 CAPSULE ORAL at 12:14

## 2020-12-23 RX ADMIN — GABAPENTIN 300 MILLIGRAM(S): 400 CAPSULE ORAL at 20:33

## 2020-12-23 NOTE — BH INPATIENT PSYCHIATRY PROGRESS NOTE - NSBHFUPINTERVALHXFT_PSY_A_CORE
No acute events overnight. Pt remains compliant with meds. This morning pt minimally engaged in interview, staying in bed, answering questions with minimal amount of words. Reports ongoing depressed mood. Denies SI/I/P. Reports poor sleep and appetite despite medication changes. When asked why staff reports he slept, pt stated he was "only resting his eyes." Pt encouraged to tell staff that he is not sleeping during routine checks. Remains very isolative (in his bedroom all day laying in bed). Pt encouraged to come out of his room and pt stated, "I'll try." Reports feeling "paranoid" about the people on the unit but says he is unable to describe paranoia further than this. Denies delusions of surveillance, thought manipulation, AH, and VH. Denies feeling unsafe and worried that someone may harm him. Pt agrees to come to staff with any safety concerns.

## 2020-12-23 NOTE — BH INPATIENT PSYCHIATRY PROGRESS NOTE - NSBHASSESSSUMMFT_PSY_ALL_CORE
Pt remains depressed and very isolative though now denying SI/I/P. Continues to report vague paranoia though without any evidence of acute psychosis or fully-formed delusions. Pt presents as concrete with intellectual limitations, likely contributory to current clinical picture.   Continue with Plan:  1. Titrate Remeron to 30mg qhs  2. C/w Lexapro 20mg daily  3. D/c Depakote 500mg qhs given subtherapeutic dosing, unclear indication for this med, and goal to minimize polypharmacy   4. C/w Gabapentin 300mg TID  5. Pt due for monthly Invega Sustenna 234mg on 12/28/20, however pt currently refusing (pt also previously taking PO Invega in addition to MURPHY, though for unclear reasons). Pt prefers Zyprexa, will start Zyprexa 5mg qhs.

## 2020-12-24 PROCEDURE — 99232 SBSQ HOSP IP/OBS MODERATE 35: CPT

## 2020-12-24 RX ADMIN — Medication 81 MILLIGRAM(S): at 09:16

## 2020-12-24 RX ADMIN — OLANZAPINE 5 MILLIGRAM(S): 15 TABLET, FILM COATED ORAL at 20:34

## 2020-12-24 RX ADMIN — GABAPENTIN 300 MILLIGRAM(S): 400 CAPSULE ORAL at 20:35

## 2020-12-24 RX ADMIN — Medication 1 PATCH: at 21:11

## 2020-12-24 RX ADMIN — GABAPENTIN 300 MILLIGRAM(S): 400 CAPSULE ORAL at 13:05

## 2020-12-24 RX ADMIN — MIRTAZAPINE 30 MILLIGRAM(S): 45 TABLET, ORALLY DISINTEGRATING ORAL at 20:35

## 2020-12-24 RX ADMIN — Medication 25 MILLIGRAM(S): at 09:16

## 2020-12-24 RX ADMIN — ESCITALOPRAM OXALATE 20 MILLIGRAM(S): 10 TABLET, FILM COATED ORAL at 08:35

## 2020-12-24 RX ADMIN — Medication 1 PATCH: at 08:35

## 2020-12-24 RX ADMIN — TAMSULOSIN HYDROCHLORIDE 0.4 MILLIGRAM(S): 0.4 CAPSULE ORAL at 20:34

## 2020-12-24 RX ADMIN — Medication 50 MILLIGRAM(S): at 18:26

## 2020-12-24 RX ADMIN — ATORVASTATIN CALCIUM 20 MILLIGRAM(S): 80 TABLET, FILM COATED ORAL at 20:34

## 2020-12-24 RX ADMIN — Medication 50 MILLIGRAM(S): at 08:38

## 2020-12-24 RX ADMIN — GABAPENTIN 300 MILLIGRAM(S): 400 CAPSULE ORAL at 09:16

## 2020-12-24 NOTE — BH DISCHARGE NOTE NURSING/SOCIAL WORK/PSYCH REHAB - NSBHDCAGENCY1FT_PSY_A_CORE
clinic at [place]  With nurse practitioner [facility] [place]  With  Psychiatric nurse practitioner Nguyen Arteaga.  Pt will be seen by the date reported below.

## 2020-12-24 NOTE — BH TREATMENT PLAN - NSTXPSYCHOINTERRN_PSY_ALL_CORE
encourage pt to adhere to recommended treatment plan to maintain continuity of care during and post discharge.
encourage pt to adhere to recommended treatment plan to maintain continuity of care during and post discharge.

## 2020-12-24 NOTE — BH TREATMENT PLAN - NSTXCAREGIVERPARTICIPATE_PSY_P_CORE
No, patient unwilling to involve family/caregiver
No, patient unwilling to involve family/caregiver
Family/Caregiver participated in identification of needs/problems/goals for treatment/Family/Caregiver participated in defining interventions/Family/Caregiver participated in development of after care plan

## 2020-12-24 NOTE — BH TREATMENT PLAN - NSTXDEPRESGOAL_PSY_ALL_CORE
Report using a coping skill to overcome sadness and worry in order to socialize with peers daily
Report using a coping skill to overcome sadness and worry in order to socialize with peers daily
Will identify 2 coping skills that assist in improving mood

## 2020-12-24 NOTE — BH TREATMENT PLAN - NSTXPSYCHOINTERMD_PSY_ALL_CORE
Optimize antipsychotic medication, psychoeducation

## 2020-12-24 NOTE — BH TREATMENT PLAN - NSCMSPTSTRENGTHS_PSY_ALL_CORE
[address]  [phone]                     DISCHARGE INSTRUCTIONS    Marry Mark  [id number]  1979    DISCOMFORT:  Sore throat- throat lozenges or warm salt water gargle  redness at IV site- apply warm compress to area; if redness or soreness persist- contact your physician  Gaseous discomfort- walking, belching will help relieve any discomfort  You may not operate a vehicle for 12 hours  You may not engage in an occupation involving machinery or appliances for rest of today  You may not drink alcoholic beverages for at least 12 hours  Avoid making any critical decisions for at least 24 hour  DIET  You may eat and drink after you leave. You may resume your regular diet - however -  remember your colon is empty and a heavy meal will produce gas. Avoid these foods:  vegetables, fried / greasy foods, carbonated drinks    ACTIVITY  You may resume your normal daily activities   Spend the remainder of the day resting -  avoid any strenuous activity. CALL M.D. ANY SIGN OF   Increasing pain, nausea, vomiting  Abdominal distension (swelling)  New increased bleeding (oral or rectal)  Fever (chills)  Pain in chest area  Bloody discharge from nose or mouth  Shortness of breath    Follow-up Instructions:   Call Dr. Reginald Saha for any questions or problems. If we took a biopsy please call the office within 2 weeks to discuss your                             pathology results. Telephone # [phone]        Continue same medications.
Compliance to treatment/Expressive of emotions
Compliance to treatment/Expressive of emotions/Supportive family/Tolerant
Compliance to treatment/Expressive of emotions/Supportive family/Tolerant

## 2020-12-24 NOTE — BH INPATIENT PSYCHIATRY PROGRESS NOTE - NSBHFUPINTERVALHXFT_PSY_A_CORE
No acute events overnight. Pt remains compliant with meds. This morning pt more engaged in interview, staying in bed, requesting an early discharge despite stating he feels "no better." Reports ongoing depressed mood. Denies SI/I/P. Reports poor sleep and appetite despite medication changes. Remains very isolative (in his bedroom all day laying in bed). Pt encouraged to come out of his room and pt became visibly irritated by the request. Reports feeling "paranoid" about the people on the unit but is vague. Denies delusions of surveillance, thought manipulation, AH, and VH. Denies feeling unsafe and worried that someone may harm him. Pt agrees to come to staff with any safety concerns.

## 2020-12-24 NOTE — BH TREATMENT PLAN - NSTXDEPRESINTERPR_PSY_ALL_CORE
Patient may benefit from attending daily symptom management groups and meeting with psychiatric rehabilitation staff individually in order to demonstrate daily medication compliance and identify 2 effective coping skills to manage symptoms over seven days.

## 2020-12-24 NOTE — BH TREATMENT PLAN - NSTXDCOPLKGOAL_PSY_ALL_CORE
Will agree to consider an appropriate level of outpatient care

## 2020-12-24 NOTE — BH DISCHARGE NOTE NURSING/SOCIAL WORK/PSYCH REHAB - PATIENT PORTAL LINK FT
You can access the FollowMyHealth Patient Portal offered by [facility] by registering at the following website: [email or web address]. By joining [employer]’s FollowMyHealth portal, you will also be able to view your health information using other applications (apps) compatible with our system.

## 2020-12-24 NOTE — BH TREATMENT PLAN - NSTXCAREGIVERAGREEMENT_PSY_P_CORE
Patient does not have family/caregiver involved in care
Yes
Patient does not have family/caregiver involved in care

## 2020-12-24 NOTE — BH DISCHARGE NOTE NURSING/SOCIAL WORK/PSYCH REHAB - NSDCPRRECOMMEND_PSY_ALL_CORE
Psychiatric Rehabilitation staff recommends that patient continues outpatient psychiatric treatment for ongoing medication management, support, and psychotherapy. In addition to, continuing exploring and utilizing coping skills for improved symptom management and sustained recovery.

## 2020-12-24 NOTE — BH TREATMENT PLAN - NSTXDEPRESINTERMD_PSY_ALL_CORE
continue medications and consider dose increase/adjustment

## 2020-12-24 NOTE — BH INPATIENT PSYCHIATRY PROGRESS NOTE - NSBHASSESSSUMMFT_PSY_ALL_CORE
Pt remains depressed and very isolative though now denying SI/I/P. Continues to report vague paranoia though without any evidence of acute psychosis or fully-formed delusions. Pt presents as concrete with intellectual limitations, likely contributory to current clinical picture.   Continue with Plan:  1. Titrate Remeron to 30mg qhs  2. C/w Lexapro 20mg daily  3. D/c Depakote 500mg qhs given subtherapeutic dosing, unclear indication for this med, and goal to minimize polypharmacy   4. C/w Gabapentin 300mg TID  5. Pt due for monthly Invega Sustenna 234mg on 12/28/20, however pt currently refusing (pt also previously taking PO Invega in addition to MURPHY, though for unclear reasons). Pt prefers Zyprexa, will start Zyprexa 5mg qhs.   6. Projected discharge 12/28/20

## 2020-12-24 NOTE — BH TREATMENT PLAN - NSTXPSYCHOGOAL_PSY_ALL_CORE
Will verbalize 1 strategy to successfully cope with psychotic symptoms

## 2020-12-24 NOTE — BH TREATMENT PLAN - NSTXDCOPLKINTERSW_PSY_ALL_CORE
[facility] will actively seek appropriate aftercare for this patient that includes psychotherapy and substance misuse treatment.

## 2020-12-24 NOTE — BH DISCHARGE NOTE NURSING/SOCIAL WORK/PSYCH REHAB - NSDCPRGOAL_PSY_ALL_CORE
Pt has demonstrated progress towards psychiatric rehabilitation goals during the current hospitalization. Pt was able to identify puzzles, going for a walk and socializing as healthy coping strategies to better manage symptoms. Pt has been compliant with medications and is tolerating them well. Pt endorses improvement in mood, sleep, appetite, and hopefulness for the future. Pt denies any current suicidal ideation, intent, or plan. Pt has been increasingly receptive to group activities throughout hospitalization. Pt was engaged in groups and participated appropriately. Pt was visible on the unit and demonstrated an increase in positive socialization with select peers. Pt was provided with a Press Ganey survey to complete prior to discharge.

## 2020-12-24 NOTE — BH TREATMENT PLAN - NSTXDEPRESINTERSW_PSY_ALL_CORE
SWI will encourage patient to engage in groups, treatment plan and continue psychoeducation.

## 2020-12-24 NOTE — BH DISCHARGE NOTE NURSING/SOCIAL WORK/PSYCH REHAB - NSCDUDCCRISIS_PSY_A_CORE
[facility] Well  1 [phone]) [facility]-WELL ([phone])  Text "WELL" to 61019  Website: www.[employer]/.[facility] 1 (817) 681-YKPH (0957) Website: www.safehorizon.org/.National Suicide Prevention Lifeline 7 (692) 981-1903/.  [facility]  1 (385) LIF[facility] (219-0202)/.  [facility]  [address] [place] [address] [phone]   Hours:  Monday through Friday from 9 AM to 3 PM [facility] Well  1 (633) [facility]-WELL ([phone])  Text "WELL" to 04822  Website: www.[employer]/.[facility]iz[facility] 1 (076) 381-UYLH (2840) Website: www.safehorizon.org/.National Suicide Prevention Lifeline 4 (495) 326-0501/.  [facility]  1 (085) LIFENET (772-7523)/.  [facility]’s Behavioral [facility] Crisis [facility]  [address] [place] [address] [phone]   Hours:  Monday through Friday from 9 AM to 3 PM/.  [employer]  [employer] [facility] Crisis Line  0 [phone] [phone], Option 1

## 2020-12-24 NOTE — BH TREATMENT PLAN - NSTXPROBDCOPLK_PSY_ALL_CORE
DISCHARGE ISSUE - LACK OF APPROPRIATE OUTPATIENT SERVICES

## 2020-12-25 RX ORDER — LANOLIN ALCOHOL/MO/W.PET/CERES
3 CREAM (GRAM) TOPICAL AT BEDTIME
Refills: 0 | Status: DISCONTINUED | OUTPATIENT
Start: 2020-12-25 | End: 2020-12-28

## 2020-12-25 RX ORDER — DIPHENHYDRAMINE HCL 50 MG
50 CAPSULE ORAL ONCE
Refills: 0 | Status: COMPLETED | OUTPATIENT
Start: 2020-12-25 | End: 2020-12-25

## 2020-12-25 RX ADMIN — GABAPENTIN 300 MILLIGRAM(S): 400 CAPSULE ORAL at 20:29

## 2020-12-25 RX ADMIN — Medication 25 MILLIGRAM(S): at 09:45

## 2020-12-25 RX ADMIN — OLANZAPINE 5 MILLIGRAM(S): 15 TABLET, FILM COATED ORAL at 20:29

## 2020-12-25 RX ADMIN — GABAPENTIN 300 MILLIGRAM(S): 400 CAPSULE ORAL at 09:45

## 2020-12-25 RX ADMIN — Medication 50 MILLIGRAM(S): at 17:57

## 2020-12-25 RX ADMIN — ESCITALOPRAM OXALATE 20 MILLIGRAM(S): 10 TABLET, FILM COATED ORAL at 09:45

## 2020-12-25 RX ADMIN — Medication 50 MILLIGRAM(S): at 09:48

## 2020-12-25 RX ADMIN — ATORVASTATIN CALCIUM 20 MILLIGRAM(S): 80 TABLET, FILM COATED ORAL at 20:30

## 2020-12-25 RX ADMIN — Medication 1 PATCH: at 20:03

## 2020-12-25 RX ADMIN — TAMSULOSIN HYDROCHLORIDE 0.4 MILLIGRAM(S): 0.4 CAPSULE ORAL at 20:29

## 2020-12-25 RX ADMIN — MIRTAZAPINE 30 MILLIGRAM(S): 45 TABLET, ORALLY DISINTEGRATING ORAL at 20:30

## 2020-12-25 RX ADMIN — Medication 1 PATCH: at 09:45

## 2020-12-25 RX ADMIN — GABAPENTIN 300 MILLIGRAM(S): 400 CAPSULE ORAL at 13:32

## 2020-12-25 RX ADMIN — Medication 81 MILLIGRAM(S): at 09:45

## 2020-12-25 RX ADMIN — Medication 3 MILLIGRAM(S): at 20:30

## 2020-12-25 RX ADMIN — Medication 50 MILLIGRAM(S): at 22:56

## 2020-12-26 RX ORDER — TRAZODONE HCL 50 MG
50 TABLET ORAL ONCE
Refills: 0 | Status: COMPLETED | OUTPATIENT
Start: 2020-12-26 | End: 2020-12-26

## 2020-12-26 RX ADMIN — Medication 3 MILLIGRAM(S): at 20:37

## 2020-12-26 RX ADMIN — MIRTAZAPINE 30 MILLIGRAM(S): 45 TABLET, ORALLY DISINTEGRATING ORAL at 20:37

## 2020-12-26 RX ADMIN — Medication 1 PATCH: at 08:54

## 2020-12-26 RX ADMIN — Medication 81 MILLIGRAM(S): at 08:53

## 2020-12-26 RX ADMIN — Medication 50 MILLIGRAM(S): at 08:53

## 2020-12-26 RX ADMIN — ATORVASTATIN CALCIUM 20 MILLIGRAM(S): 80 TABLET, FILM COATED ORAL at 20:38

## 2020-12-26 RX ADMIN — Medication 50 MILLIGRAM(S): at 15:42

## 2020-12-26 RX ADMIN — Medication 50 MILLIGRAM(S): at 23:33

## 2020-12-26 RX ADMIN — GABAPENTIN 300 MILLIGRAM(S): 400 CAPSULE ORAL at 12:50

## 2020-12-26 RX ADMIN — ESCITALOPRAM OXALATE 20 MILLIGRAM(S): 10 TABLET, FILM COATED ORAL at 08:53

## 2020-12-26 RX ADMIN — TAMSULOSIN HYDROCHLORIDE 0.4 MILLIGRAM(S): 0.4 CAPSULE ORAL at 20:37

## 2020-12-26 RX ADMIN — Medication 50 MILLIGRAM(S): at 01:42

## 2020-12-26 RX ADMIN — OLANZAPINE 5 MILLIGRAM(S): 15 TABLET, FILM COATED ORAL at 20:38

## 2020-12-26 RX ADMIN — GABAPENTIN 300 MILLIGRAM(S): 400 CAPSULE ORAL at 08:53

## 2020-12-26 RX ADMIN — Medication 50 MILLIGRAM(S): at 23:38

## 2020-12-26 RX ADMIN — Medication 25 MILLIGRAM(S): at 08:53

## 2020-12-26 RX ADMIN — GABAPENTIN 300 MILLIGRAM(S): 400 CAPSULE ORAL at 20:37

## 2020-12-27 RX ORDER — TRAZODONE HCL 50 MG
50 TABLET ORAL ONCE
Refills: 0 | Status: COMPLETED | OUTPATIENT
Start: 2020-12-27 | End: 2020-12-27

## 2020-12-27 RX ADMIN — Medication 25 MILLIGRAM(S): at 09:07

## 2020-12-27 RX ADMIN — ESCITALOPRAM OXALATE 20 MILLIGRAM(S): 10 TABLET, FILM COATED ORAL at 09:07

## 2020-12-27 RX ADMIN — TAMSULOSIN HYDROCHLORIDE 0.4 MILLIGRAM(S): 0.4 CAPSULE ORAL at 20:17

## 2020-12-27 RX ADMIN — Medication 50 MILLIGRAM(S): at 09:07

## 2020-12-27 RX ADMIN — Medication 1 PATCH: at 09:06

## 2020-12-27 RX ADMIN — ATORVASTATIN CALCIUM 20 MILLIGRAM(S): 80 TABLET, FILM COATED ORAL at 20:17

## 2020-12-27 RX ADMIN — Medication 50 MILLIGRAM(S): at 23:35

## 2020-12-27 RX ADMIN — MIRTAZAPINE 30 MILLIGRAM(S): 45 TABLET, ORALLY DISINTEGRATING ORAL at 20:17

## 2020-12-27 RX ADMIN — Medication 81 MILLIGRAM(S): at 09:07

## 2020-12-27 RX ADMIN — Medication 50 MILLIGRAM(S): at 23:11

## 2020-12-27 RX ADMIN — GABAPENTIN 300 MILLIGRAM(S): 400 CAPSULE ORAL at 20:17

## 2020-12-27 RX ADMIN — GABAPENTIN 300 MILLIGRAM(S): 400 CAPSULE ORAL at 12:59

## 2020-12-27 RX ADMIN — OLANZAPINE 5 MILLIGRAM(S): 15 TABLET, FILM COATED ORAL at 20:17

## 2020-12-27 RX ADMIN — GABAPENTIN 300 MILLIGRAM(S): 400 CAPSULE ORAL at 09:06

## 2020-12-27 RX ADMIN — Medication 3 MILLIGRAM(S): at 20:17

## 2020-12-27 RX ADMIN — Medication 1 PATCH: at 19:17

## 2020-12-28 VITALS — TEMPERATURE: 98 F

## 2020-12-28 PROCEDURE — 99239 HOSP IP/OBS DSCHRG MGMT >30: CPT

## 2020-12-28 RX ORDER — METOPROLOL TARTRATE 50 MG
1 TABLET ORAL
Qty: 30 | Refills: 0
Start: 2020-12-28 | End: 2021-01-26

## 2020-12-28 RX ORDER — GABAPENTIN 400 MG/1
1 CAPSULE ORAL
Qty: 90 | Refills: 0
Start: 2020-12-28 | End: 2021-01-26

## 2020-12-28 RX ORDER — OLANZAPINE 15 MG/1
1 TABLET, FILM COATED ORAL
Qty: 30 | Refills: 0
Start: 2020-12-28 | End: 2021-01-26

## 2020-12-28 RX ORDER — ATORVASTATIN CALCIUM 80 MG/1
1 TABLET, FILM COATED ORAL
Qty: 30 | Refills: 0
Start: 2020-12-28 | End: 2021-01-26

## 2020-12-28 RX ORDER — LANOLIN ALCOHOL/MO/W.PET/CERES
1 CREAM (GRAM) TOPICAL
Qty: 30 | Refills: 0
Start: 2020-12-28 | End: 2021-01-26

## 2020-12-28 RX ORDER — ASPIRIN/CALCIUM CARB/MAGNESIUM 324 MG
1 TABLET ORAL
Qty: 30 | Refills: 0
Start: 2020-12-28 | End: 2021-01-26

## 2020-12-28 RX ORDER — TAMSULOSIN HYDROCHLORIDE 0.4 MG/1
1 CAPSULE ORAL
Qty: 30 | Refills: 0
Start: 2020-12-28 | End: 2021-01-26

## 2020-12-28 RX ORDER — NICOTINE POLACRILEX 2 MG
1 GUM BUCCAL
Qty: 30 | Refills: 0
Start: 2020-12-28 | End: 2021-01-26

## 2020-12-28 RX ORDER — ESCITALOPRAM OXALATE 10 MG/1
1 TABLET, FILM COATED ORAL
Qty: 30 | Refills: 0
Start: 2020-12-28 | End: 2021-01-26

## 2020-12-28 RX ORDER — MIRTAZAPINE 45 MG/1
1 TABLET, ORALLY DISINTEGRATING ORAL
Qty: 30 | Refills: 0
Start: 2020-12-28 | End: 2021-01-26

## 2020-12-28 RX ORDER — HYDROXYZINE HCL 10 MG
1 TABLET ORAL
Qty: 30 | Refills: 0
Start: 2020-12-28 | End: 2021-01-26

## 2020-12-28 RX ORDER — ALBUTEROL 90 UG/1
2 AEROSOL, METERED ORAL
Qty: 0 | Refills: 0 | DISCHARGE
Start: 2020-12-28

## 2020-12-28 RX ADMIN — Medication 25 MILLIGRAM(S): at 08:28

## 2020-12-28 RX ADMIN — GABAPENTIN 300 MILLIGRAM(S): 400 CAPSULE ORAL at 12:29

## 2020-12-28 RX ADMIN — Medication 1 PATCH: at 08:28

## 2020-12-28 RX ADMIN — GABAPENTIN 300 MILLIGRAM(S): 400 CAPSULE ORAL at 08:27

## 2020-12-28 RX ADMIN — Medication 81 MILLIGRAM(S): at 08:28

## 2020-12-28 RX ADMIN — Medication 1 PATCH: at 08:15

## 2020-12-28 RX ADMIN — ESCITALOPRAM OXALATE 20 MILLIGRAM(S): 10 TABLET, FILM COATED ORAL at 08:27

## 2020-12-28 NOTE — BH INPATIENT PSYCHIATRY PROGRESS NOTE - NSBHMETABOLIC_PSY_ALL_CORE_FT
BMI: BMI (kg/m2): 25.8 (12-15-20 @ 12:40)  HbA1c: A1C with Estimated Average Glucose Result: 5.7 % (10-27-20 @ 09:09)    Glucose:   BP: 130/61 (12-21-20 @ 08:23) (99/79 - 133/75)  Lipid Panel: Date/Time: 10-27-20 @ 09:09  Cholesterol, Serum: 159  Direct LDL: --  HDL Cholesterol, Serum: 34  Total Cholesterol/HDL Ration Measurement: --  Triglycerides, Serum: 146  
BMI: BMI (kg/m2): 25.8 (12-15-20 @ 12:40)  HbA1c: A1C with Estimated Average Glucose Result: 5.7 % (10-27-20 @ 09:09)    Glucose:   BP: 140/78 (12-27-20 @ 08:47) (134/85 - 140/78)  Lipid Panel: Date/Time: 10-27-20 @ 09:09  Cholesterol, Serum: 159  Direct LDL: --  HDL Cholesterol, Serum: 34  Total Cholesterol/HDL Ration Measurement: --  Triglycerides, Serum: 146  
BMI: BMI (kg/m2): 25.8 (12-15-20 @ 12:40)  HbA1c: A1C with Estimated Average Glucose Result: 5.7 % (10-27-20 @ 09:09)    Glucose:   BP: 118/50 (12-17-20 @ 21:16) (118/50 - 118/50)  Lipid Panel: Date/Time: 10-27-20 @ 09:09  Cholesterol, Serum: 159  Direct LDL: --  HDL Cholesterol, Serum: 34  Total Cholesterol/HDL Ration Measurement: --  Triglycerides, Serum: 146  
BMI: BMI (kg/m2): 25.8 (12-15-20 @ 12:40)  HbA1c: A1C with Estimated Average Glucose Result: 5.7 % (10-27-20 @ 09:09)    Glucose:   BP: 130/61 (12-21-20 @ 08:23) (130/61 - 133/75)  Lipid Panel: Date/Time: 10-27-20 @ 09:09  Cholesterol, Serum: 159  Direct LDL: --  HDL Cholesterol, Serum: 34  Total Cholesterol/HDL Ration Measurement: --  Triglycerides, Serum: 146  
BMI: BMI (kg/m2): 25.8 (12-15-20 @ 12:40)  HbA1c: A1C with Estimated Average Glucose Result: 5.7 % (10-27-20 @ 09:09)    Glucose:   BP: 133/75 (12-20-20 @ 07:30) (99/79 - 133/75)  Lipid Panel: Date/Time: 10-27-20 @ 09:09  Cholesterol, Serum: 159  Direct LDL: --  HDL Cholesterol, Serum: 34  Total Cholesterol/HDL Ration Measurement: --  Triglycerides, Serum: 146  
BMI: BMI (kg/m2): 25.8 (12-15-20 @ 12:40)  HbA1c: A1C with Estimated Average Glucose Result: 5.7 % (10-27-20 @ 09:09)    Glucose:   BP: 121/73 (12-19-20 @ 11:02) (99/79 - 121/73)  Lipid Panel: Date/Time: 10-27-20 @ 09:09  Cholesterol, Serum: 159  Direct LDL: --  HDL Cholesterol, Serum: 34  Total Cholesterol/HDL Ration Measurement: --  Triglycerides, Serum: 146  
BMI: BMI (kg/m2): 25.8 (12-15-20 @ 12:40)  HbA1c: A1C with Estimated Average Glucose Result: 5.7 % (10-27-20 @ 09:09)    Glucose:   BP: 127/75 (12-23-20 @ 08:41) (102/66 - 130/61)  Lipid Panel: Date/Time: 10-27-20 @ 09:09  Cholesterol, Serum: 159  Direct LDL: --  HDL Cholesterol, Serum: 34  Total Cholesterol/HDL Ration Measurement: --  Triglycerides, Serum: 146  
BMI: BMI (kg/m2): 25.8 (12-15-20 @ 12:40)  HbA1c: A1C with Estimated Average Glucose Result: 5.7 % (10-27-20 @ 09:09)    Glucose:   BP: 104/73 (12-23-20 @ 18:55) (102/66 - 127/75)  Lipid Panel: Date/Time: 10-27-20 @ 09:09  Cholesterol, Serum: 159  Direct LDL: --  HDL Cholesterol, Serum: 34  Total Cholesterol/HDL Ration Measurement: --  Triglycerides, Serum: 146

## 2020-12-28 NOTE — BH INPATIENT PSYCHIATRY PROGRESS NOTE - NSBHMETABOLICLABS_PSY_ALL_CORE
Labs within last 12 months

## 2020-12-28 NOTE — BH INPATIENT PSYCHIATRY PROGRESS NOTE - NSBHMSEAFFQUAL_PSY_A_CORE
Depressed/Anxious
Euthymic
Depressed/Anxious
Euthymic/Anxious

## 2020-12-28 NOTE — BH INPATIENT PSYCHIATRY DISCHARGE NOTE - NSBHMETABOLIC_PSY_ALL_CORE_FT
BMI: BMI (kg/m2): 25.8 (12-15-20 @ 12:40)  HbA1c: A1C with Estimated Average Glucose Result: 5.7 % (10-27-20 @ 09:09)    Glucose:   BP: 140/78 (12-27-20 @ 08:47) (134/85 - 140/78)  Lipid Panel: Date/Time: 10-27-20 @ 09:09  Cholesterol, Serum: 159  Direct LDL: --  HDL Cholesterol, Serum: 34  Total Cholesterol/HDL Ration Measurement: --  Triglycerides, Serum: 146

## 2020-12-28 NOTE — BH INPATIENT PSYCHIATRY PROGRESS NOTE - NSDCCRITERIA_PSY_ALL_CORE
return to baseline

## 2020-12-28 NOTE — BH INPATIENT PSYCHIATRY DISCHARGE NOTE - HOSPITAL COURSE
... Pt initially presented reporting depressed mood, SI with plan to jump off roof of group home, and vague "paranoia" in the context of conflict with other residents at group home (involving him giving away most of his money to them). Pt presents as very concrete with intellectual limitations. Though he carries dx of Schizophrenia, pt did not present with evidence of acute psychosis. In the beginning of hospitalization he frequently requested more meds at higher doses without clear need for such. Pt was initially continued on his home meds Lexapro 20mg daily, Remeron 15mg qhs, Buspar 10mg TID, and Gabapentin 300mg TID. Pt had been given monthly Invega Sustenna injection on 11/29/20, next due 12/28/2 0 (day of discharge) and was also being prescribed PO Invega 6mg; however, on admission pt refused Invega and stated preference to stop Invega Sustenna, with strong preference to switch to Zyprexa.     During hospitalization, home meds Lexapro 20m daily and Gabapentin 300mg TID were continued. Buspar 10mg TID and Depakote 500mg qhs were discontinued due to goal to simplify meds and minimize polypharmacy (and no clear indication for each, particularly given under-dosing of Depakote). Remeron was titrated to 30mg qhs (to target mood, poor sleep, and poor appetite). Per pt request, PO Invega and Invega Sustenna were discontinued and pt was instead started on Zyprexa 5mg qhs (without clear need to titrate further at this time).     Over hospital course mood improved significantly with resolution of SI/I/P. Pt became more hopeful about the future and was noted to be much more interactive in the unit community (pt initially very isolative, spending most of the day/night in bed). Pt was observed to enjoy working on word puzzles. Attention to grooming/hygiene also improved, as did sleep and appetite. Pt was in very good behavioral control and never became agitated, aggressive, or violent. He did not attempt to self-harm on the unit and consistently denied SI/I/P and HI/I/P.

## 2020-12-28 NOTE — BH INPATIENT PSYCHIATRY DISCHARGE NOTE - NSDCMRMEDTOKEN_GEN_ALL_CORE_FT
albuterol 90 mcg/inh inhalation aerosol: 2 puff(s) inhaled every 6 hours, As needed, shortness of breath  Aspirin Enteric Coated 81 mg oral delayed release tablet: 1 tab(s) orally once a day  Flomax 0.4 mg oral capsule: 1 cap(s) orally once a day (at bedtime)  hydrOXYzine hydrochloride 50 mg oral tablet: 1 tab(s) orally once a day, As Needed -anxiety   Lexapro 20 mg oral tablet: 1 tab(s) orally once a day  Lipitor 20 mg oral tablet: 1 tab(s) orally once a day (at bedtime)  Lotrimin AF 1% topical cream: 1 application topically 2 times a day  Melatonin 3 mg oral tablet: 1 tab(s) orally once a day (at bedtime)  Metoprolol Succinate ER 25 mg oral tablet, extended release: 1 tab(s) orally once a day  Multiple Vitamins oral tablet: 1 tab(s) orally once a day  Neurontin 300 mg oral capsule: 1 cap(s) orally 3 times a day  Nicoderm C-Q Clear 21 mg/24 hr transdermal film, extended release: 1 patch transdermal once a day   Remeron 30 mg oral tablet: 1 tab(s) orally once a day (at bedtime)  saliva substitutes oral solution: 15 milliliter(s) orally 5 times a day   ZyPREXA 5 mg oral tablet: 1 tab(s) orally once a day (at bedtime)

## 2020-12-28 NOTE — BH INPATIENT PSYCHIATRY PROGRESS NOTE - NSBHFUPINTERVALHXFT_PSY_A_CORE
No acute events over the weekend. Pt remains compliant with meds and sleeping well. Has been more interactive on the unit and has enjoyed doing word puzzles. Mood remains much improved and pt denies SI/I/P. Looking forward to returning home today. Showered and dressed. Appetite is good. Denies AH and other psychotic symptoms. Pt hopeful and future-oriented. States intent to abstain from EtOH use.

## 2020-12-28 NOTE — BH INPATIENT PSYCHIATRY PROGRESS NOTE - NSTXPROBDEPRES_PSY_ALL_CORE
DEPRESSIVE SYMPTOMS
no

## 2020-12-28 NOTE — BH INPATIENT PSYCHIATRY PROGRESS NOTE - NSBHFUPINTERVALCCFT_PSY_A_CORE
Patient seen for follow up for depression and paranoia.  
f/u depression 
"I'm still depressed and paranoid."
f/u depression 
"I'm bored."
f/u depression 
"I wish there was a miracle drug to help with my depression."
f/u depression

## 2020-12-28 NOTE — BH INPATIENT PSYCHIATRY PROGRESS NOTE - NSBHATTESTSEENBY_PSY_A_CORE
NP without Attending Psychiatrist
attending Psychiatrist without NP/Trainee

## 2020-12-28 NOTE — BH INPATIENT PSYCHIATRY PROGRESS NOTE - NSBHASSESSSUMMFT_PSY_ALL_CORE
Pt reports sustained improvement in mood and has consistently denied SI/I/P. He is now hopeful and future-oriented and looking forward to returning home. He has been compliant with meds and in good behavioral control. No evidence of acute psychosis. Pt tending to ADLs independently and appropriately. He is sleeping well with good appetite. He has also been more interactive and engaged in the unit milieu. In light of significant symptomatic improvement, pt no longer presents as an acute risk of harm to self (or others) and no longer requires hospitalization. Will be discharged home today on Remeron 30mg qhs, Lexapro 20mg daily, Gabapentin 300mg TID, and Zyprexa 5mg qhs.

## 2020-12-28 NOTE — BH INPATIENT PSYCHIATRY PROGRESS NOTE - NSBHCONSULTIPREASON_PSY_A_CORE
danger to self; mental illness expected to respond to inpatient care

## 2020-12-28 NOTE — BH INPATIENT PSYCHIATRY PROGRESS NOTE - NSBHCONTPROVIDER_PSY_ALL_CORE
No, attempted...

## 2020-12-28 NOTE — BH INPATIENT PSYCHIATRY PROGRESS NOTE - NSBHINPTBILLING_PSY_ALL_CORE
60684 - Inpatient Moderate Complexity
22077 - Inpatient Moderate Complexity
62811 - Inpatient Low Complexity
00190 - Inpatient Moderate Complexity
80593 - Inpatient Low Complexity
55975 - Hospital Discharge Day Management; more than 30 min
54404 - Inpatient Moderate Complexity
90575 - Inpatient Moderate Complexity

## 2020-12-28 NOTE — BH INPATIENT PSYCHIATRY PROGRESS NOTE - NSCGIIMPROVESX_PSY_ALL_CORE
4 = No change - symptoms remain essentially unchanged
2 = Much improved - notably better with signficant reduction of symptoms; increase in the level of functioning but some symptoms remain
4 = No change - symptoms remain essentially unchanged

## 2020-12-28 NOTE — BH INPATIENT PSYCHIATRY PROGRESS NOTE - NSTXPSYCHOINTERMD_PSY_ALL_CORE
Optimize antipsychotic medication, psychoeducation

## 2020-12-28 NOTE — BH INPATIENT PSYCHIATRY DISCHARGE NOTE - NSSUICRSKFACTOR_PSY_ALL_CORE
Current and Past Psychiatric Diagnoses Current and Past Psychiatric Diagnoses/Presenting Symptoms/Activating Events/Stressors

## 2020-12-28 NOTE — BH INPATIENT PSYCHIATRY PROGRESS NOTE - NSBHCONSBHPROVDETAILS_PSY_A_CORE  FT
Dr. Nguyen Arteaga #[phone]: left  Fri with MD call back information to learn info re: medication regimen so changes can be made
Dr. Nguyen Artaega #[phone]: left VM this afternoon with MD call back information to learn info re: medication regimen so changes can be made
Dr. Nguyen Artaega #[phone]: left  Fri with MD call back information to learn info re: medication regimen so changes can be made
Dr. Nguyen Arteaga #[phone]: left  Fri with MD call back information to learn info re: medication regimen so changes can be made
Dr. Nguyen Arteaga #[phone]: left  Fri with MD call back information to learn info re: medication regimen so changes can be made
Dr. Nguyen Arteaga #[phone]: left  Fri with MD call back information to learn info re: medication regimen so changes can be made
Dr. Nguyen Arteaga #[phone]: left  Fri with MD call back information to learn info re: medication regimen so changes can be made
Dr. Nguyen Arteaga #[phone]: left  Fri with MD call back information to learn info re: medication regimen so changes can be made

## 2020-12-28 NOTE — BH INPATIENT PSYCHIATRY PROGRESS NOTE - PRN MEDS
MEDICATIONS  (PRN):  ALBUTerol    90 MICROgram(s) HFA Inhaler 2 Puff(s) Inhalation every 6 hours PRN shortness of breath  hydrOXYzine hydrochloride 50 milliGRAM(s) Oral every 6 hours PRN anxiety  LORazepam   Injectable 2 milliGRAM(s) IntraMuscular once PRN severe agitation  
MEDICATIONS  (PRN):  hydrOXYzine hydrochloride 50 milliGRAM(s) Oral every 6 hours PRN anxiety  LORazepam     Tablet 2 milliGRAM(s) Oral every 2 hours PRN CIWA score increase by 2 points and current CIWA score GREATER THAN 9  LORazepam   Injectable 2 milliGRAM(s) IntraMuscular once PRN severe agitation  
MEDICATIONS  (PRN):  ALBUTerol    90 MICROgram(s) HFA Inhaler 2 Puff(s) Inhalation every 6 hours PRN shortness of breath  hydrOXYzine hydrochloride 50 milliGRAM(s) Oral every 6 hours PRN anxiety  LORazepam   Injectable 2 milliGRAM(s) IntraMuscular once PRN severe agitation

## 2020-12-28 NOTE — BH INPATIENT PSYCHIATRY PROGRESS NOTE - NSTXPROBDCOPLK_PSY_ALL_CORE
DISCHARGE ISSUE - LACK OF APPROPRIATE OUTPATIENT SERVICES

## 2020-12-28 NOTE — BH INPATIENT PSYCHIATRY PROGRESS NOTE - NSTXDEPRESGOAL_PSY_ALL_CORE
Report using a coping skill to overcome sadness and worry in order to socialize with peers daily
Will identify 2 coping skills that assist in improving mood
Report using a coping skill to overcome sadness and worry in order to socialize with peers daily
Will identify 2 coping skills that assist in improving mood

## 2020-12-28 NOTE — BH INPATIENT PSYCHIATRY PROGRESS NOTE - NSBHPSYCHOLCOGORIENT_PSY_A_CORE
Oriented to time, place, person, situation
Unable to assess
Oriented to time, place, person, situation
Oriented to time, place, person, situation

## 2020-12-28 NOTE — BH INPATIENT PSYCHIATRY PROGRESS NOTE - NSTXDEPRESINTERMD_PSY_ALL_CORE
continue medications and consider dose increase/adjustment

## 2020-12-28 NOTE — BH INPATIENT PSYCHIATRY PROGRESS NOTE - CURRENT MEDICATION
MEDICATIONS  (STANDING):  aspirin enteric coated 81 milliGRAM(s) Oral daily  atorvastatin 20 milliGRAM(s) Oral at bedtime  diVALproex  milliGRAM(s) Oral at bedtime  escitalopram 20 milliGRAM(s) Oral daily  gabapentin 300 milliGRAM(s) Oral three times a day  metoprolol succinate ER 25 milliGRAM(s) Oral daily  mirtazapine 22.5 milliGRAM(s) Oral at bedtime  nicotine - 21 mG/24Hr(s) Patch 1 patch Transdermal daily  tamsulosin 0.4 milliGRAM(s) Oral at bedtime    MEDICATIONS  (PRN):  ALBUTerol    90 MICROgram(s) HFA Inhaler 2 Puff(s) Inhalation every 6 hours PRN shortness of breath  hydrOXYzine hydrochloride 50 milliGRAM(s) Oral every 6 hours PRN anxiety  LORazepam   Injectable 2 milliGRAM(s) IntraMuscular once PRN severe agitation  
MEDICATIONS  (STANDING):  aspirin enteric coated 81 milliGRAM(s) Oral daily  atorvastatin 20 milliGRAM(s) Oral at bedtime  busPIRone 10 milliGRAM(s) Oral three times a day  diVALproex  milliGRAM(s) Oral at bedtime  escitalopram 20 milliGRAM(s) Oral daily  folic acid 1 milliGRAM(s) Oral daily  gabapentin 300 milliGRAM(s) Oral three times a day  metoprolol succinate ER 25 milliGRAM(s) Oral daily  mirtazapine 15 milliGRAM(s) Oral at bedtime  multivitamin 1 Tablet(s) Oral daily  nicotine - 21 mG/24Hr(s) Patch 1 patch Transdermal daily  tamsulosin 0.4 milliGRAM(s) Oral at bedtime  thiamine 100 milliGRAM(s) Oral daily    MEDICATIONS  (PRN):  hydrOXYzine hydrochloride 50 milliGRAM(s) Oral every 6 hours PRN anxiety  LORazepam     Tablet 2 milliGRAM(s) Oral every 2 hours PRN CIWA score increase by 2 points and current CIWA score GREATER THAN 9  LORazepam   Injectable 2 milliGRAM(s) IntraMuscular once PRN severe agitation  
MEDICATIONS  (STANDING):  aspirin enteric coated 81 milliGRAM(s) Oral daily  atorvastatin 20 milliGRAM(s) Oral at bedtime  clotrimazole 1% Cream 1 Application(s) Topical two times a day  escitalopram 20 milliGRAM(s) Oral daily  gabapentin 300 milliGRAM(s) Oral three times a day  metoprolol succinate ER 25 milliGRAM(s) Oral daily  mirtazapine 30 milliGRAM(s) Oral at bedtime  nicotine - 21 mG/24Hr(s) Patch 1 patch Transdermal daily  OLANZapine 5 milliGRAM(s) Oral at bedtime  tamsulosin 0.4 milliGRAM(s) Oral at bedtime    MEDICATIONS  (PRN):  ALBUTerol    90 MICROgram(s) HFA Inhaler 2 Puff(s) Inhalation every 6 hours PRN shortness of breath  hydrOXYzine hydrochloride 50 milliGRAM(s) Oral every 6 hours PRN anxiety  LORazepam   Injectable 2 milliGRAM(s) IntraMuscular once PRN severe agitation  
MEDICATIONS  (STANDING):  aspirin enteric coated 81 milliGRAM(s) Oral daily  atorvastatin 20 milliGRAM(s) Oral at bedtime  clotrimazole 1% Cream 1 Application(s) Topical two times a day  escitalopram 20 milliGRAM(s) Oral daily  gabapentin 300 milliGRAM(s) Oral three times a day  metoprolol succinate ER 25 milliGRAM(s) Oral daily  mirtazapine 30 milliGRAM(s) Oral at bedtime  nicotine - 21 mG/24Hr(s) Patch 1 patch Transdermal daily  OLANZapine 5 milliGRAM(s) Oral at bedtime  tamsulosin 0.4 milliGRAM(s) Oral at bedtime    MEDICATIONS  (PRN):  ALBUTerol    90 MICROgram(s) HFA Inhaler 2 Puff(s) Inhalation every 6 hours PRN shortness of breath  hydrOXYzine hydrochloride 50 milliGRAM(s) Oral every 6 hours PRN anxiety  LORazepam   Injectable 2 milliGRAM(s) IntraMuscular once PRN severe agitation  
MEDICATIONS  (STANDING):  aspirin enteric coated 81 milliGRAM(s) Oral daily  atorvastatin 20 milliGRAM(s) Oral at bedtime  clotrimazole 1% Cream 1 Application(s) Topical two times a day  escitalopram 20 milliGRAM(s) Oral daily  gabapentin 300 milliGRAM(s) Oral three times a day  melatonin. 3 milliGRAM(s) Oral at bedtime  metoprolol succinate ER 25 milliGRAM(s) Oral daily  mirtazapine 30 milliGRAM(s) Oral at bedtime  nicotine - 21 mG/24Hr(s) Patch 1 patch Transdermal daily  OLANZapine 5 milliGRAM(s) Oral at bedtime  tamsulosin 0.4 milliGRAM(s) Oral at bedtime    MEDICATIONS  (PRN):  ALBUTerol    90 MICROgram(s) HFA Inhaler 2 Puff(s) Inhalation every 6 hours PRN shortness of breath  hydrOXYzine hydrochloride 50 milliGRAM(s) Oral every 6 hours PRN anxiety  LORazepam   Injectable 2 milliGRAM(s) IntraMuscular once PRN severe agitation

## 2020-12-28 NOTE — BH INPATIENT PSYCHIATRY DISCHARGE NOTE - NSDCCPCAREPLAN_GEN_ALL_CORE_FT
PRINCIPAL DISCHARGE DIAGNOSIS  Diagnosis: Schizophrenia  Assessment and Plan of Treatment:        PRINCIPAL DISCHARGE DIAGNOSIS  Diagnosis: Schizophrenia  Assessment and Plan of Treatment:       SECONDARY DISCHARGE DIAGNOSES  Diagnosis: Intellectual disability  Assessment and Plan of Treatment:

## 2020-12-28 NOTE — BH INPATIENT PSYCHIATRY DISCHARGE NOTE - OTHER PAST PSYCHIATRIC HISTORY (INCLUDE DETAILS REGARDING ONSET, COURSE OF ILLNESS, INPATIENT/OUTPATIENT TREATMENT)
Patient has had at least 6 psych hospitalizations with [facility], for schizophrenia and suicidal ideations.

## 2020-12-28 NOTE — BH INPATIENT PSYCHIATRY PROGRESS NOTE - NSTXPSYCHOGOAL_PSY_ALL_CORE
Will verbalize 1 strategy to successfully cope with psychotic symptoms
Will identify 2 coping skills that help mitigate hallucinations
Will verbalize 1 strategy to successfully cope with psychotic symptoms
Will verbalize 1 strategy to successfully cope with psychotic symptoms

## 2020-12-28 NOTE — BH INPATIENT PSYCHIATRY PROGRESS NOTE - NSBHMSEINTELL_PSY_A_CORE
Below Average

## 2020-12-28 NOTE — BH INPATIENT PSYCHIATRY PROGRESS NOTE - NSCGISEVERILLNESS_PSY_ALL_CORE
5 = Markedly ill - intrusive symptoms that distinctly impair social/occupational function or cause intrusive levels of distress
6 = Severely ill - disruptive pathology, behavior and function are frequently influenced by symptoms, may require assistance from others
6 = Severely ill - disruptive pathology, behavior and function are frequently influenced by symptoms, may require assistance from others
5 = Markedly ill - intrusive symptoms that distinctly impair social/occupational function or cause intrusive levels of distress
6 = Severely ill - disruptive pathology, behavior and function are frequently influenced by symptoms, may require assistance from others
4 = Moderately ill – overt symptoms causing noticeable, but modest, functional impairment or distress; symptom level may warrant medication

## 2020-12-28 NOTE — BH INPATIENT PSYCHIATRY PROGRESS NOTE - NSBHCHARTREVIEWVS_PSY_A_CORE FT
Vital Signs Last 24 Hrs  T(C): 36.3 (18 Dec 2020 06:15), Max: 36.4 (17 Dec 2020 21:16)  T(F): 97.4 (18 Dec 2020 06:15), Max: 97.5 (17 Dec 2020 21:16)  HR: --  BP: 118/50 (17 Dec 2020 21:16) (118/50 - 118/50)  BP(mean): 80 (17 Dec 2020 21:16) (80 - 80)  RR: 18 (18 Dec 2020 06:15) (18 - 18)  SpO2: 100% (18 Dec 2020 06:15) (100% - 100%)
Vital Signs Last 24 Hrs  T(C): 36.3 (20 Dec 2020 07:30), Max: 37.2 (19 Dec 2020 20:32)  T(F): 97.4 (20 Dec 2020 07:30), Max: 98.9 (19 Dec 2020 20:32)  HR: 62 (20 Dec 2020 07:30) (62 - 62)  BP: 133/75 (20 Dec 2020 07:30) (133/75 - 133/75)  BP(mean): --  RR: --  SpO2: --
Vital Signs Last 24 Hrs  T(C): 36.7 (22 Dec 2020 08:36), Max: 36.7 (22 Dec 2020 08:36)  T(F): 98 (22 Dec 2020 08:36), Max: 98 (22 Dec 2020 08:36)    Orthostatic VS    12-22-20 @ 08:36  Lying BP: --/-- HR: --   Sitting BP: 128/77 HR: 63  Standing BP: 129/71 HR: 62  Site: --   Mode: --    12-21-20 @ 19:23  Lying BP: --/-- HR: --   Sitting BP: 113/78 HR: 78  Standing BP: 119/73 HR: 82  Site: --   Mode: --  
Vital Signs Last 24 Hrs  T(C): 36.7 (23 Dec 2020 08:41), Max: 36.7 (23 Dec 2020 08:41)  T(F): 98 (23 Dec 2020 08:41), Max: 98 (23 Dec 2020 08:41)  HR: 62 (23 Dec 2020 08:41) (62 - 66)  BP: 127/75 (23 Dec 2020 08:41) (102/66 - 127/75)  BP(mean): --  RR: --  SpO2: --
Vital Signs Last 24 Hrs  T(C): 36.4 (19 Dec 2020 07:58), Max: 36.4 (19 Dec 2020 07:58)  T(F): 97.6 (19 Dec 2020 07:58), Max: 97.6 (19 Dec 2020 07:58)  HR: 62 (19 Dec 2020 11:02) (62 - 120)  BP: 121/73 (19 Dec 2020 11:02) (99/79 - 121/73)  BP(mean): --  RR: --  SpO2: --
Vital Signs Last 24 Hrs  T(C): 36.1 (21 Dec 2020 08:23), Max: 36.2 (20 Dec 2020 19:56)  T(F): 97 (21 Dec 2020 08:23), Max: 97.2 (20 Dec 2020 19:56)  HR: 65 (21 Dec 2020 08:23) (65 - 65)  BP: 130/61 (21 Dec 2020 08:23) (130/61 - 130/61)  BP(mean): --  RR: --  SpO2: --
Vital Signs Last 24 Hrs  T(C): 36.9 (24 Dec 2020 11:52), Max: 37.2 (24 Dec 2020 07:46)  T(F): 98.4 (24 Dec 2020 11:52), Max: 99 (24 Dec 2020 07:46)  HR: 76 (23 Dec 2020 18:55) (76 - 76)  BP: 104/73 (23 Dec 2020 18:55) (104/73 - 104/73)  BP(mean): --  RR: --  SpO2: --
Vital Signs Last 24 Hrs  T(C): 36.4 (28 Dec 2020 08:28), Max: 36.6 (27 Dec 2020 18:56)  T(F): 97.6 (28 Dec 2020 08:28), Max: 97.9 (27 Dec 2020 18:56)    Orthostatic VS    12-28-20 @ 08:28  Lying BP: --/-- HR: --   Sitting BP: 128/77 HR: 64  Standing BP: --/-- HR: --  Site: --   Mode: --

## 2020-12-28 NOTE — BH INPATIENT PSYCHIATRY PROGRESS NOTE - NSTXPROBPSYCHO_PSY_ALL_CORE
PSYCHOTIC SYMPTOMS

## 2020-12-28 NOTE — BH INPATIENT PSYCHIATRY DISCHARGE NOTE - HPI (INCLUDE ILLNESS QUALITY, SEVERITY, DURATION, TIMING, CONTEXT, MODIFYING FACTORS, ASSOCIATED SIGNS AND SYMPTOMS)
ED Behavioral Health Assessment from [facility] reviewed. On admission to  pt reports feeling depressed for several weeks with SI and plan to jump off roof of group home. Identifies financial stress as major contributor to depression (says that other patients at his group home harass him for money and that he gave them his stimulus check and now does not have any money). Says that he does not like his group home because other patients there "harass" him but does like the staff. With regards to suicidal thoughts says that he would "never do it" because he is "too scared" and does have hope that things will improve for him in the future. Reports ongoing suicidal thoughts at this time, but denies intent or plan, and agreeable to approach staff for help should suicidal thoughts intensify. Reports poor appetite though did eat lunch upon arrival to . Denies all psychotic symptoms including AH, VH, delusions of surveillance, IOR, and thought manipulation. Says that several weeks ago he was given Invega Sustenna injection but says he no longer wants it. Reports compliant with all PO meds. Denies recent EtOH or other drug use, though says about 1.5 months ago he was drinking heavily. Pt repeatedly asks to terminate interview early due to anxiety and wish for medication. Noted to have tremor of right extremity (and noted to be shaking his legs at times) which he attributes to anxiety. Denies any symptoms of EtOH withdrawal.     As per [facility] ED Assessment:    Patient is a 61 y/o single, disabled,  male,  unemployed, domiciled at [facility] in [place]; with a psychiatric history significant for Paranoid Schizophrenia, alcohol use disorder, discharged from [facility] approx 4 months ago for alcohol dependence, multiple psych admissions and ED visits per month for psychiatric complaints, last d/c  on 11/25/20 after a month long stay for c/o suicidal ideation;    who was BIBA for evaluation of suicidal ideation     Patient seen and evaluated and found to be calm and cooperative. Patient states he "cant stand" his life and is sick of being the nice krishan and wants to jump off the roof. patient states he has been having suicidal thoughts for days and was originally thinking of OD but realized he does not have anything ot OD with so he has been thinking of climbing to the roof and jumping. Patient states his depression is worsening and feels that others take advantage of him and hates where his life has gone. patient reports poor sleep, poor appetite and with suicidal thoughts to jump off th roof. Patient denies any homicidal thoughts and denies any symptoms of maddie or AV Hallucinations.     Attempted to get collateral from group r[facility] but no answer- [phone]

## 2020-12-28 NOTE — BH INPATIENT PSYCHIATRY PROGRESS NOTE - NSICDXBHSECONDARYDX_PSY_ALL_CORE
Moderate intellectual disability   F71  

## 2020-12-28 NOTE — BH INPATIENT PSYCHIATRY DISCHARGE NOTE - NSBHSUICIDESTATUS_PSY_ALL_CORE
... Acute risk factors include recent worsening depression and SI (with plan), now treated with inpatient hospitalization. Other protective factors that mitigate acute risk include pt has not consistently denied SI/I/P, he is hopeful and future-oriented, mood, sleep, and appetite have improved significantly, he does not have access to a gun, he is compliant with meds, he as strong social supports of brother and staff at group home, and he is able to engage in safety planning. Pt agreeable to contact outpt providers, call 911, or go to the nearest ED with any imminent safety concerns for self or others.

## 2020-12-28 NOTE — BH INPATIENT PSYCHIATRY PROGRESS NOTE - NSBHMSETHTCONTENT_PSY_A_CORE
Unremarkable
Delusions/Suicidality
Delusions/Preoccupations
Unremarkable
Delusions/Preoccupations
Delusions
Unremarkable
Unremarkable

## 2020-12-28 NOTE — BH INPATIENT PSYCHIATRY PROGRESS NOTE - NSBHMSETHTPROC_PSY_A_CORE
Linear/Impaired reasoning
Linear
Linear/Impaired reasoning
Linear

## 2021-01-01 NOTE — ED ADULT NURSE NOTE - NSFALLRSKINDICATORS_ED_ALL_ED
ADMISSION NOTE    Patient: Homero Phillips  : 2021 10:25 PM  MRN: [id number]  Referring Physician:  Information for the patient's mother:  Eileen Phillips [[id number]]   Marisela Zamora MD      Information for the patient's mother:  Eileen Phillips [[id number]]   Alexander Lynn CNP     Date of admission: 2021      Gestational Age: 40w0d   PMA: 40w1d   Day of Life on Admission (Day of birth being DOL 1): 1      REASON for admission: [place] affected by prolapsed cord    [facility]IRTH [facility]: [facility]    MATERNAL/PRENATAL HISTORY    Information for the patient's mother:  Eileen Phillips [[id number]]   2000      Information for the patient's mother:  Eileen Phillips [[id number]]   21 year old     Information for the patient's mother:  Eileen Phillips [[id number]]         Information for the patient's mother:  Eileen Phillips [[id number]]   40w1d      Information for the patient's mother:  Eileen Phillips [[id number]]   2022     Information for the patient's mother:  Eileen Phillips [[id number]]     OB History    Para Term  AB Living   1             SAB TAB Ectopic Molar Multiple Live Births                    # Outcome Date GA Lbr Kartik/2nd Weight Sex Delivery Anes PTL Lv   1 Current                 Ethnicity: Black    Maternal labs:      Information for the patient's mother:  Eileen Phillips [[id number]]     Recent Labs   Lab 10/29/20  1537 07/15/20  1145 20  1434   HIV Antigen/Antibody Screen  --   --  NONREACTIVE   HEP B Surface AG  --   --  NEGATIVE   Treponema pallidum IgG/IgM Ab  --   --  NONREACTIVE   Neisseria Gonorrhoeae by Nucleic Acid Amplification  --  NEGATIVE  --    Chlamydia Trachomatis by Nucleic Acid Amplification  --  NEGATIVE  --    Rubella Antibody IgG  --   --  167.8   ABO/RH(D)  --   --  O POSITIVE   CULTURE <10,000 CFU/mL MIXED BACTERIAL ELOISA WITH NO PREDOMINATING TYPE  --  NO GROWTH.          GBS: Positive  Antibiotics: none      Prenatal care: Yes   steroids  during pregnancy: None   Magnesium Sulfate: No  Chorioamnionitis: No  Maternal HTN, Chronic or Pregnancy Induced: No  Maternal Diabetes: No  Congenital Infection - TORCH / Zika / Parvovirus: No  Maternal COVID-19: unknown  Mutiple Gestation: No    Other Pregnancy Complication: prolapsed cord at home, brought to hospital by EMS      Information for the patient's mother:  Eileen Phillips [[id number]]     Past Medical History:   Diagnosis Date   • Anemia    • No known problems           Maternal Medication: PNV      DELIVERY/BIRTH HISTORY    Time of birth: 2022 10:25 PM by    Sex:  male   Information for the patient's mother:  Eileen Phillips [[id number]]   2022     [place]   Information for the patient's mother:  AlanEileen hinojosa [[id number]]   40w1d       Labor  · Delivery Method: , Low Transverse [251]Emergency C/S  · Time of Birth: 10:25 PM  Information for the patient's mother:  AlanEileen [[id number]]   Membranes  Membrane Status: Ruptured  Rupture Type: Spontaneous  Rupture Date: 21  Rupture Time:   Fluid Color: Meconium      Delivery Medications: general anesthesia    Delayed cord clamping x 30 seconds: No    Resuscitation:  Oxygen: Yes  Face Mask Ventilation (PPV): immediately initiated  Endotracheal Tube Intubation: by 30 seconds of life  CPAP: none              APGARS  One minute Five minutes   Skin color: 0   1     Heart rate: 2   2     Reflex: 1   1     Muscle tone: 0   1     Breathin   0     Totals: 3   5     10 minutes A/S: 6    Delivery Room Course:  Called to crash  for prolapsed cord, Dr. Turpin, Neonatalogist present at delivery. Infant was brought to warmer, poor tone and effort, PPV intiated immediately with no chest rise, HR <100 but >60. Intubated by 30 seconds of life, HR immediately tiana >100 with PPV via ETT. Skin color improved, tone and reflex slightly improved.     Initial Hospital Course: Infant brought to NICU intubated, plan for therapeutic  hypothermia      Laboratory and Radiological Findings:  Invalid input(s): RECENT LABS     Physical Exam:     Body Measurement:     Weight: 3330 g(Filed from Delivery Summary)    Head Circumference:      Length           Vital Signs:   Current Vitals:             SpO2: 99 %      General: Intubated, poor tone, minimal response to stimuli   HENT: AFSOF, normcephalic, ears normally set, no cleft, palate intact  Neck: supple, full range of motion  CV: RRR, 2+ pulses, good perfusion  Lungs: clear and equal bilaterally, no retractions, no nasal flaring  Abdomen: soft, non-tender, non-distended  Extremities: no anomalies present, hypotonic, minimal response to stimuli  Neuro: hypotonic  Skin: no rash  : normal for GA; anus patent  Back: no felipe, no dimples      ASSESSMENT AND PLAN BY SYSTEM:       ACTIVE PROBLEMS:  Active Problems:    [place] affected by prolapsed cord    Liveborn infant, of [place] pregnancy, born in hospital by  delivery    Metabolic acidosis in       Fluids, electrolytes and nutrition:   Assessment: Term infant delivered via emergency C/S due to prolapse cord  Plan:   - 35ml 0.9NS bolus now  - start IVFs with D10W at 70 ml/kg/day  -NPO  - CMP now per hypothermia protocol    Respiratory:  Assessment: Respiratory distress and infant requiring intubation at delivery    Respiratory support: SIMV PC/PS    Plan:   - place on SIMV-VC R35 PS 7 PEEP 5  PIP 17  - blood gas now  - CXR  - titrate FiO2 to keep saturations > 92 to 97%      Cardiovascular:  Assessment: No active cardiac issues    Plan:   - continue cardiorespiratory monitoring  - continuous blood pressure monitoring via UAC  - Echocardiogram    Infectious Disease:  Risk factors for sepsis: Unknown length of rupture of membranes, cord prolapse, GBS positive    Plan:   - Blood culture and CBC on admission - YES  - Ampicillin and ceftazidime (for renal protection) initiated for 36hr rule out    Hyperbilirubin:   Assessment: Routine  risk for  hyberbilirubinemia  Risk factors for hyperbilirubinemia - Mom O+, infant blood type pending    Plan:   - bilirubin on CMP    Hematology:  Assessment: At risk for coagulopathies d/t total body cooling  Plan:   - CBC w/diff and coags sent now    Neurology:  Assessment: Infant effected by prolapsed cord, hypotonic, therapeutic hypothermia, APGARS 3,5,6  Plan:   - Initiate therapeutic hypothermia  - Neurology consulted  - STAT HUS  - EEG ordered  - Will need MRI after rewarming    Lines:   - place [facility] and [facility]    Social:   - Dr. Turpin spoke to parents in-depth. All questions and concerns addressed. Parents expressed verbal understanding.      WES Mayorga, NNP-BC           no

## 2021-01-01 NOTE — SBIRT NOTE ADULT - NSSBIRTDRGFEELBADGUILT_GEN_A_CORE
"REF: [id number]    [facility]  [place],  [phone]  OR  FAX  [phone]      Rodolfo Andrade (2 m.o. Male)     Date of Birth Social Security Number Address Home Phone MRN    2022  [address] [place] [address] [phone] [id number]    Druze Marital Status          Other Single       Admission Date Admission Type Admitting Provider Attending Provider Department, Room/Bed    21  Shanae Amor MD Shimer, Kimberly S., MD [facility] NICU,     Discharge Date Discharge Disposition Discharge Destination                       Attending Provider: Shanae Amor MD    Allergies: No Known Allergies    Isolation: None   Infection: None   Code Status: Not on file    Ht: 47 cm (18.5\")   Wt: 3099 g (6 lb 13.3 oz)    Admission Cmt: None   Principal Problem: Prematurity, birth weight 1,500-1,749 grams, with 29 completed weeks of gestation [P07.16,P07.32] More...                 Active Insurance as of 2021     Primary Coverage     Payor Plan Insurance Group Employer/Plan Group    [employer] [place] [employer] [place]ICA[place]      Payor Plan Address Payor Plan Phone Number Payor Plan Fax Number Effective Dates    [address] [address] [phone]  2021 - None Entered    [facility] [id number]       Subscriber Name Subscriber Birth Date Member ID       RODOLFO ANDRADE 2022 [id number]                 Emergency Contacts      (Rel.) Home Phone Work Phone Mobile Phone    Ani Shruthi (Mother) [phone] -- --    KyleWilmer goldman (Father) [phone] -- --        Harman Oliveira, RN   Registered Nurse      Plan of Care   Signed   Date of Service:  21   Creation Time:  21            Signed             Goal Outcome Evaluation:  Progress: improving  Outcome Summary: VSS, no episodes, 1small emesis, tolerating neosure, PO intake 60, 58, 55, and 55ml with teal nipple, + weight gain, voiding, small stool, no " contact from parents this shift     During this shift infant scored feeding readiness of 1, 1, 1, and 1, and feeding quality of 1, 1, 1, and 1.  Caregiver techniques included (A ) Modified Sidelying, (E) Frequent Burping, and with teal nipple. Infant PO fed 100 percent this shift.                  Macie Hernández, RN, BSN   Registered Nurse   Neonatology ( Level II)   Plan of Care   Signed   Date of Service:  21   Creation Time:  21            Signed             VSS; no episodes during shift; 1 emesis during shift; mom here x1 and UTD on POC; cont to monitor. During this shift infant scored feeding readiness of 1, 1, 1, and 1, and feeding quality of 2, 2, 2, and 1.  Caregiver techniques included (A ) Modified Sidelying and (E) Frequent Burping. Infant PO fed 100% percent this shift   Progress: no change  Outcome Summary: VSS; no episodes during shift; FiO2 between 26-28% most of shift; tolerating og feeds over 2 hours; pt weaned to air servo due to elevated temps; mom here x1 and UTD on POC; pt did not tolerate skin to skin; cont to monitor.                Apnea/Bradycardia Events (last 14 days)    Date/Time  SpO2  Heart Rate  Episode Length (sec)  Color Change  Intervention  Association [facility]    21  72  64  --  yes  self-resolved;other (see comments)   regurgitation;other (see comments)  KK    Intervention: BULB SUCTIONED MOUTH by Salvatore Pope RN at 21   Association: SMALL EMESIS, SUPINE, HOB IS FLAT by Salvatore Poep RN at 21 115  66  73  45  yes  mild stimulation  spontaneous  KM    Association: infant asleep supine in upright infant chair by Sonja Traylor RN at 21 1158   21  70  60  20   yes  vigorous stimulation   feeding  MH    Episode Length (sec): with heart rate and sats up and down for about 2 mins by Angelique Summers, RN at 21   Intervention: mom reacted appropriate with stim and recovering baby  by Angelique Sumemrs RN at 08/03/21 1830   Association: at beginning of feeding by Angelique Summers RN at 08/03/21 1830       Vital Signs (last day)     Date/Time   Temp   Temp src   Pulse   Resp   BP   Patient Position   SpO2    08/06/21 0930   98.3 (36.8)   Axillary   161   52   (!) 99/78   --   97    08/06/21 0630   98.4 (36.9)   Axillary   134   36   --   --   98    08/06/21 0330   98 (36.7)   Axillary   150   42   --   --   95    08/06/21 0030   98.4 (36.9)   Axillary   160   40   --   --   96    08/05/21 2130   98.4 (36.9)   Axillary   142   50   (!) 94/46   --   99    08/05/21 1830   98.2 (36.8)   Axillary   162   58   --   --   99    08/05/21 1530   98.4 (36.9)   Axillary   146   30   --   --   100    08/05/21 1230   98.5 (36.9)   Axillary   131   43   --   --   97    08/05/21 0930   98.5 (36.9)   Axillary   177   38   (!) 90/44   --   100    08/05/21 0635   98.4 (36.9)   Axillary   162   45   --   --   100    08/05/21 0340   98.2 (36.8)   Axillary   150   52   --   --   99    08/05/21 0039   98.4 (36.9)   Axillary   140   55   --   --   100              Intake & Output (last day)       08/05 0701 - 08/06 0700 08/06 0701 - 08/07 0700    P.O. 466 65    Total Intake(mL/kg) 466 (151.7) 65 (21.2)    Net +466 +65          Urine Unmeasured Occurrence 8 x 1 x    Stool Unmeasured Occurrence 5 x 1 x    Emesis Unmeasured Occurrence 3 x 1 x        Current Facility-Administered Medications   Medication Dose Route Frequency Provider Last Rate Last Admin   • Cholecalciferol liquid 400 Units  400 Units Oral Daily Toya Matamoros APRN   400 Units at 08/06/21 0900   • glycerin (PEDIA-LAX) 2.8 g liquid suppository 1 mL  1 mL Rectal Once Dinorah Ceja APRN       • hepatitis B vaccine (recombinant) (ENGERIX-B) injection 10 mcg  0.5 mL Intramuscular During Hospitalization Toya Matamoros APRN       • levOCARNitine (CARNITOR) 1 GM/10ML solution 150 mg  50 mg/kg Oral Daily Toya Matamoros APRN   150 mg at 08/06/21 0900   •  phenylephrine (MYDFRIN) 2.5 % ophthalmic solution 1 drop  1 drop Both Eyes Q5 Min PRN Toya Matamoros, APRN   1 drop at 21 1458   • Poly-Vitamin/Iron (POLY-VI-SOL/IRON) 0.5 mL  0.5 mL Oral BID Myrna Fischer, APRN   0.5 mL at 21 0900     Orders (last 24 hrs)      Start     Ordered    21 1130  glycerin (PEDIA-LAX) 2.8 g liquid suppository 1 mL  Once     Note to Pharmacy: May repeat x 1 if no results    21 1030    21 0900  Cholecalciferol liquid 400 Units  Daily      21 0714    21 0900  levOCARNitine (CARNITOR) 1 GM/10ML solution 150 mg  Daily      21 0715    21 1315  breast milk 55 mL  Every 3 Hours      21 1119    21 1338  phenylephrine (MYDFRIN) 2.5 % ophthalmic solution 1 drop  Every 5 Minutes PRN      21 1338    21 1215  Poly-Vitamin/Iron (POLY-VI-SOL/IRON) 0.5 mL  2 Times Daily      21 1115    21 1002  Blood Pressure  Daily      21 1001    21 1002  Strict Intake and Output  Every Shift     Comments: If on IV fluids or TPN    21 1001    21 1001  hepatitis B vaccine (recombinant) (ENGERIX-B) injection 10 mcg  During Hospitalization      21 1001    --  SCANNED EKG      21 0000                   Physician Progress Notes (last 24 hours) (Notes from 21 1158 through 21 1158)      Shanae Amor MD at 21 1130           ICU Inborn Progress Notes      Age: 2 m.o. Follow Up Provider:  [facility]   Sex: male Admit Attending: Shanae Amor MD   ISHAAN:  Gestational Age: 29w0d BW: 1503 g (3 lb 5 oz)   Corrected Gest. Age:  39w 6d    Subjective   Overview:    1503g male infant, Negrito, born at ~29 weeks to a 37 yo mother with no prenatal care and home birth at father's home in [place], then brought to mother's house for diaper and then brought to [facility] ED in [place], [place] gasping and cold. Infant intubated and placed on vent there. ETT came  out just as transport team arrived. Transport team replaced ETT, gave curosurf, placed on vent, placed UAC and low lying UVC, warmed infant and transported back to [facility] NICU for admission due to prematurity, RDS, thermal support and nutritional support needed.  Maternal Meds: unknown  Prenatal Labs: unknown due to no prenatal care.    Interval History:    Discussed with bedside nurse patient's course overnight. Nursing notes reviewed.    Status post 2 doses of curosurf.  History of coagulopathy and received cryoprecipitate, FFP and extra dose of Vitamin K in first 24 hours.  Coagulation labs improved with no more intervention.   Infant weaned Fi02 to mid 20's and attempt to extubate to Bubble CPAP and was not successful 5/24.  He was reintubated and placed back on ventilator.  Extubated to CPAP 6, then increased to Peep 7 on 5/28, 33% FiO2, then increased to Peep 8 on morning of 5/30, but FiO2 increased to 55% and CXR with collapse of left lung and right upper lung with granularity and cbg 7.26/74/3.3 with 87.4% calc sats, so intubated and placed back on vent on 5/30 with rate 40, 22/6, IT 0.3, PS8 and FiO2 weaned to 26%.  ECHO report and discussion with cardiologist suggested we treat his PDA to try and close medically.  He has weaned on ventilator; but PICC infiltrated possibly influencing his new collapse on CXR on 6/2/21, no effusion identified.  His Fi02 is now down to 21-30%.  He had been on a rate of 60 on 5/30, but then on a rate of 35.  He was made NPO for PDA treatment.  After receiving first dose of Indomethacin, his urine output dropped off significantly and he became hyponatremic.  Total fluids were backed down to 110-120ml/kg/day morning of 5/31 and now at 130ml/kg/day.  UOP has picked up and returned to normal with Cr trending down. Then treated PDA with Neoprofen course and he improved.  Weaned ventilator more and CXR with better aeration and expanded to 10 ribs. Extubated to CPAP 6 on 6/4, then  No needed increase to Peep 8.  He is active, appearing alert, pink.  Peripheral PICC and peripheral arterial line removed 6/2/21 due to infiltration.   Central PICC attempted and successfully placed late 6/3/21 in left axilla and removed on 6/11.  CXR continued to look improved until post extubation when haziness returned, but clinically looked stable.  He was extubated 6/4 at approx. 3pm. CXR AM 6/5 improved with stable CBG.   ECHO 6/4 shows small PDA and less left to right flow. Echo on 6/9 shows trivial PDA.   He had been on Bubble CPAP +8 21-24% with good gas. Weaned peep to 7 on 6/9, FiO2 21%. Weaned to peep 6 on 6/11 and FiO2 has been 23-28. Failed wean to Peep 5 on 6/22.  His Fi02 seems to increase during gavage feeds and when assessed and with skin to skin. Gavage over 2 hours. Pulmicort started on 6/16-6/25. Spot dose lasix on 6/18. CXR under inflated but less hazy on 6/19. Albuterol  6/19-6/25. He weaned to Bubble CPAP +4 6/30 and changed to Vapotherm 7/1/21 am. LFNC discontinued on 7/19, then restarted on 7/21 at 50 ml LFNC. Trial off NC on 7/27.  Oral steroids started afternoon 6/25.  He was on wall 02, 50ml nasal cannula. Trial off NC on 7/27.  He was more anemic 7/19, Hct 25 down from 29 a week ago.  His oral intake has fallen off likely due to anemia and fatigue.  He received 15ml/kg packed red blood cells on 7/19    0 event of bradycardia/desaturation in last 24 hours.  Last spell on 8/5.  He is Po feeding 100%.  Continue ad claritza feedings.  Growth has fallen from 25th%tile to now 17th%tile after removing fortification.  Increased Neosure to 4x/day on 8/3 and then all Neosure late on 8/4.    Objective   Medications:     Scheduled Meds:  Cholecalciferol, 400 Units, Oral, Daily  glycerin, 1 mL, Rectal, Once  levOCARNitine, 50 mg/kg, Oral, Daily  Poly-Vitamin/Iron, 0.5 mL, Oral, BID      Continuous Infusions:      PRN Meds:   hepatitis B vaccine (recombinant)  •  phenylephrine    Devices, Monitoring,  "Treatments:     Lines, Devices, Monitoring and Treatments:  Umbilical Artery Catheter 05/22/21 - 5/28/21                                                    PICC Single Lumen (Ped/Gualberto) 05/23/21 Arm - 6/2/21   Site Assessment Clean;Dry;Intact 05/23/21 2100   Line Status Infusing 05/23/21 2000   Length ana (cm) 6.5 cm 05/23/21 2000   Extremity Circumference (cm) 7.5 cm 05/23/21 2000   Dressing Type Transparent 05/23/21 2000   Dressing Status Clean;Dry;Intact 05/23/21 2000   Dressing Intervention New dressing 05/23/21 1045   Indication/Daily Review of Necessity total parenteral nutrition;intravenous medication therapy 05/23/21 2000       [REMOVED] UVC Single Lumen 05/22/21 (Removed)-5/23/21   Site Assessment Clean;Dry;Intact 05/23/21 1100   Line Status Infusing 05/23/21 0800   Length ana (cm) 3.5 cm 05/23/21 0800   Line Care Connections checked and tightened 05/23/21 0800   Dressing Type Transparent 05/23/21 0800   Dressing Status Clean;Dry;Intact 05/23/21 0800   Dressing Intervention New dressing 05/22/21 1100   Indication/Daily Review of Necessity intravenous medication therapy;total parenteral nutrition;intravenous fluid therapy 05/23/21 0800        ETT  5/23/21-5/28/21   ETT 5/30/21-6/4/21                           Left radial peripheral arterial Line 5/30- 6/2/21.    PICC(left axilla) 6/3/21- 2021.    Necessity of devices was discussed with the treatment team and continued or discontinued as appropriate: yes    Respiratory Support:     Room air since 7/27    Physical Exam:        Current: Weight: 3099 g (6 lb 13.3 oz) Birth Weight Change: 106%   Last HC: 13.39\" (34 cm)      PainScore:        Apnea and Bradycardia:     Bradycardia rate: No data recorded    Temp:  [98 °F (36.7 °C)-98.5 °F (36.9 °C)] 98.3 °F (36.8 °C)  Pulse:  [131-162] 161  Resp:  [30-58] 52  BP: (94-99)/(46-78) 99/78  SpO2 Current: SpO2  Min: 95 %  Max: 100 %    Heent: fontanelles are very soft and flat, some splaying of sutures "   Respiratory: equal breath sounds bilaterally, adequate air exchange,  no retractions, no nasal flaring   Cardiovascular: RRR, S1 S2, occasional I/VI audible murmur, 2+ brachial and femoral pulses, brisk capillary refill   Abdomen: Soft, non tender,round, non-distended, no bowel sounds, no loops    : normal external genitalia, presence of penoscrotal webbin--25-30%   Extremities: well-perfused, warm and dry   Skin: no rashes, or bruising, no edema, pink   Neuro: Easy to arouse, improved activity & alertness, active     Radiology and Labs:      I have reviewed all the lab results for the past 24 hours. Pertinent findings reviewed in assessment and plan.  yes  Lab Results (last 24 hours)     ** No results found for the last 24 hours. **        I have reviewed all the imaging results for the past 24 hours. Pertinent findings reviewed in assessment and plan. yes    Intake and Output:      Current Weight: Weight: 3099 g (6 lb 13.3 oz) Last 24hr Weight change: 27 g (1 oz)   Growth:    7 day weight gain: 6.3g/kg/day(8/2/21) (to be calculated on  and u)   Caloric Intake:  Kcal/kg/day     Intake:     Total Fluid Goal: ad claritza Total Fluid Actual: 158 ml/kg/day   Feeds: Formula  Similac Neosure 55-61 ml q 3 hours  Fortified: No   Route:PO PO: 100% , BF x 0     IVF: none Blood Products: PRBC 15ml, PRBC 5/24-15ml/kg, PRBC 15ml/kg 5/31, PRBC 15ml/kg 6/2, PRBC 6/23 15ml/kg, PRBC 7/19;  cryoprecipitate 15 ml, FFP 15 ml, Vit K   Output:     UOP:  X 8 Emesis: x 3   Stool: x  5    Other: None         Assessment/Plan   Assessment and Plan:      Alteration in nutrition in infant  Assessment:  NPO on admission and placed on Vanilla TPN changed to D10P3.5L1 at ~85 ml/kg/day via low lying UVC and 1/2 NS with heparin at ~15 ml/kg/day via UAC. Blood glucose 51 on admission (44-91). UA on 5/22 with large blood (3+) with ALT/AST/AlkPhos 5/116/178, BUN/Cr 11/0.66. Repeat ALT/AST/AlkPhos 10/105/127 with BUN/Cr 31/0.75 on 5/23.  Low UVC  noted hepatic placement and discontinued on 5/23.  Mother wishes to breast feed, UDS on 5/26 negative for mom.   -NPO 5/30 - 6/3 for treatment of PDA  -Prolacta 6/7-6/25  -SHMF 24 kcal/oz 6/23-present  -requires occasion glycerin for slow stooling.    Current Diet: Similac Neosure(changed late 8/4) - PO ad claritza took 55-61 ml for 150 ml/kg/d.  Breast fed X 0. Emesis X 3 .  3 feedings of MBM, the remainder was Neosure.  Of note, he seems to have more emesis with EBM in bottle.  Fortification: none  Access: Peripheral PICC (5/23-6/2 due to infiltration); PAL (5/30-6/2); PIV 6/2-6/3; Central PICC 6/4-6/11 (left axilla)  Rx: multivitamins with Fe 0.5ml Q12hr  Weekly growth velocity  6.3 gms/k/d (8/2/21). This is a downward trend since removing fortification from breast milk.  On 8/3 increased to 50:50 EBM/Neosure.    Lab Results   Component Value Date    GLUCOSE 82 (H) 2021    BUN 9 2021    CREATININE <0.17 (L) 2021    EGFRIFNONA  2021      Comment:      Unable to calculate GFR, patient age <18.    EGFRIFAFRI  2021      Comment:      Unable to calculate GFR, patient age <18.    BCR  2021      Comment:      Unable to calculate Bun/Crea Ratio.    K 5.0 2021    CO2 25.0 2021    CALCIUM 10.4 2021    ALBUMIN 4.00 2021    AST 34 2021    ALT 25 2021     Lab Results   Component Value Date    CALCIUM 10.4 2021    PHOS 7.0 (H) 2021     Lab Results   Component Value Date    ALKPHOS 316 2021       Plan:  · Now all Neosure PO with ad claritza volumes due to increased CARITO symptoms with EBM. No history of milk intolerance in family.  · Mom may still breastfeed if she is here.  · Consider changing to fortified elemental formula if CARITO becoming more symptomatic.  · Continue IDF feeding plan with speech consult  · Strict I/Os, monitoring urine output closely  · Monitor growth and optimize nutrition.  · RFP as needed.  · At risk for osteopenia of  prematurity - CMP every 2 weeks and begin Vit D supplementation with 400 iu/day  · Continue multivitamins and iron supplementation.  · Encourage breastfeeding.  · Liquid glycerin [place] as needed for constipation      Ineffective thermoregulation in   Assessment:  Infant with no prenatal care, born at ~29 weeks with ineffective thermoregulation. Hypothermic (90') when mother brought to ED at [facility] in [place]. Thermal support given and infant now normothermic in giraffe isolette.  Incubator humidified per LBW protocol for first 14 days of life. Top up on incubator . Weaned to open crib   Plan:  · Resolved     Chronic lung disease in   Assessment:  Infant born at ~29 weeks with no prenatal care at father's home in [place]. Mother brought to her home and then brought infant gasping and cold to [facility] in [place], [place]. ED intubated and placed on vent. ETT dislodged just as transport team arrived. Transport team reintubated and placed on vent, rate 40, 20/5, 70% FiO2 and given curosurf. Curosurf repeated  @2200.  CXR white out c/w severe surfactant deficiency. Upon arrival to [facility] NICU, infant placed on rate 40, 22/6, PS 8, IT 0.3 and 70% FiO2. CXR at [facility] showed improvement in surfactant deficiency, 8-9 rib expansion, ETT at T2, UAC at T6 and UVC in liver which was pulled back to better low lying position, and discontinued .   Infant able to wean from FiO2 ~0.5 to 0.25 after second dose of curosurf.  Attempted to extubate , to BCPaP of 6 cm, with increased work of breathing and significantly higher oxygen requirement.  Reintubated within 1 hour with 3.0 ETT. CXR with increased bilateral opacities.    Weaned from ventilator and extubated to BCPAP .  Extubated to CPAP 6, with increasing support over the next 2 days - FiO2 increased to 55% and CXR with collapse on left lung and right upper lung with granularity and cbg 7.26/74/3.3; so re- intubated and  "placed back on ventilator.  -Received dexamethasone 0.25mg/kg IV q 8 x 3 for extubation. Extubated 6/4 to BCPAP 6 and increased to 7cmH20; then to 8 d/t frequent desats and increased FiO2.   -Dr. Lemos discussed the merits and risks of starting Pulmicort or using systemic steroids. Given infant's clinical course with mechanical ventilation, PDA and treatment of PDA infant at increased risk for NEC.  It was prudent to take directed approach with Pulmicort.    -CXR (6/19): 7 ribs inflated (possible expiratory film), hazy  -S/P Pulmicort BID 6/16-6/25, Albuterol 6/19-6/25; DC'd when prednisolone started.  -S/P Lasix on 6/15 d/t increased fio2 then S/P Lasix X 1 6/18/21, 6/23 (S/P PRBC transfusion)  -Rx: prednisolone started 6/25/21 at 1mg/kg q 12 for 6 doses. To be followed by 1mg/kg daily for 3 days, followed by 1mg/kg q 48 for two doses.  Last dose given 7/6/21.  -Tolerated wean to VapoTherm on 7/1/21.  -Room air trial 7/6, infant with desaturations and started on NC at 0.25 LPM. To room air 7/19/21, after PRBC transfusion.  Nasal cannula support resumed 7/21/21 due to saturations drifting to 80's persistently.  -Echo on 7/23 to evaluate for Pul HTN in face of CLD showed very small PDA, PFO vs ASD, no evidence for pulmonary HTN.  -Weaned to room air on 7/27 from NC wall O2. Currently on room air      Plan:  · Monitor respiratory effort and oxygen saturations closely.  · CBG as needed  · Consider repeat lasix dose and/or chronic diuretics  · Synagis Candidate - Due to elevated RSV cases, will give Synagis prior to discharge as at risk due to CLD and 29 week gestation at birth.  · Repeat ECHO 8/23 if still with O2 requirement to evaluate for development of Pul HTN related to CLD    Prematurity, birth weight 1,500-1,749 grams, with 29 completed weeks of gestation  Assessment:  Baby boy \"Negrito\" is the 1503g male infant born at ~29 weeks to a 37 yo mother with no prenatal care and home birth at father's home in " [place], then brought to mother's house for diaper and then brought to [facility] ED in [place], [place] gasping and cold. Infant intubated and placed on vent there. ETT came out just as transport team arrived. Transport team replaced ETT, gave curosurf, placed on vent, placed UAC and low lying UVC, warmed infant and transported back to [facility] NICU for admission due to prematurity, RDS, thermal support and nutritional support needed.  Maternal Meds: unknown  Prenatal Labs: no prenatal care.  - Infant's UDS: negative  - Meconium drug screen: negative  - HUS ,  - no IVH  - Mother's labs obtained on  by Dr. Lemos: Blood type A+(JOHANN neg), HepBsAg negative, Hep C Antibody negative, Hiv negative, HSV 1 +, HSV 2 negative, Rubella Immune and RPR NR.  - CCHD- see ECHO finding under PDA/PFO.  - HBIG had been given due to unknown maternal HBsAg status.  Mother is negative for HBsAg therefore we can wait to give Hepatitis B vaccine per protocol.  Mother refuses 30 day Hep B vaccine. Discuss 60 day vaccinations with mother  - [place] screen sent 21, prior to initial PRBC transfusion: normal; repeated on full feeds on  - see abnormal NBS problem  - Free T4/TSH on  - .22/3.28  - HUS on  - no PVL  - ROP exam  per Dr. 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  Bedside exam done 21 with Ret Cam 3 revealed mature retinas with follow up in 6 months.  - Dr. Lemos has had several discussions with mom and dad regarding 2 month vaccinations.  The risks/benefits have been discussed.  At this time they are declining however they will likely elect to get Synagis for Negrito.  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  Parents educated.  - Some elevated blood pressures which are inconsistent.  Plan:  · Continuous CR monitor and pulse ox  · Car seat test hearing test per  protocol  · Follow up with [facility].  · OT consult due to prematurity  · Speech consult due to prematurity  · Social work consult due to no prenatal care and home birth and for resource identification  · Hep B Vaccine at 30 days of age with maternal consent -  Mother continues to decline.  · Parents continue to decline 2 months immunizations.   · Synagis candidate - Due to elevated RSV cases, will give Synagis prior to discharge as at risk due to CLD and 29 week gestation at birth, if mother consents.  · Follow up with Ophthalmology in 6 months to evaluate for strabismus and amblyopia.  · Refer to pediatric Urology if parents wish for circumcision as outpatient.  · Consider renal US with doppler and/or further work up if blood pressures become more persistently elevated.         anemia  Assessment:  Initial Hct was 34 at [facility], NS bolus given due to lower blood pressure. Transfused with PRBC , , 21, ,  and  @ 15 ml/kg.  Lab Results   Component Value Date    HCT 2021     Lab Results   Component Value Date    HGB 2021     Lab Results   Component Value Date    RETICCTPCT 2021     Plan:  · Repeat CBC, retic count 1-2 weeks, or sooner, if needed.  · Minimize iatrogenic blood losses as possible.  · Continue Fe supplementation    Birth asphyxia  Assessment:  Infant delivered at father's house in [place] and parents brought to mother's house for diaper, then went to [facility] ED. Infant gasping and cold upon arrival. No prenatal care. Plan is understood for home birth with a midwife. Unknown maternal labs.  - Urine drug screen negative.  - Head US without evidence of IVH on  and negative for IVH on   - NPO x 4 days after birth.  Started trophic feeds  then NPO again for blood then PDA treatment -21.    - Meconium drug screen negative  -U/A with 3+blood on admission  Plan:  · Will need   follow up on discharge.    Apnea of prematurity  Assessment:  Infant delivered at home, at 29 weeks estimated gestation.  At high risk for apnea of prematurity.  Infant loaded with Caffeine 20 mg/kg on 5/22 and continued daily Caffeine at 10 mg/kg until 7/11/21.    Plan:      · Resolved       Coagulopathy (CMS/HCC)  Assessment:  Coag studies ordered on admission; Never active external bleeding noted.  Infant at high risk for IVH.  S/P cryoprecipitate and FFP transfusions.  HUS - no hemorrhage noted on 5/24 scan.   Coagulopathy continues to improve.  H/H stable.      Lab 05/27/21  0552 05/26/21  0551 05/25/21  2102 05/25/21  0522 05/24/21  1848 05/24/21  0457 05/23/21  1804 05/23/21  0812 05/23/21  0603 05/23/21  0000 05/22/21  2000 05/22/21  1028   HEMOGLOBIN 14.1*  --   --   --   --  11.7* 12.3*  --  13.3*  --  14.9 12.2*   HEMATOCRIT 40.3*  --   --   --   --  35.1* 35.9*  --  38.4*  --  43.7* 35.4*   PLATELETS 166  --   --   --   --  179 157  --  155  --  152 185   PROTIME  --   --   --   --   --  16.1* 18.6* 22.7*  --  20.7*  --  21.1*   APTT  --   --   --   --   --  41.5* 41.7* 47.3*  --  45.7*  --  60.0*   CREATININE 0.35 0.44 0.40 0.37 0.37 0.56 0.65  --  0.75  --  0.71 0.66   Fibrinogen                                      642                                                   99  INR    Common Labsle 5/22/21 5/23/21 5/23/21 5/23/21 5/24/21     0000 0812 1804    INR 1.99 (A) 1.94 (A) 2.18 (A) 1.69 (A) 1.40 (A)   (A) Abnormal value            Plan:   · Resolved      PDA (patent ductus arteriosus)  Assessment:  Infant admitted with significant oxygen requirements even after initial dose of surfactant.  Blood pressures with pulse pressure narrowing and decreasing in the initial 24 hour period. No murmur noted.   Echocardiogram 5/22 - Moderate sized PDA with bidirectional, yes mostly L to R flow.  Limited views of arch in light of PDA.  Normal L ventricular size and function.   Infant continues to have periods of  desaturation spontaneously and with activity, but had weaned on consistent FiO2 needs to the low 20s.  Repeat ECHO  with good function, moderate PDA with left to right shunt.  No left atrial enlargement, No runoff to thoracic aorta or abdominal aorta, Normal volumes in heart. CXR remains consistent with lung dysmaturity combined with possible additional circulation from PDA versus inflammatory cascade due to atelectotrauma, barotrauma from birth.  Vicky score less than 4.  Applying modified Vicky using clinical scale and EcHO findings.  Treatment should be considered if score > 6.    Infant worsening with atelectasis, on CPAP 8, 55% the morning of , gas 7.26/74/3.3 with anna sat 87.4%, so reintubated and placed back on vent.  - Repeat Echo (): persistent large PDA.  Pediatric Cardiology recommended treatment to close PDA medically.  ECHO with some LA enlargement.  - Indomethacin for treatment of PDA initiated 21.  Significant decrease in UOP after initial dose of indomethacin and subsequent doses not given.  - Neoprofen for PDA treatment -6/3. UOP/labs wnl.  - ECHO (): small PDA, normal LA size, function, left to right flow, PFO; Discussed results with Cardiology .  Plan to monitor for further closer.  Extubated .  If fails extubation plan to transfer to [place] for [place] or surgical closure.  Overall, he is clinically much improved.  No active precordium, pulse pressure normal, no bounding pulses.  PDA murmur negligible.  He no longer swings dramatically with Fi02 need with handling or skin to skin.  - ECHO (): tiny PDA (L to R), PFO, normal function  - Echo (): tiny PDA  - ECHO ): very small PDA, small PFO vs ASD both L-> R shunting, no evidence of elevated pulmonary pressures  -Family History - MGF with arrhythmia and  at 41, multiple heart attacks, both his brothers had same thing and only 1 is still alive. Also an infant who  suddenly for unknown reason in  that family. EKG on  was normal sinus rhythm, normal QTc interval.    Plan:  · Monitor clinically.  · Fluid restrict has been liberalized to ad claritza.  · ECHO monthly due to risk of PPHN with CLD  · Follow up with Pediatric Cardiology in 4 months      Direct hyperbilirubinemia,   Assessment:  34 week infant, home delivery with bruising.  Initial bilirubin 2.6, increased to 3.6 mg/dL .  Peak  at 8.5 mg/dL.  S/P Phototherapy (- ; -, 6/3-)  Elevated Direct Bili noted 21.  Most likely due to prolonged TPN administration; TPN DC'd 6/10.  LIVER FUNCTION TESTS:      Lab 21  0617   TOTAL PROTEIN 4.7   ALBUMIN 3.40*   GLOBULIN 1.3   ALT (SGPT) 13   AST (SGOT) 29   BILIRUBIN 0.9  0.9   INDIRECT BILIRUBIN 0.5   BILIRUBIN DIRECT 0.4*   ALK PHOS 271       Plan:  · Improving direct bili on full feedings.  · Problem resolved.      High risk social situation  Assessment:  Infant born at dad's home in [place].  Mom had been seeing a midwife near [place], [place].  Discussion with parents today, 21, initiated by the father focused on what happened according to them.  Dad and mom explained mom thought she was having [place] contractions, but then several minutes later she went to the bathroom and delivered the baby.  She wasn't sure how far along she was due to the fact she thought she had another period which is why she thought she was 29 weeks.  Dad explained the infant cried and moved like a normal baby after delivery.  He said he thought he might be small but was acting like babies they had before.  Mom said all of her other children were born at home.  They said they didn't have a diaper or clothes but mom did at her house so they drove with the baby to her house(not in a car seat). They both said the baby continued to cry and it wasn't until they were at mom's house in [place] they noticed he wasn't doing well and they brought him to the [place] ER.  They said that at the  [facility], they didn't bring them back right away, but they took good care of their son.  This was approximately 3 hours after the birth at the dad's house.  Dad said he was caught off guard by the involvement of the State and social work, but kind of understands, but wishes he had warning.  They were calm throughout the conversation.  Ibis Moreno, , was present.  It was explained that in situations like this where there is limited prenatal care, and a home birth, we get social work involved as well as the state.  They both said in hind site they see why their son needed help.  They said because he was crying that he was ok.  Mom said she tried nursing once.  Mom and dad asked appropriate questions.  - State  paid a courtesy visit on 21  - There have been no issues with parents.  Plan:  · Continue to involve social work.  · Continue to communicate with family daily.    Low birth weight  Assessment:  Infant delivered at ~ 29 weeks with birth weight of 1500 grams.  Plots st 87th percentile for weight and 74th percentile for OFC, on Kirti  boys scale, on admission.  Weight loss of 12.8% on 21. Regained BW by .  - 7 day weight gain of 6.3 gm/kg/day on 21  Plan:    · Monitor growth velocity.  · Maximize nutrition, as tolerated, for growth    Hyponatremia of   Assessment:  Infant with decreased UOP after Indomethacin dose given on 21 in the late afternoon.  Na decreased to 128 on 21. Hyponatremia most likely dilutional due to low UOP.  S/P Lasix X 1 after PRBC transfusion on 21. UOP normalized .  Lab Results   Component Value Date     2021    K 2021    CL 98 (L) 2021    CO2 29.0 (H) 2021     Plan:    · Resolved      Metabolic alkalosis with respiratory acidosis  Assessment: Infant born at 29w0d GA. Birth asphyxia at birth, RDS, and currently being treated for PDA. Infant received Indocin x 1 dose on  with  minimal UOP following so treatment stopped. Then treated with Neoprofen, infant is S/P 3 doses. Infant has required 4 PRBC transfusions thus far; receiving Lasix only 1 time post transfusion on . Infant with elevated bicarb on ABG and capillary gases and also increased serum CO2 6/3.  All acetate removed from TPN 6/3 (previously on 1 meq/kg in TPN).  Likely from prematurity and premature kidneys as infant is attempting to compensate for respiratory acidosis.   Lab Results   Component Value Date     2021    K 2021    CL 98 (L) 2021    CO2 29.0 (H) 2021    CBG () Bicarb 28    Plan:  · Resolved.    Cyanotic episodes in   Assessment:  Intermittent bradycardia/desaturation events.   CBC, CRP, UA normal on  following 8 events; infant transfused with improvement.   No events in the last 24 hours. Previously 1-3 events w/regurgitation and sitting in chair.   CARITO symptoms noted.  Seems to have more CARITO symptoms and events with EBM vs. Neosure.  Plan:  · Monitor events closely.  · Monitor respiratory effort and O2 requirement for need for increase support.  · Consider sepsis evaluation as indicated if frequency and severity of episodes increase.   · Must be event free for 5 days prior to discharge home    PFO (patent foramen ovale)  Assessment:  Please see PDA problem  - ECHO (): tiny PDA (L to R), PFO, normal function  - ECHO ): very small PDA, small PFO vs ASD both L-> R shunting     Plan:    · Monitor clinically  · Follow up with Pediatric Cardiology in 4 months    Carnitine deficiency (CMS/HCC)  Assessment: Repeat NBS on  reported low carnitine, 5.62 micormol/L(normal 7-70) and AC/Cit 0.75 (normal 1-9).  Dr. Lemos spoke with the genetic counselor at  Genetics office on 21.  Due to infant receiving the majority of nutrition from mom's breast milk, we need to send total and free carnitine on baby and mom.  Recommended starting Levocarnitine 50mg/kg  daily until results come back.  We are to call if questions, [phone] and fax results to [phone].  The counselor did not feel that CUD(Carnitine uptake deficiency) is likely due to not being on feeds for very long and the fact that majority of nutrition is mom's milk we need to check mom as well.  The counselor agreed mom's diet could be deficient.  Education given to mom and dad today,21. We will need to contact genetics to verify additional information from [facility] likelihood of Carnitine Uptake Deficiency. On Levocarnitine 50mg/kg daily -present (weight adjusted last )  - Total carnitine: 14(normal 16-63); Free carnitine: 10(normal 11-45); esterified/free: 0.4  - Mom's labs: Total carnitine: 26(normal 27-73); free carnitine: 21(20-55); esterified/free: 0.2  - Dr. Lemos spoke with genetics counselor : think he has mild carnitine deficiency that may be related to prematurity and/or mom's diet/breast milk.  Recommended continuing on current dose of levocarnitine. If we transition him to formula, can stop carnitine and recheck free and total carnitine 2 weeks after stopping.  Recommended mom begin taking 1500mg daily of carnitine.      Plan:  · Continue Levocarnitine 50mg/kg daily, weight adjusted 21  · If remains off breastmilk for prolonged period can stop Levocarnitine and recheck serum levels in two weeks.  · Mom to take 1500mg carnitine daily. She started 21      Assessment:  Infant found to have low carnitine on serum free and total testing.          Discharge Planning:        [place] Testing  CCHD     Car Seat Challenge Test     Hearing Screen       Screen       There is no immunization history for the selected administration types on file for this patient.      Expected Discharge Date: BRYNN    Social comments: Parents visiting often.  Family Communication: Update mother today.  Db's mobile number is [phone]      Shanae Amor MD  2021  11:30  CDT    Patient rounds conducted with Nurse Practitioner and Primary Care Nurse    This patient is under constant supervision by the healthcare team and is requiring intensive cardiac and respiratory monitoring, including frequent or continuous vital sign monitoring, maintenance of neutral thermal  environment and/or nutritional management. Current status and treatment is delineated in the above problem list.    Electronically signed by Shanae Amor MD at 21 1132     Alvaro Lemos MD at 21 1546           ICU Inborn Progress Notes      Age: 2 m.o. Follow Up Provider:  [facility]   Sex: male Admit Attending: Shanae Amor MD   ISHAAN:  Gestational Age: 29w0d BW: 1503 g (3 lb 5 oz)   Corrected Gest. Age:  39w 5d    Subjective   Overview:    1503g male infant, Negrito, born at ~29 weeks to a 35 yo mother with no prenatal care and home birth at father's home in [place], then brought to mother's house for diaper and then brought to [facility] ED in [place], [place] gasping and cold. Infant intubated and placed on vent there. ETT came out just as transport team arrived. Transport team replaced ETT, gave curosurf, placed on vent, placed UAC and low lying UVC, warmed infant and transported back to [facility] NICU for admission due to prematurity, RDS, thermal support and nutritional support needed.  Maternal Meds: unknown  Prenatal Labs: unknown due to no prenatal care.    Interval History:    Discussed with bedside nurse patient's course overnight. Nursing notes reviewed.    Status post 2 doses of curosurf.  History of coagulopathy and received cryoprecipitate, FFP and extra dose of Vitamin K in first 24 hours.  Coagulation labs improved with no more intervention.   Infant weaned Fi02 to mid 20's and attempt to extubate to Bubble CPAP and was not successful .  He was reintubated and placed back on ventilator.  Extubated to CPAP 6, then increased to Peep 7 on , 33% FiO2,  then increased to Peep 8 on morning of 5/30, but FiO2 increased to 55% and CXR with collapse of left lung and right upper lung with granularity and cbg 7.26/74/3.3 with 87.4% calc sats, so intubated and placed back on vent on 5/30 with rate 40, 22/6, IT 0.3, PS8 and FiO2 weaned to 26%.  ECHO report and discussion with cardiologist suggested we treat his PDA to try and close medically.  He has weaned on ventilator; but PICC infiltrated possibly influencing his new collapse on CXR on 6/2/21, no effusion identified.  His Fi02 is now down to 21-30%.  He had been on a rate of 60 on 5/30, but then on a rate of 35.  He was made NPO for PDA treatment.  After receiving first dose of Indomethacin, his urine output dropped off significantly and he became hyponatremic.  Total fluids were backed down to 110-120ml/kg/day morning of 5/31 and now at 130ml/kg/day.  UOP has picked up and returned to normal with Cr trending down. Then treated PDA with Neoprofen course and he improved.  Weaned ventilator more and CXR with better aeration and expanded to 10 ribs. Extubated to CPAP 6 on 6/4, then needed increase to Peep 8.  He is active, appearing alert, pink.  Peripheral PICC and peripheral arterial line removed 6/2/21 due to infiltration.   Central PICC attempted and successfully placed late 6/3/21 in left axilla and removed on 6/11.  CXR continued to look improved until post extubation when haziness returned, but clinically looked stable.  He was extubated 6/4 at approx. 3pm. CXR AM 6/5 improved with stable CBG.   ECHO 6/4 shows small PDA and less left to right flow. Echo on 6/9 shows trivial PDA.   He had been on Bubble CPAP +8 21-24% with good gas. Weaned peep to 7 on 6/9, FiO2 21%. Weaned to peep 6 on 6/11 and FiO2 has been 23-28. Failed wean to Peep 5 on 6/22.  His Fi02 seems to increase during gavage feeds and when assessed and with skin to skin. Gavage over 2 hours. Pulmicort started on 6/16-6/25. Spot dose lasix on 6/18. CXR  under inflated but less hazy on 6/19. Albuterol  6/19-6/25. He weaned to Bubble CPAP +4 6/30 and changed to Vapotherm 7/1/21 am. LFNC discontinued on 7/19, then restarted on 7/21 at 50 ml LFNC. Trial off NC on 7/27.  Oral steroids started afternoon 6/25.  He was on wall 02, 50ml nasal cannula. Trial off NC on 7/27.  He was more anemic 7/19, Hct 25 down from 29 a week ago.  His oral intake has fallen off likely due to anemia and fatigue.  He received 15ml/kg packed red blood cells on 7/19    2 event of bradycardia/desaturation in last 24 hours.    He is Po feeding 100%.  Continue ad claritza feedings.  Growth has fallen from 25th%tile to now 17th%tile after removing fortification.  Increased Neosure to 4x/day on 8/3 and then all Neosure late on 8/4.    Objective   Medications:     Scheduled Meds:  Cholecalciferol, 400 Units, Oral, Daily  glycerin, 1 mL, Rectal, Once  levOCARNitine, 50 mg/kg, Oral, Daily  Poly-Vitamin/Iron, 0.5 mL, Oral, BID      Continuous Infusions:      PRN Meds:   hepatitis B vaccine (recombinant)  •  phenylephrine    Devices, Monitoring, Treatments:     Lines, Devices, Monitoring and Treatments:  Umbilical Artery Catheter 05/22/21 - 5/28/21                                                    PICC Single Lumen (Ped/Gualberto) 05/23/21 Arm - 6/2/21   Site Assessment Clean;Dry;Intact 05/23/21 2100   Line Status Infusing 05/23/21 2000   Length ana (cm) 6.5 cm 05/23/21 2000   Extremity Circumference (cm) 7.5 cm 05/23/21 2000   Dressing Type Transparent 05/23/21 2000   Dressing Status Clean;Dry;Intact 05/23/21 2000   Dressing Intervention New dressing 05/23/21 1045   Indication/Daily Review of Necessity total parenteral nutrition;intravenous medication therapy 05/23/21 2000       [REMOVED] UVC Single Lumen 05/22/21 (Removed)-5/23/21   Site Assessment Clean;Dry;Intact 05/23/21 1100   Line Status Infusing 05/23/21 0800   Length ana (cm) 3.5 cm 05/23/21 0800   Line Care Connections checked and tightened 05/23/21  "0800   Dressing Type Transparent 05/23/21 0800   Dressing Status Clean;Dry;Intact 05/23/21 0800   Dressing Intervention New dressing 05/22/21 1100   Indication/Daily Review of Necessity intravenous medication therapy;total parenteral nutrition;intravenous fluid therapy 05/23/21 0800        ETT  5/23/21-5/28/21   ETT 5/30/21-6/4/21                           Left radial peripheral arterial Line 5/30- 6/2/21.    PICC(left axilla) 6/3/21- 2021.    Necessity of devices was discussed with the treatment team and continued or discontinued as appropriate: yes    Respiratory Support:     Room air since 7/27    Physical Exam:        Current: Weight: 3072 g (6 lb 12.4 oz) Birth Weight Change: 104%   Last HC: 13.78\" (35 cm)      PainScore:        Apnea and Bradycardia:     Bradycardia rate: No data recorded    Temp:  [98.1 °F (36.7 °C)-98.5 °F (36.9 °C)] 98.4 °F (36.9 °C)  Pulse:  [131-177] 146  Resp:  [30-55] 30  BP: (90-98)/(44-51) 90/44  SpO2 Current: SpO2  Min: 93 %  Max: 100 %    Heent: fontanelles are very soft and flat, some splaying of sutures   Respiratory: equal breath sounds bilaterally, adequate air exchange,  no retractions, no nasal flaring   Cardiovascular: RRR, S1 S2, occasional I/VI audible murmur, 2+ brachial and femoral pulses, brisk capillary refill   Abdomen: Soft, non tender,round, non-distended, no bowel sounds, no loops    : normal external genitalia, presence of penoscrotal webbin--25-30%   Extremities: well-perfused, warm and dry   Skin: no rashes, or bruising, no edema, pink   Neuro: Easy to arouse, improved activity & alertness, active     Radiology and Labs:      I have reviewed all the lab results for the past 24 hours. Pertinent findings reviewed in assessment and plan.  yes  Lab Results (last 24 hours)     ** No results found for the last 24 hours. **        I have reviewed all the imaging results for the past 24 hours. Pertinent findings reviewed in assessment and plan. yes    Intake and " Output:      Current Weight: Weight: 3072 g (6 lb 12.4 oz) Last 24hr Weight change: 62 g (2.2 oz)   Growth:    7 day weight gain: 6.3g/kg/day(8/2/21) (to be calculated on  and u)   Caloric Intake:  Kcal/kg/day     Intake:     Total Fluid Goal: ad claritza Total Fluid Actual: 158 ml/kg/day   Feeds: Maternal BM and Formula  Similac Neosure 60-61 ml q 3 hours  Fortified: No   Route:PO PO: 100% , BF x 0     IVF: none Blood Products: PRBC 15ml, PRBC 5/24-15ml/kg, PRBC 15ml/kg 5/31, PRBC 15ml/kg 6/2, PRBC 6/23 15ml/kg, PRBC 7/19;  cryoprecipitate 15 ml, FFP 15 ml, Vit K   Output:     UOP:  X 10 Emesis: x 5   Stool: x  0    Other: None         Assessment/Plan   Assessment and Plan:      Alteration in nutrition in infant  Assessment:  NPO on admission and placed on Vanilla TPN changed to D10P3.5L1 at ~85 ml/kg/day via low lying UVC and 1/2 NS with heparin at ~15 ml/kg/day via UAC. Blood glucose 51 on admission (44-91). UA on 5/22 with large blood (3+) with ALT/AST/AlkPhos 5/116/178, BUN/Cr 11/0.66. Repeat ALT/AST/AlkPhos 10/105/127 with BUN/Cr 31/0.75 on 5/23.  Low UVC noted hepatic placement and discontinued on 5/23.  Mother wishes to breast feed, UDS on 5/26 negative for mom.   -NPO 5/30 - 6/3 for treatment of PDA  -Prolacta 6/7-6/25  -SHMF 24 kcal/oz 6/23-present  -requires occasion glycerin for slow stooling.    Current Diet: Similac Neosure(changed late 8/4) - PO ad claritza took 60-61 ml for 158 ml/kg/d.  Breast fed X 0. Emesis X 5.  He seems to have more emesis with EBM in bottle.  Fortification: none  Access: Peripheral PICC (5/23-6/2 due to infiltration); PAL (5/30-6/2); PIV 6/2-6/3; Central PICC 6/4-6/11 (left axilla)  Rx: multivitamins with Fe 0.5ml Q12hr  Weekly growth velocity  6.3 gms/k/d (8/2/21). This is a downward trend since removing fortification from breast milk.  On 8/3 increased to 50:50 EBM/Neosure.    Lab Results   Component Value Date    GLUCOSE 82 (H) 2021    BUN 9 2021    CREATININE <0.17 (L)  2021    EGFRIFNONA  2021      Comment:      Unable to calculate GFR, patient age <18.    EGFRIFAFRI  2021      Comment:      Unable to calculate GFR, patient age <18.    BCR  2021      Comment:      Unable to calculate Bun/Crea Ratio.    K 2021    CO2 2021    CALCIUM 2021    ALBUMIN 2021    AST 34 2021    ALT 25 2021     Lab Results   Component Value Date    CALCIUM 2021    PHOS 7.0 (H) 2021     Lab Results   Component Value Date    ALKPHOS 316 2021       Plan:  · Now all Neosure PO with ad claritza volumes due to increased CARITO symptoms with EBM. No history of milk intolerance in family.  · Mom may still breastfeed if she is here.  · Consider changing to fortified elemental formula if CARITO becoming more symptomatic.  · Continue IDF feeding plan with speech consult  · Strict I/Os, monitoring urine output closely  · Monitor growth and optimize nutrition.  · RFP as needed.  · At risk for osteopenia of prematurity - CMP every 2 weeks and begin Vit D supplementation with 400 iu/day  · Continue multivitamins and iron supplementation.  · Encourage breastfeeding.  · Liquid glycerin [place] as needed for constipation      Ineffective thermoregulation in   Assessment:  Infant with no prenatal care, born at ~29 weeks with ineffective thermoregulation. Hypothermic (90') when mother brought to ED at [facility] in [place]. Thermal support given and infant now normothermic in giraffe isolette.  Incubator humidified per LBW protocol for first 14 days of life. Top up on incubator . Weaned to open crib   Plan:  · Resolved     Chronic lung disease in   Assessment:  Infant born at ~29 weeks with no prenatal care at father's home in [place]. Mother brought to her home and then brought infant gasping and cold to [facility] in [place], [place]. ED intubated and placed on vent. ETT dislodged just as  transport team arrived. Transport team reintubated and placed on vent, rate 40, 20/5, 70% FiO2 and given curosurf. Curosurf repeated 5/22 @2200.  CXR white out c/w severe surfactant deficiency. Upon arrival to [facility] NICU, infant placed on rate 40, 22/6, PS 8, IT 0.3 and 70% FiO2. CXR at [facility] showed improvement in surfactant deficiency, 8-9 rib expansion, ETT at T2, UAC at T6 and UVC in liver which was pulled back to better low lying position, and discontinued 5/24.   Infant able to wean from FiO2 ~0.5 to 0.25 after second dose of curosurf.  Attempted to extubate 5/24, to BCPaP of 6 cm, with increased work of breathing and significantly higher oxygen requirement.  Reintubated within 1 hour with 3.0 ETT. CXR with increased bilateral opacities.    Weaned from ventilator and extubated to BCPAP 5/28.  Extubated to CPAP 6, with increasing support over the next 2 days - FiO2 increased to 55% and CXR with collapse on left lung and right upper lung with granularity and cbg 7.26/74/3.3; so re- intubated and placed back on ventilator.  -Received dexamethasone 0.25mg/kg IV q 8 x 3 for extubation. Extubated 6/4 to BCPAP 6 and increased to 7cmH20; then to 8 d/t frequent desats and increased FiO2.   -Dr. Lemos discussed the merits and risks of starting Pulmicort or using systemic steroids. Given infant's clinical course with mechanical ventilation, PDA and treatment of PDA infant at increased risk for NEC.  It was prudent to take directed approach with Pulmicort.    -CXR (6/19): 7 ribs inflated (possible expiratory film), hazy  -S/P Pulmicort BID 6/16-6/25, Albuterol 6/19-6/25; DC'd when prednisolone started.  -S/P Lasix on 6/15 d/t increased fio2 then S/P Lasix X 1 6/18/21, 6/23 (S/P PRBC transfusion)  -Rx: prednisolone started 6/25/21 at 1mg/kg q 12 for 6 doses. To be followed by 1mg/kg daily for 3 days, followed by 1mg/kg q 48 for two doses.  Last dose given 7/6/21.  -Tolerated wean to VapoTherm on 7/1/21.  -Room air trial 7/6,  "infant with desaturations and started on NC at 0.25 LPM. To room air 7/19/21, after PRBC transfusion.  Nasal cannula support resumed 7/21/21 due to saturations drifting to 80's persistently.  -Echo on 7/23 to evaluate for Pul HTN in face of CLD showed very small PDA, PFO vs ASD, no evidence for pulmonary HTN.  -Weaned to room air on 7/27 from NC wall O2. Currently on room air      Plan:  · Monitor respiratory effort and oxygen saturations closely.  · CBG as needed  · Consider repeat lasix dose and/or chronic diuretics  · Synagis Candidate - Due to elevated RSV cases, will give Synagis prior to discharge as at risk due to CLD and 29 week gestation at birth.  · Repeat ECHO 8/23 if still with O2 requirement to evaluate for development of Pul HTN related to CLD    Prematurity, birth weight 1,500-1,749 grams, with 29 completed weeks of gestation  Assessment:  Baby boy \"Mervat" is the 1503g male infant born at ~29 weeks to a 37 yo mother with no prenatal care and home birth at father's home in [place], then brought to mother's house for diaper and then brought to [facility] ED in [place], [place] gasping and cold. Infant intubated and placed on vent there. ETT came out just as transport team arrived. Transport team replaced ETT, gave curosurf, placed on vent, placed UAC and low lying UVC, warmed infant and transported back to [facility] NICU for admission due to prematurity, RDS, thermal support and nutritional support needed.  Maternal Meds: unknown  Prenatal Labs: no prenatal care.  - Infant's UDS: negative  - Meconium drug screen: negative  - HUS 5/24, 5/27 - no IVH  - Mother's labs obtained on 5/24 by Dr. Lemos: Blood type A+(JOHANN neg), HepBsAg negative, Hep C Antibody negative, Hiv negative, HSV 1 +, HSV 2 negative, Rubella Immune and RPR NR.  - CCHD- see ECHO finding under PDA/PFO.  - HBIG had been given due to unknown maternal HBsAg status.  Mother is negative for HBsAg therefore we can wait to give " Hepatitis B vaccine per protocol.  Mother refuses 30 day Hep B vaccine. Discuss 60 day vaccinations with mother  - [facility] screen sent 21, prior to initial PRBC transfusion: normal; repeated on full feeds on  - see abnormal NBS problem  - Free T4/TSH on  - 1.22/3.28  - HUS on  - no PVL  - ROP exam  per Dr. 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  Bedside exam done 21 with Ret Cam 3 revealed mature retinas with follow up in 6 months.  - Dr. Lemos has had several discussions with mom and dad regarding 2 month vaccinations.  The risks/benefits have been discussed.  At this time they are declining however they will likely elect to get Synagis for Negrito.  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  Parents educated.  - Some elevated blood pressures which are inconsistent.  Plan:  · Continuous CR monitor and pulse ox  · Car seat test hearing test per protocol  · [facility] with [facility].  · OT consult due to prematurity  · Speech consult due to prematurity  · Social work consult due to no prenatal care and home birth and for resource identification  · Hep B Vaccine at 30 days of age with maternal consent -  Mother continues to decline.  · Parents continue to decline 2 months immunizations.   · Synagis candidate - Due to elevated RSV cases, will give Synagis prior to discharge as at risk due to CLD and 29 week gestation at birth, if mother consents.  · Follow up with Ophthalmology in 6 months to evaluate for strabismus and amblyopia.  · Refer to pediatric Urology if parents wish for circumcision as outpatient.  · Consider renal US with doppler and/or further work up if blood pressures become more persistently elevated.         anemia  Assessment:  Initial Hct was 34 at [facility], NS bolus given due to lower blood pressure. Transfused  with PRBC , , 21, ,  and  @ 15 ml/kg.  Lab Results   Component Value Date    HCT 2021     Lab Results   Component Value Date    HGB 2021     Lab Results   Component Value Date    RETICCTPCT 2021     Plan:  · Repeat CBC, retic count 1-2 weeks, or sooner, if needed.  · Minimize iatrogenic blood losses as possible.  · Continue Fe supplementation    Birth asphyxia  Assessment:  Infant delivered at father's house in [place] and parents brought to mother's house for diaper, then went to [facility]. Infant gasping and cold upon arrival. No prenatal care. Plan is understood for home birth with a midwife. Unknown maternal labs.  - Urine drug screen negative.  - Head US without evidence of IVH on  and negative for IVH on   - NPO x 4 days after birth.  Started trophic feeds  then NPO again for blood then PDA treatment -21.    - Meconium drug screen negative  -U/A with 3+blood on admission  Plan:  · Will need  follow up on discharge.    Apnea of prematurity  Assessment:  Infant delivered at home, at 29 weeks estimated gestation.  At high risk for apnea of prematurity.  Infant loaded with Caffeine 20 mg/kg on  and continued daily Caffeine at 10 mg/kg until 21.    Plan:      · Resolved       Coagulopathy (CMS/HCC)  Assessment:  Coag studies ordered on admission; Never active external bleeding noted.  Infant at high risk for IVH.  S/P cryoprecipitate and FFP transfusions.  HUS - no hemorrhage noted on  scan.   Coagulopathy continues to improve.  H/H stable.      Lab 21  0552 21  0551 21  2102 21  0522 21  1848 21  0457 21  1804 21  0812 21  0603 21  0000 21  2000 21  1028   HEMOGLOBIN 14.1*  --   --   --   --  11.7* 12.3*  --  13.3*  --  14.9 12.2*   HEMATOCRIT 40.3*  --   --   --   --  35.1* 35.9*  --  38.4*  --  43.7* 35.4*   PLATELETS 166  --   --    --   --  179 157  --  155  --  152 185   PROTIME  --   --   --   --   --  16.1* 18.6* 22.7*  --  20.7*  --  21.1*   APTT  --   --   --   --   --  41.5* 41.7* 47.3*  --  45.7*  --  60.0*   CREATININE 0.35 0.44 0.40 0.37 0.37 0.56 0.65  --  0.75  --  0.71 0.66   Fibrinogen                                      642                                                   99  INR    Common Labsle 5/22/21 5/23/21 5/23/21 5/23/21 5/24/21     0000 0812 1804    INR 1.99 (A) 1.94 (A) 2.18 (A) 1.69 (A) 1.40 (A)   (A) Abnormal value            Plan:   · Resolved      PDA (patent ductus arteriosus)  Assessment:  Infant admitted with significant oxygen requirements even after initial dose of surfactant.  Blood pressures with pulse pressure narrowing and decreasing in the initial 24 hour period. No murmur noted.   Echocardiogram 5/22 - Moderate sized PDA with bidirectional, yes mostly L to R flow.  Limited views of arch in light of PDA.  Normal L ventricular size and function.   Infant continues to have periods of desaturation spontaneously and with activity, but had weaned on consistent FiO2 needs to the low 20s.  Repeat ECHO 5/25 with good function, moderate PDA with left to right shunt.  No left atrial enlargement, No runoff to thoracic aorta or abdominal aorta, Normal volumes in heart. CXR remains consistent with lung dysmaturity combined with possible additional circulation from PDA versus inflammatory cascade due to atelectotrauma, barotrauma from birth.  Vicky score less than 4.  Applying modified Vicky using clinical scale and EcHO findings.  Treatment should be considered if score > 6.    Infant worsening with atelectasis, on CPAP 8, 55% the morning of 5/30, gas 7.26/74/3.3 with anna sat 87.4%, so reintubated and placed back on vent.  - Repeat Echo (5/30): persistent large PDA.  Pediatric Cardiology recommended treatment to close PDA medically.  ECHO with some LA enlargement.  - Indomethacin for treatment of PDA initiated  21.  Significant decrease in UOP after initial dose of indomethacin and subsequent doses not given.  - Neoprofen for PDA treatment -6/3. UOP/labs wnl.  - ECHO (): small PDA, normal LA size, function, left to right flow, PFO; Discussed results with Cardiology .  Plan to monitor for further closer.  Extubated .  If fails extubation plan to transfer to [place] for David or surgical closure.  Overall, he is clinically much improved.  No active precordium, pulse pressure normal, no bounding pulses.  PDA murmur negligible.  He no longer swings dramatically with Fi02 need with handling or skin to skin.  - ECHO (): tiny PDA (L to R), PFO, normal function  - Echo (): tiny PDA  - ECHO ): very small PDA, small PFO vs ASD both L-> R shunting, no evidence of elevated pulmonary pressures  -Family History - MGF with arrhythmia and  at 41, multiple heart attacks, both his brothers had same thing and only 1 is still alive. Also an infant who  suddenly for unknown reason in that family. EKG on  was normal sinus rhythm, normal QTc interval.    Plan:  · Monitor clinically.  · Fluid restrict has been liberalized to ad claritza.  · ECHO monthly due to risk of PPHN with CLD  · Follow up with Pediatric Cardiology in 4 months      Direct hyperbilirubinemia,   Assessment:  34 week infant, home delivery with bruising.  Initial bilirubin 2.6, increased to 3.6 mg/dL .  Peak  at 8.5 mg/dL.  S/P Phototherapy (- ; -, 6/3-)  Elevated Direct Bili noted 21.  Most likely due to prolonged TPN administration; TPN DC'd 6/10.  LIVER FUNCTION TESTS:      Lab 21  0617   TOTAL PROTEIN 4.7   ALBUMIN 3.40*   GLOBULIN 1.3   ALT (SGPT) 13   AST (SGOT) 29   BILIRUBIN 0.9  0.9   INDIRECT BILIRUBIN 0.5   BILIRUBIN DIRECT 0.4*   ALK PHOS 271       Plan:  · Improving direct bili on full feedings.  · Problem resolved.      High risk social situation  Assessment:  Infant born at dad's home in  [place].  Mom had been seeing a midwife near [place], [place].  Discussion with parents today, 5/27/21, initiated by the father focused on what happened according to them.  Dad and mom explained mom thought she was having Gregory Corona contractions, but then several minutes later she went to the bathroom and delivered the baby.  She wasn't sure how far along she was due to the fact she thought she had another period which is why she thought she was 29 weeks.  Dad explained the infant cried and moved like a normal baby after delivery.  He said he thought he might be small but was acting like babies they had before.  Mom said all of her other children were born at home.  They said they didn't have a diaper or clothes but mom did at her house so they drove with the baby to her house(not in a car seat). They both said the baby continued to cry and it wasn't until they were at mom's house in [place] they noticed he wasn't doing well and they brought him to the [facility].  They said that at the [facility], they didn't bring them back right away, but they took good care of their son.  This was approximately 3 hours after the birth at the dad's house.  Dad said he was caught off guard by the involvement of the State and social work, but kind of understands, but wishes he had warning.  They were calm throughout the conversation.  Ibis Moreno, , was present.  It was explained that in situations like this where there is limited prenatal care, and a home birth, we get social work involved as well as the state.  They both said in hind site they see why their son needed help.  They said because he was crying that he was ok.  Mom said she tried nursing once.  Mom and dad asked appropriate questions.  - State  paid a courtesy visit on 6/2/21  - There have been no issues with parents.  Plan:  · Continue to involve social work.  · Continue to communicate with family daily.    Low birth  weight  Assessment:  Infant delivered at ~ 29 weeks with birth weight of 1500 grams.  Plots st 87th percentile for weight and 74th percentile for OFC, on [place]  boys scale, on admission.  Weight loss of 12.8% on 21. Regained BW by .  - 7 day weight gain of 6.3 gm/kg/day on 21  Plan:    · Monitor growth velocity.  · Maximize nutrition, as tolerated, for growth    Hyponatremia of   Assessment:  Infant with decreased UOP after Indomethacin dose given on 21 in the late afternoon.  Na decreased to 128 on 21. Hyponatremia most likely dilutional due to low UOP.  S/P Lasix X 1 after PRBC transfusion on 21. UOP normalized .  Lab Results   Component Value Date     2021    K 2021    CL 98 (L) 2021    CO2 29.0 (H) 2021     Plan:    · Resolved      Metabolic alkalosis with respiratory acidosis  Assessment: Infant born at 29w0d GA. Birth asphyxia at birth, RDS, and currently being treated for PDA. Infant received Indocin x 1 dose on  with minimal UOP following so treatment stopped. Then treated with Neoprofen, infant is S/P 3 doses. Infant has required 4 PRBC transfusions thus far; receiving Lasix only 1 time post transfusion on . Infant with elevated bicarb on ABG and capillary gases and also increased serum CO2 6/3.  All acetate removed from TPN 6/3 (previously on 1 meq/kg in TPN).  Likely from prematurity and premature kidneys as infant is attempting to compensate for respiratory acidosis.   Lab Results   Component Value Date     2021    K 2021    CL 98 (L) 2021    CO2 29.0 (H) 2021    CBG () Bicarb 28    Plan:  · Resolved.    Cyanotic episodes in   Assessment:  Intermittent bradycardia/desaturation events.   CBC, CRP, UA normal on  following 8 events; infant transfused with improvement.   -2 events in the last 24 hours, w/regurgitation and sitting in chair.  Previously 1-3 events.  CARITO  symptoms noted.  Seems to have more CARITO symptoms and events with EBM vs. Neosure.  Plan:  · Monitor events closely.  · Monitor respiratory effort and O2 requirement for need for increase support.  · Consider sepsis evaluation as indicated if frequency and severity of episodes increase.   · Must be event free for 5 days prior to discharge home    PFO (patent foramen ovale)  Assessment:  Please see PDA problem  - ECHO (6/9): tiny PDA (L to R), PFO, normal function  - ECHO 7/23): very small PDA, small PFO vs ASD both L-> R shunting     Plan:    · Monitor clinically  · Follow up with Pediatric Cardiology in 4 months    Carnitine deficiency (CMS/HCC)  Assessment: Repeat NBS on 6/13 reported low carnitine, 5.62 micormol/L(normal 7-70) and AC/Cit 0.75 (normal 1-9).  Dr. Lemos spoke with the genetic counselor at  Genetics office on 6/16/21.  Due to infant receiving the majority of nutrition from mom's breast milk, we need to send total and free carnitine on baby and mom.  Recommended starting Levocarnitine 50mg/kg daily until results come back.  We are to call if questions, [phone] and fax results to [phone].  The counselor did not feel that CUD(Carnitine uptake deficiency) is likely due to not being on feeds for very long and the fact that majority of nutrition is mom's milk we need to check mom as well.  The counselor agreed mom's diet could be deficient.  Education given to mom and dad today,6/16/21. We will need to contact genetics to verify additional information from [facility] likelihood of Carnitine Uptake Deficiency. On Levocarnitine 50mg/kg daily 6/16-present (weight adjusted last 7/19)  - Total carnitine: 14(normal 16-63); Free carnitine: 10(normal 11-45); esterified/free: 0.4  - Mom's labs: Total carnitine: 26(normal 27-73); free carnitine: 21(20-55); esterified/free: 0.2  - Dr. Lemos spoke with genetics counselor 6/26: think he has mild carnitine deficiency that may be related to prematurity and/or  mom's diet/breast milk.  Recommended continuing on current dose of levocarnitine. If we transition him to formula, can stop carnitine and recheck free and total carnitine 2 weeks after stopping.  Recommended mom begin taking 1500mg daily of carnitine.      Plan:  · Continue Levocarnitine 50mg/kg daily, weight adjusted 21  · If remains off breastmilk for prolonged period can stop Levocarnitine and recheck serum levels in two weeks.  · Mom to take 1500mg carnitine daily. She started 21      Assessment:  Infant found to have low carnitine on serum free and total testing.          Discharge Planning:         Testing  CCHD     Car Seat Challenge Test     Hearing Screen       Screen       There is no immunization history for the selected administration types on file for this patient.      Expected Discharge Date: BRYNN    Social comments: Parents visiting often.  Family Communication: Updated mother today on the phone.  Db's mobile number is [phone]      Alvaro Lemos MD  2021  15:46 CDT    Patient rounds conducted with Nurse Practitioner and Primary Care Nurse    This patient is under constant supervision by the healthcare team and is requiring intensive cardiac and respiratory monitoring, including frequent or continuous vital sign monitoring, maintenance of neutral thermal  environment and/or nutritional management. Current status and treatment is delineated in the above problem list.    Electronically signed by Alvaro Lemos MD at 21 5685

## 2021-01-15 NOTE — ED ADULT TRIAGE NOTE - NSTRIAGECARE_GEN_A_ER
Problem: MECHANICAL VENTILATION  Goal: Normal spontaneous ventilation  1/13/2021 0853 by Nicolasa Pandey RCP  Outcome: Ongoing   Vent setting optimized to achieve target tidal volume, respiratory rate and ideal oxygen saturations. SBT will be performed when appropriate.
Problem: MECHANICAL VENTILATION  Goal: Normal spontaneous ventilation  Outcome: Ongoing  Note: Pt comfortable on current vent settings. Will monitor for pt comfort and to achieve adequate Vt.
Problem: MECHANICAL VENTILATION  Goal: Normal spontaneous ventilation  Outcome: Ongoing  Note: Vent setting optimized to achieve target tidal volume, respiratory rate and ideal oxygen saturations. SBT will be performed when appropriate.
1:1

## 2021-03-16 NOTE — ED CDU PROVIDER DISPOSITION NOTE - NS_OBSORDERDATE_ED_A_ED
11-Sep-2020 18:38 Informed patient that order for MRI is in. Patient concerned about having insurance approval for imaging. Informed her of process- her scheduling the exam prompting our office to get prior authorization. CS number confirmed. All questions answered.

## 2021-04-09 NOTE — ED BEHAVIORAL HEALTH ASSESSMENT NOTE - ORIENTED TO PERSON
Gastrointestinal Esophagogastroduodenoscopy (EGD) - Upper Exam Discharge Instructions    1. Call [facility] for any problems or questions. 2. Contact the doctor's office for follow up appointment as directed. 3. Medication may cause drowsiness for several hours, therefore:  · Do not drive or operate machinery for remainder of the day. · No alcohol today. · Do not make any important or legal decisions for 24 hours. · Do not sign any legal documents for 24 hours. 5. Ordinarily, you may resume regular diet and activity after exam unless otherwise              specified by your physician. 6. For mild soreness in your throat you may use Cepacol throat lozenges or warm               salt-water gargles as needed. Any additional instructions:    PLAN:  1. Follow-up pathology  2. Avoid NSAID use  3. Continue pantoprazole 20 mg PO daily  4. Repeat EGD in 1 year at hospital for surveillance of varix  5. Follow-up in office in 8 weeks     Patient Education        Esophageal Varices: Care Instructions  Your Care Instructions     Esophageal varices (say \"ee-[id number]-uh-ALIYAH-ul [id number]\") are veins in your esophagus that are bigger than normal. Your esophagus is a tube. It carries food from your throat to your stomach. These veins get big because of extra pressure. This pressure makes the walls of the veins weak. Then they can rupture and cause very serious bleeding. This problem is usually found in people who have serious liver disease. Treatments include medicines and procedures to help lower the pressure in the veins. Talk with your doctor about the best treatment for you. If you have bleeding from this problem, there is a risk that it will happen again. In this case, it's important to go back and follow up with your doctor. Follow-up care is a key part of your treatment and safety. Be sure to make and go to all appointments, and call your doctor if you are having problems.  It's also a good idea to know your test results and keep a list of the medicines you take. How can you care for yourself at home? · Be safe with medicines. Take your medicines exactly as prescribed. Call your doctor if you think you are having a problem with your medicine. You will get more details on the specific medicines your doctor prescribes. · Talk to your doctor before you take any other medicines. These include over-the-counter medicines, vitamins, and herbal products. · Avoid aspirin, ibuprofen (Advil, Motrin), and naproxen (Aleve). These can cause sores in your stomach or esophagus. · Do not drink alcohol. It increases your risk of bleeding. It can also make liver damage worse. Tell your doctor if you need help to quit. Counseling, support groups, and sometimes medicines can help you stay sober. When should you call for help? Call 911 anytime you think you may need emergency care. For example, call if:    · You have trouble breathing.     · You vomit blood or what looks like coffee grounds. Call your doctor now or seek immediate medical care if:    · You feel very sleepy or confused.     · You have new or worse belly pain.     · You have a fever.     · There is a new or increasing yellow tint to your skin or the whites of your eyes.     · You have any abnormal bleeding, such as:  ? Nosebleeds. ? Vaginal bleeding that is different (heavier, more frequent, at a different time of the month) than what you are used to.  ? Bloody or black stools, or rectal bleeding. ? Bloody or pink urine. Watch closely for changes in your health, and be sure to contact your doctor if:    · You have any problems.     · Your belly is getting bigger.     · You are gaining weight. Where can you learn more? Go to http://www.gray.com/  Enter [id number] in the search box to learn more about \"Esophageal Varices: Care Instructions. \"  Current as of: April 15, 2020               Content Version: 12.8  © 2932-0967 [employer]. Care instructions adapted under license by [employer] (which disclaims liability or warranty for this information). If you have questions about a medical condition or this instruction, always ask your healthcare professional. [address] any warranty or liability for your use of this information. Yes

## 2021-05-19 NOTE — ED ADULT NURSE NOTE - ALCOHOL USE HISTORY SINGLE SELECT
From: Oliva Whitaker  To: Padmaja Gonzalez  Sent: 5/19/2021 5:55 AM CDT  Subject: Non-Urgent Medical Question    This message is being sent by Precious JONES on behalf of Oliva Whitaker.    So Alma has these strange Lil bumps on her knees should I be concerned about it it's just on one knee   yes...

## 2021-06-21 NOTE — ED ADULT NURSE NOTE - GROOMING
Arava Pregnancy And Lactation Text: This medication is Pregnancy Category X and is absolutely contraindicated during pregnancy. It is unknown if it is excreted in breast milk. Fair

## 2021-06-24 NOTE — ED ADULT NURSE REASSESSMENT NOTE - STATUS
awaiting consult post-procedure radiography performed central line located in the superior vena cava chest tube in correct position

## 2021-07-05 NOTE — ED BEHAVIORAL HEALTH ASSESSMENT NOTE - TREATMENT
VIDEO VISIT PROGRESS NOTE      CHIEF COMPLAINT  Chief Complaint   Patient presents with   • Video Visit   • Derm Problem       SUBJECTIVE  Westley Escobedo is a 32 year old/male who is requesting a Video Visit (V-Visit) for evaluation of a cold sore.  Patient states that yesterday he started noticing a cold sore coming on on his left upper lip.  He has placed Abreva on it a few times today.  He also feels that there is another 1 starting in his mid upper lip given the tingling sensation.  He states both areas are slightly pruritic.  There is no drainage or signs and symptoms of infection at this point.  He denies any lip swelling, abdominal pain, appetite loss, fever, chills, GI issues, URI symptoms.  He denies any swelling of tongue or throat, breathing issues.  He does have a history of cold sores, and was last seen on 05/14/2021 in the walking [facility].  At that time he was given a 1 day dose of medication which he tolerated well.  I reviewed that epic note in detail.    MEDICATIONS  Current Outpatient Medications   Medication Sig Dispense Refill   • Valacyclovir HCl 1000 MG Tab Take 2 tablets by mouth 2 times daily for 1 day. Indications: Herpes Simplex Affecting the Lip 4 tablet 0   • fluticasone (FLONASE) 50 MCG/ACT nasal spray Spray 2 sprays in each nostril daily. 16 g 3   • pantoprazole (PROTONIX) 40 MG tablet Take 1 tablet by mouth daily. 90 tablet 1   • clobetasol (TEMOVATE) 0.05 % ointment Apply topically 2 times daily. 60 g 3     No current facility-administered medications for this visit.       HISTORIES  I have personally reviewed and updated the following electronic medical record sections: Current medications, Allergies, Problem list, Past Medical History, Past Surgical History, Social History and Family History    REVIEW OF SYSTEMS  GENERAL:  Patient denies fever, chills, tiredness, malaise, weight loss, weight gain.  EYES:  Patient denies blurred vision, double vision, pain.   NEUROLOGIC:  Patient  denies tremors, dizzy spells, numbness, tingling.  ENDOCRINE:  Patient denies excessive thirst, heat intolerance, tired/sluggishness.  GASTROINTESTINAL:  Patient denies abdominal pain, nausea/vomiting, indigestion/heartburn, constipation.  CARDIOVASCUALR:  Patient denies chest pain, shortness of breath, palpitations.  SKIN:  SEE HPI  MUSCULOSKELETAL:  Patient denies joint pain, neck pain, back pain.  ENT/MOUTH:  Patient denies ear infections, sore throats, sinus problems.  :  Patient denies urine retention, painful urination, urinary frequency, blood in urine.  RESPIRATORY:  Patient denies wheezing, frequent cough, shortness of breath.      PHYSICAL EXAM  Information acquired with patient assistance, demonstration and feedback due to two-way video visit method of visit.  Vitals: See recorded vitals.   General: Alert & Oriented x 3. No acute distress noted. Patient appears comfortable and talking appropriately.   Lungs: Normal breathing, no audible wheezing or SOB noted during visit.   Neuro: Speech normal and A&O x 3.   Skin:  On his left upper lip area there is an ulcerated erythematous lesion.  There is mild swelling noted.  The area is covered with medicated Abreva.  Patient also pointed to his mid upper lip stating that there is a tingling sensation, although there is no lesion present thus far.        ASSESSMENT/PLAN  Herpes labialis  - Valacyclovir HCl 1000 MG Tab; Take 2 tablets by mouth 2 times daily for 1 day. Indications: Herpes Simplex Affecting the Lip  Dispense: 4 tablet; Refill: 0    History of herpes labialis    Symptom Management Recommended: All directions given to patient   - Advil/Tylenol as directed on package for fever, pain and body aches PRN.  - Increase fluid intake and rest.   - Wash hands frequently.   - Continue Abreva as directed to areas    MEDICAL DECISION MAKING  This is an acute episode of herpes labialis in a patient with a history of it. I have initiated an antiviral for his acute  episode given that this is within the 48-72 hour ta in hopes that this will help lessen this acute episode. Given that his last acute episode was about 2 months ago, I did discuss possibly f/u with PCP to discuss suppressive therapy to help prevent recurrence in the future.    -I discussed with the patient warning signs and symptoms for an in person evaluation if needed.   -Proper use and side effects of any medication prescribed at today's visit was discussed in detail.   -Phone number for the nurses line was provided in the patient instruction area and explained to the patient that that is the number to call for any questions or concerns regarding today's visit.   -If the patient has any questions at any time about their diagnosis, disease process or progression, and/or lack of response to the medication, they are encouraged to contact their PCP, seek an Urgent Care Visit or go to the Emergency Department for further evaluation.        FOLLOW UP  Return for As needed .    CONSENT   This visit is being performed virtually.  Clinician Location: [place]  Westley's Location: [place]      MAXIMO Mancini  07/05/21  5:01 PM         denies

## 2021-08-30 ENCOUNTER — EMERGENCY (EMERGENCY)
Facility: HOSPITAL | Age: 61
LOS: 1 days | Discharge: OTHER INSTITUTION | End: 2021-08-30
Attending: EMERGENCY MEDICINE
Payer: MEDICARE

## 2021-08-30 VITALS
DIASTOLIC BLOOD PRESSURE: 67 MMHG | TEMPERATURE: 99 F | SYSTOLIC BLOOD PRESSURE: 186 MMHG | HEIGHT: 66 IN | RESPIRATION RATE: 16 BRPM | HEART RATE: 78 BPM | WEIGHT: 169.98 LBS | OXYGEN SATURATION: 98 %

## 2021-08-30 DIAGNOSIS — F20.0 PARANOID SCHIZOPHRENIA: ICD-10-CM

## 2021-08-30 DIAGNOSIS — F32.9 MAJOR DEPRESSIVE DISORDER, SINGLE EPISODE, UNSPECIFIED: ICD-10-CM

## 2021-08-30 LAB
ALBUMIN SERPL ELPH-MCNC: 4.7 G/DL — SIGNIFICANT CHANGE UP (ref 3.3–5.2)
ALP SERPL-CCNC: 139 U/L — HIGH (ref 40–120)
ALT FLD-CCNC: 24 U/L — SIGNIFICANT CHANGE UP
ANION GAP SERPL CALC-SCNC: 15 MMOL/L — SIGNIFICANT CHANGE UP (ref 5–17)
APAP SERPL-MCNC: <3 UG/ML — LOW (ref 10–26)
APPEARANCE UR: CLEAR — SIGNIFICANT CHANGE UP
AST SERPL-CCNC: 25 U/L — SIGNIFICANT CHANGE UP
BACTERIA # UR AUTO: NEGATIVE — SIGNIFICANT CHANGE UP
BASOPHILS # BLD AUTO: 0.03 K/UL — SIGNIFICANT CHANGE UP (ref 0–0.2)
BASOPHILS NFR BLD AUTO: 0.5 % — SIGNIFICANT CHANGE UP (ref 0–2)
BILIRUB SERPL-MCNC: 0.2 MG/DL — LOW (ref 0.4–2)
BILIRUB UR-MCNC: NEGATIVE — SIGNIFICANT CHANGE UP
BUN SERPL-MCNC: 13.9 MG/DL — SIGNIFICANT CHANGE UP (ref 8–20)
CALCIUM SERPL-MCNC: 10.9 MG/DL — HIGH (ref 8.6–10.2)
CHLORIDE SERPL-SCNC: 106 MMOL/L — SIGNIFICANT CHANGE UP (ref 98–107)
CO2 SERPL-SCNC: 20 MMOL/L — LOW (ref 22–29)
COLOR SPEC: YELLOW — SIGNIFICANT CHANGE UP
COVID-19 SPIKE DOMAIN AB INTERP: POSITIVE
COVID-19 SPIKE DOMAIN ANTIBODY RESULT: >250 U/ML — HIGH
CREAT SERPL-MCNC: 1.18 MG/DL — SIGNIFICANT CHANGE UP (ref 0.5–1.3)
DIFF PNL FLD: NEGATIVE — SIGNIFICANT CHANGE UP
EOSINOPHIL # BLD AUTO: 0.03 K/UL — SIGNIFICANT CHANGE UP (ref 0–0.5)
EOSINOPHIL NFR BLD AUTO: 0.5 % — SIGNIFICANT CHANGE UP (ref 0–6)
EPI CELLS # UR: SIGNIFICANT CHANGE UP
ETHANOL SERPL-MCNC: <10 MG/DL — SIGNIFICANT CHANGE UP (ref 0–9)
GLUCOSE SERPL-MCNC: 97 MG/DL — SIGNIFICANT CHANGE UP (ref 70–99)
GLUCOSE UR QL: NEGATIVE MG/DL — SIGNIFICANT CHANGE UP
HCT VFR BLD CALC: 40.8 % — SIGNIFICANT CHANGE UP (ref 39–50)
HGB BLD-MCNC: 14 G/DL — SIGNIFICANT CHANGE UP (ref 13–17)
IMM GRANULOCYTES NFR BLD AUTO: 0.2 % — SIGNIFICANT CHANGE UP (ref 0–1.5)
KETONES UR-MCNC: NEGATIVE — SIGNIFICANT CHANGE UP
LEUKOCYTE ESTERASE UR-ACNC: NEGATIVE — SIGNIFICANT CHANGE UP
LYMPHOCYTES # BLD AUTO: 1.22 K/UL — SIGNIFICANT CHANGE UP (ref 1–3.3)
LYMPHOCYTES # BLD AUTO: 21.3 % — SIGNIFICANT CHANGE UP (ref 13–44)
MCHC RBC-ENTMCNC: 30 PG — SIGNIFICANT CHANGE UP (ref 27–34)
MCHC RBC-ENTMCNC: 34.3 GM/DL — SIGNIFICANT CHANGE UP (ref 32–36)
MCV RBC AUTO: 87.6 FL — SIGNIFICANT CHANGE UP (ref 80–100)
MONOCYTES # BLD AUTO: 0.47 K/UL — SIGNIFICANT CHANGE UP (ref 0–0.9)
MONOCYTES NFR BLD AUTO: 8.2 % — SIGNIFICANT CHANGE UP (ref 2–14)
NEUTROPHILS # BLD AUTO: 3.98 K/UL — SIGNIFICANT CHANGE UP (ref 1.8–7.4)
NEUTROPHILS NFR BLD AUTO: 69.3 % — SIGNIFICANT CHANGE UP (ref 43–77)
NITRITE UR-MCNC: NEGATIVE — SIGNIFICANT CHANGE UP
PCP SPEC-MCNC: SIGNIFICANT CHANGE UP
PH UR: 5 — SIGNIFICANT CHANGE UP (ref 5–8)
PLATELET # BLD AUTO: 240 K/UL — SIGNIFICANT CHANGE UP (ref 150–400)
POTASSIUM SERPL-MCNC: 4.4 MMOL/L — SIGNIFICANT CHANGE UP (ref 3.5–5.3)
POTASSIUM SERPL-SCNC: 4.4 MMOL/L — SIGNIFICANT CHANGE UP (ref 3.5–5.3)
PROT SERPL-MCNC: 7.7 G/DL — SIGNIFICANT CHANGE UP (ref 6.6–8.7)
PROT UR-MCNC: 30 MG/DL
RBC # BLD: 4.66 M/UL — SIGNIFICANT CHANGE UP (ref 4.2–5.8)
RBC # FLD: 13.2 % — SIGNIFICANT CHANGE UP (ref 10.3–14.5)
RBC CASTS # UR COMP ASSIST: NEGATIVE /HPF — SIGNIFICANT CHANGE UP (ref 0–4)
SALICYLATES SERPL-MCNC: <0.6 MG/DL — LOW (ref 10–20)
SARS-COV-2 IGG+IGM SERPL QL IA: >250 U/ML — HIGH
SARS-COV-2 IGG+IGM SERPL QL IA: POSITIVE
SARS-COV-2 RNA SPEC QL NAA+PROBE: SIGNIFICANT CHANGE UP
SODIUM SERPL-SCNC: 141 MMOL/L — SIGNIFICANT CHANGE UP (ref 135–145)
SP GR SPEC: 1.01 — SIGNIFICANT CHANGE UP (ref 1.01–1.02)
UROBILINOGEN FLD QL: NEGATIVE MG/DL — SIGNIFICANT CHANGE UP
WBC # BLD: 5.74 K/UL — SIGNIFICANT CHANGE UP (ref 3.8–10.5)
WBC # FLD AUTO: 5.74 K/UL — SIGNIFICANT CHANGE UP (ref 3.8–10.5)
WBC UR QL: NEGATIVE — SIGNIFICANT CHANGE UP

## 2021-08-30 PROCEDURE — 99218: CPT

## 2021-08-30 PROCEDURE — 90792 PSYCH DIAG EVAL W/MED SRVCS: CPT

## 2021-08-30 PROCEDURE — 93010 ELECTROCARDIOGRAM REPORT: CPT

## 2021-08-30 RX ORDER — OLANZAPINE 15 MG/1
10 TABLET, FILM COATED ORAL DAILY
Refills: 0 | Status: DISCONTINUED | OUTPATIENT
Start: 2021-08-31 | End: 2021-09-03

## 2021-08-30 RX ORDER — METOPROLOL TARTRATE 50 MG
25 TABLET ORAL DAILY
Refills: 0 | Status: DISCONTINUED | OUTPATIENT
Start: 2021-08-31 | End: 2021-09-03

## 2021-08-30 RX ORDER — ATORVASTATIN CALCIUM 80 MG/1
10 TABLET, FILM COATED ORAL AT BEDTIME
Refills: 0 | Status: DISCONTINUED | OUTPATIENT
Start: 2021-08-30 | End: 2021-09-03

## 2021-08-30 RX ORDER — GABAPENTIN 400 MG/1
300 CAPSULE ORAL THREE TIMES A DAY
Refills: 0 | Status: DISCONTINUED | OUTPATIENT
Start: 2021-08-30 | End: 2021-09-03

## 2021-08-30 RX ORDER — ESCITALOPRAM OXALATE 10 MG/1
20 TABLET, FILM COATED ORAL DAILY
Refills: 0 | Status: DISCONTINUED | OUTPATIENT
Start: 2021-08-31 | End: 2021-09-03

## 2021-08-30 RX ORDER — MIRTAZAPINE 45 MG/1
30 TABLET, ORALLY DISINTEGRATING ORAL AT BEDTIME
Refills: 0 | Status: DISCONTINUED | OUTPATIENT
Start: 2021-08-30 | End: 2021-09-03

## 2021-08-30 RX ORDER — TAMSULOSIN HYDROCHLORIDE 0.4 MG/1
0.4 CAPSULE ORAL AT BEDTIME
Refills: 0 | Status: DISCONTINUED | OUTPATIENT
Start: 2021-08-30 | End: 2021-09-03

## 2021-08-30 RX ORDER — ASPIRIN/CALCIUM CARB/MAGNESIUM 324 MG
81 TABLET ORAL DAILY
Refills: 0 | Status: DISCONTINUED | OUTPATIENT
Start: 2021-08-31 | End: 2021-09-03

## 2021-08-30 RX ORDER — OLANZAPINE 15 MG/1
10 TABLET, FILM COATED ORAL AT BEDTIME
Refills: 0 | Status: DISCONTINUED | OUTPATIENT
Start: 2021-08-30 | End: 2021-09-03

## 2021-08-30 RX ADMIN — MIRTAZAPINE 30 MILLIGRAM(S): 45 TABLET, ORALLY DISINTEGRATING ORAL at 21:46

## 2021-08-30 RX ADMIN — ATORVASTATIN CALCIUM 10 MILLIGRAM(S): 80 TABLET, FILM COATED ORAL at 21:46

## 2021-08-30 RX ADMIN — TAMSULOSIN HYDROCHLORIDE 0.4 MILLIGRAM(S): 0.4 CAPSULE ORAL at 21:46

## 2021-08-30 RX ADMIN — GABAPENTIN 300 MILLIGRAM(S): 400 CAPSULE ORAL at 21:46

## 2021-08-30 RX ADMIN — OLANZAPINE 10 MILLIGRAM(S): 15 TABLET, FILM COATED ORAL at 21:46

## 2021-08-30 RX ADMIN — GABAPENTIN 300 MILLIGRAM(S): 400 CAPSULE ORAL at 13:44

## 2021-08-30 NOTE — ED BEHAVIORAL HEALTH ASSESSMENT NOTE - SUMMARY
Patient is a 60 year old male, unemployed, living in a group home, with a psychiatric diagnosis of Paranoid Schizophrenia presents today after feelings of paranoia and suicidal thoughts. Patient states he has been getting bullied by a group of people in his group home and now has active thoughts of suicide. The patient's plan would be to tie a shoelace around his wrist and cut his arm with a pair of scissors. Patient feels he is not being understood in the group home and often spends time outside because he is paranoid about being inside. Patient has a history of cocaine, cannabis, and alcohol use. He states he has been clean of cannabis and cocaine for over 3 years and has been sober for over a year and a half although he had 1 beer this morning to calm his nerves. Patient is a 60 year old male, who I single, unemployed, living in an adult home, with a past psychiatric history of Paranoid Schizophrenia with 2 past SA, following with Psych NP in facility who was BIBA for paranoia and suicidal thoughts.       Patient seen and found with worsening depression and suicidal thoughts to cut his wrist and requesting Inpatient admission. Patient to be admitted under voluntary status
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
## 2021-08-30 NOTE — ED ADULT TRIAGE NOTE - CHIEF COMPLAINT QUOTE
Pt with hx of schizophrenia arrives in NAD and is calm and cooperative states he is feeling suicidal and wants to jump from 2 story roof of his complex where he lives. Pt states he is unhappy living there.
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
## 2021-08-30 NOTE — ED PROVIDER NOTE - NSICDXPASTMEDICALHX_GEN_ALL_CORE_FT
PAST MEDICAL HISTORY:  Anxiety     BPH (benign prostatic hyperplasia)     Elevated liver enzymes     GERD (gastroesophageal reflux disease)     HTN (hypertension)     Hyperlipidemia     Mood disorder     Paranoid schizophrenia     Psychosis
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
## 2021-08-30 NOTE — ED BEHAVIORAL HEALTH ASSESSMENT NOTE - DESCRIPTION
Patient is non combative, alert and oriented to person, place, and time. Patient has been resting in his room doing a word puzzle. Hypertension, High Cholesterol Patient is a 60 year old male, unemployed, living in a group home, with a psychiatric diagnosis of Paranoid Schizophrenia Hypertension, HLD Patient is a 60 year old male, unemployed, living in a group home, with a psychiatric diagnosis of Paranoid Schizophrenia, never , no children, has a brother he stays in contact with Patient is non combative, alert and oriented to person, place, and time. Patient has been resting in his room doing a word puzzle.    Vital Signs Last 24 Hrs  T(C): 36.8 (30 Aug 2021 11:02), Max: 37.2 (30 Aug 2021 09:04)  T(F): 98.3 (30 Aug 2021 11:02), Max: 98.9 (30 Aug 2021 09:04)  HR: 58 (30 Aug 2021 11:02) (58 - 78)  BP: 160/80 (30 Aug 2021 11:02) (160/80 - 186/67)  BP(mean): --  RR: 18 (30 Aug 2021 11:02) (16 - 18)  SpO2: 98% (30 Aug 2021 11:02) (97% - 98%)

## 2021-08-30 NOTE — CHART NOTE - NSCHARTNOTEFT_GEN_A_CORE
SERENA Note: Plan is to transfer pt for inpt psychiatric care. Currently there are no beds at [facility] and [facility]. SW will explore options in the a.m.  staff aware

## 2021-08-30 NOTE — ED PROVIDER NOTE - OBJECTIVE STATEMENT
62 y/o  male with PMHx of paranoid schizophrenia and HTN p/w SI. Pt states he reside in group home for people with psych disorder. Pt state he no longer likes living there and  told staff today he planned to jump off roof to kill himself . Pt denies HI, drug or etoh use

## 2021-08-30 NOTE — ED BEHAVIORAL HEALTH ASSESSMENT NOTE - CURRENT MEDICATION
Lipitor  Zyprexa Remeron 30mg QHS, Gabapentin 300mg PO TID , Lipitor 10mg po QHS, Flomax 0.4mg QHS, Lexapro 20mg po daily, ASA 81 daily, Metoprolol succinate 25mg Daily, Zyprexa 10mg PO BID.

## 2021-08-30 NOTE — ED BEHAVIORAL HEALTH ASSESSMENT NOTE - ADDITIONAL DETAILS ALL
Patient states he would tie a shoelace around his wrist and cut his arm with a scissor. Attempted this method over a year ago but the scissors did not cut.  Previous attempt was about 30 years ago where he consumed a whole bottle of vitamins

## 2021-08-30 NOTE — ED BEHAVIORAL HEALTH ASSESSMENT NOTE - RISK ASSESSMENT
High Acute Suicide Risk Patient with chronic risk due to age, gender, h/o SA and currently expressing suicidal ideation Moderate Acute Suicide Risk

## 2021-08-31 ENCOUNTER — INPATIENT (INPATIENT)
Facility: HOSPITAL | Age: 61
LOS: 14 days | Discharge: HOME | End: 2021-09-15
Attending: PSYCHIATRY & NEUROLOGY | Admitting: PSYCHIATRY & NEUROLOGY
Payer: MEDICARE

## 2021-08-31 VITALS
SYSTOLIC BLOOD PRESSURE: 169 MMHG | RESPIRATION RATE: 18 BRPM | HEART RATE: 60 BPM | DIASTOLIC BLOOD PRESSURE: 90 MMHG | OXYGEN SATURATION: 95 % | TEMPERATURE: 98 F

## 2021-08-31 VITALS — HEIGHT: 67 IN | RESPIRATION RATE: 17 BRPM | WEIGHT: 164.02 LBS | OXYGEN SATURATION: 100 %

## 2021-08-31 DIAGNOSIS — F32.9 MAJOR DEPRESSIVE DISORDER, SINGLE EPISODE, UNSPECIFIED: ICD-10-CM

## 2021-08-31 PROCEDURE — U0003: CPT

## 2021-08-31 PROCEDURE — 85025 COMPLETE CBC W/AUTO DIFF WBC: CPT

## 2021-08-31 PROCEDURE — 80307 DRUG TEST PRSMV CHEM ANLYZR: CPT

## 2021-08-31 PROCEDURE — 99217: CPT

## 2021-08-31 PROCEDURE — 36415 COLL VENOUS BLD VENIPUNCTURE: CPT

## 2021-08-31 PROCEDURE — 99221 1ST HOSP IP/OBS SF/LOW 40: CPT

## 2021-08-31 PROCEDURE — 99213 OFFICE O/P EST LOW 20 MIN: CPT

## 2021-08-31 PROCEDURE — U0005: CPT

## 2021-08-31 PROCEDURE — 81001 URINALYSIS AUTO W/SCOPE: CPT

## 2021-08-31 PROCEDURE — 93005 ELECTROCARDIOGRAM TRACING: CPT

## 2021-08-31 PROCEDURE — G0378: CPT

## 2021-08-31 PROCEDURE — 99285 EMERGENCY DEPT VISIT HI MDM: CPT

## 2021-08-31 PROCEDURE — 80053 COMPREHEN METABOLIC PANEL: CPT

## 2021-08-31 PROCEDURE — 86769 SARS-COV-2 COVID-19 ANTIBODY: CPT

## 2021-08-31 RX ORDER — NICOTINE POLACRILEX 2 MG
1 GUM BUCCAL DAILY
Refills: 0 | Status: DISCONTINUED | OUTPATIENT
Start: 2021-08-31 | End: 2021-09-15

## 2021-08-31 RX ORDER — ESCITALOPRAM OXALATE 10 MG/1
20 TABLET, FILM COATED ORAL DAILY
Refills: 0 | Status: DISCONTINUED | OUTPATIENT
Start: 2021-08-31 | End: 2021-09-15

## 2021-08-31 RX ORDER — ALBUTEROL 90 UG/1
2 AEROSOL, METERED ORAL EVERY 6 HOURS
Refills: 0 | Status: DISCONTINUED | OUTPATIENT
Start: 2021-08-31 | End: 2021-09-15

## 2021-08-31 RX ORDER — GABAPENTIN 400 MG/1
300 CAPSULE ORAL THREE TIMES A DAY
Refills: 0 | Status: DISCONTINUED | OUTPATIENT
Start: 2021-08-31 | End: 2021-09-15

## 2021-08-31 RX ORDER — FLUPHENAZINE HYDROCHLORIDE 1 MG/1
3 TABLET, FILM COATED ORAL EVERY 12 HOURS
Refills: 0 | Status: DISCONTINUED | OUTPATIENT
Start: 2021-08-31 | End: 2021-09-15

## 2021-08-31 RX ORDER — ASPIRIN/CALCIUM CARB/MAGNESIUM 324 MG
81 TABLET ORAL DAILY
Refills: 0 | Status: DISCONTINUED | OUTPATIENT
Start: 2021-08-31 | End: 2021-09-15

## 2021-08-31 RX ORDER — OLANZAPINE 15 MG/1
5 TABLET, FILM COATED ORAL AT BEDTIME
Refills: 0 | Status: DISCONTINUED | OUTPATIENT
Start: 2021-08-31 | End: 2021-09-02

## 2021-08-31 RX ORDER — MIRTAZAPINE 45 MG/1
30 TABLET, ORALLY DISINTEGRATING ORAL AT BEDTIME
Refills: 0 | Status: DISCONTINUED | OUTPATIENT
Start: 2021-08-31 | End: 2021-09-15

## 2021-08-31 RX ORDER — METOPROLOL TARTRATE 50 MG
25 TABLET ORAL DAILY
Refills: 0 | Status: DISCONTINUED | OUTPATIENT
Start: 2021-08-31 | End: 2021-09-15

## 2021-08-31 RX ORDER — HYDROXYZINE HCL 10 MG
25 TABLET ORAL ONCE
Refills: 0 | Status: COMPLETED | OUTPATIENT
Start: 2021-08-31 | End: 2021-08-31

## 2021-08-31 RX ORDER — ATORVASTATIN CALCIUM 80 MG/1
20 TABLET, FILM COATED ORAL AT BEDTIME
Refills: 0 | Status: DISCONTINUED | OUTPATIENT
Start: 2021-08-31 | End: 2021-09-15

## 2021-08-31 RX ORDER — TAMSULOSIN HYDROCHLORIDE 0.4 MG/1
0.4 CAPSULE ORAL AT BEDTIME
Refills: 0 | Status: DISCONTINUED | OUTPATIENT
Start: 2021-08-31 | End: 2021-09-15

## 2021-08-31 RX ADMIN — GABAPENTIN 300 MILLIGRAM(S): 400 CAPSULE ORAL at 06:07

## 2021-08-31 RX ADMIN — ESCITALOPRAM OXALATE 20 MILLIGRAM(S): 10 TABLET, FILM COATED ORAL at 08:30

## 2021-08-31 RX ADMIN — MIRTAZAPINE 30 MILLIGRAM(S): 45 TABLET, ORALLY DISINTEGRATING ORAL at 21:04

## 2021-08-31 RX ADMIN — TAMSULOSIN HYDROCHLORIDE 0.4 MILLIGRAM(S): 0.4 CAPSULE ORAL at 21:03

## 2021-08-31 RX ADMIN — Medication 81 MILLIGRAM(S): at 08:30

## 2021-08-31 RX ADMIN — OLANZAPINE 5 MILLIGRAM(S): 15 TABLET, FILM COATED ORAL at 21:03

## 2021-08-31 RX ADMIN — GABAPENTIN 300 MILLIGRAM(S): 400 CAPSULE ORAL at 21:04

## 2021-08-31 RX ADMIN — OLANZAPINE 10 MILLIGRAM(S): 15 TABLET, FILM COATED ORAL at 08:30

## 2021-08-31 RX ADMIN — ATORVASTATIN CALCIUM 20 MILLIGRAM(S): 80 TABLET, FILM COATED ORAL at 21:03

## 2021-08-31 RX ADMIN — Medication 25 MILLIGRAM(S): at 11:42

## 2021-08-31 RX ADMIN — Medication 25 MILLIGRAM(S): at 06:07

## 2021-08-31 NOTE — ED ADULT NURSE REASSESSMENT NOTE - COMFORT CARE
meal provided/plan of care explained
darkened lights

## 2021-08-31 NOTE — BH INPATIENT PSYCHIATRY ASSESSMENT NOTE - MSE UNSTRUCTURED FT
PT is awake and alert. Affect is irritable. He stands up, says "not right now, no!" Agitated. Hostile. Not cooperative with exam.

## 2021-08-31 NOTE — ED ADULT NURSE REASSESSMENT NOTE - NS ED NURSE REASSESS COMMENT FT1
Patient awake out of his room.  Appears drowsy but ambulating with a steady gait.  Patient accepted lunch and ate 100% of meal.  Patient now resting in bed.  No attempts to harm self or others.  Safety of patient maintained.
received report.  received pt  in bed watching tv.  calm pleasant.
Patient ate 100% of his meal at dinner.  Patient awaiting transfer to inpatient unit when a bed is available.  No attempts to harm self or others and safety maintained.
Assumed care of patient at 0720.  Patient resting in bed sleeping with no distress and regular nonlabored breathing noted.  Safety of patient maintained.
Patient mood is subdued, resting in bed with no distress.  Patient complaint with medication.  No attempts to harm self or others.  Safety of patient maintained.
Patient verbally contracts for safety not to harm self in the hospital.  Patient ate 100% of his lunch.  No attempts to harm self or others and safety maintained.
Patient requested and received vistaril 25 mg 1142 for anxiety with good effect.  No attempts to harm self or others.  Safety maintained.

## 2021-08-31 NOTE — CHART NOTE - NSCHARTNOTEFT_GEN_A_CORE
SW Note: Plan si to transfer pt for inpt psychiatric care. Referral made to [facility]. pending bed census and MD review. Will follow

## 2021-08-31 NOTE — BH INPATIENT PSYCHIATRY ASSESSMENT NOTE - NSBHMETABOLIC_PSY_ALL_CORE_FT
BMI: BMI (kg/m2): 25.7 (08-31-21 @ 16:59)  HbA1c: A1C with Estimated Average Glucose Result: 5.7 % (10-27-20 @ 09:09)    Glucose:   BP: --  Lipid Panel: Date/Time: 10-27-20 @ 09:09  Cholesterol, Serum: 159  Direct LDL: --  HDL Cholesterol, Serum: 34  Total Cholesterol/HDL Ration Measurement: --  Triglycerides, Serum: 146

## 2021-08-31 NOTE — BH INPATIENT PSYCHIATRY ASSESSMENT NOTE - NSBHCHARTREVIEWVS_PSY_A_CORE FT
Vital Signs Last 24 Hrs  T(C): 36.6 (08-31-21 @ 13:55), Max: 36.8 (08-30-21 @ 19:21)  T(F): 97.9 (08-31-21 @ 13:55), Max: 98.2 (08-30-21 @ 19:21)  HR: 60 (08-31-21 @ 13:55) (60 - 70)  BP: 169/90 (08-31-21 @ 13:55) (107/75 - 169/90)  BP(mean): --  RR: 17 (08-31-21 @ 16:59) (17 - 18)  SpO2: 100% (08-31-21 @ 16:59) (95% - 100%)    Orthostatic VS  08-31-21 @ 16:59  Lying BP: --/-- HR: --  Sitting BP: 132/83 HR: 61  Standing BP: --/-- HR: --  Site: upper left arm  Mode: electronic   Vital Signs Last 24 Hrs  T(C): 36.3 (09-01-21 @ 06:38), Max: 36.6 (08-31-21 @ 13:55)  T(F): 97.3 (09-01-21 @ 06:38), Max: 97.9 (08-31-21 @ 13:55)  HR: 60 (08-31-21 @ 13:55) (60 - 60)  BP: 169/90 (08-31-21 @ 13:55) (169/90 - 169/90)  BP(mean): --  RR: 17 (08-31-21 @ 16:59) (17 - 18)  SpO2: 100% (08-31-21 @ 16:59) (95% - 100%)    Orthostatic VS  09-01-21 @ 07:55  Lying BP: --/-- HR: --  Sitting BP: 118/76 HR: 88  Standing BP: 122/73 HR: 87  Site: --  Mode: --  Orthostatic VS  08-31-21 @ 19:34  Lying BP: --/-- HR: --  Sitting BP: 136/80 HR: 72  Standing BP: 118/84 HR: 92  Site: --  Mode: --  Orthostatic VS  08-31-21 @ 16:59  Lying BP: --/-- HR: --  Sitting BP: 132/83 HR: 61  Standing BP: --/-- HR: --  Site: upper left arm  Mode: electronic

## 2021-08-31 NOTE — BH INPATIENT PSYCHIATRY ASSESSMENT NOTE - NSBHASSESSSUMMFT_PSY_ALL_CORE
61 year-old with history of htn, increased lipids, paranoid schizophrenia, some ETOH misuse, living in adult home, admitted on voluntary with suicidal ideations  Currently, agitated, not cooperative with exam, but given voluntary status will not constant obs. No UTOX, ETOH negative, was in hospital since yesterday,  no evidence of ETOH withdrawal (no tremors/sweats), reportedly long period of sobriety, will not CIWA  Continue olanzapine and other psychotropic, htn, lipid lowering agents

## 2021-08-31 NOTE — BH INPATIENT PSYCHIATRY ASSESSMENT NOTE - DESCRIPTION
Patient is a 60 year old male, unemployed, living in a group home, with a psychiatric diagnosis of Paranoid Schizophrenia, never , no children, has a brother he stays in contact with
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
## 2021-08-31 NOTE — BH INPATIENT PSYCHIATRY ASSESSMENT NOTE - ADDITIONAL DETAILS ALL
Patient stated he would tie a shoelace around his wrist and cut his arm with a scissor. Attempted this method over a year ago but the scissors did not cut.  Previous attempt was about 30 years ago where he consumed a whole bottle of vitamins

## 2021-08-31 NOTE — BH PATIENT PROFILE - HOME MEDICATIONS
Lotrimin AF 1% topical cream , 1 application topically 2 times a day  Nicoderm C-Q Clear 21 mg/24 hr transdermal film, extended release , 1 patch transdermal once a day   Melatonin 3 mg oral tablet , 1 tab(s) orally once a day (at bedtime)  albuterol 90 mcg/inh inhalation aerosol , 2 puff(s) inhaled every 6 hours, As needed, shortness of breath  Metoprolol Succinate ER 25 mg oral tablet, extended release , 1 tab(s) orally once a day  hydrOXYzine hydrochloride 50 mg oral tablet , 1 tab(s) orally once a day, As Needed -anxiety   ZyPREXA 5 mg oral tablet , 1 tab(s) orally once a day (at bedtime)  Aspirin Enteric Coated 81 mg oral delayed release tablet , 1 tab(s) orally once a day  Lipitor 20 mg oral tablet , 1 tab(s) orally once a day (at bedtime)  Remeron 30 mg oral tablet , 1 tab(s) orally once a day (at bedtime)  Lexapro 20 mg oral tablet , 1 tab(s) orally once a day  Neurontin 300 mg oral capsule , 1 cap(s) orally 3 times a day  Flomax 0.4 mg oral capsule , 1 cap(s) orally once a day (at bedtime)  saliva substitutes oral solution , 15 milliliter(s) orally 5 times a day   Multiple Vitamins oral tablet , 1 tab(s) orally once a day

## 2021-08-31 NOTE — BH INPATIENT PSYCHIATRY ASSESSMENT NOTE - OTHER PAST PSYCHIATRIC HISTORY (INCLUDE DETAILS REGARDING ONSET, COURSE OF ILLNESS, INPATIENT/OUTPATIENT TREATMENT)
Scheduling Instructions (Optional): 30 min excision
Introduction Text (Please End With A Colon): The following procedure was deferred:
Detail Level: Detailed
Reason To Defer Override: Referring to Dr. Morales
Paranoid Schizophrenia

## 2021-08-31 NOTE — BH INPATIENT PSYCHIATRY ASSESSMENT NOTE - RISK ASSESSMENT
pt with history of previous suicide attempts, history of psychosis, impulsivity, substance abuse, currently on inpatient psych, voluntary

## 2021-08-31 NOTE — BH INPATIENT PSYCHIATRY ASSESSMENT NOTE - CURRENT MEDICATION
MEDICATIONS  (STANDING):    MEDICATIONS  (PRN):   MEDICATIONS  (STANDING):  aspirin enteric coated 81 milliGRAM(s) Oral daily  atorvastatin 20 milliGRAM(s) Oral at bedtime  escitalopram 20 milliGRAM(s) Oral daily  gabapentin 300 milliGRAM(s) Oral three times a day  metoprolol succinate ER 25 milliGRAM(s) Oral daily  mirtazapine 30 milliGRAM(s) Oral at bedtime  multivitamin 1 Tablet(s) Oral daily  nicotine - 21 mG/24Hr(s) Patch 1 patch Transdermal daily  OLANZapine 5 milliGRAM(s) Oral at bedtime  tamsulosin 0.4 milliGRAM(s) Oral at bedtime    MEDICATIONS  (PRN):  ALBUTerol    90 MICROgram(s) HFA Inhaler 2 Puff(s) Inhalation every 6 hours PRN shortness of breath  fluPHENAZine  Injectable 3 milliGRAM(s) IntraMuscular every 12 hours PRN agitation

## 2021-08-31 NOTE — ED ADULT NURSE REASSESSMENT NOTE - STATUS
awaiting transfer, no change

## 2021-08-31 NOTE — ED BEHAVIORAL HEALTH NOTE - BEHAVIORAL HEALTH NOTE
PROGRESS NOTE: 21 @ 11:14  	  • Reason for Ongoing Consultation: Behavioral Health Assessment awaiting admission to an inpatient care facility for suicidal thoughts	    ID: [id number] Male with HEALTH ISSUES - PROBLEM Dx:  Depression, unspecified depression type    Paranoid schizophrenia    INTERVAL DATA:   • Interval Chief Complaint: Active Suicidal thoughts   • Interval History:   Patient is a 61 year old male, living in a group home, unemployed, with a  history of paranoid schizophrenia and depression presenting with suicidal thoughts. On reevaluation patient states he feels the same as yesterday and continues to have active suicidal thoughts and is not safe to go home. He still states he is depressed and very fatigued which is abnormal for him. Since yesterday he finished all of his word puzzles and has just been resting in bed. He feels like he has been in his head more than usual with racing suicidal thoughts. He also states he has been waiting for an EKG for a long time now and if he doesn't get it he is going to state he is no longer suicidal so he can be discharged.        REVIEW OF SYSTEMS:   • Constitutional Symptoms	No complaints  • Eyes	No complaints  • Ears / Nose / Throat / Mouth	No complaints  • Cardiovascular	No complaints  • Respiratory	No complaints  • Gastrointestinal	No complaints  • Genitourinary	No complaints  • Musculoskeletal	No complaints  • Skin	No complaints  • Neurological	No complaints  • Psychiatric (see HPI)	See HPI  • Endocrine	No complaints  • Hematologic / Lymphatic	No complaints  • Allergic / Immunologic	No complaints    REVIEW OF VITALS/LABS/IMAGING/INVESTIGATIONS:   • Vital signs reviewed: Yes  • Vital Signs:	    T(C): 36.6 (21 @ 07:33), Max: 37 (21 @ 15:23)  HR: 63 (21 @ 07:33) (58 - 70)  BP: 144/84 (21 @ 07:33) (107/75 - 144/84)  RR: 18 (21 @ 07:33) (18 - 18)  SpO2: 97% (21 @ 07:33) (96% - 97%)    • Available labs reviewed: Yes  • Available Lab Results:                           14.0   5.74  )-----------( 240      ( 30 Aug 2021 09:43 )             40.8         141  |  106  |  13.9  ----------------------------<  97  4.4   |  20.0<L>  |  1.18    Ca    10.9<H>      30 Aug 2021 09:43    TPro  7.7  /  Alb  4.7  /  TBili  0.2<L>  /  DBili  x   /  AST  25  /  ALT  24  /  AlkPhos  139<H>  08-30    LIVER FUNCTIONS - ( 30 Aug 2021 09:43 )  Alb: 4.7 g/dL / Pro: 7.7 g/dL / ALK PHOS: 139 U/L / ALT: 24 U/L / AST: 25 U/L / GGT: x             Urinalysis Basic - ( 30 Aug 2021 09:44 )    Color: Yellow / Appearance: Clear / S.015 / pH: x  Gluc: x / Ketone: Negative  / Bili: Negative / Urobili: Negative mg/dL   Blood: x / Protein: 30 mg/dL / Nitrite: Negative   Leuk Esterase: Negative / RBC: Negative /HPF / WBC Negative   Sq Epi: x / Non Sq Epi: Occasional / Bacteria: Negative          MEDICATIONS:      PRN Medications:  • PRN Medications since last evaluation	  • PRN Details	    Current Medications:   aspirin  chewable 81 milliGRAM(s) Oral daily  atorvastatin 10 milliGRAM(s) Oral at bedtime  escitalopram 20 milliGRAM(s) Oral daily  gabapentin 300 milliGRAM(s) Oral three times a day  metoprolol succinate ER 25 milliGRAM(s) Oral daily  mirtazapine 30 milliGRAM(s) Oral at bedtime  OLANZapine 10 milliGRAM(s) Oral at bedtime  OLANZapine 10 milliGRAM(s) Oral daily  tamsulosin 0.4 milliGRAM(s) Oral at bedtime     Medication Side Effects:  • Medication Side Effects or Adverse Reactions (new or ongoing)	None known    MENTAL STATUS EXAM:   • Level of Consciousness	Alert  • General Appearance	Well developed  • Body Habitus	Well nourished  • Hygiene	Fair  • Grooming	Fair  • Behavior	Cooperative  • Eye Contact	Fair  • Relatedness	Good  • Impulse Control	Normal  • Muscle Tone / Strength	Normal muscle tone/strength  • Abnormal Movements	No abnormal movements  • Gait / Station	Normal gait / station  • Speech Volume	Normal  • Speech Rate	Normal  • Speech Spontaneity	Normal  • Speech Articulation	Normal  • Mood	Depressed, suicidal thoughts  • Affect Quality	Depressed   • Affect Range	blunted  • Affect Congruence	Congruent  • Thought Process	Linear  • Thought Associations	Normal  • Thought Content: Preoccupied with need for an EKG  • Perceptions	No abnormalities  • Oriented to Time	Yes  • Oriented to Place	Yes  • Oriented to Situation	Yes  • Oriented to Person	Yes  • Attention / Concentration	Normal  • Estimated Intelligence	Average  • Recent Memory	Normal  • Remote Memory	Normal  • Fund of Knowledge	Normal  • Language	No abnormalities noted  • Judgment (regarding everyday events)	Fair  • Insight (regarding psychiatric illness)	Fair    SUICIDALITY:   • Suicidality (Interval) Current suicidal thoughts to tie a shoelace around his wrist and cut his arm with a pair of scissors. Previous attempt 30 years ago by consuming a bottle of vitamins. Additional previous attempt using his shoelace and scissors method but was not able to cut his arm with the scissors.     HOMICIDALITY/AGGRESSION:   • Homicidality/Aggression	none known    DIAGNOSIS DSM-V:    Psychiatric Diagnosis (Corresponds to DSM-IV Axis I, II):   HEALTH ISSUES - PROBLEM Dx:  Depression, unspecified depression type    Paranoid schizophrenia       Medical Diagnosis (Corresponds to DSM-IV Axis III):  • Axis III	Hypertension and high cholesterol       ASSESSMENT OF CURRENT CONDITION:   Summary (include case differential, formulation and patient response to therapy):   Patient is a 60 year old male, who I single, unemployed, living in an adult home, with a past psychiatric history of Paranoid Schizophrenia with 2 past SA, following with Psych NP in facility who was BIBA for paranoia and suicidal thoughts.     Patient seen and found with worsening depression and suicidal thoughts to cut his wrist and requesting Inpatient admission. Patient to be admitted under voluntary status      Risk Assessment (consider static vs modifiable risk factors and protective factors; comment on level of risk for dangerous behavior):   Patient with chronic risk due to age, gender, h/o SA and currently expressing suicidal ideation    PLAN: Admit/Transfer to Inpatient Psychiatry due to active suicidal ideation. PROGRESS NOTE: 21 @ 11:14  	  • Reason for Ongoing Consultation: Behavioral Health Assessment awaiting admission to an inpatient care facility for suicidal thoughts	    ID: [id number] Male with HEALTH ISSUES - PROBLEM Dx:  Depression, unspecified depression type    Paranoid schizophrenia    INTERVAL DATA:   • Interval Chief Complaint: Active Suicidal thoughts   • Interval History:   Patient is a 61 year old male, living in a group home, unemployed, with a  history of paranoid schizophrenia and depression presenting with suicidal thoughts. On reevaluation patient states he feels the same as yesterday and continues to have active suicidal thoughts and is not safe to go home. He still states he is depressed and very fatigued which is abnormal for him. Since yesterday he finished all of his word puzzles and has just been resting in bed. He feels like he has been in his head more than usual with racing suicidal thoughts. Patient denies any A V hallucinations         REVIEW OF SYSTEMS:   • Constitutional Symptoms	No complaints  • Eyes	No complaints  • Ears / Nose / Throat / Mouth	No complaints  • Cardiovascular	No complaints  • Respiratory	No complaints  • Gastrointestinal	No complaints  • Genitourinary	No complaints  • Musculoskeletal	No complaints  • Skin	No complaints  • Neurological	No complaints  • Psychiatric (see HPI)	See HPI  • Endocrine	No complaints  • Hematologic / Lymphatic	No complaints  • Allergic / Immunologic	No complaints    REVIEW OF VITALS/LABS/IMAGING/INVESTIGATIONS:   • Vital signs reviewed: Yes  • Vital Signs:	    T(C): 36.6 (21 @ 07:33), Max: 37 (21 @ 15:23)  HR: 63 (21 @ 07:33) (58 - 70)  BP: 144/84 (21 @ 07:33) (107/75 - 144/84)  RR: 18 (21 @ 07:33) (18 - 18)  SpO2: 97% (21 @ 07:33) (96% - 97%)    • Available labs reviewed: Yes  • Available Lab Results:                           14.0   5.74  )-----------( 240      ( 30 Aug 2021 09:43 )             40.8         141  |  106  |  13.9  ----------------------------<  97  4.4   |  20.0<L>  |  1.18    Ca    10.9<H>      30 Aug 2021 09:43    TPro  7.7  /  Alb  4.7  /  TBili  0.2<L>  /  DBili  x   /  AST  25  /  ALT  24  /  AlkPhos  139<H>      LIVER FUNCTIONS - ( 30 Aug 2021 09:43 )  Alb: 4.7 g/dL / Pro: 7.7 g/dL / ALK PHOS: 139 U/L / ALT: 24 U/L / AST: 25 U/L / GGT: x             Urinalysis Basic - ( 30 Aug 2021 09:44 )    Color: Yellow / Appearance: Clear / S.015 / pH: x  Gluc: x / Ketone: Negative  / Bili: Negative / Urobili: Negative mg/dL   Blood: x / Protein: 30 mg/dL / Nitrite: Negative   Leuk Esterase: Negative / RBC: Negative /HPF / WBC Negative   Sq Epi: x / Non Sq Epi: Occasional / Bacteria: Negative          MEDICATIONS:      PRN Medications:  • PRN Medications since last evaluation	  • PRN Details	    Current Medications:   aspirin  chewable 81 milliGRAM(s) Oral daily  atorvastatin 10 milliGRAM(s) Oral at bedtime  escitalopram 20 milliGRAM(s) Oral daily  gabapentin 300 milliGRAM(s) Oral three times a day  metoprolol succinate ER 25 milliGRAM(s) Oral daily  mirtazapine 30 milliGRAM(s) Oral at bedtime  OLANZapine 10 milliGRAM(s) Oral at bedtime  OLANZapine 10 milliGRAM(s) Oral daily  tamsulosin 0.4 milliGRAM(s) Oral at bedtime     Medication Side Effects:  • Medication Side Effects or Adverse Reactions (new or ongoing)	None known    MENTAL STATUS EXAM:   • Level of Consciousness	Alert  • General Appearance	Well developed  • Body Habitus	Well nourished  • Hygiene	Fair  • Grooming	Fair  • Behavior	Cooperative  • Eye Contact	Fair  • Relatedness	Good  • Impulse Control	Normal  • Muscle Tone / Strength	Normal muscle tone/strength  • Abnormal Movements	No abnormal movements  • Gait / Station	Normal gait / station  • Speech Volume	Normal  • Speech Rate	Normal  • Speech Spontaneity	Normal  • Speech Articulation	Normal  • Mood	Depressed  • Affect Quality	Depressed   • Affect Range	blunted  • Affect Congruence	Congruent  • Thought Process	Linear  • Thought Associations	Normal  • Thought Content: Preoccupied with need for an EKG, suicidal ideation   • Perceptions	No abnormalities  • Oriented to Time	Yes  • Oriented to Place	Yes  • Oriented to Situation	Yes  • Oriented to Person	Yes  • Attention / Concentration	Normal  • Estimated Intelligence	Average  • Recent Memory	Normal  • Remote Memory	Normal  • Fund of Knowledge	Normal  • Language	No abnormalities noted  • Judgment (regarding everyday events)	Fair  • Insight (regarding psychiatric illness)	Fair    SUICIDALITY:   • Suicidality (Interval) Current suicidal thoughts to tie a shoelace around his wrist and cut his arm with a pair of scissors. Previous attempt 30 years ago by consuming a bottle of vitamins. Additional previous attempt using his shoelace and scissors method but was not able to cut his arm with the scissors.     HOMICIDALITY/AGGRESSION:   • Homicidality/Aggression	none known    DIAGNOSIS DSM-V:    Psychiatric Diagnosis (Corresponds to DSM-IV Axis I, II):   HEALTH ISSUES - PROBLEM Dx:  Depression, unspecified depression type    Paranoid schizophrenia       Medical Diagnosis (Corresponds to DSM-IV Axis III):  • Axis III	Hypertension and high cholesterol       ASSESSMENT OF CURRENT CONDITION:   Summary (include case differential, formulation and patient response to therapy):   Patient is a 60 year old male, who I single, unemployed, living in an adult home, with a past psychiatric history of Paranoid Schizophrenia with 2 past SA, following with Psych NP in facility who was BIBA for paranoia and suicidal thoughts.     Patient seen and found with worsening depression and suicidal thoughts to cut his wrist and requesting Inpatient admission. Patient to be admitted under voluntary status      Risk Assessment (consider static vs modifiable risk factors and protective factors; comment on level of risk for dangerous behavior):   Patient with chronic risk due to age, gender, h/o SA and currently expressing suicidal ideation    PLAN: Admit/Transfer to Inpatient Psychiatry due to active suicidal ideation (voluntary admission)
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
## 2021-09-01 PROCEDURE — 99232 SBSQ HOSP IP/OBS MODERATE 35: CPT

## 2021-09-01 RX ADMIN — ATORVASTATIN CALCIUM 20 MILLIGRAM(S): 80 TABLET, FILM COATED ORAL at 20:55

## 2021-09-01 RX ADMIN — Medication 1 PATCH: at 09:14

## 2021-09-01 RX ADMIN — Medication 1 PATCH: at 19:44

## 2021-09-01 RX ADMIN — MIRTAZAPINE 30 MILLIGRAM(S): 45 TABLET, ORALLY DISINTEGRATING ORAL at 20:55

## 2021-09-01 RX ADMIN — ESCITALOPRAM OXALATE 20 MILLIGRAM(S): 10 TABLET, FILM COATED ORAL at 09:14

## 2021-09-01 RX ADMIN — GABAPENTIN 300 MILLIGRAM(S): 400 CAPSULE ORAL at 12:16

## 2021-09-01 RX ADMIN — Medication 25 MILLIGRAM(S): at 09:14

## 2021-09-01 RX ADMIN — Medication 81 MILLIGRAM(S): at 09:14

## 2021-09-01 RX ADMIN — TAMSULOSIN HYDROCHLORIDE 0.4 MILLIGRAM(S): 0.4 CAPSULE ORAL at 20:55

## 2021-09-01 RX ADMIN — GABAPENTIN 300 MILLIGRAM(S): 400 CAPSULE ORAL at 20:55

## 2021-09-01 RX ADMIN — GABAPENTIN 300 MILLIGRAM(S): 400 CAPSULE ORAL at 09:14

## 2021-09-01 RX ADMIN — OLANZAPINE 5 MILLIGRAM(S): 15 TABLET, FILM COATED ORAL at 20:55

## 2021-09-01 RX ADMIN — Medication 1 TABLET(S): at 09:14

## 2021-09-01 NOTE — BH INPATIENT PSYCHIATRY PROGRESS NOTE - NSBHASSESSSUMMFT_PSY_ALL_CORE
61 year-old with history of htn, increased lipids, paranoid schizophrenia, some ETOH misuse, living in adult home, admitted on voluntary with suicidal ideations  Currently, agitated, not cooperative with exam, but given voluntary status will not constant obs. No UTOX, ETOH negative, was in hospital since yesterday,  no evidence of ETOH withdrawal (no tremors/sweats), reportedly long period of sobriety, will not CIWA  Continue olanzapine and other psychotropic, htn, lipid lowering agents.   61 year-old with history of htn, increased lipids, paranoid schizophrenia, some ETOH misuse, living in adult home, admitted on voluntary with suicidal ideations  Currently, agitated, not cooperative with exam, but given voluntary status will not constant obs. No UTOX, ETOH negative, was in hospital since yesterday,  no evidence of ETOH withdrawal (no tremors/sweats), reportedly long period of sobriety, will not CIWA  Continue olanzapine and other psychotropic, htn, lipid lowering agents.    Plan:  >Legal: 9.13  >Obs: Routine, no current SI. no need for CO, patient not expected to pose risk to self or others in controlled inpatient setting  >Psychiatric Meds: Restart outpatient medication regimen. Observe for tolerability and efficacy.   PRN medications:  Ativan 2mg oral Q6HR PRN for agitation and anxiety.  Haldol 5mg oral Q6HR PRN for agitation.   Benadryl 50mg oral Q6HR PRN for agitation.   Vistaril 50mg oral Q6HR PRN for anxiety.  Desyrel 50mg oral QHS PRN for insomnia.   >Labs: Admission labs reviewed, no acute findings. Hold antipsychotics if QTc >500  >Medical:   No acute concerns. No consultations needed at this time. No indication for CIWA. Patient with consistently stable VS, During the course of treatment, will collaborate with medical team to manage medical issues.  >Diet: Regular   >Social: milieu/structured therapy  >Treatment Interventions: Groups and Individual Therapy/CBT, Motivational counseling for substance abuse related issues.   >Treatment goals: Establish initial treatment plan  >Dispo: Collateral and dispo planning pending further symptom and medication optimization
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
## 2021-09-01 NOTE — BH INPATIENT PSYCHIATRY PROGRESS NOTE - NSBHCHARTREVIEWVS_PSY_A_CORE FT
Vital Signs Last 24 Hrs  T(C): 36.3 (09-01-21 @ 06:38), Max: 36.6 (08-31-21 @ 13:55)  T(F): 97.3 (09-01-21 @ 06:38), Max: 97.9 (08-31-21 @ 13:55)  HR: 60 (08-31-21 @ 13:55) (60 - 60)  BP: 169/90 (08-31-21 @ 13:55) (169/90 - 169/90)  BP(mean): --  RR: 17 (08-31-21 @ 16:59) (17 - 18)  SpO2: 100% (08-31-21 @ 16:59) (95% - 100%)    Orthostatic VS  08-31-21 @ 19:34  Lying BP: --/-- HR: --  Sitting BP: 136/80 HR: 72  Standing BP: 118/84 HR: 92  Site: --  Mode: --  Orthostatic VS  08-31-21 @ 16:59  Lying BP: --/-- HR: --  Sitting BP: 132/83 HR: 61  Standing BP: --/-- HR: --  Site: upper left arm  Mode: electronic   Vital Signs Last 24 Hrs  T(C): 36.3 (09-01-21 @ 06:38), Max: 36.6 (08-31-21 @ 13:55)  T(F): 97.3 (09-01-21 @ 06:38), Max: 97.9 (08-31-21 @ 13:55)  HR: 60 (08-31-21 @ 13:55) (60 - 60)  BP: 169/90 (08-31-21 @ 13:55) (169/90 - 169/90)  BP(mean): --  RR: 17 (08-31-21 @ 16:59) (17 - 18)  SpO2: 100% (08-31-21 @ 16:59) (95% - 100%)    Orthostatic VS  09-01-21 @ 07:55  Lying BP: --/-- HR: --  Sitting BP: 118/76 HR: 88  Standing BP: 122/73 HR: 87  Site: --  Mode: --  Orthostatic VS  08-31-21 @ 19:34  Lying BP: --/-- HR: --  Sitting BP: 136/80 HR: 72  Standing BP: 118/84 HR: 92  Site: --  Mode: --  Orthostatic VS  08-31-21 @ 16:59  Lying BP: --/-- HR: --  Sitting BP: 132/83 HR: 61  Standing BP: --/-- HR: --  Site: upper left arm  Mode: electronic   Vital Signs Last 24 Hrs  T(C): 36.3 (09-02-21 @ 06:08), Max: 36.8 (09-01-21 @ 22:04)  T(F): 97.4 (09-02-21 @ 06:08), Max: 98.2 (09-01-21 @ 22:04)  HR: --  BP: --  BP(mean): --  RR: 18 (09-01-21 @ 22:04) (17 - 18)  SpO2: 98% (09-01-21 @ 22:04) (98% - 98%)    Orthostatic VS  09-02-21 @ 08:28  Lying BP: --/-- HR: --  Sitting BP: 133/62 HR: 65  Standing BP: 148/78 HR: 72  Site: upper left arm  Mode: electronic  Orthostatic VS  09-01-21 @ 18:32  Lying BP: --/-- HR: --  Sitting BP: 139/77 HR: 63  Standing BP: 146/79 HR: 65  Site: --  Mode: --  Orthostatic VS  09-01-21 @ 07:55  Lying BP: --/-- HR: --  Sitting BP: 118/76 HR: 88  Standing BP: 122/73 HR: 87  Site: --  Mode: --  Orthostatic VS  08-31-21 @ 19:34  Lying BP: --/-- HR: --  Sitting BP: 136/80 HR: 72  Standing BP: 118/84 HR: 92  Site: --  Mode: --  Orthostatic VS  08-31-21 @ 16:59  Lying BP: --/-- HR: --  Sitting BP: 132/83 HR: 61  Standing BP: --/-- HR: --  Site: upper left arm  Mode: electronic

## 2021-09-01 NOTE — PSYCHIATRIC REHAB INITIAL EVALUATION - NSBHALCSUBCHOICE_PSY_ALL_CORE
Patient reported history of using alcohol and elisabeth dust. He stated he has been sober for the past year and a half except for 1 beer he had earlier in the week

## 2021-09-01 NOTE — BH INPATIENT PSYCHIATRY PROGRESS NOTE - PRN MEDS
MEDICATIONS  (PRN):  ALBUTerol    90 MICROgram(s) HFA Inhaler 2 Puff(s) Inhalation every 6 hours PRN shortness of breath  fluPHENAZine  Injectable 3 milliGRAM(s) IntraMuscular every 12 hours PRN agitation

## 2021-09-01 NOTE — PSYCHIATRIC REHAB INITIAL EVALUATION - NSBHPRRECOMMEND_PSY_ALL_CORE
Writer met with patient to orient to unit and introduce self, psychiatric rehabilitation staff and department functions. Patient was provided a copy of the unit schedule. In response to the COVID-19 outbreak, there has been a shift in hospital wide policies and protocols. As a result, it should be noted that unit programming will be re-evaluated on a consistent basis in effort to maintain safety guidelines. Writer encouraged patient to attend psychiatric rehabilitation groups and engage in treatment. Patient stated that he has an autism diagnosis and has a hard time participating in groups or socializing with peers. Patient was receptive to meeting with writer and was engaged in the admission interview. Patient presents as paranoid though he was mostly forthcoming with personal data and information surrounding admitting circumstances. Patient was admitted to [facility] L[facility] due to suicidal thoughts and paranoia. Writer and patient were able to establish a collaborative psychiatric rehabilitation goal. Psychiatric Rehabilitation staff will continue to engage patient daily in order to develop therapeutic rapport.

## 2021-09-01 NOTE — CHART NOTE - NSCHARTNOTEFT_GEN_A_CORE
Screening Medical Evaluation  Patient Admitted from: [facility] ED    [facility] admitting diagnosis: Major depressive disorder with single episode    PAST MEDICAL & SURGICAL HISTORY:  Anxiety    Mood disorder    Psychosis    HTN (hypertension)    GERD (gastroesophageal reflux disease)    BPH (benign prostatic hyperplasia)    Hyperlipidemia    Paranoid schizophrenia    Elevated liver enzymes    No significant past surgical history          Allergies    haloperidol (Other)    Intolerances        Social History:     FAMILY HISTORY:  No pertinent family history in first degree relatives        MEDICATIONS  (STANDING):  aspirin enteric coated 81 milliGRAM(s) Oral daily  atorvastatin 20 milliGRAM(s) Oral at bedtime  escitalopram 20 milliGRAM(s) Oral daily  gabapentin 300 milliGRAM(s) Oral three times a day  metoprolol succinate ER 25 milliGRAM(s) Oral daily  mirtazapine 30 milliGRAM(s) Oral at bedtime  multivitamin 1 Tablet(s) Oral daily  nicotine - 21 mG/24Hr(s) Patch 1 patch Transdermal daily  OLANZapine 5 milliGRAM(s) Oral at bedtime  tamsulosin 0.4 milliGRAM(s) Oral at bedtime    MEDICATIONS  (PRN):  ALBUTerol    90 MICROgram(s) HFA Inhaler 2 Puff(s) Inhalation every 6 hours PRN shortness of breath  fluPHENAZine  Injectable 3 milliGRAM(s) IntraMuscular every 12 hours PRN agitation      Vital Signs Last 24 Hrs  T(C): 36.2 (31 Aug 2021 19:34), Max: 36.6 (31 Aug 2021 07:33)  T(F): 97.2 (31 Aug 2021 19:34), Max: 97.9 (31 Aug 2021 13:55)  HR: 60 (31 Aug 2021 13:55) (60 - 63)  BP: 169/90 (31 Aug 2021 13:55) (144/84 - 169/90)  BP(mean): --  RR: 17 (31 Aug 2021 16:59) (17 - 18)  SpO2: 100% (31 Aug 2021 16:59) (95% - 100%)  CAPILLARY BLOOD GLUCOSE            PHYSICAL EXAM:  GENERAL: NAD, well-developed  HEAD:  Atraumatic, Normocephalic  EYES: EOMI, PERRLA, conjunctiva and sclera clear  NECK: Supple, No JVD  CHEST/LUNG: Clear to auscultation bilaterally; No wheeze  HEART: Regular rate and rhythm; No murmurs, rubs, or gallops  ABDOMEN: Soft, Nontender, Nondistended; Bowel sounds present  EXTREMITIES:  2+ Peripheral Pulses, No cyanosis, or edema  PSYCH: AAOx3  NEUROLOGY: non-focal  SKIN: No rashes or lesions    LABS:                        14.0   5.74  )-----------( 240      ( 30 Aug 2021 09:43 )             40.8         141  |  106  |  13.9  ----------------------------<  97  4.4   |  20.0<L>  |  1.18    Ca    10.9<H>      30 Aug 2021 09:43    TPro  7.7  /  Alb  4.7  /  TBili  0.2<L>  /  DBili  x   /  AST  25  /  ALT  24  /  AlkPhos  139<H>            Urinalysis Basic - ( 30 Aug 2021 09:44 )    Color: Yellow / Appearance: Clear / S.015 / pH: x  Gluc: x / Ketone: Negative  / Bili: Negative / Urobili: Negative mg/dL   Blood: x / Protein: 30 mg/dL / Nitrite: Negative   Leuk Esterase: Negative / RBC: Negative /HPF / WBC Negative   Sq Epi: x / Non Sq Epi: Occasional / Bacteria: Negative        RADIOLOGY & ADDITIONAL TESTS:    Assessment and Plan:  61 year old male presenting today from [facility] ED to [facility] with admitting diagnosis of Major depressive disorder with single episode  with PMH of HTN, HLD, asthma, and BPH. Denies any fever, chills, headache, chest pain, SOB, abdominal pain, N/V/D/C, dysuria. Physical exam unremarkable.  1) HLD: Continue Lipitor 20mg oral at bedtime.  2) HTN: Continue metoprolol 25mg oral daily.  3) asthma: Continue albuterol inhaler prn.   4) BPH: Continue Flomax 0.4mg oral at bedtime.   5) Major depressive disorder with single episode: Follow care plan as per primary team.

## 2021-09-01 NOTE — BH SOCIAL WORK INITIAL PSYCHOSOCIAL EVALUATION - NSBHSACOC_PSY_A_CORE FT
Pt did not discuss Cocaine use, however it was noted to be discussed in ED.  Clindamycin Pregnancy And Lactation Text: This medication can be used in pregnancy if certain situations. Clindamycin is also present in breast milk.

## 2021-09-01 NOTE — BH SOCIAL WORK INITIAL PSYCHOSOCIAL EVALUATION - NSBHSAALC_PSY_A_CORE FT
Sober for a year and a half, drank one morning of admission, per ED note. Stated he drank for 20 years, daily 1 pt demarcus.
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
## 2021-09-01 NOTE — BH TREATMENT PLAN - NSTXDEPRESINTERPR_PSY_ALL_CORE
Psychiatric rehabilitation recommends patient attends 2-3 groups per day, engages in individual sessions and participates in activities on the milieu in order to explore coping skills to better manage symptoms.

## 2021-09-01 NOTE — BH INPATIENT PSYCHIATRY PROGRESS NOTE - CURRENT MEDICATION
MEDICATIONS  (STANDING):  aspirin enteric coated 81 milliGRAM(s) Oral daily  atorvastatin 20 milliGRAM(s) Oral at bedtime  escitalopram 20 milliGRAM(s) Oral daily  gabapentin 300 milliGRAM(s) Oral three times a day  metoprolol succinate ER 25 milliGRAM(s) Oral daily  mirtazapine 30 milliGRAM(s) Oral at bedtime  multivitamin 1 Tablet(s) Oral daily  nicotine - 21 mG/24Hr(s) Patch 1 patch Transdermal daily  OLANZapine 5 milliGRAM(s) Oral at bedtime  tamsulosin 0.4 milliGRAM(s) Oral at bedtime    MEDICATIONS  (PRN):  ALBUTerol    90 MICROgram(s) HFA Inhaler 2 Puff(s) Inhalation every 6 hours PRN shortness of breath  fluPHENAZine  Injectable 3 milliGRAM(s) IntraMuscular every 12 hours PRN agitation   MEDICATIONS  (STANDING):  aspirin enteric coated 81 milliGRAM(s) Oral daily  atorvastatin 20 milliGRAM(s) Oral at bedtime  escitalopram 20 milliGRAM(s) Oral daily  gabapentin 300 milliGRAM(s) Oral three times a day  metoprolol succinate ER 25 milliGRAM(s) Oral daily  mirtazapine 30 milliGRAM(s) Oral at bedtime  multivitamin 1 Tablet(s) Oral daily  nicotine - 21 mG/24Hr(s) Patch 1 patch Transdermal daily  QUEtiapine 100 milliGRAM(s) Oral at bedtime  tamsulosin 0.4 milliGRAM(s) Oral at bedtime    MEDICATIONS  (PRN):  ALBUTerol    90 MICROgram(s) HFA Inhaler 2 Puff(s) Inhalation every 6 hours PRN shortness of breath  fluPHENAZine  Injectable 3 milliGRAM(s) IntraMuscular every 12 hours PRN agitation

## 2021-09-02 PROCEDURE — 99232 SBSQ HOSP IP/OBS MODERATE 35: CPT

## 2021-09-02 RX ORDER — QUETIAPINE FUMARATE 200 MG/1
100 TABLET, FILM COATED ORAL AT BEDTIME
Refills: 0 | Status: DISCONTINUED | OUTPATIENT
Start: 2021-09-02 | End: 2021-09-04

## 2021-09-02 RX ORDER — HYDROXYZINE HCL 10 MG
25 TABLET ORAL EVERY 6 HOURS
Refills: 0 | Status: DISCONTINUED | OUTPATIENT
Start: 2021-09-02 | End: 2021-09-15

## 2021-09-02 RX ORDER — OLANZAPINE 15 MG/1
7.5 TABLET, FILM COATED ORAL AT BEDTIME
Refills: 0 | Status: DISCONTINUED | OUTPATIENT
Start: 2021-09-02 | End: 2021-09-02

## 2021-09-02 RX ADMIN — GABAPENTIN 300 MILLIGRAM(S): 400 CAPSULE ORAL at 13:47

## 2021-09-02 RX ADMIN — Medication 25 MILLIGRAM(S): at 10:33

## 2021-09-02 RX ADMIN — Medication 1 TABLET(S): at 10:31

## 2021-09-02 RX ADMIN — Medication 1 PATCH: at 10:33

## 2021-09-02 RX ADMIN — ESCITALOPRAM OXALATE 20 MILLIGRAM(S): 10 TABLET, FILM COATED ORAL at 10:32

## 2021-09-02 RX ADMIN — Medication 81 MILLIGRAM(S): at 10:32

## 2021-09-02 RX ADMIN — Medication 1 PATCH: at 20:19

## 2021-09-02 RX ADMIN — GABAPENTIN 300 MILLIGRAM(S): 400 CAPSULE ORAL at 10:32

## 2021-09-02 RX ADMIN — Medication 25 MILLIGRAM(S): at 18:43

## 2021-09-02 NOTE — BH INPATIENT PSYCHIATRY PROGRESS NOTE - NSBHCHARTREVIEWVS_PSY_A_CORE FT
Vital Signs Last 24 Hrs  T(C): 36.3 (09-02-21 @ 06:08), Max: 36.8 (09-01-21 @ 22:04)  T(F): 97.4 (09-02-21 @ 06:08), Max: 98.2 (09-01-21 @ 22:04)  HR: --  BP: --  BP(mean): --  RR: 18 (09-01-21 @ 22:04) (17 - 18)  SpO2: 98% (09-01-21 @ 22:04) (98% - 98%)    Orthostatic VS  09-01-21 @ 18:32  Lying BP: --/-- HR: --  Sitting BP: 139/77 HR: 63  Standing BP: 146/79 HR: 65  Site: --  Mode: --  Orthostatic VS  09-01-21 @ 07:55  Lying BP: --/-- HR: --  Sitting BP: 118/76 HR: 88  Standing BP: 122/73 HR: 87  Site: --  Mode: --  Orthostatic VS  08-31-21 @ 19:34  Lying BP: --/-- HR: --  Sitting BP: 136/80 HR: 72  Standing BP: 118/84 HR: 92  Site: --  Mode: --  Orthostatic VS  08-31-21 @ 16:59  Lying BP: --/-- HR: --  Sitting BP: 132/83 HR: 61  Standing BP: --/-- HR: --  Site: upper left arm  Mode: electronic   Vital Signs Last 24 Hrs  T(C): 36.3 (09-02-21 @ 06:08), Max: 36.8 (09-01-21 @ 22:04)  T(F): 97.4 (09-02-21 @ 06:08), Max: 98.2 (09-01-21 @ 22:04)  HR: --  BP: --  BP(mean): --  RR: 18 (09-01-21 @ 22:04) (17 - 18)  SpO2: 98% (09-01-21 @ 22:04) (98% - 98%)    Orthostatic VS  09-02-21 @ 08:28  Lying BP: --/-- HR: --  Sitting BP: 133/62 HR: 65  Standing BP: 148/78 HR: 72  Site: upper left arm  Mode: electronic  Orthostatic VS  09-01-21 @ 18:32  Lying BP: --/-- HR: --  Sitting BP: 139/77 HR: 63  Standing BP: 146/79 HR: 65  Site: --  Mode: --  Orthostatic VS  09-01-21 @ 07:55  Lying BP: --/-- HR: --  Sitting BP: 118/76 HR: 88  Standing BP: 122/73 HR: 87  Site: --  Mode: --  Orthostatic VS  08-31-21 @ 19:34  Lying BP: --/-- HR: --  Sitting BP: 136/80 HR: 72  Standing BP: 118/84 HR: 92  Site: --  Mode: --  Orthostatic VS  08-31-21 @ 16:59  Lying BP: --/-- HR: --  Sitting BP: 132/83 HR: 61  Standing BP: --/-- HR: --  Site: upper left arm  Mode: electronic   Vital Signs Last 24 Hrs  T(C): 36.3 (09-02-21 @ 14:16), Max: 36.8 (09-01-21 @ 22:04)  T(F): 97.4 (09-02-21 @ 14:16), Max: 98.2 (09-01-21 @ 22:04)  HR: --  BP: --  BP(mean): --  RR: 18 (09-01-21 @ 22:04) (17 - 18)  SpO2: 98% (09-01-21 @ 22:04) (98% - 98%)    Orthostatic VS  09-02-21 @ 08:28  Lying BP: --/-- HR: --  Sitting BP: 133/62 HR: 65  Standing BP: 148/78 HR: 72  Site: upper left arm  Mode: electronic  Orthostatic VS  09-01-21 @ 18:32  Lying BP: --/-- HR: --  Sitting BP: 139/77 HR: 63  Standing BP: 146/79 HR: 65  Site: --  Mode: --  Orthostatic VS  09-01-21 @ 07:55  Lying BP: --/-- HR: --  Sitting BP: 118/76 HR: 88  Standing BP: 122/73 HR: 87  Site: --  Mode: --  Orthostatic VS  08-31-21 @ 19:34  Lying BP: --/-- HR: --  Sitting BP: 136/80 HR: 72  Standing BP: 118/84 HR: 92  Site: --  Mode: --  Orthostatic VS  08-31-21 @ 16:59  Lying BP: --/-- HR: --  Sitting BP: 132/83 HR: 61  Standing BP: --/-- HR: --  Site: upper left arm  Mode: electronic

## 2021-09-02 NOTE — BH INPATIENT PSYCHIATRY PROGRESS NOTE - CURRENT MEDICATION
MEDICATIONS  (STANDING):  aspirin enteric coated 81 milliGRAM(s) Oral daily  atorvastatin 20 milliGRAM(s) Oral at bedtime  escitalopram 20 milliGRAM(s) Oral daily  gabapentin 300 milliGRAM(s) Oral three times a day  metoprolol succinate ER 25 milliGRAM(s) Oral daily  mirtazapine 30 milliGRAM(s) Oral at bedtime  multivitamin 1 Tablet(s) Oral daily  nicotine - 21 mG/24Hr(s) Patch 1 patch Transdermal daily  OLANZapine 7.5 milliGRAM(s) Oral at bedtime  tamsulosin 0.4 milliGRAM(s) Oral at bedtime    MEDICATIONS  (PRN):  ALBUTerol    90 MICROgram(s) HFA Inhaler 2 Puff(s) Inhalation every 6 hours PRN shortness of breath  fluPHENAZine  Injectable 3 milliGRAM(s) IntraMuscular every 12 hours PRN agitation   MEDICATIONS  (STANDING):  aspirin enteric coated 81 milliGRAM(s) Oral daily  atorvastatin 20 milliGRAM(s) Oral at bedtime  escitalopram 20 milliGRAM(s) Oral daily  gabapentin 300 milliGRAM(s) Oral three times a day  metoprolol succinate ER 25 milliGRAM(s) Oral daily  mirtazapine 30 milliGRAM(s) Oral at bedtime  multivitamin 1 Tablet(s) Oral daily  nicotine - 21 mG/24Hr(s) Patch 1 patch Transdermal daily  QUEtiapine 100 milliGRAM(s) Oral at bedtime  tamsulosin 0.4 milliGRAM(s) Oral at bedtime    MEDICATIONS  (PRN):  ALBUTerol    90 MICROgram(s) HFA Inhaler 2 Puff(s) Inhalation every 6 hours PRN shortness of breath  fluPHENAZine  Injectable 3 milliGRAM(s) IntraMuscular every 12 hours PRN agitation
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
## 2021-09-02 NOTE — BH INPATIENT PSYCHIATRY PROGRESS NOTE - NSBHASSESSSUMMFT_PSY_ALL_CORE
61 year-old with history of htn, increased lipids, paranoid schizophrenia, some ETOH misuse, living in adult home, admitted on voluntary with suicidal ideations  Currently, agitated, not cooperative with exam, but given voluntary status will not constant obs. No UTOX, ETOH negative, was in hospital since yesterday,  no evidence of ETOH withdrawal (no tremors/sweats), reportedly long period of sobriety, will not CIWA  Continue olanzapine and other psychotropic, htn, lipid lowering agents.    Plan:  >Legal: 9.13  >Obs: Routine, no current SI. no need for CO, patient not expected to pose risk to self or others in controlled inpatient setting  >Psychiatric Meds: Restart outpatient medication regimen. Observe for tolerability and efficacy.   PRN medications:  Ativan 2mg oral Q6HR PRN for agitation and anxiety.  Haldol 5mg oral Q6HR PRN for agitation.   Benadryl 50mg oral Q6HR PRN for agitation.   Vistaril 50mg oral Q6HR PRN for anxiety.  Desyrel 50mg oral QHS PRN for insomnia.   >Labs: Admission labs reviewed, no acute findings. Hold antipsychotics if QTc >500  >Medical:   No acute concerns. No consultations needed at this time. No indication for CIWA. Patient with consistently stable VS, During the course of treatment, will collaborate with medical team to manage medical issues.  >Diet: Regular   >Social: milieu/structured therapy  >Treatment Interventions: Groups and Individual Therapy/CBT, Motivational counseling for substance abuse related issues.   >Treatment goals: Establish initial treatment plan  >Dispo: Collateral and dispo planning pending further symptom and medication optimization     61 year-old with history of htn, increased lipids, paranoid schizophrenia, some ETOH misuse, living in adult home, admitted on voluntary with suicidal ideations  Currently, agitated, not cooperative with exam, but given voluntary status will not constant obs. No UTOX, ETOH negative, was in hospital since yesterday,  no evidence of ETOH withdrawal (no tremors/sweats), reportedly long period of sobriety, will not CIWA  Continue olanzapine and other psychotropic, htn, lipid lowering agents.    Plan:  >Legal: 9.13  >Obs: Routine, no current SI. no need for CO, patient not expected to pose risk to self or others in controlled inpatient setting  >Psychiatric Meds: Seroquel 100mg, Remeron 30mg qhs, Lexapro 20mg daily. Observe for tolerability and efficacy.   PRN medications:  Ativan 2mg oral Q6HR PRN for agitation and anxiety.  Haldol 5mg oral Q6HR PRN for agitation.   Benadryl 50mg oral Q6HR PRN for agitation.   Vistaril 50mg oral Q6HR PRN for anxiety.  Desyrel 50mg oral QHS PRN for insomnia.   >Labs: Admission labs reviewed, no acute findings. Labs for tomorrow: A1c, Lipid. Hold antipsychotics if QTc >500  >Medical:   No acute concerns. No consultations needed at this time. No indication for CIWA. Patient with consistently stable VS, During the course of treatment, will collaborate with medical team to manage medical issues.  >Diet: Regular   >Social: milieu/structured therapy  >Treatment Interventions: Groups and Individual Therapy/CBT, Motivational counseling for substance abuse related issues.   >Treatment goals: Establish initial treatment plan  >Dispo: Collateral and dispo planning pending further symptom and medication optimization

## 2021-09-03 RX ORDER — CLONAZEPAM 1 MG
0.5 TABLET ORAL THREE TIMES A DAY
Refills: 0 | Status: DISCONTINUED | OUTPATIENT
Start: 2021-09-03 | End: 2021-09-07

## 2021-09-03 RX ORDER — DIPHENHYDRAMINE HCL 50 MG
25 CAPSULE ORAL EVERY 6 HOURS
Refills: 0 | Status: DISCONTINUED | OUTPATIENT
Start: 2021-09-03 | End: 2021-09-15

## 2021-09-03 RX ADMIN — Medication 0.5 MILLIGRAM(S): at 13:11

## 2021-09-03 RX ADMIN — TAMSULOSIN HYDROCHLORIDE 0.4 MILLIGRAM(S): 0.4 CAPSULE ORAL at 20:29

## 2021-09-03 RX ADMIN — GABAPENTIN 300 MILLIGRAM(S): 400 CAPSULE ORAL at 20:29

## 2021-09-03 RX ADMIN — Medication 1 PATCH: at 19:43

## 2021-09-03 RX ADMIN — ATORVASTATIN CALCIUM 20 MILLIGRAM(S): 80 TABLET, FILM COATED ORAL at 20:29

## 2021-09-03 RX ADMIN — Medication 25 MILLIGRAM(S): at 20:29

## 2021-09-03 RX ADMIN — Medication 81 MILLIGRAM(S): at 09:02

## 2021-09-03 RX ADMIN — Medication 1 TABLET(S): at 09:01

## 2021-09-03 RX ADMIN — Medication 1 PATCH: at 09:02

## 2021-09-03 RX ADMIN — ESCITALOPRAM OXALATE 20 MILLIGRAM(S): 10 TABLET, FILM COATED ORAL at 09:01

## 2021-09-03 RX ADMIN — GABAPENTIN 300 MILLIGRAM(S): 400 CAPSULE ORAL at 09:01

## 2021-09-03 RX ADMIN — QUETIAPINE FUMARATE 100 MILLIGRAM(S): 200 TABLET, FILM COATED ORAL at 20:29

## 2021-09-03 RX ADMIN — MIRTAZAPINE 30 MILLIGRAM(S): 45 TABLET, ORALLY DISINTEGRATING ORAL at 20:29

## 2021-09-03 RX ADMIN — GABAPENTIN 300 MILLIGRAM(S): 400 CAPSULE ORAL at 13:11

## 2021-09-03 RX ADMIN — Medication 25 MILLIGRAM(S): at 09:02

## 2021-09-03 RX ADMIN — Medication 0.5 MILLIGRAM(S): at 20:29

## 2021-09-03 NOTE — BH INPATIENT PSYCHIATRY PROGRESS NOTE - NSBHCHARTREVIEWVS_PSY_A_CORE FT
Vital Signs Last 24 Hrs  T(C): 36.3 (09-03-21 @ 06:35), Max: 36.7 (09-02-21 @ 20:20)  T(F): 97.3 (09-03-21 @ 06:35), Max: 98 (09-02-21 @ 20:20)  HR: --  BP: --  BP(mean): --  RR: 18 (09-02-21 @ 20:20) (18 - 18)  SpO2: --    Orthostatic VS  09-02-21 @ 20:20  Lying BP: --/-- HR: --  Sitting BP: 155/84 HR: 63  Standing BP: 150/82 HR: 71  Site: --  Mode: --  Orthostatic VS  09-02-21 @ 08:28  Lying BP: --/-- HR: --  Sitting BP: 133/62 HR: 65  Standing BP: 148/78 HR: 72  Site: upper left arm  Mode: electronic  Orthostatic VS  09-01-21 @ 18:32  Lying BP: --/-- HR: --  Sitting BP: 139/77 HR: 63  Standing BP: 146/79 HR: 65  Site: --  Mode: --  Orthostatic VS  09-01-21 @ 07:55  Lying BP: --/-- HR: --  Sitting BP: 118/76 HR: 88  Standing BP: 122/73 HR: 87  Site: --  Mode: --   Vital Signs Last 24 Hrs  T(C): 36.3 (09-03-21 @ 06:35), Max: 36.7 (09-02-21 @ 20:20)  T(F): 97.3 (09-03-21 @ 06:35), Max: 98 (09-02-21 @ 20:20)  HR: --65  BP: --135/74  BP(mean): --  RR: 18 (09-02-21 @ 20:20) (18 - 18)  SpO2: --    Orthostatic VS  09-03-21 @ 07:17  Lying BP: --/-- HR: --  Sitting BP: 135/74 HR: 65  Standing BP: 141/77 HR: 70  Site: --  Mode: --  Orthostatic VS  09-02-21 @ 20:20  Lying BP: --/-- HR: --  Sitting BP: 155/84 HR: 63  Standing BP: 150/82 HR: 71  Site: --  Mode: --  Orthostatic VS  09-02-21 @ 08:28  Lying BP: --/-- HR: --  Sitting BP: 133/62 HR: 65  Standing BP: 148/78 HR: 72  Site: upper left arm  Mode: electronic  Orthostatic VS  09-01-21 @ 18:32  Lying BP: --/-- HR: --  Sitting BP: 139/77 HR: 63  Standing BP: 146/79 HR: 65  Site: --  Mode: --

## 2021-09-03 NOTE — BH INPATIENT PSYCHIATRY PROGRESS NOTE - PRN MEDS
MEDICATIONS  (PRN):  ALBUTerol    90 MICROgram(s) HFA Inhaler 2 Puff(s) Inhalation every 6 hours PRN shortness of breath  fluPHENAZine  Injectable 3 milliGRAM(s) IntraMuscular every 12 hours PRN agitation  hydrOXYzine hydrochloride 25 milliGRAM(s) Oral every 6 hours PRN anxiety

## 2021-09-03 NOTE — BH INPATIENT PSYCHIATRY PROGRESS NOTE - CURRENT MEDICATION
MEDICATIONS  (STANDING):  aspirin enteric coated 81 milliGRAM(s) Oral daily  atorvastatin 20 milliGRAM(s) Oral at bedtime  escitalopram 20 milliGRAM(s) Oral daily  gabapentin 300 milliGRAM(s) Oral three times a day  metoprolol succinate ER 25 milliGRAM(s) Oral daily  mirtazapine 30 milliGRAM(s) Oral at bedtime  multivitamin 1 Tablet(s) Oral daily  nicotine - 21 mG/24Hr(s) Patch 1 patch Transdermal daily  QUEtiapine 100 milliGRAM(s) Oral at bedtime  tamsulosin 0.4 milliGRAM(s) Oral at bedtime    MEDICATIONS  (PRN):  ALBUTerol    90 MICROgram(s) HFA Inhaler 2 Puff(s) Inhalation every 6 hours PRN shortness of breath  fluPHENAZine  Injectable 3 milliGRAM(s) IntraMuscular every 12 hours PRN agitation  hydrOXYzine hydrochloride 25 milliGRAM(s) Oral every 6 hours PRN anxiety

## 2021-09-03 NOTE — BH INPATIENT PSYCHIATRY PROGRESS NOTE - NSBHASSESSSUMMFT_PSY_ALL_CORE
61 year-old with history of htn, increased lipids, paranoid schizophrenia, some ETOH misuse, living in adult home, admitted on voluntary with suicidal ideations  Currently, agitated, not cooperative with exam, but given voluntary status will not constant obs. No UTOX, ETOH negative, was in hospital since yesterday,  no evidence of ETOH withdrawal (no tremors/sweats), reportedly long period of sobriety, will not CIWA  Continue olanzapine and other psychotropic, htn, lipid lowering agents.    Plan:  >Legal: 9.13  >Obs: Routine, no current SI. no need for CO, patient not expected to pose risk to self or others in controlled inpatient setting  >Psychiatric Meds: Seroquel 100mg, Remeron 30mg qhs, Lexapro 20mg daily. Observe for tolerability and efficacy.   PRN medications:  Ativan 2mg oral Q6HR PRN for agitation and anxiety.  Haldol 5mg oral Q6HR PRN for agitation.   Benadryl 50mg oral Q6HR PRN for agitation.   Vistaril 50mg oral Q6HR PRN for anxiety.  Desyrel 50mg oral QHS PRN for insomnia.   >Labs: Admission labs reviewed, no acute findings. Labs for tomorrow: A1c, Lipid. Hold antipsychotics if QTc >500  >Medical:   No acute concerns. No consultations needed at this time. No indication for CIWA. Patient with consistently stable VS, During the course of treatment, will collaborate with medical team to manage medical issues.  >Diet: Regular   >Social: milieu/structured therapy  >Treatment Interventions: Groups and Individual Therapy/CBT, Motivational counseling for substance abuse related issues.   >Treatment goals: Establish initial treatment plan  >Dispo: Collateral and dispo planning pending further symptom and medication optimization

## 2021-09-04 PROCEDURE — 99232 SBSQ HOSP IP/OBS MODERATE 35: CPT

## 2021-09-04 RX ORDER — QUETIAPINE FUMARATE 200 MG/1
150 TABLET, FILM COATED ORAL AT BEDTIME
Refills: 0 | Status: DISCONTINUED | OUTPATIENT
Start: 2021-09-04 | End: 2021-09-06

## 2021-09-04 RX ADMIN — Medication 1 PATCH: at 08:59

## 2021-09-04 RX ADMIN — Medication 0.5 MILLIGRAM(S): at 20:32

## 2021-09-04 RX ADMIN — ESCITALOPRAM OXALATE 20 MILLIGRAM(S): 10 TABLET, FILM COATED ORAL at 08:42

## 2021-09-04 RX ADMIN — MIRTAZAPINE 30 MILLIGRAM(S): 45 TABLET, ORALLY DISINTEGRATING ORAL at 20:32

## 2021-09-04 RX ADMIN — GABAPENTIN 300 MILLIGRAM(S): 400 CAPSULE ORAL at 08:41

## 2021-09-04 RX ADMIN — Medication 1 PATCH: at 09:00

## 2021-09-04 RX ADMIN — Medication 25 MILLIGRAM(S): at 08:42

## 2021-09-04 RX ADMIN — Medication 1 TABLET(S): at 08:41

## 2021-09-04 RX ADMIN — Medication 0.5 MILLIGRAM(S): at 08:41

## 2021-09-04 RX ADMIN — GABAPENTIN 300 MILLIGRAM(S): 400 CAPSULE ORAL at 12:03

## 2021-09-04 RX ADMIN — Medication 1 PATCH: at 08:42

## 2021-09-04 RX ADMIN — Medication 0.5 MILLIGRAM(S): at 12:03

## 2021-09-04 RX ADMIN — Medication 81 MILLIGRAM(S): at 08:41

## 2021-09-04 RX ADMIN — QUETIAPINE FUMARATE 150 MILLIGRAM(S): 200 TABLET, FILM COATED ORAL at 20:32

## 2021-09-04 RX ADMIN — ATORVASTATIN CALCIUM 20 MILLIGRAM(S): 80 TABLET, FILM COATED ORAL at 20:33

## 2021-09-04 RX ADMIN — TAMSULOSIN HYDROCHLORIDE 0.4 MILLIGRAM(S): 0.4 CAPSULE ORAL at 20:31

## 2021-09-04 RX ADMIN — GABAPENTIN 300 MILLIGRAM(S): 400 CAPSULE ORAL at 20:31

## 2021-09-04 NOTE — BH INPATIENT PSYCHIATRY PROGRESS NOTE - PRN MEDS
MEDICATIONS  (PRN):  ALBUTerol    90 MICROgram(s) HFA Inhaler 2 Puff(s) Inhalation every 6 hours PRN shortness of breath  diphenhydrAMINE 25 milliGRAM(s) Oral every 6 hours PRN agitation  fluPHENAZine  Injectable 3 milliGRAM(s) IntraMuscular every 12 hours PRN agitation  hydrOXYzine hydrochloride 25 milliGRAM(s) Oral every 6 hours PRN anxiety  LORazepam     Tablet 0.5 milliGRAM(s) Oral every 6 hours PRN anxiety/agitation

## 2021-09-04 NOTE — BH INPATIENT PSYCHIATRY PROGRESS NOTE - NSBHCHARTREVIEWVS_PSY_A_CORE FT
Vital Signs Last 24 Hrs  T(C): 35.7 (09-04-21 @ 06:08), Max: 37 (09-03-21 @ 22:15)  T(F): 96.3 (09-04-21 @ 06:08), Max: 98.6 (09-03-21 @ 22:15)  HR: --  BP: --  BP(mean): --  RR: 18 (09-03-21 @ 19:41) (18 - 18)  SpO2: 99% (09-03-21 @ 19:41) (99% - 99%)    Orthostatic VS  09-03-21 @ 19:41  Lying BP: --/-- HR: --  Sitting BP: 148/82 HR: 70  Standing BP: 134/84 HR: 73  Site: --  Mode: electronic  Orthostatic VS  09-03-21 @ 07:17  Lying BP: --/-- HR: --  Sitting BP: 135/74 HR: 65  Standing BP: 141/77 HR: 70  Site: --  Mode: --  Orthostatic VS  09-02-21 @ 20:20  Lying BP: --/-- HR: --  Sitting BP: 155/84 HR: 63  Standing BP: 150/82 HR: 71  Site: --  Mode: --  Orthostatic VS  09-02-21 @ 08:28  Lying BP: --/-- HR: --  Sitting BP: 133/62 HR: 65  Standing BP: 148/78 HR: 72  Site: upper left arm  Mode: electronic   Vital Signs Last 24 Hrs  T(C): 35.7 (09-04-21 @ 06:08), Max: 37 (09-03-21 @ 22:15)  T(F): 96.3 (09-04-21 @ 06:08), Max: 98.6 (09-03-21 @ 22:15)  HR: --60  BP: --121/69  BP(mean): --  RR: 18 (09-03-21 @ 19:41) (18 - 18)  SpO2: 99% (09-03-21 @ 19:41) (99% - 99%)    Orthostatic VS  09-04-21 @ 08:16  Lying BP: --/-- HR: --  Sitting BP: 121/69 HR: 60  Standing BP: 118/67 HR: 69  Site: --  Mode: --  Orthostatic VS  09-03-21 @ 19:41  Lying BP: --/-- HR: --  Sitting BP: 148/82 HR: 70  Standing BP: 134/84 HR: 73  Site: --  Mode: electronic  Orthostatic VS  09-03-21 @ 07:17  Lying BP: --/-- HR: --  Sitting BP: 135/74 HR: 65  Standing BP: 141/77 HR: 70  Site: --  Mode: --  Orthostatic VS  09-02-21 @ 20:20  Lying BP: --/-- HR: --  Sitting BP: 155/84 HR: 63  Standing BP: 150/82 HR: 71  Site: --  Mode: --

## 2021-09-04 NOTE — BH INPATIENT PSYCHIATRY PROGRESS NOTE - NSBHFUPINTERVALHXFT_PSY_A_CORE
Patient is followed up for psychosis/paranoia/depression. Chart, medications and labs reviewed.  Patient is discussed with nursing staff. Per nursing report, no overnight issues.  Patient took all standing medications, no SE noted.  Patient had refused his [facility] medications the night prior. Overall has been in behavioral control, no prn’s for aggression. Eating and sleeping ok.  Patient is observed in his room, interviewed at bedside.  Pt endorses feeling paranoid. Patient remains disorganized, depressed.  Continues to make references of having HIV/AIDS despite negative lab results.  Denies SI/SIB/HI/AVH, no intent or plan engages in safety planning.   Remains compliant with medications, no reported or observed adverse effects.  Increase Seroquel 150mg. Pt offers no complaints. No acute medical concerns, VSS. Continue treatment for risk modification. Continue to provide therapeutic support.

## 2021-09-04 NOTE — BH INPATIENT PSYCHIATRY PROGRESS NOTE - CURRENT MEDICATION
MEDICATIONS  (STANDING):  aspirin enteric coated 81 milliGRAM(s) Oral daily  atorvastatin 20 milliGRAM(s) Oral at bedtime  clonazePAM  Tablet 0.5 milliGRAM(s) Oral three times a day  escitalopram 20 milliGRAM(s) Oral daily  gabapentin 300 milliGRAM(s) Oral three times a day  metoprolol succinate ER 25 milliGRAM(s) Oral daily  mirtazapine 30 milliGRAM(s) Oral at bedtime  multivitamin 1 Tablet(s) Oral daily  nicotine - 21 mG/24Hr(s) Patch 1 patch Transdermal daily  QUEtiapine 100 milliGRAM(s) Oral at bedtime  tamsulosin 0.4 milliGRAM(s) Oral at bedtime    MEDICATIONS  (PRN):  ALBUTerol    90 MICROgram(s) HFA Inhaler 2 Puff(s) Inhalation every 6 hours PRN shortness of breath  diphenhydrAMINE 25 milliGRAM(s) Oral every 6 hours PRN agitation  fluPHENAZine  Injectable 3 milliGRAM(s) IntraMuscular every 12 hours PRN agitation  hydrOXYzine hydrochloride 25 milliGRAM(s) Oral every 6 hours PRN anxiety  LORazepam     Tablet 0.5 milliGRAM(s) Oral every 6 hours PRN anxiety/agitation   MEDICATIONS  (STANDING):  aspirin enteric coated 81 milliGRAM(s) Oral daily  atorvastatin 20 milliGRAM(s) Oral at bedtime  clonazePAM  Tablet 0.5 milliGRAM(s) Oral three times a day  escitalopram 20 milliGRAM(s) Oral daily  gabapentin 300 milliGRAM(s) Oral three times a day  metoprolol succinate ER 25 milliGRAM(s) Oral daily  mirtazapine 30 milliGRAM(s) Oral at bedtime  multivitamin 1 Tablet(s) Oral daily  nicotine - 21 mG/24Hr(s) Patch 1 patch Transdermal daily  QUEtiapine 150 milliGRAM(s) Oral at bedtime  tamsulosin 0.4 milliGRAM(s) Oral at bedtime    MEDICATIONS  (PRN):  ALBUTerol    90 MICROgram(s) HFA Inhaler 2 Puff(s) Inhalation every 6 hours PRN shortness of breath  diphenhydrAMINE 25 milliGRAM(s) Oral every 6 hours PRN agitation  fluPHENAZine  Injectable 3 milliGRAM(s) IntraMuscular every 12 hours PRN agitation  hydrOXYzine hydrochloride 25 milliGRAM(s) Oral every 6 hours PRN anxiety  LORazepam     Tablet 0.5 milliGRAM(s) Oral every 6 hours PRN anxiety/agitation

## 2021-09-04 NOTE — BH INPATIENT PSYCHIATRY PROGRESS NOTE - NSBHASSESSSUMMFT_PSY_ALL_CORE
61 year-old with history of htn, increased lipids, paranoid schizophrenia, some ETOH misuse, living in adult home, admitted on voluntary with suicidal ideations  Currently, agitated, not cooperative with exam, but given voluntary status will not constant obs. No UTOX, ETOH negative, was in hospital since yesterday,  no evidence of ETOH withdrawal (no tremors/sweats), reportedly long period of sobriety, will not CIWA  Continue olanzapine and other psychotropic, htn, lipid lowering agents.    Plan:  >Legal: 9.13  >Obs: Routine, no current SI. no need for CO, patient not expected to pose risk to self or others in controlled inpatient setting  >Psychiatric Meds: Seroquel 100mg, Remeron 30mg qhs, Lexapro 20mg daily. Observe for tolerability and efficacy.   PRN medications:  Ativan 2mg oral Q6HR PRN for agitation and anxiety.  Haldol 5mg oral Q6HR PRN for agitation.   Benadryl 50mg oral Q6HR PRN for agitation.   Vistaril 50mg oral Q6HR PRN for anxiety.  Desyrel 50mg oral QHS PRN for insomnia.   >Labs: Admission labs reviewed, no acute findings. Labs for tomorrow: A1c, Lipid. Hold antipsychotics if QTc >500  >Medical:   No acute concerns. No consultations needed at this time. No indication for CIWA. Patient with consistently stable VS, During the course of treatment, will collaborate with medical team to manage medical issues.  >Diet: Regular   >Social: milieu/structured therapy  >Treatment Interventions: Groups and Individual Therapy/CBT, Motivational counseling for substance abuse related issues.   >Treatment goals: Establish initial treatment plan  >Dispo: Collateral and dispo planning pending further symptom and medication optimization     61 year-old with history of htn, increased lipids, paranoid schizophrenia, some ETOH misuse, living in adult home, admitted on voluntary with suicidal ideations  Currently, agitated, not cooperative with exam, but given voluntary status will not constant obs. No UTOX, ETOH negative, was in hospital since yesterday,  no evidence of ETOH withdrawal (no tremors/sweats), reportedly long period of sobriety, will not CIWA  Continue olanzapine and other psychotropic, htn, lipid lowering agents.    Plan:  >Legal: 9.13  >Obs: Routine, no current SI. no need for CO, patient not expected to pose risk to self or others in controlled inpatient setting  >Psychiatric Meds: Seroquel 150mg, Remeron 30mg qhs, Lexapro 20mg daily. Observe for tolerability and efficacy.   PRN medications:  Ativan 2mg oral Q6HR PRN for agitation and anxiety.  Haldol 5mg oral Q6HR PRN for agitation.   Benadryl 50mg oral Q6HR PRN for agitation.   Vistaril 50mg oral Q6HR PRN for anxiety.  Desyrel 50mg oral QHS PRN for insomnia.   >Labs: Admission labs reviewed, no acute findings. Labs for tomorrow: A1c, Lipid. Hold antipsychotics if QTc >500  >Medical:   No acute concerns. No consultations needed at this time. No indication for CIWA. Patient with consistently stable VS, During the course of treatment, will collaborate with medical team to manage medical issues.  >Diet: Regular   >Social: milieu/structured therapy  >Treatment Interventions: Groups and Individual Therapy/CBT, Motivational counseling for substance abuse related issues.   >Treatment goals: Establish initial treatment plan  >Dispo: Collateral and dispo planning pending further symptom and medication optimization

## 2021-09-05 PROCEDURE — 99232 SBSQ HOSP IP/OBS MODERATE 35: CPT

## 2021-09-05 RX ADMIN — Medication 0.5 MILLIGRAM(S): at 08:49

## 2021-09-05 RX ADMIN — Medication 1 PATCH: at 08:49

## 2021-09-05 RX ADMIN — Medication 25 MILLIGRAM(S): at 08:48

## 2021-09-05 RX ADMIN — GABAPENTIN 300 MILLIGRAM(S): 400 CAPSULE ORAL at 12:58

## 2021-09-05 RX ADMIN — ATORVASTATIN CALCIUM 20 MILLIGRAM(S): 80 TABLET, FILM COATED ORAL at 20:56

## 2021-09-05 RX ADMIN — Medication 0.1 MILLIGRAM(S): at 18:43

## 2021-09-05 RX ADMIN — TAMSULOSIN HYDROCHLORIDE 0.4 MILLIGRAM(S): 0.4 CAPSULE ORAL at 20:56

## 2021-09-05 RX ADMIN — Medication 81 MILLIGRAM(S): at 08:48

## 2021-09-05 RX ADMIN — ESCITALOPRAM OXALATE 20 MILLIGRAM(S): 10 TABLET, FILM COATED ORAL at 08:48

## 2021-09-05 RX ADMIN — Medication 25 MILLIGRAM(S): at 20:56

## 2021-09-05 RX ADMIN — GABAPENTIN 300 MILLIGRAM(S): 400 CAPSULE ORAL at 08:49

## 2021-09-05 RX ADMIN — Medication 1 PATCH: at 08:00

## 2021-09-05 RX ADMIN — Medication 1 PATCH: at 07:00

## 2021-09-05 RX ADMIN — MIRTAZAPINE 30 MILLIGRAM(S): 45 TABLET, ORALLY DISINTEGRATING ORAL at 20:56

## 2021-09-05 RX ADMIN — Medication 0.5 MILLIGRAM(S): at 20:56

## 2021-09-05 RX ADMIN — GABAPENTIN 300 MILLIGRAM(S): 400 CAPSULE ORAL at 20:56

## 2021-09-05 RX ADMIN — QUETIAPINE FUMARATE 150 MILLIGRAM(S): 200 TABLET, FILM COATED ORAL at 20:57

## 2021-09-05 RX ADMIN — Medication 1 TABLET(S): at 08:49

## 2021-09-05 RX ADMIN — Medication 0.5 MILLIGRAM(S): at 12:58

## 2021-09-05 NOTE — BH INPATIENT PSYCHIATRY PROGRESS NOTE - NSBHASSESSSUMMFT_PSY_ALL_CORE
61 year-old with history of htn, increased lipids, paranoid schizophrenia, some ETOH misuse, living in adult home, admitted on voluntary with suicidal ideations  Currently, agitated, not cooperative with exam, but given voluntary status will not constant obs. No UTOX, ETOH negative, was in hospital since yesterday,  no evidence of ETOH withdrawal (no tremors/sweats), reportedly long period of sobriety, will not CIWA  Continue olanzapine and other psychotropic, htn, lipid lowering agents.    Plan:  >Legal: 9.13  >Obs: Routine, no current SI. no need for CO, patient not expected to pose risk to self or others in controlled inpatient setting  >Psychiatric Meds: Seroquel 150mg, Remeron 30mg qhs, Lexapro 20mg daily. Observe for tolerability and efficacy.   PRN medications:  Ativan 2mg oral Q6HR PRN for agitation and anxiety.  Haldol 5mg oral Q6HR PRN for agitation.   Benadryl 50mg oral Q6HR PRN for agitation.   Vistaril 50mg oral Q6HR PRN for anxiety.  Desyrel 50mg oral QHS PRN for insomnia.   >Labs: Admission labs reviewed, no acute findings. Labs for tomorrow: A1c, Lipid. Hold antipsychotics if QTc >500  >Medical:   No acute concerns. No consultations needed at this time. No indication for CIWA. Patient with consistently stable VS, During the course of treatment, will collaborate with medical team to manage medical issues.  >Diet: Regular   >Social: milieu/structured therapy  >Treatment Interventions: Groups and Individual Therapy/CBT, Motivational counseling for substance abuse related issues.   >Treatment goals: Establish initial treatment plan  >Dispo: Collateral and dispo planning pending further symptom and medication optimization

## 2021-09-05 NOTE — BH INPATIENT PSYCHIATRY PROGRESS NOTE - CURRENT MEDICATION
MEDICATIONS  (STANDING):  aspirin enteric coated 81 milliGRAM(s) Oral daily  atorvastatin 20 milliGRAM(s) Oral at bedtime  clonazePAM  Tablet 0.5 milliGRAM(s) Oral three times a day  escitalopram 20 milliGRAM(s) Oral daily  gabapentin 300 milliGRAM(s) Oral three times a day  metoprolol succinate ER 25 milliGRAM(s) Oral daily  mirtazapine 30 milliGRAM(s) Oral at bedtime  multivitamin 1 Tablet(s) Oral daily  nicotine - 21 mG/24Hr(s) Patch 1 patch Transdermal daily  QUEtiapine 150 milliGRAM(s) Oral at bedtime  tamsulosin 0.4 milliGRAM(s) Oral at bedtime    MEDICATIONS  (PRN):  ALBUTerol    90 MICROgram(s) HFA Inhaler 2 Puff(s) Inhalation every 6 hours PRN shortness of breath  diphenhydrAMINE 25 milliGRAM(s) Oral every 6 hours PRN agitation  fluPHENAZine  Injectable 3 milliGRAM(s) IntraMuscular every 12 hours PRN agitation  hydrOXYzine hydrochloride 25 milliGRAM(s) Oral every 6 hours PRN anxiety  LORazepam     Tablet 0.5 milliGRAM(s) Oral every 6 hours PRN anxiety/agitation

## 2021-09-05 NOTE — BH INPATIENT PSYCHIATRY PROGRESS NOTE - NSBHFUPINTERVALHXFT_PSY_A_CORE
Patient is followed up for psychosis/paranoia/depression. Chart, medications and labs reviewed.  Patient is discussed with nursing staff. Per nursing report, no overnight issues.  Patient took all standing medications, no SE noted.  Remains  in behavioral control, no prn’s for aggression. Eating and sleeping ok.  Patient is observed in his room, interviewed at bedside, pt is malodorous.  He is encouraged to tend to ADL’s, but seems to have little motivation to do so.  Pt reports feeling “tired” Continues to endorse paranoia, and anxiety. However reports symptoms improving slightly. Patient remains disorganized, anxious. Denies SI/SIB/HI/AVH, no intent or plan engages in safety planning.   Remains compliant with medications, no reported or observed adverse effects.  Increase Seroquel 150mg. No acute medical concerns, VSS. Continue treatment for risk modification. Continue to provide therapeutic support.

## 2021-09-05 NOTE — BH INPATIENT PSYCHIATRY PROGRESS NOTE - NSBHCHARTREVIEWVS_PSY_A_CORE FT
Vital Signs Last 24 Hrs  T(C): 36.2 (09-05-21 @ 06:46), Max: 36.8 (09-04-21 @ 18:52)  T(F): 97.1 (09-05-21 @ 06:46), Max: 98.3 (09-04-21 @ 18:52)  HR: --77  BP: --143/86  BP(mean): --  RR: --  SpO2: --    Orthostatic VS  09-05-21 @ 07:33  Lying BP: --/-- HR: --  Sitting BP: 143/86 HR: 77  Standing BP: 136/87 HR: 83  Site: --  Mode: --  Orthostatic VS  09-04-21 @ 18:52  Lying BP: --/-- HR: --  Sitting BP: 154/87 HR: 71  Standing BP: 152/87 HR: 71  Site: --  Mode: --  Orthostatic VS  09-04-21 @ 08:16  Lying BP: --/-- HR: --  Sitting BP: 121/69 HR: 60  Standing BP: 118/67 HR: 69  Site: --  Mode: --  Orthostatic VS  09-03-21 @ 19:41  Lying BP: --/-- HR: --  Sitting BP: 148/82 HR: 70  Standing BP: 134/84 HR: 73  Site: --  Mode: electronic

## 2021-09-06 PROCEDURE — 99232 SBSQ HOSP IP/OBS MODERATE 35: CPT

## 2021-09-06 RX ORDER — QUETIAPINE FUMARATE 200 MG/1
200 TABLET, FILM COATED ORAL AT BEDTIME
Refills: 0 | Status: DISCONTINUED | OUTPATIENT
Start: 2021-09-06 | End: 2021-09-08

## 2021-09-06 RX ORDER — POLYETHYLENE GLYCOL 3350 17 G/17G
17 POWDER, FOR SOLUTION ORAL
Refills: 0 | Status: DISCONTINUED | OUTPATIENT
Start: 2021-09-06 | End: 2021-09-15

## 2021-09-06 RX ADMIN — Medication 81 MILLIGRAM(S): at 09:11

## 2021-09-06 RX ADMIN — GABAPENTIN 300 MILLIGRAM(S): 400 CAPSULE ORAL at 09:13

## 2021-09-06 RX ADMIN — GABAPENTIN 300 MILLIGRAM(S): 400 CAPSULE ORAL at 20:36

## 2021-09-06 RX ADMIN — Medication 1 PATCH: at 19:46

## 2021-09-06 RX ADMIN — Medication 0.5 MILLIGRAM(S): at 20:36

## 2021-09-06 RX ADMIN — QUETIAPINE FUMARATE 200 MILLIGRAM(S): 200 TABLET, FILM COATED ORAL at 20:36

## 2021-09-06 RX ADMIN — Medication 1 TABLET(S): at 09:11

## 2021-09-06 RX ADMIN — Medication 1 PATCH: at 09:14

## 2021-09-06 RX ADMIN — ESCITALOPRAM OXALATE 20 MILLIGRAM(S): 10 TABLET, FILM COATED ORAL at 09:11

## 2021-09-06 RX ADMIN — ATORVASTATIN CALCIUM 20 MILLIGRAM(S): 80 TABLET, FILM COATED ORAL at 20:36

## 2021-09-06 RX ADMIN — Medication 0.5 MILLIGRAM(S): at 14:11

## 2021-09-06 RX ADMIN — Medication 0.5 MILLIGRAM(S): at 09:11

## 2021-09-06 RX ADMIN — Medication 25 MILLIGRAM(S): at 09:11

## 2021-09-06 RX ADMIN — TAMSULOSIN HYDROCHLORIDE 0.4 MILLIGRAM(S): 0.4 CAPSULE ORAL at 20:35

## 2021-09-06 RX ADMIN — MIRTAZAPINE 30 MILLIGRAM(S): 45 TABLET, ORALLY DISINTEGRATING ORAL at 20:35

## 2021-09-06 RX ADMIN — GABAPENTIN 300 MILLIGRAM(S): 400 CAPSULE ORAL at 14:11
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
## 2021-09-06 NOTE — BH INPATIENT PSYCHIATRY PROGRESS NOTE - CURRENT MEDICATION
MEDICATIONS  (STANDING):  aspirin enteric coated 81 milliGRAM(s) Oral daily  atorvastatin 20 milliGRAM(s) Oral at bedtime  clonazePAM  Tablet 0.5 milliGRAM(s) Oral three times a day  escitalopram 20 milliGRAM(s) Oral daily  gabapentin 300 milliGRAM(s) Oral three times a day  metoprolol succinate ER 25 milliGRAM(s) Oral daily  mirtazapine 30 milliGRAM(s) Oral at bedtime  multivitamin 1 Tablet(s) Oral daily  nicotine - 21 mG/24Hr(s) Patch 1 patch Transdermal daily  QUEtiapine 200 milliGRAM(s) Oral at bedtime  tamsulosin 0.4 milliGRAM(s) Oral at bedtime    MEDICATIONS  (PRN):  ALBUTerol    90 MICROgram(s) HFA Inhaler 2 Puff(s) Inhalation every 6 hours PRN shortness of breath  diphenhydrAMINE 25 milliGRAM(s) Oral every 6 hours PRN agitation  fluPHENAZine  Injectable 3 milliGRAM(s) IntraMuscular every 12 hours PRN agitation  hydrOXYzine hydrochloride 25 milliGRAM(s) Oral every 6 hours PRN anxiety  LORazepam     Tablet 0.5 milliGRAM(s) Oral every 6 hours PRN anxiety/agitation

## 2021-09-06 NOTE — BH INPATIENT PSYCHIATRY PROGRESS NOTE - NSBHCHARTREVIEWVS_PSY_A_CORE FT
Vital Signs Last 24 Hrs  T(C): 36.4 (09-05-21 @ 20:51), Max: 36.6 (09-05-21 @ 14:54)  T(F): 97.5 (09-05-21 @ 20:51), Max: 97.8 (09-05-21 @ 14:54)  HR: --  BP: --  BP(mean): --  RR: 17 (09-05-21 @ 18:41) (17 - 17)  SpO2: 100% (09-05-21 @ 18:41) (100% - 100%)    Orthostatic VS  09-05-21 @ 20:51  Lying BP: --/-- HR: --  Sitting BP: 166/76 HR: 67  Standing BP: 155/83 HR: 70  Site: --  Mode: --  Orthostatic VS  09-05-21 @ 18:41  Lying BP: --/-- HR: --  Sitting BP: 170/87 HR: 90  Standing BP: 171/89 HR: 96  Site: --  Mode: --  Orthostatic VS  09-05-21 @ 07:33  Lying BP: --/-- HR: --  Sitting BP: 143/86 HR: 77  Standing BP: 136/87 HR: 83  Site: --  Mode: --  Orthostatic VS  09-04-21 @ 18:52  Lying BP: --/-- HR: --  Sitting BP: 154/87 HR: 71  Standing BP: 152/87 HR: 71  Site: --  Mode: --  Orthostatic VS  09-04-21 @ 08:16  Lying BP: --/-- HR: --  Sitting BP: 121/69 HR: 60  Standing BP: 118/67 HR: 69  Site: --  Mode: --   Vital Signs Last 24 Hrs  T(C): 36.4 (09-05-21 @ 20:51), Max: 36.6 (09-05-21 @ 14:54)  T(F): 97.5 (09-05-21 @ 20:51), Max: 97.8 (09-05-21 @ 14:54)  HR: --70  BP: --128/70  BP(mean): --  RR: 17 (09-05-21 @ 18:41) (17 - 17)  SpO2: 100% (09-05-21 @ 18:41) (100% - 100%)    Orthostatic VS  09-06-21 @ 08:21  Lying BP: --/-- HR: --  Sitting BP: 128/70 HR: 70  Standing BP: 128/78 HR: 80  Site: --  Mode: --  Orthostatic VS  09-05-21 @ 20:51  Lying BP: --/-- HR: --  Sitting BP: 166/76 HR: 67  Standing BP: 155/83 HR: 70  Site: --  Mode: --  Orthostatic VS  09-05-21 @ 18:41  Lying BP: --/-- HR: --  Sitting BP: 170/87 HR: 90  Standing BP: 171/89 HR: 96  Site: --  Mode: --  Orthostatic VS  09-05-21 @ 07:33  Lying BP: --/-- HR: --  Sitting BP: 143/86 HR: 77  Standing BP: 136/87 HR: 83  Site: --  Mode: --  Orthostatic VS  09-04-21 @ 18:52  Lying BP: --/-- HR: --  Sitting BP: 154/87 HR: 71  Standing BP: 152/87 HR: 71  Site: --  Mode: --

## 2021-09-07 PROCEDURE — 99232 SBSQ HOSP IP/OBS MODERATE 35: CPT

## 2021-09-07 RX ORDER — CLONAZEPAM 1 MG
0.5 TABLET ORAL THREE TIMES A DAY
Refills: 0 | Status: DISCONTINUED | OUTPATIENT
Start: 2021-09-07 | End: 2021-09-13

## 2021-09-07 RX ADMIN — TAMSULOSIN HYDROCHLORIDE 0.4 MILLIGRAM(S): 0.4 CAPSULE ORAL at 20:48

## 2021-09-07 RX ADMIN — MIRTAZAPINE 30 MILLIGRAM(S): 45 TABLET, ORALLY DISINTEGRATING ORAL at 20:48

## 2021-09-07 RX ADMIN — Medication 1 PATCH: at 19:24

## 2021-09-07 RX ADMIN — ATORVASTATIN CALCIUM 20 MILLIGRAM(S): 80 TABLET, FILM COATED ORAL at 20:48

## 2021-09-07 RX ADMIN — ESCITALOPRAM OXALATE 20 MILLIGRAM(S): 10 TABLET, FILM COATED ORAL at 09:06

## 2021-09-07 RX ADMIN — Medication 0.5 MILLIGRAM(S): at 09:03

## 2021-09-07 RX ADMIN — GABAPENTIN 300 MILLIGRAM(S): 400 CAPSULE ORAL at 12:21

## 2021-09-07 RX ADMIN — POLYETHYLENE GLYCOL 3350 17 GRAM(S): 17 POWDER, FOR SOLUTION ORAL at 20:48

## 2021-09-07 RX ADMIN — QUETIAPINE FUMARATE 200 MILLIGRAM(S): 200 TABLET, FILM COATED ORAL at 20:47

## 2021-09-07 RX ADMIN — Medication 1 TABLET(S): at 09:03

## 2021-09-07 RX ADMIN — Medication 0.5 MILLIGRAM(S): at 20:48

## 2021-09-07 RX ADMIN — GABAPENTIN 300 MILLIGRAM(S): 400 CAPSULE ORAL at 09:03

## 2021-09-07 RX ADMIN — GABAPENTIN 300 MILLIGRAM(S): 400 CAPSULE ORAL at 20:48

## 2021-09-07 RX ADMIN — Medication 25 MILLIGRAM(S): at 09:03

## 2021-09-07 RX ADMIN — POLYETHYLENE GLYCOL 3350 17 GRAM(S): 17 POWDER, FOR SOLUTION ORAL at 09:12

## 2021-09-07 RX ADMIN — Medication 81 MILLIGRAM(S): at 09:03

## 2021-09-07 RX ADMIN — Medication 1 PATCH: at 09:04

## 2021-09-07 RX ADMIN — Medication 0.5 MILLIGRAM(S): at 12:20

## 2021-09-07 NOTE — BH INPATIENT PSYCHIATRY PROGRESS NOTE - PRN MEDS
MEDICATIONS  (PRN):  ALBUTerol    90 MICROgram(s) HFA Inhaler 2 Puff(s) Inhalation every 6 hours PRN shortness of breath  diphenhydrAMINE 25 milliGRAM(s) Oral every 6 hours PRN agitation  fluPHENAZine  Injectable 3 milliGRAM(s) IntraMuscular every 12 hours PRN agitation  hydrOXYzine hydrochloride 25 milliGRAM(s) Oral every 6 hours PRN anxiety  LORazepam     Tablet 0.5 milliGRAM(s) Oral every 6 hours PRN agitation  polyethylene glycol 3350 17 Gram(s) Oral two times a day PRN constipation

## 2021-09-07 NOTE — BH INPATIENT PSYCHIATRY PROGRESS NOTE - CURRENT MEDICATION
MEDICATIONS  (STANDING):  aspirin enteric coated 81 milliGRAM(s) Oral daily  atorvastatin 20 milliGRAM(s) Oral at bedtime  clonazePAM  Tablet 0.5 milliGRAM(s) Oral three times a day  escitalopram 20 milliGRAM(s) Oral daily  gabapentin 300 milliGRAM(s) Oral three times a day  metoprolol succinate ER 25 milliGRAM(s) Oral daily  mirtazapine 30 milliGRAM(s) Oral at bedtime  multivitamin 1 Tablet(s) Oral daily  nicotine - 21 mG/24Hr(s) Patch 1 patch Transdermal daily  QUEtiapine 200 milliGRAM(s) Oral at bedtime  tamsulosin 0.4 milliGRAM(s) Oral at bedtime    MEDICATIONS  (PRN):  ALBUTerol    90 MICROgram(s) HFA Inhaler 2 Puff(s) Inhalation every 6 hours PRN shortness of breath  diphenhydrAMINE 25 milliGRAM(s) Oral every 6 hours PRN agitation  fluPHENAZine  Injectable 3 milliGRAM(s) IntraMuscular every 12 hours PRN agitation  hydrOXYzine hydrochloride 25 milliGRAM(s) Oral every 6 hours PRN anxiety  LORazepam     Tablet 0.5 milliGRAM(s) Oral every 6 hours PRN agitation  polyethylene glycol 3350 17 Gram(s) Oral two times a day PRN constipation

## 2021-09-07 NOTE — BH INPATIENT PSYCHIATRY PROGRESS NOTE - NSBHCHARTREVIEWVS_PSY_A_CORE FT
Vital Signs Last 24 Hrs  T(C): 36.4 (09-07-21 @ 06:21), Max: 36.7 (09-06-21 @ 19:21)  T(F): 97.5 (09-07-21 @ 06:21), Max: 98 (09-06-21 @ 19:21)  HR: --  BP: --  BP(mean): --  RR: 17 (09-06-21 @ 20:47) (17 - 17)  SpO2: 98% (09-06-21 @ 20:47) (98% - 98%)    Orthostatic VS  09-07-21 @ 06:38  Lying BP: --/-- HR: --  Sitting BP: 149/92 HR: 89  Standing BP: 148/90 HR: 86  Site: --  Mode: --  Orthostatic VS  09-06-21 @ 19:21  Lying BP: --/-- HR: --  Sitting BP: 128/75 HR: 76  Standing BP: 124/73 HR: 78  Site: --  Mode: --  Orthostatic VS  09-06-21 @ 08:21  Lying BP: --/-- HR: --  Sitting BP: 128/70 HR: 70  Standing BP: 128/78 HR: 80  Site: --  Mode: --  Orthostatic VS  09-05-21 @ 20:51  Lying BP: --/-- HR: --  Sitting BP: 166/76 HR: 67  Standing BP: 155/83 HR: 70  Site: --  Mode: --  Orthostatic VS  09-05-21 @ 18:41  Lying BP: --/-- HR: --  Sitting BP: 170/87 HR: 90  Standing BP: 171/89 HR: 96  Site: --  Mode: --
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
## 2021-09-08 PROCEDURE — 99232 SBSQ HOSP IP/OBS MODERATE 35: CPT

## 2021-09-08 RX ORDER — QUETIAPINE FUMARATE 200 MG/1
250 TABLET, FILM COATED ORAL AT BEDTIME
Refills: 0 | Status: DISCONTINUED | OUTPATIENT
Start: 2021-09-08 | End: 2021-09-15

## 2021-09-08 RX ADMIN — GABAPENTIN 300 MILLIGRAM(S): 400 CAPSULE ORAL at 08:30

## 2021-09-08 RX ADMIN — MIRTAZAPINE 30 MILLIGRAM(S): 45 TABLET, ORALLY DISINTEGRATING ORAL at 20:30

## 2021-09-08 RX ADMIN — Medication 1 PATCH: at 07:00

## 2021-09-08 RX ADMIN — QUETIAPINE FUMARATE 250 MILLIGRAM(S): 200 TABLET, FILM COATED ORAL at 20:30

## 2021-09-08 RX ADMIN — GABAPENTIN 300 MILLIGRAM(S): 400 CAPSULE ORAL at 20:30

## 2021-09-08 RX ADMIN — Medication 1 PATCH: at 09:00

## 2021-09-08 RX ADMIN — Medication 1 TABLET(S): at 08:30

## 2021-09-08 RX ADMIN — Medication 1 PATCH: at 08:31

## 2021-09-08 RX ADMIN — Medication 25 MILLIGRAM(S): at 08:30

## 2021-09-08 RX ADMIN — Medication 0.5 MILLIGRAM(S): at 08:30

## 2021-09-08 RX ADMIN — Medication 0.5 MILLIGRAM(S): at 12:00

## 2021-09-08 RX ADMIN — Medication 81 MILLIGRAM(S): at 08:30

## 2021-09-08 RX ADMIN — ATORVASTATIN CALCIUM 20 MILLIGRAM(S): 80 TABLET, FILM COATED ORAL at 20:29

## 2021-09-08 RX ADMIN — TAMSULOSIN HYDROCHLORIDE 0.4 MILLIGRAM(S): 0.4 CAPSULE ORAL at 20:30

## 2021-09-08 RX ADMIN — Medication 0.5 MILLIGRAM(S): at 20:29

## 2021-09-08 RX ADMIN — ESCITALOPRAM OXALATE 20 MILLIGRAM(S): 10 TABLET, FILM COATED ORAL at 08:30

## 2021-09-08 RX ADMIN — GABAPENTIN 300 MILLIGRAM(S): 400 CAPSULE ORAL at 12:00

## 2021-09-08 NOTE — BH INPATIENT PSYCHIATRY DISCHARGE NOTE - NSDCCPCAREPLAN_GEN_ALL_CORE_FT
PRINCIPAL DISCHARGE DIAGNOSIS  Diagnosis: Paranoid schizophrenia  Assessment and Plan of Treatment:
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
## 2021-09-08 NOTE — BH INPATIENT PSYCHIATRY DISCHARGE NOTE - HOSPITAL COURSE
to be completed Patient was admitted to [facility] inpatient service on 9.13 legal status. Patient's labs were grossly WNL.  Patient admitted with a diagnosis of Paranoid Schizophrenia.  Patient has had multiple admissions in the past, with 2 prior past SA. Patient was admitted due to worsening paranoia, and suicidal thoughts. Patient also presented with paranoid delusions that he had AIDS despite multiple test resulting otherwise.   On admission interview, patient reported severe depression, low mood, escalating anxiety and suicidal with thoughts.  Patient perseverative on how much he hates his life, reported profound worthlessness and hopelessness. He also reported escalating daily anxiety. During initial interview patient was very distraught, anxious, difficult to engage and unable to tolerate interview, minimal information could be elicited.  After several attempts, following a discussion regarding risks (including but not limited to EPS, weight gain, NMS, TD, metabolic syndrome) and benefits (attenuated psychosis, more organized thought process and behavior), patient agreed to treatment and consented to treatment and medications.  Patient was maintained on outpatient medications: Lexapro 20mg, Neurontin 300mg TID, Remeron 30mg qhs, and Olanzapine 5mg qhs. Ativan 0.5mg BID was added during treatment course due to anxiety, with intent to discontinue once stabilized.   Patient had no reported or observed adverse effects, such as akathisia, tremor, EPS.   Patient mostly stayed in his room during his hospitalization, low profile on the unit, only visible for his needs.  He remained compliant with medication regimen and showed some improvement in his symptoms, with a gradual reduction of his depressive symptoms, anxiety, hopelessness, and suicidal thinking. Patient’s psychotic symptoms also subsided significantly.  He remained in good behavioral control and has not required emergent intramuscular medications or seclusion / restraints. He denied any suicidal or homicidal ideations throughout hospitalization.  The patient was provided with motivational counseling to tackle stressors and enhance coping skills.  Patient was also given literature on “depression safety plan.”   Patient was provided with extensive psycho-education regarding importance of compliance with medications.    PROCEDURES AND TREATMENT:  >Individual psychotherapy/CBT modality and group therapy  >Milieu therapy and supportive therapy.  >Psychopharmacologic management.  >Motivational counseling.   Discharge Psychiatric Medications: Lexapro 20mg, Neurontin 300mg TID, Remeron 30mg qhs, and Olanzapine 5mg qhs.  Discharge Medical Medications: Albuterol 90mcg, ASA 81mg daily, Flomax 0.4mg qhs, Lipitor 20mg qhs, Metoprolol 25mg qdaily.   Patient  labs were all WNL. Labs include---CBC/Chemistry/Lipid Panel/TSH/CPK/HBA1-C/U-A/U-Tox/EKG.    ADDITIONAL RISK FACTORS CONSIDERED AT TIME OF DISCHARGE:  Patient is visible on the unit and is calm, cooperative, pleasant, AAOX3 upon approach. He is scheduled for discharge, returning to his group [facility] to [facility].  On suicide risk assessment at the time of discharge, patient is at elevated chronic risk due the following factors: history of psychiatric illness, history on noncompliance, recent inpatient psychiatric admission.  Protective factors include patient denying any anxiety, panic attacks, global insomnia, severe anhedonia or psychotic/manic symptoms. Patient has support social network of and residential stability.  Patient denies any suicidal ideations, intent, or plan at the time of discharge.  Given the mitigation of aforementioned acute risk factors and considerable protective factors, patient's acute risk for self-harm has been minimized and is currently low.  There are no other acute risk factors that could be mitigated by further inpatient hospitalization.  Patient is not deemed to be an imminent danger to self or others at the time of discharge.  The patient has been instructed that should he develop thoughts of self-harm, suicidal thoughts, homicidal thoughts, aggression, agitation or any other distressing psychiatric symptoms, he should call 911 or go the emergency room. The patient has expressed understanding and is in agreement with the above plan.  Problem Pertinent Review of Symptoms/Associated Signs and Symptoms: No hallucinations, delusions, or other symptoms of psychotic process are reported. There are no other acute risk factors that could be mitigated by further inpatient hospitalization.  Patient is not deemed to be an imminent danger to self or others at the time of discharge. Patient's clinical improvement of symptoms led the treatment team to conclude that the patient can be safely discharged to lower level of care to his outpatient provider.  Psychoeducation was provided to patient with regards to smoking lowering Olanzapine blood levels, and necessary bloodwork, The patient was educated about the proper and safe use of medications as well as the risks and benefits of over and under dosing. The patient was told to go to the nearest emergency department or call his PMD or psychiatrist if he experienced any adverse side effects from any of the medications.  Encouraged patient to maintain medication compliance. Discussed pitfalls of treatment failure, and reinforced taking medication as directed. Discussed not stopping medications when  he feels better, and processed discontinuation symptoms. Patient was informed of risk/benefit of medication, alternative treatment and no treatment.  Patient was educated about side effects of his medications, including EPS, TD.   Patient was provided with emergency phone numbers and were instructed to call 911 or go to the nearest ER for worsening of symptoms, dangerousness to self/others. Patient verbalized understanding.   Based on the above assessment, patient no longer warrants an inpatient psychiatric hospitalization.  Prognosis is guarded, given a history of inpatient psychiatric hospitalizations, history of chronic mental illness and history of noncompliance with medications. Future risk was minimized before discharge by treatment of acute mood symptoms, maximizing outpatient support, providing relevant patient education, discussing emergency procedures, and ensuring close follow-up. Although the patient remains at his chronic baseline risk of self-harm, such risk cannot be further ameliorated by continued inpatient treatment and the patient is therefore appropriate for discharge.  Discharge Pan:  -Patient given 4 weeks supply of medications. Risk, benefits and alternatives discussed with patient. Patient verbalized understanding and in accord with aftercare recommendations.   -Patient instructed to call 911 or go to nearest ER in case of emergency.   -Patient given Suicide Prevention Lifeline number 1-643.204.3067 and provided instructions on its use  -Patient will follow up with outpatient appointments with outpatient NP.

## 2021-09-08 NOTE — BH INPATIENT PSYCHIATRY PROGRESS NOTE - CURRENT MEDICATION
MEDICATIONS  (STANDING):  aspirin enteric coated 81 milliGRAM(s) Oral daily  atorvastatin 20 milliGRAM(s) Oral at bedtime  clonazePAM  Tablet 0.5 milliGRAM(s) Oral three times a day  escitalopram 20 milliGRAM(s) Oral daily  gabapentin 300 milliGRAM(s) Oral three times a day  metoprolol succinate ER 25 milliGRAM(s) Oral daily  mirtazapine 30 milliGRAM(s) Oral at bedtime  multivitamin 1 Tablet(s) Oral daily  nicotine - 21 mG/24Hr(s) Patch 1 patch Transdermal daily  QUEtiapine 200 milliGRAM(s) Oral at bedtime  tamsulosin 0.4 milliGRAM(s) Oral at bedtime    MEDICATIONS  (PRN):  ALBUTerol    90 MICROgram(s) HFA Inhaler 2 Puff(s) Inhalation every 6 hours PRN shortness of breath  diphenhydrAMINE 25 milliGRAM(s) Oral every 6 hours PRN agitation  fluPHENAZine  Injectable 3 milliGRAM(s) IntraMuscular every 12 hours PRN agitation  hydrOXYzine hydrochloride 25 milliGRAM(s) Oral every 6 hours PRN anxiety  LORazepam     Tablet 0.5 milliGRAM(s) Oral every 6 hours PRN agitation  polyethylene glycol 3350 17 Gram(s) Oral two times a day PRN constipation   MEDICATIONS  (STANDING):  aspirin enteric coated 81 milliGRAM(s) Oral daily  atorvastatin 20 milliGRAM(s) Oral at bedtime  clonazePAM  Tablet 0.5 milliGRAM(s) Oral three times a day  escitalopram 20 milliGRAM(s) Oral daily  gabapentin 300 milliGRAM(s) Oral three times a day  metoprolol succinate ER 25 milliGRAM(s) Oral daily  mirtazapine 30 milliGRAM(s) Oral at bedtime  multivitamin 1 Tablet(s) Oral daily  nicotine - 21 mG/24Hr(s) Patch 1 patch Transdermal daily  QUEtiapine 250 milliGRAM(s) Oral at bedtime  tamsulosin 0.4 milliGRAM(s) Oral at bedtime    MEDICATIONS  (PRN):  ALBUTerol    90 MICROgram(s) HFA Inhaler 2 Puff(s) Inhalation every 6 hours PRN shortness of breath  diphenhydrAMINE 25 milliGRAM(s) Oral every 6 hours PRN agitation  fluPHENAZine  Injectable 3 milliGRAM(s) IntraMuscular every 12 hours PRN agitation  hydrOXYzine hydrochloride 25 milliGRAM(s) Oral every 6 hours PRN anxiety  LORazepam     Tablet 0.5 milliGRAM(s) Oral every 6 hours PRN agitation  polyethylene glycol 3350 17 Gram(s) Oral two times a day PRN constipation

## 2021-09-08 NOTE — BH INPATIENT PSYCHIATRY PROGRESS NOTE - NSBHCHARTREVIEWVS_PSY_A_CORE FT
Vital Signs Last 24 Hrs  T(C): 37.1 (09-08-21 @ 06:22), Max: 37.1 (09-07-21 @ 15:12)  T(F): 98.7 (09-08-21 @ 06:22), Max: 98.8 (09-07-21 @ 15:12)  HR: --  BP: --  BP(mean): --  RR: 18 (09-07-21 @ 20:27) (18 - 18)  SpO2: 99% (09-07-21 @ 20:27) (99% - 99%)    Orthostatic VS  09-07-21 @ 19:21  Lying BP: --/-- HR: --  Sitting BP: 140/71 HR: 68  Standing BP: 148/96 HR: 78  Site: --  Mode: --  Orthostatic VS  09-07-21 @ 06:38  Lying BP: --/-- HR: --  Sitting BP: 149/92 HR: 89  Standing BP: 148/90 HR: 86  Site: --  Mode: --  Orthostatic VS  09-06-21 @ 19:21  Lying BP: --/-- HR: --  Sitting BP: 128/75 HR: 76  Standing BP: 124/73 HR: 78  Site: --  Mode: --  Orthostatic VS  09-06-21 @ 08:21  Lying BP: --/-- HR: --  Sitting BP: 128/70 HR: 70  Standing BP: 128/78 HR: 80  Site: --  Mode: --   Vital Signs Last 24 Hrs  T(C): 37.1 (09-08-21 @ 06:22), Max: 37.1 (09-07-21 @ 15:12)  T(F): 98.7 (09-08-21 @ 06:22), Max: 98.8 (09-07-21 @ 15:12)  HR: --  BP: --  BP(mean): --  RR: 18 (09-07-21 @ 20:27) (18 - 18)  SpO2: 99% (09-07-21 @ 20:27) (99% - 99%)    Orthostatic VS  09-08-21 @ 08:17  Lying BP: --/-- HR: --  Sitting BP: 132/66 HR: 69  Standing BP: 126/73 HR: 68  Site: upper left arm  Mode: electronic  Orthostatic VS  09-07-21 @ 19:21  Lying BP: --/-- HR: --  Sitting BP: 140/71 HR: 68  Standing BP: 148/96 HR: 78  Site: --  Mode: --  Orthostatic VS  09-07-21 @ 06:38  Lying BP: --/-- HR: --  Sitting BP: 149/92 HR: 89  Standing BP: 148/90 HR: 86  Site: --  Mode: --  Orthostatic VS  09-06-21 @ 19:21  Lying BP: --/-- HR: --  Sitting BP: 128/75 HR: 76  Standing BP: 124/73 HR: 78  Site: --  Mode: --   Vital Signs Last 24 Hrs  T(C): 37.1 (09-08-21 @ 06:22), Max: 37.1 (09-07-21 @ 15:12)  T(F): 98.7 (09-08-21 @ 06:22), Max: 98.8 (09-07-21 @ 15:12)  HR: --69  BP: --132/66  BP(mean): --  RR: 18 (09-07-21 @ 20:27) (18 - 18)  SpO2: 99% (09-07-21 @ 20:27) (99% - 99%)    Orthostatic VS  09-08-21 @ 08:17  Lying BP: --/-- HR: --  Sitting BP: 132/66 HR: 69  Standing BP: 126/73 HR: 68  Site: upper left arm  Mode: electronic  Orthostatic VS  09-07-21 @ 19:21  Lying BP: --/-- HR: --  Sitting BP: 140/71 HR: 68  Standing BP: 148/96 HR: 78  Site: --  Mode: --  Orthostatic VS  09-07-21 @ 06:38  Lying BP: --/-- HR: --  Sitting BP: 149/92 HR: 89  Standing BP: 148/90 HR: 86  Site: --  Mode: --  Orthostatic VS  09-06-21 @ 19:21  Lying BP: --/-- HR: --  Sitting BP: 128/75 HR: 76  Standing BP: 124/73 HR: 78  Site: --  Mode: --

## 2021-09-08 NOTE — BH INPATIENT PSYCHIATRY DISCHARGE NOTE - NSDCMRMEDTOKEN_GEN_ALL_CORE_FT
albuterol 90 mcg/inh inhalation aerosol: 2 puff(s) inhaled every 6 hours, As needed, shortness of breath  Aspirin Enteric Coated 81 mg oral delayed release tablet: 1 tab(s) orally once a day  Flomax 0.4 mg oral capsule: 1 cap(s) orally once a day (at bedtime)  Lexapro 20 mg oral tablet: 1 tab(s) orally once a day  Lipitor 20 mg oral tablet: 1 tab(s) orally once a day (at bedtime)  Metoprolol Succinate ER 25 mg oral tablet, extended release: 1 tab(s) orally once a day  Multiple Vitamins oral tablet: 1 tab(s) orally once a day  Neurontin 300 mg oral capsule: 1 cap(s) orally 3 times a day  Nicoderm C-Q Clear 21 mg/24 hr transdermal film, extended release: 1 patch transdermal once a day   Remeron 30 mg oral tablet: 1 tab(s) orally once a day (at bedtime)  ZyPREXA 5 mg oral tablet: 1 tab(s) orally once a day (at bedtime)   albuterol 90 mcg/inh inhalation aerosol: 2 puff(s) inhaled every 6 hours, As needed, shortness of breath  aspirin 81 mg oral delayed release tablet: 1 tab(s) orally once a day  atorvastatin 20 mg oral tablet: 1 tab(s) orally once a day (at bedtime)  clonazePAM 0.5 mg oral tablet: 1 tab(s) orally 2 times a day MDD:1mg  escitalopram 20 mg oral tablet: 1 tab(s) orally once a day  gabapentin 300 mg oral capsule: 1 cap(s) orally 3 times a day  metoprolol succinate 25 mg oral tablet, extended release: 1 tab(s) orally once a day  mirtazapine 30 mg oral tablet: 1 tab(s) orally once a day (at bedtime)  SEROquel 200 mg oral tablet: 1 tab(s) orally once a day (at bedtime)   SEROquel 50 mg oral tablet: 1 tab(s) orally once a day (at bedtime)   tamsulosin 0.4 mg oral capsule: 1 cap(s) orally once a day (at bedtime)

## 2021-09-08 NOTE — BH INPATIENT PSYCHIATRY DISCHARGE NOTE - NSBHDCMEDICALFT_PSY_A_CORE
Patient is diagnosed with HTN, HLD.  At the time of this treatment summary, the patient has not had any acute medical changes during his hospitalization. There have been no medical consultations. Patient was discharge with COVID 19 negative results. No cough, SOB, CP, or fever at time of discharge. Vitals WNL.  Patient is diagnosed with HTN, HLD, Asthma.  At the time of this treatment summary, the patient has not had any acute medical changes during his hospitalization. There have been no medical consultations. Patient was discharge with no cough, SOB, CP, or fever at time of discharge. Vitals WNL.

## 2021-09-08 NOTE — BH INPATIENT PSYCHIATRY DISCHARGE NOTE - REASON FOR ADMISSION
active suicidal thought and plans to tie a shoelace around his wrist and cut his arm with a scissor.

## 2021-09-08 NOTE — BH INPATIENT PSYCHIATRY DISCHARGE NOTE - NSBHDCHANDOFFFT_PSY_ALL_CORE
Medication list and discharge summary included discussion of hospital course, treatment, and medications, with phone number left for call back provided to outpatient NP.  Writer's contact information was provided for any further questions or concerns to contact Neris Kauffman NP at [phone].  Verbal handoff: Medication list and discharge summary included discussion of hospital course, treatment, and medications,  provided to outpatient NP Nguyen Arteaga dc summary also faxed at [phone].  Writer's contact information was provided for any further questions or concerns to contact Neris Kauffman NP at [phone].

## 2021-09-08 NOTE — BH INPATIENT PSYCHIATRY DISCHARGE NOTE - CASE SUMMARY
Patient was admitted to [facility] inpatient service on 9.13 legal status. Patient's labs were grossly WNL.  Patient admitted with a diagnosis of Paranoid Schizophrenia.  Patient has had multiple admissions in the past, with 2 prior past SA. Patient was admitted due to worsening paranoia, and suicidal thoughts. Patient also presented with delusions that he had AIDS despite multiple test resulting otherwise.   On admission interview, patient reported severe depression, low mood, escalating anxiety and suicidal with thoughts.  Patient perseverative on how much he hates his life, reported profound worthlessness and hopelessness. He also reported escalating daily anxiety. During initial interview patient was very distraught, anxious, difficult to engage and unable to tolerate interview, minimal information could be elicited.  After several attempts, following a discussion regarding risks (including but not limited to EPS, weight gain, NMS, TD, metabolic syndrome) and benefits (attenuated psychosis, more organized thought process and behavior), patient agreed to treatment and consented to treatment and medications.  Patient was maintained on outpatient medications: Lexapro 20mg, Neurontin 300mg TID, Remeron 30mg qhs, and Olanzapine 5mg qhs. Ativan 0.5mg BID was added during treatment course due to anxiety, with intent to discontinue once stabilized.   Patient had no reported or observed adverse effects, such as akathisia, tremor, EPS.   Patient mostly stayed in his room during his hospitalization, low profile on the unit, only visible for his needs.  He remained compliant with medication regimen and showed some improvement in his symptoms, with a gradual reduction of his depressive symptoms, anxiety, hopelessness, and suicidal thinking. Patient’s psychotic symptoms also subsided significantly.  He remained in good behavioral control and has not required emergent intramuscular medications or seclusion / restraints. He denied any suicidal or homicidal ideations throughout hospitalization.  The patient was provided with motivational counseling to tackle stressors and enhance coping skills.  Patient was also given literature on “depression safety plan.”   Patient was provided with extensive psycho-education regarding importance of compliance with medications.    PROCEDURES AND TREATMENT:  >Individual psychotherapy/CBT modality and group therapy  >Milieu therapy and supportive therapy.  >Psychopharmacologic management.  >Motivational counseling.   Discharge Psychiatric Medications: Lexapro 20mg, Neurontin 300mg TID, Remeron 30mg qhs, and Olanzapine 5mg qhs.  Discharge Medical Medications: Albuterol 90mcg, ASA 81mg daily, Flomax 0.4mg qhs, Lipitor 20mg qhs, Metoprolol 25mg qdaily.   Patient  labs were all WNL. Labs include---CBC/Chemistry/Lipid Panel/TSH/CPK/HBA1-C/U-A/U-Tox/EKG.    ADDITIONAL RISK FACTORS CONSIDERED AT TIME OF DISCHARGE:  Patient is visible on the unit and is calm, cooperative, pleasant, AAOX3 upon approach. He is scheduled for discharge, returning to his group home [facility] to live.  On suicide risk assessment at the time of discharge, patient is at elevated chronic risk due the following factors: history of psychiatric illness, history on noncompliance, recent inpatient psychiatric admission.  Protective factors include patient denying any anxiety, panic attacks, global insomnia, severe anhedonia or psychotic/manic symptoms. Patient has support social network of and residential stability.  Patient denies any suicidal ideations, intent, or plan at the time of discharge.  Given the mitigation of aforementioned acute risk factors and considerable protective factors, patient's acute risk for self-harm has been minimized and is currently low.  There are no other acute risk factors that could be mitigated by further inpatient hospitalization.  Patient is not deemed to be an imminent danger to self or others at the time of discharge.  The patient has been instructed that should he develop thoughts of self-harm, suicidal thoughts, homicidal thoughts, aggression, agitation or any other distressing psychiatric symptoms, he should call 911 or go the emergency room. The patient has expressed understanding and is in agreement with the above plan.  Problem Pertinent Review of Symptoms/Associated Signs and Symptoms: No hallucinations, delusions, or other symptoms of psychotic process are reported. There are no other acute risk factors that could be mitigated by further inpatient hospitalization.  Patient is not deemed to be an imminent danger to self or others at the time of discharge. Patient's clinical improvement of symptoms led the treatment team to conclude that the patient can be safely discharged to lower level of care to his outpatient provider.  Psychoeducation was provided to patient with regards to smoking lowering Olanzapine blood levels, and necessary bloodwork, The patient was educated about the proper and safe use of medications as well as the risks and benefits of over and under dosing. The patient was told to go to the nearest emergency department or call his PMD or psychiatrist if he experienced any adverse side effects from any of the medications.  Encouraged patient to maintain medication compliance. Discussed pitfalls of treatment failure, and reinforced taking medication as directed. Discussed not stopping medications when  he feels better, and processed discontinuation symptoms. Patient was informed of risk/benefit of medication, alternative treatment and no treatment.  Patient was educated about side effects of his medications, including EPS, TD.   Patient was provided with emergency phone numbers and were instructed to call 911 or go to the nearest ER for worsening of symptoms, dangerousness to self/others. Patient verbalized understanding.   Based on the above assessment, patient no longer warrants an inpatient psychiatric hospitalization.  Prognosis is guarded, given a history of inpatient psychiatric hospitalizations, history of chronic mental illness and history of noncompliance with medications. Future risk was minimized before discharge by treatment of acute mood symptoms, maximizing outpatient support, providing relevant patient education, discussing emergency procedures, and ensuring close follow-up. Although the patient remains at his chronic baseline risk of self-harm, such risk cannot be further ameliorated by continued inpatient treatment and the patient is therefore appropriate for discharge.  Discharge Pan:  -Patient given 4 weeks supply of medications. Risk, benefits and alternatives discussed with patient. Patient verbalized understanding and in accord with aftercare recommendations.   -Patient instructed to call 911 or go to nearest ER in case of emergency.   -Patient given Suicide Prevention Lifeline number 1-583.873.9689 and provided instructions on its use  -Patient will follow up with outpatient appointments with outpatient NP.
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
## 2021-09-08 NOTE — BH INPATIENT PSYCHIATRY DISCHARGE NOTE - MODIFICATIONS
On admission, pt demonstrated worsening paranoia, disorganization, dysphoria, anxiety. Had reported SI on presentation though recanted subsequent to admission, saying he had reported it to be admitted. Anxiety had more of an OCD quality in its perseverative quality with intact reality testing. Stabilized in inpatient environment with medication adjustment, individual/group therapy. Appropriate outpatient follow-up arrangements made, acute risk sufficiently improved for discharge.

## 2021-09-08 NOTE — BH INPATIENT PSYCHIATRY DISCHARGE NOTE - NSBHSUICIDESTATUS_PSY_ALL_CORE
On safety assessment on day of discharge, patient has the following risk factors which are nonmodifiable which include: gender, age, chronic mental illness, remote suicide attempt   Modifiable risk factors: psychosis, treatment non-adherence, substance abuse, impulsivity, inadequate social support. Currently there are no modifiable risk factors that could further be addressed in the inpatient hospital setting as patient denies any depressive sxs, patient is not suicidal with no intent or plan, has improvement in sleep and energy, medication compliant.   Protective factors include: denies SIIP, denies HIIP, future oriented, hopeful, willingness to try medication, not depressed, no access to weapons, engaged in treatment, stable residence, close outpatient follow up appointment.   Patient is at chronic higher risk than the general population for suicide because of risk factors as above, however, they can be mitigated by adjusting medications, medication compliance, and continued therapy.  At the time of discharge, patient not an acute danger to self or others.

## 2021-09-08 NOTE — BH INPATIENT PSYCHIATRY PROGRESS NOTE - NSBHFUPINTERVALHXFT_PSY_A_CORE
Patient seen for follow up for psychosis/paranoia. Charts, medications, and labs reviewed. Patient is discussed with nursing staff. No interval events. Patient is observed in his room, he is cooperative, less paranoid, and calm. He reported that his mood is "good" today. Patient denies any pain/physical complaints. Per nursing, he was complaining of poor sleep last night. Patient reports that he slept well last night. Patient reports his appetite is poor, but he was able to eat "a little not a lot." Patient asked about receiving Ativan again stating that it helped him a lot in the past. Denies SI/HI/AH/VH. Patient was encouraged to go to group therapy sessions. He said he would attend sessions when he gets word that he is allowed back into his residence. After explaining the benefits of therapy groups, he agreed to give it a try today. He also agreed he would get out of his room more today because he spends the majority of his time at his desk in his room. Grooming and hygiene were appropriate, no strong body odors noted. Patient remains compliant with all standing medications, no SE noted. No acute medical concerns, VSS. Continue with medication regimen: Seroquel 250mg tonight. Continue to provide therapeutic support.

## 2021-09-08 NOTE — BH INPATIENT PSYCHIATRY PROGRESS NOTE - NSBHASSESSSUMMFT_PSY_ALL_CORE
61 year-old with history of htn, increased lipids, paranoid schizophrenia, some ETOH misuse, living in adult home, admitted on voluntary with suicidal ideations  Currently, agitated, not cooperative with exam, but given voluntary status will not constant obs. No UTOX, ETOH negative, was in hospital since yesterday,  no evidence of ETOH withdrawal (no tremors/sweats), reportedly long period of sobriety, will not CIWA  Continue olanzapine and other psychotropic, htn, lipid lowering agents.    Plan:  >Legal: 9.13  >Obs: Routine, no current SI. no need for CO, patient not expected to pose risk to self or others in controlled inpatient setting  >Psychiatric Meds: Seroquel 150mg, Remeron 30mg qhs, Lexapro 20mg daily. Observe for tolerability and efficacy.   PRN medications:  Ativan 2mg oral Q6HR PRN for agitation and anxiety.  Haldol 5mg oral Q6HR PRN for agitation.   Benadryl 50mg oral Q6HR PRN for agitation.   Vistaril 50mg oral Q6HR PRN for anxiety.  Desyrel 50mg oral QHS PRN for insomnia.   >Labs: Admission labs reviewed, no acute findings. Labs for tomorrow: A1c, Lipid. Hold antipsychotics if QTc >500  >Medical:   No acute concerns. No consultations needed at this time. No indication for CIWA. Patient with consistently stable VS, During the course of treatment, will collaborate with medical team to manage medical issues.  >Diet: Regular   >Social: milieu/structured therapy  >Treatment Interventions: Groups and Individual Therapy/CBT, Motivational counseling for substance abuse related issues.   >Treatment goals: Symptom reduction  >Dispo: Collateral and dispo planning pending further symptom and medication optimization     61 year-old with history of htn, increased lipids, paranoid schizophrenia, some ETOH misuse, living in adult home, admitted on voluntary with suicidal ideations  Currently, agitated, not cooperative with exam, but given voluntary status will not constant obs. No UTOX, ETOH negative, was in hospital since yesterday,  no evidence of ETOH withdrawal (no tremors/sweats), reportedly long period of sobriety, will not CIWA  Continue olanzapine and other psychotropic, htn, lipid lowering agents.    Plan:  >Legal: 9.13  >Obs: Routine, no current SI. no need for CO, patient not expected to pose risk to self or others in controlled inpatient setting  >Psychiatric Meds: increase Seroquel 250mg, Remeron 30mg qhs, Lexapro 20mg daily. Observe for tolerability and efficacy.   PRN medications:  Ativan 2mg oral Q6HR PRN for agitation and anxiety.  Haldol 5mg oral Q6HR PRN for agitation.   Benadryl 50mg oral Q6HR PRN for agitation.   Vistaril 50mg oral Q6HR PRN for anxiety.  Desyrel 50mg oral QHS PRN for insomnia.   >Labs: Admission labs reviewed, no acute findings. Labs for tomorrow: A1c, Lipid. Hold antipsychotics if QTc >500  >Medical:   No acute concerns. No consultations needed at this time. No indication for CIWA. Patient with consistently stable VS, During the course of treatment, will collaborate with medical team to manage medical issues.  >Diet: Regular   >Social: milieu/structured therapy  >Treatment Interventions: Groups and Individual Therapy/CBT, Motivational counseling for substance abuse related issues.   >Treatment goals: Symptom reduction  >Dispo: Collateral and dispo planning pending further symptom and medication optimization

## 2021-09-09 PROCEDURE — 99232 SBSQ HOSP IP/OBS MODERATE 35: CPT

## 2021-09-09 RX ADMIN — Medication 0.5 MILLIGRAM(S): at 20:29

## 2021-09-09 RX ADMIN — Medication 1 TABLET(S): at 09:29

## 2021-09-09 RX ADMIN — GABAPENTIN 300 MILLIGRAM(S): 400 CAPSULE ORAL at 20:29

## 2021-09-09 RX ADMIN — Medication 25 MILLIGRAM(S): at 08:46

## 2021-09-09 RX ADMIN — Medication 0.5 MILLIGRAM(S): at 13:33

## 2021-09-09 RX ADMIN — Medication 1 PATCH: at 08:45

## 2021-09-09 RX ADMIN — ATORVASTATIN CALCIUM 20 MILLIGRAM(S): 80 TABLET, FILM COATED ORAL at 20:29

## 2021-09-09 RX ADMIN — MIRTAZAPINE 30 MILLIGRAM(S): 45 TABLET, ORALLY DISINTEGRATING ORAL at 20:29

## 2021-09-09 RX ADMIN — QUETIAPINE FUMARATE 250 MILLIGRAM(S): 200 TABLET, FILM COATED ORAL at 20:29

## 2021-09-09 RX ADMIN — GABAPENTIN 300 MILLIGRAM(S): 400 CAPSULE ORAL at 13:34

## 2021-09-09 RX ADMIN — GABAPENTIN 300 MILLIGRAM(S): 400 CAPSULE ORAL at 08:47

## 2021-09-09 RX ADMIN — ESCITALOPRAM OXALATE 20 MILLIGRAM(S): 10 TABLET, FILM COATED ORAL at 08:47

## 2021-09-09 RX ADMIN — TAMSULOSIN HYDROCHLORIDE 0.4 MILLIGRAM(S): 0.4 CAPSULE ORAL at 20:30

## 2021-09-09 RX ADMIN — Medication 81 MILLIGRAM(S): at 08:45

## 2021-09-09 RX ADMIN — Medication 0.5 MILLIGRAM(S): at 09:29

## 2021-09-09 NOTE — BH INPATIENT PSYCHIATRY PROGRESS NOTE - NSBHCHARTREVIEWVS_PSY_A_CORE FT
Vital Signs Last 24 Hrs  T(C): 35.9 (09-09-21 @ 06:44), Max: 36.6 (09-08-21 @ 22:25)  T(F): 96.7 (09-09-21 @ 06:44), Max: 97.8 (09-08-21 @ 22:25)  HR: --  BP: --  BP(mean): --  RR: 18 (09-08-21 @ 15:02) (18 - 18)  SpO2: --    Orthostatic VS  09-09-21 @ 06:44  Lying BP: --/-- HR: --  Sitting BP: 136/74 HR: --  Standing BP: 116/81 HR: 78  Site: --  Mode: --  Orthostatic VS  09-08-21 @ 19:23  Lying BP: --/-- HR: --  Sitting BP: 120/72 HR: 69  Standing BP: 118/76 HR: 80  Site: --  Mode: --  Orthostatic VS  09-08-21 @ 08:17  Lying BP: --/-- HR: --  Sitting BP: 132/66 HR: 69  Standing BP: 126/73 HR: 68  Site: upper left arm  Mode: electronic  Orthostatic VS  09-07-21 @ 19:21  Lying BP: --/-- HR: --  Sitting BP: 140/71 HR: 68  Standing BP: 148/96 HR: 78  Site: --  Mode: --   Vital Signs Last 24 Hrs  T(C): 35.9 (09-09-21 @ 06:44), Max: 36.6 (09-08-21 @ 22:25)  T(F): 96.7 (09-09-21 @ 06:44), Max: 97.8 (09-08-21 @ 22:25)  HR: --78  BP: --136/74  BP(mean): --  RR: 18 (09-08-21 @ 15:02) (18 - 18)  SpO2: --    Orthostatic VS  09-09-21 @ 06:44  Lying BP: --/-- HR: --  Sitting BP: 136/74 HR: --  Standing BP: 116/81 HR: 78  Site: --  Mode: --  Orthostatic VS  09-08-21 @ 19:23  Lying BP: --/-- HR: --  Sitting BP: 120/72 HR: 69  Standing BP: 118/76 HR: 80  Site: --  Mode: --  Orthostatic VS  09-08-21 @ 08:17  Lying BP: --/-- HR: --  Sitting BP: 132/66 HR: 69  Standing BP: 126/73 HR: 68  Site: upper left arm  Mode: electronic  Orthostatic VS  09-07-21 @ 19:21  Lying BP: --/-- HR: --  Sitting BP: 140/71 HR: 68  Standing BP: 148/96 HR: 78  Site: --  Mode: --

## 2021-09-09 NOTE — BH INPATIENT PSYCHIATRY PROGRESS NOTE - NSBHASSESSSUMMFT_PSY_ALL_CORE
61 year-old with history of htn, increased lipids, paranoid schizophrenia, some ETOH misuse, living in adult home, admitted on voluntary with suicidal ideations  Currently, agitated, not cooperative with exam, but given voluntary status will not constant obs. No UTOX, ETOH negative, was in hospital since yesterday,  no evidence of ETOH withdrawal (no tremors/sweats), reportedly long period of sobriety, will not CIWA  Continue olanzapine and other psychotropic, htn, lipid lowering agents.    Plan:  >Legal: 9.13  >Obs: Routine, no current SI. no need for CO, patient not expected to pose risk to self or others in controlled inpatient setting  >Psychiatric Meds: increase Seroquel 250mg, Remeron 30mg qhs, Lexapro 20mg daily. Observe for tolerability and efficacy.   PRN medications:  Ativan 2mg oral Q6HR PRN for agitation and anxiety.  Haldol 5mg oral Q6HR PRN for agitation.   Benadryl 50mg oral Q6HR PRN for agitation.   Vistaril 50mg oral Q6HR PRN for anxiety.  Desyrel 50mg oral QHS PRN for insomnia.   >Labs: Admission labs reviewed, no acute findings. Labs for tomorrow: A1c, Lipid. Hold antipsychotics if QTc >500  >Medical:   No acute concerns. No consultations needed at this time. No indication for CIWA. Patient with consistently stable VS, During the course of treatment, will collaborate with medical team to manage medical issues.  >Diet: Regular   >Social: milieu/structured therapy  >Treatment Interventions: Groups and Individual Therapy/CBT, Motivational counseling for substance abuse related issues.   >Treatment goals: Symptom reduction  >Dispo: Collateral and dispo planning pending further symptom and medication optimization     61 year-old with history of htn, increased lipids, paranoid schizophrenia, some ETOH misuse, living in adult home, admitted on voluntary with suicidal ideations  Currently, agitated, not cooperative with exam, but given voluntary status will not constant obs. No UTOX, ETOH negative, was in hospital since yesterday,  no evidence of ETOH withdrawal (no tremors/sweats), reportedly long period of sobriety, will not CIWA  Continue olanzapine and other psychotropic, htn, lipid lowering agents.    Plan:  >Legal: 9.13  >Obs: Routine, no current SI. no need for CO, patient not expected to pose risk to self or others in controlled inpatient setting  >Psychiatric Meds: increase Seroquel 250mg, Remeron 30mg qhs, Lexapro 20mg daily. Observe for tolerability and efficacy.   PRN medications:  Ativan 2mg oral Q6HR PRN for agitation and anxiety.  Haldol 5mg oral Q6HR PRN for agitation.   Benadryl 50mg oral Q6HR PRN for agitation.   Vistaril 50mg oral Q6HR PRN for anxiety.  Desyrel 50mg oral QHS PRN for insomnia.   >Labs: Admission labs reviewed, no acute findings. Labs for tomorrow: A1c, Lipid. Hold antipsychotics if QTc >500  >Medical:   No acute concerns. No consultations needed at this time. No indication for CIWA. Patient with consistently stable VS, During the course of treatment, will collaborate with medical team to manage medical issues.  >Diet: Regular   >Social: milieu/structured therapy  >Treatment Interventions: Groups and Individual Therapy/CBT, Motivational counseling for substance abuse related issues.   >Treatment goals: Symptom reduction  >Dispo: pending dc on Wednesday 9/15

## 2021-09-09 NOTE — BH INPATIENT PSYCHIATRY PROGRESS NOTE - CURRENT MEDICATION
MEDICATIONS  (STANDING):  aspirin enteric coated 81 milliGRAM(s) Oral daily  atorvastatin 20 milliGRAM(s) Oral at bedtime  clonazePAM  Tablet 0.5 milliGRAM(s) Oral three times a day  escitalopram 20 milliGRAM(s) Oral daily  gabapentin 300 milliGRAM(s) Oral three times a day  metoprolol succinate ER 25 milliGRAM(s) Oral daily  mirtazapine 30 milliGRAM(s) Oral at bedtime  multivitamin 1 Tablet(s) Oral daily  nicotine - 21 mG/24Hr(s) Patch 1 patch Transdermal daily  QUEtiapine 250 milliGRAM(s) Oral at bedtime  tamsulosin 0.4 milliGRAM(s) Oral at bedtime    MEDICATIONS  (PRN):  ALBUTerol    90 MICROgram(s) HFA Inhaler 2 Puff(s) Inhalation every 6 hours PRN shortness of breath  diphenhydrAMINE 25 milliGRAM(s) Oral every 6 hours PRN agitation  fluPHENAZine  Injectable 3 milliGRAM(s) IntraMuscular every 12 hours PRN agitation  hydrOXYzine hydrochloride 25 milliGRAM(s) Oral every 6 hours PRN anxiety  LORazepam     Tablet 0.5 milliGRAM(s) Oral every 6 hours PRN agitation  polyethylene glycol 3350 17 Gram(s) Oral two times a day PRN constipation   MEDICATIONS  (STANDING):  aspirin enteric coated 81 milliGRAM(s) Oral daily  atorvastatin 20 milliGRAM(s) Oral at bedtime  clonazePAM  Tablet 0.5 milliGRAM(s) Oral three times a day  escitalopram 20 milliGRAM(s) Oral daily  gabapentin 300 milliGRAM(s) Oral three times a day  metoprolol succinate ER 25 milliGRAM(s) Oral daily  mirtazapine 30 milliGRAM(s) Oral at bedtime  multivitamin 1 Tablet(s) Oral daily  nicotine - 21 mG/24Hr(s) Patch 1 patch Transdermal daily  QUEtiapine 250 milliGRAM(s) Oral at bedtime  tamsulosin 0.4 milliGRAM(s) Oral at bedtime    MEDICATIONS  (PRN):  ALBUTerol    90 MICROgram(s) HFA Inhaler 2 Puff(s) Inhalation every 6 hours PRN shortness of breath  diphenhydrAMINE 25 milliGRAM(s) Oral every 6 hours PRN agitation  fluPHENAZine  Injectable 3 milliGRAM(s) IntraMuscular every 12 hours PRN agitation  hydrOXYzine hydrochloride 25 milliGRAM(s) Oral every 6 hours PRN anxiety  polyethylene glycol 3350 17 Gram(s) Oral two times a day PRN constipation   MEDICATIONS  (STANDING):  aspirin enteric coated 81 milliGRAM(s) Oral daily  atorvastatin 20 milliGRAM(s) Oral at bedtime  clonazePAM  Tablet 0.5 milliGRAM(s) Oral three times a day  escitalopram 20 milliGRAM(s) Oral daily  gabapentin 300 milliGRAM(s) Oral three times a day  LORazepam     Tablet 0.5 milliGRAM(s) Oral three times a day  metoprolol succinate ER 25 milliGRAM(s) Oral daily  mirtazapine 30 milliGRAM(s) Oral at bedtime  multivitamin 1 Tablet(s) Oral daily  nicotine - 21 mG/24Hr(s) Patch 1 patch Transdermal daily  QUEtiapine 250 milliGRAM(s) Oral at bedtime  tamsulosin 0.4 milliGRAM(s) Oral at bedtime    MEDICATIONS  (PRN):  ALBUTerol    90 MICROgram(s) HFA Inhaler 2 Puff(s) Inhalation every 6 hours PRN shortness of breath  diphenhydrAMINE 25 milliGRAM(s) Oral every 6 hours PRN agitation  fluPHENAZine  Injectable 3 milliGRAM(s) IntraMuscular every 12 hours PRN agitation  hydrOXYzine hydrochloride 25 milliGRAM(s) Oral every 6 hours PRN anxiety  polyethylene glycol 3350 17 Gram(s) Oral two times a day PRN constipation

## 2021-09-09 NOTE — BH INPATIENT PSYCHIATRY PROGRESS NOTE - PRN MEDS
MEDICATIONS  (PRN):  ALBUTerol    90 MICROgram(s) HFA Inhaler 2 Puff(s) Inhalation every 6 hours PRN shortness of breath  diphenhydrAMINE 25 milliGRAM(s) Oral every 6 hours PRN agitation  fluPHENAZine  Injectable 3 milliGRAM(s) IntraMuscular every 12 hours PRN agitation  hydrOXYzine hydrochloride 25 milliGRAM(s) Oral every 6 hours PRN anxiety  LORazepam     Tablet 0.5 milliGRAM(s) Oral every 6 hours PRN agitation  polyethylene glycol 3350 17 Gram(s) Oral two times a day PRN constipation   MEDICATIONS  (PRN):  ALBUTerol    90 MICROgram(s) HFA Inhaler 2 Puff(s) Inhalation every 6 hours PRN shortness of breath  diphenhydrAMINE 25 milliGRAM(s) Oral every 6 hours PRN agitation  fluPHENAZine  Injectable 3 milliGRAM(s) IntraMuscular every 12 hours PRN agitation  hydrOXYzine hydrochloride 25 milliGRAM(s) Oral every 6 hours PRN anxiety  polyethylene glycol 3350 17 Gram(s) Oral two times a day PRN constipation

## 2021-09-09 NOTE — BH INPATIENT PSYCHIATRY PROGRESS NOTE - NSBHFUPINTERVALHXFT_PSY_A_CORE
Patient seen for follow up for psychosis/paranoia. Charts, medications, and labs reviewed. Patient is discussed with nursing staff. No interval events. Patient is compliant with all standing medications, no SE noted. Patient is observed in his room, he is cooperative and less anxious. Patient reports that his mood is "alright" today. Patient reported that his appetite is "so-so" today and that he was able to sleep, but woke up very early because he did not want to miss breakfast. He stated that he is able to stay up during the day because he drinks about 4 coffees and 4 teas per day. Patient expressed interest in wanting to go outside to get fresh air. He keeps himself occupied by solving puzzles all day. Patient was encouraged to go to group therapy sessions. He said that the reason why he has not attended any sessions yet is because his attention span is short and he tends to daydream. Patient admits that his paranoia and anxiety has gotten better. Patient is very repetitive when it comes to telling staff that he is "autistic". Denies SI/HI/AH/VH. Grooming and hygiene were appropriate, no strong body odor noted. No acute medical concerns, VSS. Continue with medication regimen. Continue to provide therapeutic support.  Patient seen for follow up for psychosis/paranoia. Charts, medications, and labs reviewed. Patient is discussed with nursing staff. No interval events. Patient is compliant with all standing medications, no SE noted. Patient is observed in his room, he is cooperative and less anxious. Patient reports that his mood is "alright" today. Patient reported that his appetite is "so-so" today and that he was able to sleep, but woke up very early because he did not want to miss breakfast. He stated that he is able to stay up during the day because he drinks about 4 coffees and 4 teas per day. Patient expressed interest in wanting to go outside to get fresh air. He keeps himself occupied by solving puzzles all day. Patient was encouraged to go to group therapy sessions. He said that the reason why he has not attended any sessions yet is because his attention span is short and he tends to daydream. Patient admits that his paranoia and anxiety has gotten better. Patient is very repetitive when it comes to telling staff that he is "autistic". Denies SI/HI/AH/VH. Grooming and hygiene were appropriate, no strong body odor noted. Patient denies any pain/physical complaints. No acute medical concerns, VSS. Continue with medication regimen. Continue to provide therapeutic support.

## 2021-09-10 PROCEDURE — 99232 SBSQ HOSP IP/OBS MODERATE 35: CPT

## 2021-09-10 RX ADMIN — Medication 1 PATCH: at 08:00

## 2021-09-10 RX ADMIN — Medication 81 MILLIGRAM(S): at 08:40

## 2021-09-10 RX ADMIN — ESCITALOPRAM OXALATE 20 MILLIGRAM(S): 10 TABLET, FILM COATED ORAL at 08:40

## 2021-09-10 RX ADMIN — TAMSULOSIN HYDROCHLORIDE 0.4 MILLIGRAM(S): 0.4 CAPSULE ORAL at 20:54

## 2021-09-10 RX ADMIN — Medication 0.5 MILLIGRAM(S): at 12:27

## 2021-09-10 RX ADMIN — Medication 10 MILLIGRAM(S): at 20:54

## 2021-09-10 RX ADMIN — Medication 1 PATCH: at 07:00

## 2021-09-10 RX ADMIN — MIRTAZAPINE 30 MILLIGRAM(S): 45 TABLET, ORALLY DISINTEGRATING ORAL at 20:54

## 2021-09-10 RX ADMIN — ATORVASTATIN CALCIUM 20 MILLIGRAM(S): 80 TABLET, FILM COATED ORAL at 20:54

## 2021-09-10 RX ADMIN — QUETIAPINE FUMARATE 250 MILLIGRAM(S): 200 TABLET, FILM COATED ORAL at 20:58

## 2021-09-10 RX ADMIN — Medication 1 PATCH: at 08:39

## 2021-09-10 RX ADMIN — Medication 0.5 MILLIGRAM(S): at 08:40

## 2021-09-10 RX ADMIN — GABAPENTIN 300 MILLIGRAM(S): 400 CAPSULE ORAL at 20:54

## 2021-09-10 RX ADMIN — GABAPENTIN 300 MILLIGRAM(S): 400 CAPSULE ORAL at 12:27

## 2021-09-10 RX ADMIN — Medication 1 TABLET(S): at 08:39

## 2021-09-10 RX ADMIN — Medication 0.5 MILLIGRAM(S): at 20:56

## 2021-09-10 RX ADMIN — GABAPENTIN 300 MILLIGRAM(S): 400 CAPSULE ORAL at 08:40

## 2021-09-10 RX ADMIN — Medication 25 MILLIGRAM(S): at 08:39

## 2021-09-10 NOTE — BH INPATIENT PSYCHIATRY PROGRESS NOTE - PRN MEDS
MEDICATIONS  (PRN):  ALBUTerol    90 MICROgram(s) HFA Inhaler 2 Puff(s) Inhalation every 6 hours PRN shortness of breath  diphenhydrAMINE 25 milliGRAM(s) Oral every 6 hours PRN agitation  fluPHENAZine  Injectable 3 milliGRAM(s) IntraMuscular every 12 hours PRN agitation  hydrOXYzine hydrochloride 25 milliGRAM(s) Oral every 6 hours PRN anxiety  polyethylene glycol 3350 17 Gram(s) Oral two times a day PRN constipation

## 2021-09-10 NOTE — BH INPATIENT PSYCHIATRY PROGRESS NOTE - NSBHFUPINTERVALHXFT_PSY_A_CORE
Chart reviewed and case discussed with treatment team. No events reported overnight. Sleep is fair and appetite is "so, so". Patient stated that he has been waking up around 4am everyday but then falls back asleep till around 6am. He denies any SI/HI. Patient stated the medications have been "pretty good" and helps to reduce the paranoia and anxiety. He stated his paranoia is at 10%.  Patient is compliant with medications, no adverse effects reported.

## 2021-09-10 NOTE — BH INPATIENT PSYCHIATRY PROGRESS NOTE - NSBHASSESSSUMMFT_PSY_ALL_CORE
61 year-old with history of htn, increased lipids, paranoid schizophrenia, some ETOH misuse, living in adult home, admitted on voluntary with suicidal ideations  Currently, agitated, not cooperative with exam, but given voluntary status will not constant obs. No UTOX, ETOH negative, was in hospital since yesterday,  no evidence of ETOH withdrawal (no tremors/sweats), reportedly long period of sobriety, will not CIWA  Continue olanzapine and other psychotropic, htn, lipid lowering agents.  Patient complaining of constipation, no BM x3days, PO dulcolax 10mg x1 ordered for tonight.    Plan:  >Legal: 9.13  >Obs: Routine, no current SI. no need for CO, patient not expected to pose risk to self or others in controlled inpatient setting  >Psychiatric Meds: increase Seroquel 250mg, Remeron 30mg qhs, Lexapro 20mg daily. Observe for tolerability and efficacy.   PRN medications:  Ativan 2mg oral Q6HR PRN for agitation and anxiety.  Haldol 5mg oral Q6HR PRN for agitation.   Benadryl 50mg oral Q6HR PRN for agitation.   Vistaril 50mg oral Q6HR PRN for anxiety.  Desyrel 50mg oral QHS PRN for insomnia.   >Labs: Admission labs reviewed, no acute findings. Labs for tomorrow: A1c, Lipid. Hold antipsychotics if QTc >500  >Medical:   No acute concerns. No consultations needed at this time. No indication for CIWA. Patient with consistently stable VS, During the course of treatment, will collaborate with medical team to manage medical issues.  >Diet: Regular   >Social: milieu/structured therapy  >Treatment Interventions: Groups and Individual Therapy/CBT, Motivational counseling for substance abuse related issues.   >Treatment goals: Symptom reduction  >Dispo: pending dc on Wednesday 9/15

## 2021-09-10 NOTE — BH INPATIENT PSYCHIATRY PROGRESS NOTE - NSBHMETABOLIC_PSY_ALL_CORE_FT
Constitutional: +fever, chills    All other ROS neg except as per HPI BMI: BMI (kg/m2): 25.7 (08-31-21 @ 16:59)  HbA1c: A1C with Estimated Average Glucose Result: 5.7 % (10-27-20 @ 09:09)    Glucose:   BP: --  Lipid Panel: Date/Time: 10-27-20 @ 09:09  Cholesterol, Serum: 159  Direct LDL: --  HDL Cholesterol, Serum: 34  Total Cholesterol/HDL Ration Measurement: --  Triglycerides, Serum: 146

## 2021-09-10 NOTE — BH INPATIENT PSYCHIATRY PROGRESS NOTE - NSBHCHARTREVIEWVS_PSY_A_CORE FT
Vital Signs Last 24 Hrs  T(C): 36.4 (09-10-21 @ 06:30), Max: 36.7 (09-09-21 @ 19:12)  T(F): 97.6 (09-10-21 @ 06:30), Max: 98 (09-09-21 @ 19:12)  HR: --  BP: --  BP(mean): --  RR: --  SpO2: --    Orthostatic VS  09-10-21 @ 06:30  Lying BP: --/-- HR: --  Sitting BP: 140/82 HR: 96  Standing BP: 136/77 HR: 89  Site: --  Mode: --  Orthostatic VS  09-09-21 @ 19:12  Lying BP: --/-- HR: --  Sitting BP: 151/96 HR: 67  Standing BP: 152/81 HR: 69  Site: --  Mode: --  Orthostatic VS  09-09-21 @ 06:44  Lying BP: --/-- HR: --  Sitting BP: 136/74 HR: --  Standing BP: 116/81 HR: 78  Site: --  Mode: --  Orthostatic VS  09-08-21 @ 19:23  Lying BP: --/-- HR: --  Sitting BP: 120/72 HR: 69  Standing BP: 118/76 HR: 80  Site: --  Mode: --

## 2021-09-10 NOTE — BH INPATIENT PSYCHIATRY PROGRESS NOTE - CURRENT MEDICATION
MEDICATIONS  (STANDING):  aspirin enteric coated 81 milliGRAM(s) Oral daily  atorvastatin 20 milliGRAM(s) Oral at bedtime  clonazePAM  Tablet 0.5 milliGRAM(s) Oral three times a day  escitalopram 20 milliGRAM(s) Oral daily  gabapentin 300 milliGRAM(s) Oral three times a day  metoprolol succinate ER 25 milliGRAM(s) Oral daily  mirtazapine 30 milliGRAM(s) Oral at bedtime  multivitamin 1 Tablet(s) Oral daily  nicotine - 21 mG/24Hr(s) Patch 1 patch Transdermal daily  QUEtiapine 250 milliGRAM(s) Oral at bedtime  tamsulosin 0.4 milliGRAM(s) Oral at bedtime    MEDICATIONS  (PRN):  ALBUTerol    90 MICROgram(s) HFA Inhaler 2 Puff(s) Inhalation every 6 hours PRN shortness of breath  diphenhydrAMINE 25 milliGRAM(s) Oral every 6 hours PRN agitation  fluPHENAZine  Injectable 3 milliGRAM(s) IntraMuscular every 12 hours PRN agitation  hydrOXYzine hydrochloride 25 milliGRAM(s) Oral every 6 hours PRN anxiety  polyethylene glycol 3350 17 Gram(s) Oral two times a day PRN constipation

## 2021-09-11 RX ADMIN — ATORVASTATIN CALCIUM 20 MILLIGRAM(S): 80 TABLET, FILM COATED ORAL at 21:10

## 2021-09-11 RX ADMIN — Medication 0.5 MILLIGRAM(S): at 08:44

## 2021-09-11 RX ADMIN — GABAPENTIN 300 MILLIGRAM(S): 400 CAPSULE ORAL at 08:43

## 2021-09-11 RX ADMIN — Medication 0.5 MILLIGRAM(S): at 12:57

## 2021-09-11 RX ADMIN — Medication 1 PATCH: at 08:42

## 2021-09-11 RX ADMIN — GABAPENTIN 300 MILLIGRAM(S): 400 CAPSULE ORAL at 21:10

## 2021-09-11 RX ADMIN — Medication 81 MILLIGRAM(S): at 08:44

## 2021-09-11 RX ADMIN — MIRTAZAPINE 30 MILLIGRAM(S): 45 TABLET, ORALLY DISINTEGRATING ORAL at 21:10

## 2021-09-11 RX ADMIN — Medication 1 PATCH: at 08:43

## 2021-09-11 RX ADMIN — Medication 25 MILLIGRAM(S): at 08:44

## 2021-09-11 RX ADMIN — Medication 0.5 MILLIGRAM(S): at 21:10

## 2021-09-11 RX ADMIN — GABAPENTIN 300 MILLIGRAM(S): 400 CAPSULE ORAL at 12:57

## 2021-09-11 RX ADMIN — Medication 1 TABLET(S): at 08:44

## 2021-09-11 RX ADMIN — QUETIAPINE FUMARATE 250 MILLIGRAM(S): 200 TABLET, FILM COATED ORAL at 21:10

## 2021-09-11 RX ADMIN — TAMSULOSIN HYDROCHLORIDE 0.4 MILLIGRAM(S): 0.4 CAPSULE ORAL at 21:10

## 2021-09-11 RX ADMIN — Medication 1 PATCH: at 20:46

## 2021-09-11 RX ADMIN — ESCITALOPRAM OXALATE 20 MILLIGRAM(S): 10 TABLET, FILM COATED ORAL at 08:44

## 2021-09-12 RX ADMIN — ESCITALOPRAM OXALATE 20 MILLIGRAM(S): 10 TABLET, FILM COATED ORAL at 08:30

## 2021-09-12 RX ADMIN — GABAPENTIN 300 MILLIGRAM(S): 400 CAPSULE ORAL at 12:29

## 2021-09-12 RX ADMIN — Medication 25 MILLIGRAM(S): at 08:30

## 2021-09-12 RX ADMIN — TAMSULOSIN HYDROCHLORIDE 0.4 MILLIGRAM(S): 0.4 CAPSULE ORAL at 20:49

## 2021-09-12 RX ADMIN — GABAPENTIN 300 MILLIGRAM(S): 400 CAPSULE ORAL at 20:50

## 2021-09-12 RX ADMIN — Medication 1 PATCH: at 08:32

## 2021-09-12 RX ADMIN — Medication 1 PATCH: at 09:06

## 2021-09-12 RX ADMIN — GABAPENTIN 300 MILLIGRAM(S): 400 CAPSULE ORAL at 08:30

## 2021-09-12 RX ADMIN — ATORVASTATIN CALCIUM 20 MILLIGRAM(S): 80 TABLET, FILM COATED ORAL at 20:50

## 2021-09-12 RX ADMIN — Medication 0.5 MILLIGRAM(S): at 20:49

## 2021-09-12 RX ADMIN — Medication 81 MILLIGRAM(S): at 08:30

## 2021-09-12 RX ADMIN — QUETIAPINE FUMARATE 250 MILLIGRAM(S): 200 TABLET, FILM COATED ORAL at 20:49

## 2021-09-12 RX ADMIN — Medication 0.5 MILLIGRAM(S): at 08:30

## 2021-09-12 RX ADMIN — Medication 0.5 MILLIGRAM(S): at 12:29

## 2021-09-12 RX ADMIN — MIRTAZAPINE 30 MILLIGRAM(S): 45 TABLET, ORALLY DISINTEGRATING ORAL at 20:49

## 2021-09-12 RX ADMIN — Medication 1 TABLET(S): at 08:29

## 2021-09-13 PROCEDURE — 99232 SBSQ HOSP IP/OBS MODERATE 35: CPT

## 2021-09-13 RX ORDER — CLONAZEPAM 1 MG
0.5 TABLET ORAL
Refills: 0 | Status: DISCONTINUED | OUTPATIENT
Start: 2021-09-13 | End: 2021-09-15

## 2021-09-13 RX ADMIN — TAMSULOSIN HYDROCHLORIDE 0.4 MILLIGRAM(S): 0.4 CAPSULE ORAL at 20:24

## 2021-09-13 RX ADMIN — ATORVASTATIN CALCIUM 20 MILLIGRAM(S): 80 TABLET, FILM COATED ORAL at 20:25

## 2021-09-13 RX ADMIN — MIRTAZAPINE 30 MILLIGRAM(S): 45 TABLET, ORALLY DISINTEGRATING ORAL at 20:25

## 2021-09-13 RX ADMIN — Medication 1 PATCH: at 08:58

## 2021-09-13 RX ADMIN — GABAPENTIN 300 MILLIGRAM(S): 400 CAPSULE ORAL at 08:59

## 2021-09-13 RX ADMIN — QUETIAPINE FUMARATE 250 MILLIGRAM(S): 200 TABLET, FILM COATED ORAL at 20:24

## 2021-09-13 RX ADMIN — Medication 0.5 MILLIGRAM(S): at 12:53

## 2021-09-13 RX ADMIN — Medication 25 MILLIGRAM(S): at 09:00

## 2021-09-13 RX ADMIN — Medication 1 TABLET(S): at 08:59

## 2021-09-13 RX ADMIN — Medication 1 PATCH: at 20:08

## 2021-09-13 RX ADMIN — Medication 0.5 MILLIGRAM(S): at 20:26

## 2021-09-13 RX ADMIN — GABAPENTIN 300 MILLIGRAM(S): 400 CAPSULE ORAL at 20:25

## 2021-09-13 RX ADMIN — POLYETHYLENE GLYCOL 3350 17 GRAM(S): 17 POWDER, FOR SOLUTION ORAL at 08:59

## 2021-09-13 RX ADMIN — Medication 81 MILLIGRAM(S): at 08:59

## 2021-09-13 RX ADMIN — ESCITALOPRAM OXALATE 20 MILLIGRAM(S): 10 TABLET, FILM COATED ORAL at 09:00

## 2021-09-13 RX ADMIN — GABAPENTIN 300 MILLIGRAM(S): 400 CAPSULE ORAL at 12:53

## 2021-09-13 RX ADMIN — Medication 0.5 MILLIGRAM(S): at 08:59

## 2021-09-13 NOTE — BH INPATIENT PSYCHIATRY PROGRESS NOTE - NSBHCHARTREVIEWVS_PSY_A_CORE FT
Vital Signs Last 24 Hrs  T(C): 36.2 (09-13-21 @ 06:47), Max: 36.3 (09-12-21 @ 14:39)  T(F): 97.2 (09-13-21 @ 06:47), Max: 97.4 (09-12-21 @ 14:39)  HR: --  BP: --  BP(mean): --  RR: --  SpO2: --    Orthostatic VS  09-13-21 @ 06:47  Lying BP: --/-- HR: --  Sitting BP: 134/74 HR: 84  Standing BP: 123/69 HR: 84  Site: --  Mode: --  Orthostatic VS  09-12-21 @ 08:00  Lying BP: --/-- HR: --  Sitting BP: 130/78 HR: 77  Standing BP: 130/82 HR: 83  Site: upper left arm  Mode: electronic  Orthostatic VS  09-11-21 @ 19:19  Lying BP: --/-- HR: --  Sitting BP: 150/78 HR: 62  Standing BP: 150/83 HR: 65  Site: --  Mode: --   Vital Signs Last 24 Hrs  T(C): 36.2 (09-13-21 @ 06:47), Max: 36.3 (09-12-21 @ 14:39)  T(F): 97.2 (09-13-21 @ 06:47), Max: 97.4 (09-12-21 @ 14:39)  HR: --84  BP: --134/74  BP(mean): --  RR: --  SpO2: --    Orthostatic VS  09-13-21 @ 06:47  Lying BP: --/-- HR: --  Sitting BP: 134/74 HR: 84  Standing BP: 123/69 HR: 84  Site: --  Mode: --  Orthostatic VS  09-12-21 @ 08:00  Lying BP: --/-- HR: --  Sitting BP: 130/78 HR: 77  Standing BP: 130/82 HR: 83  Site: upper left arm  Mode: electronic  Orthostatic VS  09-11-21 @ 19:19  Lying BP: --/-- HR: --  Sitting BP: 150/78 HR: 62  Standing BP: 150/83 HR: 65  Site: --  Mode: --

## 2021-09-13 NOTE — BH INPATIENT PSYCHIATRY PROGRESS NOTE - NSBHFUPINTERVALHXFT_PSY_A_CORE
Patient seen for follow up for psychosis/paranoia. Charts, medications, and labs reviewed. Patient is discussed with nursing. No interval events. Patient is compliant with all standing meds, no SE noted. Patient is observed in his room, he is cooperative. Patient denies any physical complaints/pain. Patient reports that his mood has been "very good", stating that he is less anxious now. He reports that his anxiety is the worst in the mornings, but it decreases once he takes his medications.  Patient seen for follow up for psychosis/paranoia. Charts, medications, and labs reviewed. Patient is discussed with nursing. No interval events. Patient is compliant with all standing meds, no SE noted. Patient is observed in his room, he is cooperative. Patient denies any physical complaints/pain. Patient reports that his mood has been "very good", stating that he is less anxious now. He reports that his anxiety is the worst in the mornings, but it decreases once he takes his meds. Patient reports that he attended group therapy for the first time the other day. He did not participate, but stayed the entire time and said he was willing to go again. Patient expressed excitement during the interview because he has plans to play chess with one of the other patients in the unit today. Patient reports poor appetite and difficulty falling asleep, but no issue staying asleep. Denies SI/HI/AH/VH, no intent or plan. Grooming and hygiene are appropriate, no strong body odor noted. Patient is aware that he is being discharged this week, he states "he is ready to go." No acute medical concerns. Continue with medication regimen. Continue to provide therapeutic support.

## 2021-09-13 NOTE — BH INPATIENT PSYCHIATRY PROGRESS NOTE - NSBHASSESSSUMMFT_PSY_ALL_CORE
61 year-old with history of htn, increased lipids, paranoid schizophrenia, some ETOH misuse, living in adult home, admitted on voluntary with suicidal ideations  Currently, agitated, not cooperative with exam, but given voluntary status will not constant obs. No UTOX, ETOH negative, was in hospital since yesterday,  no evidence of ETOH withdrawal (no tremors/sweats), reportedly long period of sobriety, will not CIWA  Continue olanzapine and other psychotropic, htn, lipid lowering agents.  Patient complaining of constipation, no BM x3days, PO dulcolax 10mg x1 ordered for tonight.    Plan:  >Legal: 9.13  >Obs: Routine, no current SI. no need for CO, patient not expected to pose risk to self or others in controlled inpatient setting  >Psychiatric Meds: increase Seroquel 250mg, Remeron 30mg qhs, Lexapro 20mg daily. Observe for tolerability and efficacy.   PRN medications:  Ativan 2mg oral Q6HR PRN for agitation and anxiety.  Haldol 5mg oral Q6HR PRN for agitation.   Benadryl 50mg oral Q6HR PRN for agitation.   Vistaril 50mg oral Q6HR PRN for anxiety.  Desyrel 50mg oral QHS PRN for insomnia.   >Labs: Admission labs reviewed, no acute findings. Labs for tomorrow: A1c, Lipid. Hold antipsychotics if QTc >500  >Medical:   No acute concerns. No consultations needed at this time. No indication for CIWA. Patient with consistently stable VS, During the course of treatment, will collaborate with medical team to manage medical issues.  >Diet: Regular   >Social: milieu/structured therapy  >Treatment Interventions: Groups and Individual Therapy/CBT, Motivational counseling for substance abuse related issues.   >Treatment goals: Symptom reduction  >Dispo: pending dc on Wednesday 9/15     61 year-old with history of htn, increased lipids, paranoid schizophrenia, some ETOH misuse, living in adult home, admitted on voluntary with suicidal ideations  Currently, agitated, not cooperative with exam, but given voluntary status will not constant obs. No UTOX, ETOH negative, was in hospital since yesterday,  no evidence of ETOH withdrawal (no tremors/sweats), reportedly long period of sobriety, will not CIWA  Continue olanzapine and other psychotropic, htn, lipid lowering agents.  Patient complaining of constipation, no BM x3days, PO dulcolax 10mg x1 ordered for tonight.    Plan:  >Legal: 9.13  >Obs: Routine, no current SI. no need for CO, patient not expected to pose risk to self or others in controlled inpatient setting  >Psychiatric Meds: Seroquel 250mg, Remeron 30mg qhs, Lexapro 20mg daily. Observe for tolerability and efficacy.   PRN medications:  Ativan 2mg oral Q6HR PRN for agitation and anxiety.  Haldol 5mg oral Q6HR PRN for agitation.   Benadryl 50mg oral Q6HR PRN for agitation.   Vistaril 50mg oral Q6HR PRN for anxiety.  Desyrel 50mg oral QHS PRN for insomnia.   >Labs: Admission labs reviewed, no acute findings. Labs for tomorrow: A1c, Lipid. Hold antipsychotics if QTc >500  >Medical:   No acute concerns. No consultations needed at this time. No indication for CIWA. Patient with consistently stable VS, During the course of treatment, will collaborate with medical team to manage medical issues.  >Diet: Regular   >Social: milieu/structured therapy  >Treatment Interventions: Groups and Individual Therapy/CBT, Motivational counseling for substance abuse related issues.   >Treatment goals: Symptom reduction  >Dispo: dc on Wednesday 9/15

## 2021-09-13 NOTE — BH INPATIENT PSYCHIATRY PROGRESS NOTE - NSBHMETABOLIC_PSY_ALL_CORE_FT
BMI: BMI (kg/m2): 25.7 (08-31-21 @ 16:59)  HbA1c: A1C with Estimated Average Glucose Result: 5.7 % (10-27-20 @ 09:09)    Glucose:   BP: 130/76 (09-11-21 @ 08:44) (130/76 - 130/76)  Lipid Panel: Date/Time: 10-27-20 @ 09:09  Cholesterol, Serum: 159  Direct LDL: --  HDL Cholesterol, Serum: 34  Total Cholesterol/HDL Ration Measurement: --  Triglycerides, Serum: 146

## 2021-09-14 PROCEDURE — 99232 SBSQ HOSP IP/OBS MODERATE 35: CPT

## 2021-09-14 RX ADMIN — Medication 1 PATCH: at 09:38

## 2021-09-14 RX ADMIN — GABAPENTIN 300 MILLIGRAM(S): 400 CAPSULE ORAL at 20:32

## 2021-09-14 RX ADMIN — Medication 0.5 MILLIGRAM(S): at 08:55

## 2021-09-14 RX ADMIN — Medication 1 PATCH: at 08:55

## 2021-09-14 RX ADMIN — Medication 81 MILLIGRAM(S): at 08:54

## 2021-09-14 RX ADMIN — TAMSULOSIN HYDROCHLORIDE 0.4 MILLIGRAM(S): 0.4 CAPSULE ORAL at 20:34

## 2021-09-14 RX ADMIN — MIRTAZAPINE 30 MILLIGRAM(S): 45 TABLET, ORALLY DISINTEGRATING ORAL at 20:32

## 2021-09-14 RX ADMIN — ESCITALOPRAM OXALATE 20 MILLIGRAM(S): 10 TABLET, FILM COATED ORAL at 08:54

## 2021-09-14 RX ADMIN — QUETIAPINE FUMARATE 250 MILLIGRAM(S): 200 TABLET, FILM COATED ORAL at 20:33

## 2021-09-14 RX ADMIN — Medication 1 TABLET(S): at 08:54

## 2021-09-14 RX ADMIN — Medication 25 MILLIGRAM(S): at 08:54

## 2021-09-14 RX ADMIN — ATORVASTATIN CALCIUM 20 MILLIGRAM(S): 80 TABLET, FILM COATED ORAL at 20:33

## 2021-09-14 RX ADMIN — Medication 0.5 MILLIGRAM(S): at 20:36

## 2021-09-14 RX ADMIN — GABAPENTIN 300 MILLIGRAM(S): 400 CAPSULE ORAL at 13:00

## 2021-09-14 RX ADMIN — GABAPENTIN 300 MILLIGRAM(S): 400 CAPSULE ORAL at 08:54

## 2021-09-14 NOTE — BH INPATIENT PSYCHIATRY PROGRESS NOTE - NSBHCHARTREVIEWVS_PSY_A_CORE FT
Vital Signs Last 24 Hrs  T(C): 36.7 (09-14-21 @ 06:43), Max: 36.8 (09-13-21 @ 14:45)  T(F): 98 (09-14-21 @ 06:43), Max: 98.3 (09-13-21 @ 14:45)  HR: --  BP: --  BP(mean): --  RR: 18 (09-13-21 @ 22:08) (18 - 18)  SpO2: 99% (09-13-21 @ 22:08) (99% - 99%)    Orthostatic VS  09-14-21 @ 06:43  Lying BP: 142/95 HR: 78  Sitting BP: 133/92 HR: 77  Standing BP: --/-- HR: --  Site: --  Mode: --  Orthostatic VS  09-13-21 @ 19:29  Lying BP: --/-- HR: --  Sitting BP: 164/78 HR: 68  Standing BP: 160/78 HR: 70  Site: --  Mode: --  Orthostatic VS  09-13-21 @ 06:47  Lying BP: --/-- HR: --  Sitting BP: 134/74 HR: 84  Standing BP: 123/69 HR: 84  Site: --  Mode: --  Orthostatic VS  09-12-21 @ 08:00  Lying BP: --/-- HR: --  Sitting BP: 130/78 HR: 77  Standing BP: 130/82 HR: 83  Site: upper left arm  Mode: electronic   Vital Signs Last 24 Hrs  T(C): 36.7 (09-14-21 @ 06:43), Max: 36.8 (09-13-21 @ 14:45)  T(F): 98 (09-14-21 @ 06:43), Max: 98.3 (09-13-21 @ 14:45)  HR: --  BP: --  BP(mean): --  RR: 18 (09-13-21 @ 22:08) (18 - 18)  SpO2: 99% (09-13-21 @ 22:08) (99% - 99%)    Orthostatic VS  09-14-21 @ 06:43  Lying BP: 142/95 HR: 78  Sitting BP: 133/92 HR: 77  Standing BP: --/-- HR: --  Site: --  Mode: --  Orthostatic VS  09-13-21 @ 19:29  Lying BP: --/-- HR: --  Sitting BP: 164/78 HR: 68  Standing BP: 160/78 HR: 70  Site: --  Mode: --  Orthostatic VS  09-13-21 @ 06:47  Lying BP: --/-- HR: --  Sitting BP: 134/74 HR: 84  Standing BP: 123/69 HR: 84  Site: --  Mode: --   Vital Signs Last 24 Hrs  T(C): 36.7 (09-14-21 @ 06:43), Max: 36.8 (09-13-21 @ 14:45)  T(F): 98 (09-14-21 @ 06:43), Max: 98.3 (09-13-21 @ 14:45)  HR: --98  BP: --142/95  BP(mean): --  RR: 18 (09-13-21 @ 22:08) (18 - 18)  SpO2: 99% (09-13-21 @ 22:08) (99% - 99%)    Orthostatic VS  09-14-21 @ 06:43  Lying BP: 142/95 HR: 78  Sitting BP: 133/92 HR: 77  Standing BP: --/-- HR: --  Site: --  Mode: --  Orthostatic VS  09-13-21 @ 19:29  Lying BP: --/-- HR: --  Sitting BP: 164/78 HR: 68  Standing BP: 160/78 HR: 70  Site: --  Mode: --  Orthostatic VS  09-13-21 @ 06:47  Lying BP: --/-- HR: --  Sitting BP: 134/74 HR: 84  Standing BP: 123/69 HR: 84  Site: --  Mode: --

## 2021-09-14 NOTE — BH INPATIENT PSYCHIATRY PROGRESS NOTE - NSBHMETABOLIC_PSY_ALL_CORE_FT
BMI: BMI (kg/m2): 25.7 (08-31-21 @ 16:59)  HbA1c: A1C with Estimated Average Glucose Result: 5.7 % (10-27-20 @ 09:09)    Glucose:   BP: 130/76 (09-11-21 @ 08:44) (130/76 - 130/76)  Lipid Panel: Date/Time: 10-27-20 @ 09:09  Cholesterol, Serum: 159  Direct LDL: --  HDL Cholesterol, Serum: 34  Total Cholesterol/HDL Ration Measurement: --  Triglycerides, Serum: 146   BMI: BMI (kg/m2): 25.7 (08-31-21 @ 16:59)  HbA1c: A1C with Estimated Average Glucose Result: 5.7 % (10-27-20 @ 09:09)    Glucose:   BP: --  Lipid Panel: Date/Time: 10-27-20 @ 09:09  Cholesterol, Serum: 159  Direct LDL: --  HDL Cholesterol, Serum: 34  Total Cholesterol/HDL Ration Measurement: --  Triglycerides, Serum: 146

## 2021-09-14 NOTE — BH DISCHARGE NOTE NURSING/SOCIAL WORK/PSYCH REHAB - NSDCPEEMAIL_GEN_ALL_CORE
[facility] email [email or web address]

## 2021-09-14 NOTE — BH INPATIENT PSYCHIATRY PROGRESS NOTE - CURRENT MEDICATION
MEDICATIONS  (STANDING):  aspirin enteric coated 81 milliGRAM(s) Oral daily  atorvastatin 20 milliGRAM(s) Oral at bedtime  clonazePAM  Tablet 0.5 milliGRAM(s) Oral two times a day  escitalopram 20 milliGRAM(s) Oral daily  gabapentin 300 milliGRAM(s) Oral three times a day  metoprolol succinate ER 25 milliGRAM(s) Oral daily  mirtazapine 30 milliGRAM(s) Oral at bedtime  multivitamin 1 Tablet(s) Oral daily  nicotine - 21 mG/24Hr(s) Patch 1 patch Transdermal daily  QUEtiapine 250 milliGRAM(s) Oral at bedtime  tamsulosin 0.4 milliGRAM(s) Oral at bedtime    MEDICATIONS  (PRN):  ALBUTerol    90 MICROgram(s) HFA Inhaler 2 Puff(s) Inhalation every 6 hours PRN shortness of breath  diphenhydrAMINE 25 milliGRAM(s) Oral every 6 hours PRN agitation  fluPHENAZine  Injectable 3 milliGRAM(s) IntraMuscular every 12 hours PRN agitation  hydrOXYzine hydrochloride 25 milliGRAM(s) Oral every 6 hours PRN anxiety  polyethylene glycol 3350 17 Gram(s) Oral two times a day PRN constipation

## 2021-09-14 NOTE — BH DISCHARGE NOTE NURSING/SOCIAL WORK/PSYCH REHAB - NSCDUDCCRISIS_PSY_A_CORE
[facility]  1 [phone] [facility]-WELL [phone])  Text "WELL" to [phone]  Website: [email or web address] 1 [phone]) 881-FBYJ (2176) Website: www.[facility]izon.org/.National Suicide Prevention Lifeline 5 (287) 963-1370/.  [facility]  1 (383) LIFENET (931-7541)/.  [facility]  [address] [place] [phone]   Hours:  Monday through Friday from 9 AM to 3 PM/.  [facility] [facility] - [facility] Crisis Line  [phone], Option 1

## 2021-09-14 NOTE — BH INPATIENT PSYCHIATRY PROGRESS NOTE - NSBHASSESSSUMMFT_PSY_ALL_CORE
61 year-old with history of htn, increased lipids, paranoid schizophrenia, some ETOH misuse, living in adult home, admitted on voluntary with suicidal ideations  Currently, agitated, not cooperative with exam, but given voluntary status will not constant obs. No UTOX, ETOH negative, was in hospital since yesterday,  no evidence of ETOH withdrawal (no tremors/sweats), reportedly long period of sobriety, will not CIWA  Continue olanzapine and other psychotropic, htn, lipid lowering agents.  Patient complaining of constipation, no BM x3days, PO dulcolax 10mg x1 ordered for tonight.    Plan:  >Legal: 9.13  >Obs: Routine, no current SI. no need for CO, patient not expected to pose risk to self or others in controlled inpatient setting  >Psychiatric Meds: Seroquel 250mg, Remeron 30mg qhs, Lexapro 20mg daily. Observe for tolerability and efficacy.   PRN medications:  Ativan 2mg oral Q6HR PRN for agitation and anxiety.  Haldol 5mg oral Q6HR PRN for agitation.   Benadryl 50mg oral Q6HR PRN for agitation.   Vistaril 50mg oral Q6HR PRN for anxiety.  Desyrel 50mg oral QHS PRN for insomnia.   >Labs: Admission labs reviewed, no acute findings. Labs for tomorrow: A1c, Lipid. Hold antipsychotics if QTc >500  >Medical:   No acute concerns. No consultations needed at this time. No indication for CIWA. Patient with consistently stable VS, During the course of treatment, will collaborate with medical team to manage medical issues.  >Diet: Regular   >Social: milieu/structured therapy  >Treatment Interventions: Groups and Individual Therapy/CBT, Motivational counseling for substance abuse related issues.   >Treatment goals: Symptom reduction  >Dispo: dc on Wednesday 9/15

## 2021-09-14 NOTE — BH DISCHARGE NOTE NURSING/SOCIAL WORK/PSYCH REHAB - PATIENT PORTAL LINK FT
You can access the FollowMyHealth Patient Portal offered by [facility] by registering at the following website: [email or web address]. By joining [facility]’s FollowMyHealth portal, you will also be able to view your health information using other applications (apps) compatible with our system.

## 2021-09-14 NOTE — BH INPATIENT PSYCHIATRY PROGRESS NOTE - NSBHFUPINTERVALHXFT_PSY_A_CORE
Patient seen for follow up for psychosis/paranoia. Charts, medications, and labs reviewed. Patient is discussed with nursing. No interval events. Patient is compliant with all standing meds, no SE noted. Patient is observed in his room, he is cooperative. Patient denies any physical complaints/pain. Patient reports that his mood is good today as he doesn't feel anxious. Patient was excited to report that he went outside today to get fresh air for 1 hour. He stated that getting fresh air was good for him. Patient reports poor appetite, but good sleep. Denies SI/HI/AH/VH, no intent or plan. Grooming and hygiene are appropriate, no strong body odor noted. Patient is aware about his discharge tomorrow, he states that he is ready to go but it is "bittersweet" to leave. When asked about his goal and coping skills upon discharge, patient reports that he plans to take his meds, keep busy, complete puzzles, and keep up with sports. No acute medical concerns, VSS. Continue with medication regimen. Continue to provide therapeutic support.

## 2021-09-14 NOTE — BH DISCHARGE NOTE NURSING/SOCIAL WORK/PSYCH REHAB - DISCHARGE INSTRUCTIONS AFTERCARE APPOINTMENTS
In order to check the location, date, or time of your aftercare appointment, please refer to your Discharge Instructions Document given to you upon leaving the hospital.  If you have lost the instructions please call [phone]

## 2021-09-14 NOTE — BH INPATIENT PSYCHIATRY PROGRESS NOTE - NSBHFUPINTERVALCCFT_PSY_A_CORE
Patient seen for follow up for psychosis/paranoia Patient seen for follow up for psychosis/paranoia  dc planning

## 2021-09-14 NOTE — BH DISCHARGE NOTE NURSING/SOCIAL WORK/PSYCH REHAB - NSDCPRGOAL_PSY_ALL_CORE
Pt made some progress over this past week. Pt has verbalized his coping skills such as taking medication, get busy with puzzle/sports/music, and play chess to manage symptoms. Pt was observed to play chess with his peer and utilized Sudoku to manage symptoms during this hospitalization.  Pt denies SI, HI, AH, and VH.  Pt has been compliance with medication/treatment.     During this hospitalization, pt has not attend any group despite psychiatric rehabilitation staff’s daily prompting. Pt was observed to be self-isolative, interacts with selective peers. Pt maintained fair ADLs.     Pt was provided with "[employer] survey. Pt has participate in his safety plan.

## 2021-09-14 NOTE — BH DISCHARGE NOTE NURSING/SOCIAL WORK/PSYCH REHAB - NSDCPEWEB_GEN_ALL_CORE
[facility] website --- [email or web address] website --- [email or web address]

## 2021-09-15 VITALS — TEMPERATURE: 98 F

## 2021-09-15 PROCEDURE — 99238 HOSP IP/OBS DSCHRG MGMT 30/<: CPT

## 2021-09-15 RX ORDER — ATORVASTATIN CALCIUM 80 MG/1
1 TABLET, FILM COATED ORAL
Qty: 30 | Refills: 0
Start: 2021-09-15 | End: 2021-10-14

## 2021-09-15 RX ORDER — ASPIRIN/CALCIUM CARB/MAGNESIUM 324 MG
1 TABLET ORAL
Qty: 30 | Refills: 0
Start: 2021-09-15 | End: 2021-10-14

## 2021-09-15 RX ORDER — QUETIAPINE FUMARATE 200 MG/1
1 TABLET, FILM COATED ORAL
Qty: 30 | Refills: 0
Start: 2021-09-15 | End: 2021-10-14

## 2021-09-15 RX ORDER — ESCITALOPRAM OXALATE 10 MG/1
1 TABLET, FILM COATED ORAL
Qty: 30 | Refills: 0
Start: 2021-09-15 | End: 2021-10-14

## 2021-09-15 RX ORDER — METOPROLOL TARTRATE 50 MG
1 TABLET ORAL
Qty: 30 | Refills: 0
Start: 2021-09-15 | End: 2021-10-14

## 2021-09-15 RX ORDER — ALBUTEROL 90 UG/1
2 AEROSOL, METERED ORAL
Qty: 8 | Refills: 0
Start: 2021-09-15 | End: 2021-09-15

## 2021-09-15 RX ORDER — CLONAZEPAM 1 MG
1 TABLET ORAL
Qty: 14 | Refills: 0
Start: 2021-09-15 | End: 2021-09-21

## 2021-09-15 RX ORDER — GABAPENTIN 400 MG/1
1 CAPSULE ORAL
Qty: 90 | Refills: 0
Start: 2021-09-15 | End: 2021-10-14

## 2021-09-15 RX ORDER — TAMSULOSIN HYDROCHLORIDE 0.4 MG/1
1 CAPSULE ORAL
Qty: 30 | Refills: 0
Start: 2021-09-15 | End: 2021-10-14

## 2021-09-15 RX ORDER — MIRTAZAPINE 45 MG/1
1 TABLET, ORALLY DISINTEGRATING ORAL
Qty: 30 | Refills: 0
Start: 2021-09-15 | End: 2021-10-14

## 2021-09-15 RX ADMIN — GABAPENTIN 300 MILLIGRAM(S): 400 CAPSULE ORAL at 13:54

## 2021-09-15 RX ADMIN — GABAPENTIN 300 MILLIGRAM(S): 400 CAPSULE ORAL at 08:46

## 2021-09-15 RX ADMIN — Medication 25 MILLIGRAM(S): at 08:48

## 2021-09-15 RX ADMIN — Medication 81 MILLIGRAM(S): at 08:46

## 2021-09-15 RX ADMIN — Medication 0.5 MILLIGRAM(S): at 08:46

## 2021-09-15 RX ADMIN — Medication 1 PATCH: at 08:49

## 2021-09-15 RX ADMIN — Medication 1 TABLET(S): at 08:46

## 2021-09-15 RX ADMIN — ESCITALOPRAM OXALATE 20 MILLIGRAM(S): 10 TABLET, FILM COATED ORAL at 08:46

## 2021-09-15 RX ADMIN — Medication 25 MILLIGRAM(S): at 14:12

## 2021-09-15 NOTE — BH INPATIENT PSYCHIATRY PROGRESS NOTE - NSBHMSEAFFRANGE_PSY_A_CORE
Constricted
Full
Constricted

## 2021-09-15 NOTE — BH INPATIENT PSYCHIATRY PROGRESS NOTE - NSBHMETABOLICLABS_PSY_ALL_CORE
All labs not within last 12 months, ordered

## 2021-09-15 NOTE — BH INPATIENT PSYCHIATRY PROGRESS NOTE - NSBHADMITMEDEDUDETAILS_A_CORE FT
Risk and benefits discussed

## 2021-09-15 NOTE — BH INPATIENT PSYCHIATRY PROGRESS NOTE - NSBHMSEBEHAV_PSY_A_CORE
Other
Cooperative
Cooperative
Other
Cooperative
Other
Cooperative

## 2021-09-15 NOTE — BH INPATIENT PSYCHIATRY PROGRESS NOTE - NSDCCRITERIA_PSY_ALL_CORE
improvement in symptoms 

## 2021-09-15 NOTE — BH INPATIENT PSYCHIATRY PROGRESS NOTE - NSTXDCOTHRDATEEST_PSY_ALL_CORE
08-Sep-2021
01-Sep-2021
01-Sep-2021
08-Sep-2021
01-Sep-2021

## 2021-09-15 NOTE — BH INPATIENT PSYCHIATRY PROGRESS NOTE - NSBHMSETHTPROC_PSY_A_CORE
Disorganized/Tangential/Illogical/Impaired reasoning/Other
Linear/Impaired reasoning
Disorganized/Tangential/Illogical/Impaired reasoning/Other
Linear/Impaired reasoning
Linear/Impaired reasoning
Disorganized/Tangential/Illogical/Impaired reasoning/Other
Linear/Impaired reasoning
Disorganized/Tangential/Illogical/Impaired reasoning/Other

## 2021-09-15 NOTE — BH INPATIENT PSYCHIATRY PROGRESS NOTE - NSTXDCOTHRDATETRGT_PSY_ALL_CORE
15-Sep-2021
08-Sep-2021
08-Sep-2021
15-Sep-2021
08-Sep-2021
15-Sep-2021
08-Sep-2021

## 2021-09-15 NOTE — BH INPATIENT PSYCHIATRY PROGRESS NOTE - NSBHMSEBODY_PSY_A_CORE
Average build

## 2021-09-15 NOTE — BH INPATIENT PSYCHIATRY PROGRESS NOTE - NSBHCHARTREVIEWVS_PSY_A_CORE FT
Vital Signs Last 24 Hrs  T(C): 36.6 (09-15-21 @ 06:31), Max: 36.7 (09-14-21 @ 14:50)  T(F): 97.8 (09-15-21 @ 06:31), Max: 98.1 (09-14-21 @ 14:50)  HR: --  BP: --  BP(mean): --  RR: --  SpO2: --    Orthostatic VS  09-14-21 @ 19:11  Lying BP: --/-- HR: --  Sitting BP: 150/80 HR: 72  Standing BP: 140/86 HR: 72  Site: --  Mode: --  Orthostatic VS  09-14-21 @ 06:43  Lying BP: 142/95 HR: 78  Sitting BP: 133/92 HR: 77  Standing BP: --/-- HR: --  Site: --  Mode: --  Orthostatic VS  09-13-21 @ 19:29  Lying BP: --/-- HR: --  Sitting BP: 164/78 HR: 68  Standing BP: 160/78 HR: 70  Site: --  Mode: --   Vital Signs Last 24 Hrs  T(C): 36.6 (09-15-21 @ 06:31), Max: 36.7 (09-14-21 @ 14:50)  T(F): 97.8 (09-15-21 @ 06:31), Max: 98.1 (09-14-21 @ 14:50)  HR: --  BP: --  BP(mean): --  RR: --  SpO2: --    Orthostatic VS  09-15-21 @ 07:45  Lying BP: --/-- HR: --  Sitting BP: 136/75 HR: 76  Standing BP: 140/78 HR: 74  Site: --  Mode: --  Orthostatic VS  09-14-21 @ 19:11  Lying BP: --/-- HR: --  Sitting BP: 150/80 HR: 72  Standing BP: 140/86 HR: 72  Site: --  Mode: --  Orthostatic VS  09-14-21 @ 06:43  Lying BP: 142/95 HR: 78  Sitting BP: 133/92 HR: 77  Standing BP: --/-- HR: --  Site: --  Mode: --  Orthostatic VS  09-13-21 @ 19:29  Lying BP: --/-- HR: --  Sitting BP: 164/78 HR: 68  Standing BP: 160/78 HR: 70  Site: --  Mode: --

## 2021-09-15 NOTE — BH INPATIENT PSYCHIATRY PROGRESS NOTE - NSBHADMITMEDEDUDETAILS_PSY_A_CORE FT
Discussed

## 2021-09-15 NOTE — BH INPATIENT PSYCHIATRY PROGRESS NOTE - NSTXDEPRESGOAL_PSY_ALL_CORE
Exhibit improvements in self-grooming, hygiene, sleep and appetite
Will identify 2 coping skills that assist in improving mood
Report using a coping skill to overcome sadness and worry in order to socialize with peers daily
Will identify 2 coping skills that assist in improving mood

## 2021-09-15 NOTE — BH INPATIENT PSYCHIATRY PROGRESS NOTE - NSBHMSESPEECH_PSY_A_CORE
Normal volume, rate, productivity, spontaneity and articulation

## 2021-09-15 NOTE — BH INPATIENT PSYCHIATRY PROGRESS NOTE - NSBHPSYCHOLCOGORIENT_PSY_A_CORE
Oriented to time, place, person, situation

## 2021-09-15 NOTE — BH INPATIENT PSYCHIATRY PROGRESS NOTE - NSBHMSEMOOD_PSY_A_CORE
Anxious
Anxious
Other
Anxious
Other
Other
Anxious
Normal/Other
Anxious

## 2021-09-15 NOTE — BH INPATIENT PSYCHIATRY PROGRESS NOTE - NSTXDEPRESDATEEST_PSY_ALL_CORE
01-Sep-2021
15-Sep-2021
01-Sep-2021
31-Aug-2021
01-Sep-2021
08-Sep-2021
01-Sep-2021

## 2021-09-15 NOTE — BH INPATIENT PSYCHIATRY PROGRESS NOTE - NSTXDCOTHRPROGRES_PSY_ALL_CORE
Improving
No Change
Improving
No Change
No Change
Improving
No Change

## 2021-09-15 NOTE — BH INPATIENT PSYCHIATRY PROGRESS NOTE - NSBHMSEAFFQUAL_PSY_A_CORE
Anxious
Euthymic
Anxious
Anxious

## 2021-09-15 NOTE — BH INPATIENT PSYCHIATRY PROGRESS NOTE - GENERAL APPEARANCE
No deformities present

## 2021-09-15 NOTE — BH INPATIENT PSYCHIATRY PROGRESS NOTE - NSCGISEVERILLNESS_PSY_ALL_CORE
3 = Mildly ill – clearly established symptoms with minimal, if any, distress or difficulty in social and occupational function
5 = Markedly ill - intrusive symptoms that distinctly impair social/occupational function or cause intrusive levels of distress
3 = Mildly ill – clearly established symptoms with minimal, if any, distress or difficulty in social and occupational function
5 = Markedly ill - intrusive symptoms that distinctly impair social/occupational function or cause intrusive levels of distress
3 = Mildly ill – clearly established symptoms with minimal, if any, distress or difficulty in social and occupational function
3 = Mildly ill – clearly established symptoms with minimal, if any, distress or difficulty in social and occupational function
5 = Markedly ill - intrusive symptoms that distinctly impair social/occupational function or cause intrusive levels of distress
5 = Markedly ill - intrusive symptoms that distinctly impair social/occupational function or cause intrusive levels of distress

## 2021-09-15 NOTE — BH INPATIENT PSYCHIATRY PROGRESS NOTE - NSBHATTESTSEENBY_PSY_A_CORE
NP without Attending Psychiatrist

## 2021-09-15 NOTE — BH INPATIENT PSYCHIATRY PROGRESS NOTE - MSE OPTIONS
Structured MSE

## 2021-09-15 NOTE — BH INPATIENT PSYCHIATRY PROGRESS NOTE - NSBHFUPMEDSE_PSY_A_CORE
None known
supplemental O2
None known

## 2021-09-15 NOTE — BH INPATIENT PSYCHIATRY PROGRESS NOTE - NSBHFUPINTERVALHXFT_PSY_A_CORE
Patient seen individually for discharge day management. The patient's case has been reviewed with the treatment team.  Spent performing discharge risk assessment, safety planning, performing clinical eval, planning for dc and providing psychoeducation about meds, sx and aftercare. On exam today the patient was cooperative and makes good eye contact.   Symptom improvement is noted. He has been in behavioral control no prn’s required during his hospitalization. Denies suicidal thoughts no plan or intent. Denies HI/AVH, delusions, or other symptoms of psychotic process are reported at this time.  He showed some improvement in his symptoms, with a gradual reduction of his anxiety, depression, and suicidal thinking.     He has remained compliant with medications, no adverse effects noted.  He has remained in good behavioral control and has not required emergent intramuscular medications or seclusion / restraints.  Improved level of functioning is evident, with appropriate interaction with others, with manageable level of anxiety.   Patient is not judged to be an acute danger to self or others at this time. Appears ready and stable to be discharged to lower level of care.  There are no other acute risk factors that could be mitigated by further inpatient hospitalization.   He will be discharged today to his group home and outpatient follow up.   Patient left under no distress.

## 2021-09-15 NOTE — BH INPATIENT PSYCHIATRY PROGRESS NOTE - NSBHMSETHTCONTENT_PSY_A_CORE
Delusions
Unremarkable
Delusions

## 2021-09-15 NOTE — BH INPATIENT PSYCHIATRY PROGRESS NOTE - NSTXDCOTHRGOAL_PSY_ALL_CORE
Pt will comply with recommended tx plan and medications for 5 days, identify 2 benefits for adhering to tx.
Pt will comply with recommended tx plan and medications for 5 days, identify 2 benefits for adhering to tx.
Pt will show a reduction of symptoms and engage meaningfully with writer to identify a safe discharge plan.
Pt will comply with recommended tx plan and medications for 5 days, identify 2 benefits for adhering to tx.
Pt will comply with recommended tx plan and medications for 5 days, identify 2 benefits for adhering to tx.
Pt will show a reduction of symptoms and engage meaningfully with writer to identify a safe discharge plan.
Pt will comply with recommended tx plan and medications for 5 days, identify 2 benefits for adhering to tx.
Pt will show a reduction of symptoms and engage meaningfully with writer to identify a safe discharge plan.
Pt will show a reduction of symptoms and engage meaningfully with writer to identify a safe discharge plan.

## 2021-09-15 NOTE — BH INPATIENT PSYCHIATRY PROGRESS NOTE - NSBHCHARTREVIEWINVESTIGATE_PSY_A_CORE FT
EKG:NSR  Qt/QTc:404/440ms

## 2021-09-15 NOTE — BH INPATIENT PSYCHIATRY PROGRESS NOTE - NSTXPROBDEPRES_PSY_ALL_CORE
DEPRESSIVE SYMPTOMS

## 2021-09-15 NOTE — BH INPATIENT PSYCHIATRY PROGRESS NOTE - NSTXPROBDCOTHR_PSY_ALL_CORE
DISCHARGE ISSUE - OTHER

## 2021-09-15 NOTE — BH INPATIENT PSYCHIATRY PROGRESS NOTE - NSBHMSEKNOWHOW_PSY_ALL_CORE
Current Events

## 2021-09-15 NOTE — BH INPATIENT PSYCHIATRY PROGRESS NOTE - NSTXDEPRESPROGRES_PSY_ALL_CORE
Improving
No Change
No Change
Improving
No Change
Improving
No Change
Improving

## 2021-09-15 NOTE — BH INPATIENT PSYCHIATRY PROGRESS NOTE - NSBHMSEIMPULSE_PSY_A_CORE
Impaired
Normal
Impaired

## 2021-09-15 NOTE — BH INPATIENT PSYCHIATRY PROGRESS NOTE - NSTXDEPRESDATETRGT_PSY_ALL_CORE
08-Sep-2021
15-Sep-2021
08-Sep-2021
15-Sep-2021
08-Sep-2021

## 2021-09-15 NOTE — BH INPATIENT PSYCHIATRY PROGRESS NOTE - NSBHINPTBILLING_PSY_ALL_CORE
70329 - Inpatient Moderate Complexity
37373 - Inpatient Moderate Complexity
72169 - Inpatient Moderate Complexity
75895 - Hospital Discharge Day Management; 30 min or less
26380 - Inpatient Moderate Complexity
25396 - Inpatient Moderate Complexity
61518 - Inpatient Moderate Complexity
58467 - Inpatient Moderate Complexity
94010 - Inpatient Moderate Complexity
18275 - Inpatient Moderate Complexity
85553 - Inpatient Moderate Complexity
75194 - Inpatient Moderate Complexity
63963 - Inpatient Moderate Complexity

## 2021-09-15 NOTE — BH INPATIENT PSYCHIATRY PROGRESS NOTE - ADDITIONAL DETAILS / COMMENTS
Patient continues to need treatment interventions until symptoms are optimally controlled

## 2021-09-15 NOTE — BH INPATIENT PSYCHIATRY PROGRESS NOTE - NSCGIIMPROVESX_PSY_ALL_CORE
4 = No change - symptoms remain essentially unchanged
4 = No change - symptoms remain essentially unchanged
3 = Minimally improved - slightly better with little or no clinically meaningful reduction of symptoms.  Represents very little change in basic clinical status, level of care, or functional capacity.
4 = No change - symptoms remain essentially unchanged
3 = Minimally improved - slightly better with little or no clinically meaningful reduction of symptoms.  Represents very little change in basic clinical status, level of care, or functional capacity.
3 = Minimally improved - slightly better with little or no clinically meaningful reduction of symptoms.  Represents very little change in basic clinical status, level of care, or functional capacity.
4 = No change - symptoms remain essentially unchanged
3 = Minimally improved - slightly better with little or no clinically meaningful reduction of symptoms.  Represents very little change in basic clinical status, level of care, or functional capacity.
4 = No change - symptoms remain essentially unchanged
3 = Minimally improved - slightly better with little or no clinically meaningful reduction of symptoms.  Represents very little change in basic clinical status, level of care, or functional capacity.
4 = No change - symptoms remain essentially unchanged
4 = No change - symptoms remain essentially unchanged
3 = Minimally improved - slightly better with little or no clinically meaningful reduction of symptoms.  Represents very little change in basic clinical status, level of care, or functional capacity.

## 2021-09-15 NOTE — BH INPATIENT PSYCHIATRY PROGRESS NOTE - NSBHROSSYSTEMS_PSY_ALL_CORE
Psychiatric

## 2021-09-24 NOTE — SOCIAL WORK POST DISCHARGE FOLLOW UP NOTE - NSBHSWFOLLOWUP_PSY_ALL_CORE_FT
Pt had a scheduled appt on 9/19 for his provider to meet with him at his place of residence, [facility]. Upon speaking to [facility] staff, they indicated that Nguyen did not come to see pt. Writer left a message on 9/23 and 9/24 to follow up with Nguyen. Jennyr will continue to follow up.

## 2021-10-07 NOTE — ED PROVIDER NOTE - SKIN, MLM
Established Patient Visit  Date of visit: 10/7/2021  Diagnosis: IDC Left breast Grade 3   Hormone receptor status: ER30%, GA<1%, Her2 negative, Ki67: 99%  Oncotype score: 62  Distant metastasis risk >39%  Absolute chemotherapy benefit >15%  Stage: cT1cN0 (1A) iV2orQ7a(IIA)  Date of diagnosis:04/07/21  Treatment:  Left partial mastectomy & axillary sentinel lymphadenectomy.  6/29/2021 marginal clearance  09/29/2021 Completed adjuvant   TC - DOCEtaxel 75 mg/m2, Cyclophosphamide 600 mg/m2 every 21 days x 4 cycles  PCP: Yoni Savage MD   Surgeon:  Dr Goldberg   Radiation Oncologist: Eloina Antonio MD     Subject:   She recently completed adjuvant chemotherapy.  Denies having any persistent neuropathy.  Awaiting radiation therapy.    History of Present Illness   Lianne Gannon is a 67 year old female whom I am seeing at the kind request of Yoni Savage MD for left breast IDC. February felt a lump and eventually in March saw gynecologist who sent for appropriate studies. The mass has remained same size, non tender, not painful, no discharge from the nipple. On US showed 2 lesions, MRI showed a 1.5 cm x 1.3 cm x 1.3 cm lobulated mass in the left breast.  Biopsy of one of the lesions was consistent with infiltrating ductal carcinoma.  There was no lymphadenopathy noted on ultrasound or MRI.      Gynecology  Menarche: 10  Menarche: Hysterectomy  Children: 2  Age at first pregnancy: 22  Breast: No  Oral contraceptives: 10 years   Hormone replacement therapy: No     Family History:  Prostate cancer X 3 brothers    Review of Systems  Review of Systems   Constitutional: Negative for activity change, appetite change, chills, diaphoresis, fatigue, fever and unexpected weight change.   HENT: Negative for congestion, nosebleeds, sore throat, tinnitus and voice change.    Eyes: Negative for redness and visual disturbance.   Respiratory: Negative for apnea, cough, shortness of breath, wheezing and stridor.    Cardiovascular:  Negative for chest pain, palpitations and leg swelling.   Gastrointestinal: Negative for abdominal distention, abdominal pain, blood in stool, constipation, diarrhea, nausea, rectal pain and vomiting.   Endocrine: Negative for cold intolerance, heat intolerance and polydipsia.   Genitourinary: Negative for decreased urine volume, difficulty urinating, dysuria, flank pain, frequency and hematuria.   Musculoskeletal: Negative for arthralgias, back pain, gait problem and joint swelling.   Skin: Negative for pallor and rash.   Neurological: Negative for dizziness, seizures, speech difficulty, weakness, light-headedness, numbness and headaches.   Hematological: Negative for adenopathy. Does not bruise/bleed easily.   Psychiatric/Behavioral: Negative for decreased concentration, sleep disturbance and suicidal ideas.         Active Problems  Patient Active Problem List   Diagnosis   • Infiltrating ductal carcinoma of central portion of left breast in female (CMS/HCC)         Past Medical History  Past Medical History:   Diagnosis Date   • Anxiety    • Arthritis    • Diabetes mellitus (CMS/HCC)    • Essential (primary) hypertension    • High cholesterol     med for preventitive   • Malignant neoplasm (CMS/HCC)        Past Surgical History  Past Surgical History:   Procedure Laterality Date   • Breast surgery     •  section, classic      X2   •  section, low transverse     • Colonoscopy     • Eye surgery Bilateral     CATARACT   • Hysterectomy         Social History  Social History     Substance and Sexual Activity   Alcohol Use Yes    Comment: SOCIAL     Alcohol  Social History     Tobacco Use   Smoking Status Never Smoker   Smokeless Tobacco Never Used          Family History  Family History   Problem Relation Age of Onset   • Sickle Cell Disease Mother    • Heart disease Father    • Hypertension Father    • Cancer Brother         PROSTATE         Review  Past medical history, problem list, family medical  history, surgical history and social history reviewed.       Current Meds  Current Outpatient Medications   Medication Sig Dispense Refill   • esomeprazole (NexIUM) 20 MG capsule Take 1 capsule by mouth daily (before breakfast). 30 capsule 3   • loratadine (CLARITIN) 10 MG tablet Take 10 mg by mouth daily.     • HYDROcodone-acetaminophen (Norco) 5-325 MG per tablet Take 1 tablet by mouth every 6 hours as needed for Pain. 10 tablet 0   • Multiple Vitamins-Minerals (CENTRUM ADULTS PO) Take 1 tablet by mouth daily.     • acetaminophen (TYLENOL) 500 MG tablet Take 1,000 mg by mouth every 6 hours as needed for Pain.     • HYDROcodone-acetaminophen (NORCO) 5-325 MG per tablet Take 1 tablet by mouth every 6 hours as needed for Pain. 10 tablet 0   • prochlorperazine (COMPAZINE) 10 MG tablet Take 1 tablet by mouth every 6 hours as needed for Nausea or Vomiting. 30 tablet 5   • dexamethasone (DECADRON) 4 MG tablet Take 2 tablets by mouth 2 times daily. Take for 3 days with each chemotherapy cycle.  Start the day before each docetaxel treatment. 60 tablet 0   • Cholecalciferol (Vitamin D3) 50 mcg (2,000 units) capsule Take 50 mcg by mouth daily.      • aspirin 81 MG EC tablet Take 81 mg by mouth.     • atorvastatin (LIPITOR) 10 MG tablet Take 10 mg by mouth daily.     • blood glucose (Accu-Chek Guide) test strip 1 strip by Intrauterine route.     • Accu-Chek FastClix Lancets Misc 1 lancet by Finger stick route daily. Test as directed     • latanoprost (XALATAN) 0.005 % ophthalmic solution Place 1 drop into both eyes nightly.      • valsartan (DIOVAN) 320 MG tablet TAKE 1 TABLET BY MOUTH EVERY DAY     • anastrozole (Arimidex) 1 MG tablet Take 1 tablet by mouth daily. 0 30 tablet 3   • calcium carbonate-vitamin D (CALTRATE+D) 600-400 MG-UNIT per tablet Take 1 tablet by mouth 2 times daily. 60 tablet 11   • empagliflozin (Jardiance) 10 MG tablet Take 1 tablet by mouth.     • metFORMIN (GLUCOPHAGE-XR) 500 MG 24 hr tablet Take 500  mg by mouth daily (with breakfast).        No current facility-administered medications for this visit.       Current Allergies      ALLERGIES:   Allergen Reactions   • Adhesive   (Environmental) Other (See Comments)     Blisters and 'skin peeled of with tape\"  Blisters and 'skin peeled of with tape\"   • Seasonal Runny Nose and Other (See Comments)           Vitals  Vitals:    10/07/21 1033   BP: 139/70   BP Location: LUE - Left upper extremity   Patient Position: Sitting   Cuff Size: Large Adult   Pulse: (!) 103   Resp: 16   SpO2: 98%   Weight: 87.5 kg (192 lb 12.7 oz)   Height: 5' 5\" (1.651 m)   PainSc:  0       Physical Exam  Physical Exam  Constitutional:       General: She is not in acute distress.     Appearance: She is well-developed. She is not diaphoretic.   HENT:      Head: Normocephalic and atraumatic.      Right Ear: External ear normal.      Left Ear: External ear normal.      Nose: Nose normal.   Eyes:      General: No scleral icterus.     Conjunctiva/sclera: Conjunctivae normal.      Pupils: Pupils are equal, round, and reactive to light.   Neck:      Thyroid: No thyromegaly.      Vascular: No JVD.      Trachea: No tracheal deviation.   Cardiovascular:      Rate and Rhythm: Normal rate and regular rhythm.      Heart sounds: Normal heart sounds. No murmur heard.     Pulmonary:      Effort: Pulmonary effort is normal. No respiratory distress.      Breath sounds: Normal breath sounds. No wheezing or rales.   Abdominal:      General: Bowel sounds are normal. There is no distension.      Palpations: Abdomen is soft. There is no mass.      Tenderness: There is no abdominal tenderness. There is no guarding or rebound.   Musculoskeletal:         General: No tenderness or deformity.   Lymphadenopathy:      Cervical: No cervical adenopathy.   Skin:     General: Skin is warm and dry.      Coloration: Skin is not pale.      Findings: No erythema or rash.   Neurological:      Mental Status: She is alert and  oriented to person, place, and time.      Cranial Nerves: No cranial nerve deficit.      Coordination: Coordination normal.      Deep Tendon Reflexes: Reflexes normal.   Psychiatric:         Behavior: Behavior normal.         Thought Content: Thought content normal.         Psychosocial assessment was obtained. Intervention was not necessary.    ECOG Performance Status: 0 - Fully active, able to carry on all pre-disease activities without restrictions.    Pain Scale: None    Treatment Related Toxicites: None      Labs:   Pathologic Diagnosis   A.   Left breast, inferior margin; re-excision for margin clearance:  -Residual ductal carcinoma in situ, papillary and solid types, nuclear grade 3, identified in 2 out of 12 slides examined  -Focus of ductal carcinoma in situ present at < 1 mm from inked superior margin  -Rest of surgical margins are free of malignancy  -Breast tissue with fat necrosis, fibrosis, micro-fibroadenoma, atypical ductal hyperplasia, columnar cell change     B.   Left breast, posterior margin; re-excision for margin clearance:  -Breast tissue with previous surgical site changes, papillomatosis, atypical ductal hyperplasia, usual ductal hyperplasia, cysts with apocrine metaplasia and calcifications  -No definitive evidence of residual ductal carcinoma in situ  -Surgical margins are free of malignancy   Electronically signed by Andreina Price MD on 7/1/2021 at 1115           Pathologic Diagnosis  A.  Left breast, 12:00-1:00, 2 cm from nipple; ultrasound-guided core biopsy:   -  Invasive ductal carcinoma with lobular features, grade 3, involving 3 of 3 core fragments, measuring up to 10 mm in maximum linear extent    AA - BREAST: BIOMARKER REPORTING TEMPLATE - All Specimens  Test(s) Performed     Estrogen Receptor (ER) Results  Positive    Percentage of Cells with Nuclear Positivity  30 %   Average Intensity of Staining  Weak    Test Type  Food and Drug Administration (FDA) cleared (test / vendor):  Roche Calvert    Primary Antibody  SP1    Scoring System  No separate scoring system used    Test(s) Performed     Progesterone Receptor (PgR) Status  Negative (less than 1%)      Internal control cells present and stain as expected    Test Type  Food and Drug Administration (FDA) cleared (test / vendor): Roche Calvert    Primary Antibody  1E2    Scoring System  No separate scoring system used    Test(s) Performed  Ki-67    Percentage of Positive Nuclei  99 %   Cold Ischemia and Fixation Times  Meet requirements specified in latest version of the ASCO / CAP Guidelines    METHODS       INVASIVE CARCINOMA OF THE BREAST: Resection  8th Edition - Protocol posted: 2/26/2020  INVASIVE CARCINOMA OF THE BREAST: COMPLETE EXCISION - A  SPECIMEN   Procedure  Excision (less than total mastectomy)    Specimen Laterality  Left    TUMOR   Tumor Site  Not specified    Histologic Type  Invasive carcinoma of no special type (ductal)    Glandular (Acinar) / Tubular Differentiation  Score 3    Nuclear Pleomorphism  Score 3    Mitotic Rate  Score 3    Overall Grade  Grade 3 (scores of 8 or 9)    Tumor Size  Greatest dimension of largest invasive focus (Millimeters): 18 mm   Additional Dimension (Millimeters)  16 mm     12 mm   Tumor Focality  Single focus of invasive carcinoma    Ductal Carcinoma In Situ (DCIS)  Present      Positive for extensive intraductal component (EIC)    Size (Extent) of DCIS     Number of Blocks with DCIS  5    Number of Blocks Examined  12    Architectural Patterns  Papillary      Solid    Nuclear Grade  Grade III (high)    Necrosis  Present, focal (small foci or single cell necrosis)    Lobular Carcinoma In Situ (LCIS)  Not identified    Tumor Extent     Lymphovascular Invasion  Present    Dermal Lymphovascular Invasion  No skin present    Microcalcifications  Present in non-neoplastic tissue    Treatment Effect in the Breast  No known presurgical therapy    MARGINS   Invasive Carcinoma Margins  Positive for  invasive carcinoma    Positive Margin(s)  Inferior    DCIS Margins  Positive for DCIS    Positive Margin(s)  Posterior    LYMPH NODES   Regional Lymph Nodes  Involved by tumor cells    Number of Lymph Nodes with Macrometastases (> 2 mm)  1    Number of Lymph Nodes with Micrometastases (> 0.2 mm to 2 mm and / or > 200 cells)  0    Number of Lymph Nodes with Isolated Tumor Cells (<= 0.2 mm or <= 200 cells)  0    Size of Largest Metastatic Deposit (Millimeters)  3 mm   Extranodal Extension  Not identified    Total Number of Lymph Nodes Examined  4    Number of [place] Nodes Examined  2    PATHOLOGIC STAGE CLASSIFICATION (pTNM, AJCC 8th Edition)      Primary Tumor (pT)  pT1c    Regional Lymph Nodes Modifier  (sn): [place] node(s) evaluated.    Regional Lymph Nodes (pN)  pN1a    SPECIAL STUDIES   Breast Biomarker Testing Performed on Previous Biopsy     Estrogen Receptor (ER) Status  Positive (greater than 10% of cells demonstrate nuclear positivity)    Percentage of Cells with Nuclear Positivity  21-30%    Breast Biomarker Testing Performed on Previous Biopsy     Progesterone Receptor (PgR) Status  Negative    Breast Biomarker Testing Performed on Previous Biopsy     Ki-67 Percentage of Positive Nuclei  80 %   Testing Performed on Case Number  [id number]    .             ImaginD echo 2021  1. Left ventricle: The cavity size is normal. Wall thickness is normal.     The ejection fraction was measured by biplane method of disks. Features     are consistent with a pseudonormal left ventricular filling pattern,     with concomitant abnormal relaxation and increased filling pressure     (grade 2 diastolic dysfunction). The ejection fraction is 60%.  2. Mitral valve: Moderate regurgitation.  3. Left atrium: The atrium is severely dilated.       MRI breast 2021  IMPRESSION:  KNOWN BIOPSY POSITIVE MALIGNANCY   Normal right breast MRI.    The 1.5 cm x 1.3 cm x 1.3 cm lobulated mass in the left breast is a known biopsy  positive for malignancy.    MRI BI-RADS: 6 Known biopsy proven malignancy        Mammogram 6/24/2020  IMPRESSION:  MAMMOGRAPHY  BENIGN  There is no mammographic evidence of malignancy. A 1 year screening mammogram is recommended.    BILATERAL DIGITAL DIAGNOSTIC MAMMOGRAM 3D/2D: 3/23/2021  IMPRESSION:  MAMMOGRAPHY  INCOMPLETE NEED ADDITIONAL IMAGING EVALUATION  The 0.4 cm oval mass in the left breast at 12 o'clock middle depth is indeterminate.  An ultrasound is recommended.    The 1.4 cm irregular mass in the left breast at 1 o'clock anterior depth has a differential diagnosis of carcinoma and is highly suggestive of malignancy.       LIMITED ULTRASOUND OF LEFT BREAST AND AXILLA: 3/23/2021  Axillary lymph nodes visualized, normal in size and appearance, fatty aleida maintained.    IMPRESSION: ULTRASOUND HIGHLY SUGGESTIVE OF MALIGNANCY   The 1.1 cm x 1.1 cm oval mass in the left breast at 1 o'clock middle depth has a differential diagnosis of carcinoma and is highly suggestive of malignancy.  An ultrasound guided biopsy is recommended.    The 0.5 cm x 0.3 cm oval mass in the left breast at 12 o'clock middle depth is consistent with a solid mass and is at a moderate suspicion for malignancy.  An ultrasound guided biopsy is recommended.           Assessment  Problem List Items Addressed This Visit        Hematology and Neoplasia    Infiltrating ductal carcinoma of central portion of left breast in female (CMS/HCC) - Primary    Relevant Medications    anastrozole (Arimidex) 1 MG tablet    calcium carbonate-vitamin D (CALTRATE+D) 600-400 MG-UNIT per tablet    Other Relevant Orders    SERVICE TO [facility]         Cancer Staging Summary for Lianne Gannon RED     Infiltrating ductal carcinoma of central portion of left breast in female (CMS/HCC)     Stage Date Classification Stage Status    4/15/21 Clinical Stage IB (cT1c, cN0(f), cM0, G3, ER+, CO-, HER2-) Signed by Evan Lucio MD on 4/15/21            On  initial visit discussed diagnosis, stage, significance of hormone receptor status.  Also discussed antiestrogen/aromatase inhibitor in relation to her hormone receptor status as she has an ER of 30%.    We also did discuss in addition that patient's tumor is behaving more like a triple negative considering lower ER positivity rate, negative AL, high Ki-67 of 99% and considering patient's tumor was not present at mammogram from June 2020.  For this reason patient will benefit from chemotherapy to prevent recurrence.    Recommend TC over AC-T due to node-negative, hormone receptor-positive breast and given patients age closer to 69 will try to avoid anthracycline associated SE and tumor relatively small at 1.5cm         Plan:  1. Radiation therapy  2. Anastrozole 2 weeks after completion of radiation   3. Port flush every 3 months  4. Follow up 6 weeks into starting anastrozole  5. When taking anastrozole take Vitamin D 800 units daily and calcium 9431-6577 mg daily       Learning needs assessed. Learning intervention was not required.  Above plan was discussed with patient and family and all pertinent questions were answered. At the end of the evaluation, the patient was asked if all complaints had been addressed today to her satisfaction and she responded affirmatively.      Thank you MD Yoni Santoyo MD for involving me in the care of this patient. I have sent you a copy of this visit. Please do not hesitate to call me with any questions/concerns.    Schedule follow-up: No follow-ups on file.  Greater than 50% of the visit was spent counseling regarding above issues; I spent a total of 25 minutes on the day of the visit.  This includes pre-charting, chart review and documenting.      Sincerely,       Sofi Lucio MD       Skin normal color for race, warm, dry and intact. No evidence of rash.

## 2021-10-12 NOTE — ED ADULT NURSE NOTE - NSCAGESTDRUGANNOY_GEN_A_CORE_SD
[address] [facility]  [address] [address]  Phone: [phone]               October 12, 2021    Patient: Efrain Hernandez   YOB: 1976   Date of Visit: 10/12/2021       To Whom It May Concern:    Efarin Hernandez was seen and treated in our emergency department on 10/12/2021. He may return to work on night shift 10/12-10/13.       Sincerely,       CHRISTIAN De Paz CNP         Signature:__________________________________ no

## 2021-12-07 NOTE — ED BEHAVIORAL HEALTH ASSESSMENT NOTE - EYE CONTACT
Goal Outcome Evaluation:           Progress: no change  Outcome Summary: Pt is A&O. pt has been on cpap during the shift. neosporin ordered for cut in eyelid. no other changes in pt condition this shift. Mar Live RN    Good

## 2022-02-04 NOTE — PATIENT PROFILE BEHAVIORAL HEALTH - HEALTH/HEALTHCARE ANXIETIES, PROFILE
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 Mom says that she has been doing well since the procedure. She has been sleeping a lot but she no longer snores and her breathing has already improved from prior to the procedure. She has peed and taken sips of water. Around 1 pm she was given 60 mls of her usual feed through her G tube. Normally through her G tube she gets 550 mls total of a mixed blend of food. Her feeds are broken up into four feeds a day of 180 mls each. The amount is given over a 12 minute period. She also passed a swallow study in January and since then has been drinking all kinds of liquids in a variety of cups. She is able to eat purees orally but does not like anything thicker. Mom is hoping that she will be able to advance to thicker foods now that she has had the procedure.     HPI:     ED Course:        Pediatric History:  No birth history on file.    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    Family History:   Family History   Problem Relation Age of Onset   • Allergic Rhinitis Mother    • Allergic Rhinitis Father    • Hypertension Paternal Grandmother    • Other Maternal Grandfather    • Hypertension Paternal Grandfather         Immunizations:   UTD***    Surgical History:   has a past surgical history that includes Gastrostomy tube placement.     Social History:   reports that she has never smoked. She has never used smokeless tobacco. No history on file for alcohol use and drug use.     Allergies:  ALLERGIES:  Eggs or egg-derived products   (food or  med)    Medications:  Medications Prior to Admission   Medication Sig Dispense Refill   • cetirizine (ZyrTEC) 5 MG/5ML solution Take 5 mg by mouth daily.     • pediatric multivitamin with iron (POLY-VI-SOL WITH IRON) 10 MG/ML Solution Take 1.5 mLs by mouth daily.     • fluticasone (VERAMYST) 27.5 MCG/SPRAY nasal spray Spray 1 spray in each nostril daily.          PCP:   Loc Mcintosh MD    ROS:  Review of Systems       Last Recorded Vitals    VITAL SIGNS:     Vital Last Value 24 Hour Range   Temperature 97.3 °F (36.3 °C) (02/04/22 1247) Temp  Min: 96.8 °F (36 °C)  Max: 99.7 °F (37.6 °C)   Pulse (!) 152 (02/04/22 1215) Pulse  Min: 128  Max: 166   Respiratory (!) 20 (02/04/22 1215) Resp  Min: 12  Max: 24   Non-Invasive  Blood Pressure (!) 117/85 (02/04/22 1215) BP  Min: 83/50  Max: 117/85   Pulse Oximetry 100 % (02/04/22 1215) SpO2  Min: 95 %  Max: 100 %     Vital Today Admitted   Weight 12.2 kg (26 lb 14.3 oz) (02/04/22 1215) Weight: 12.1 kg (26 lb 9.6 oz) (02/03/22 1134)   Height N/A Height: 3' 3\" (99.1 cm) (02/03/22 1134)   Body Mass Index N/A BMI (Calculated): 12.3 (02/03/22 1134)     INTAKE/OUTPUT:      Intake/Output Summary (Last 24 hours) at 2/4/2022 1324  Last data filed at 2/4/2022 0914  Gross per 24 hour   Intake 273.06 ml   Output —   Net 273.06 ml         LABORATORY DATA:    No results found for this or any previous visit (from the past 24 hour(s)).     IMAGING STUDIES:    No orders to display       Physical Exam  Physical Exam       ASSESSMENT:   Ayad is a 29 month old female presenting with *** found to have ***. Ddx includes ***.     Active Problems:    * No active hospital problems. *      PLAN:   CV:    Resp:    FENGI:    Heme/ID:  - COVID sent and pending    Neuro/Pain:    Lines: PIV x 1  Function, necessity, and utility of all lines was discussed on rounds.     VTE: At baseline mobility.     Antibiotic Decision Making  Patient on Antibiotics: - No    Dispo: Pt to remain hospitalized while  requiring observation post surgery     Plan was discussed with family, nursing staff, and attending physician. All in agreement with the plan.      Fiona Houston     none

## 2022-03-10 NOTE — ED ADULT NURSE NOTE - HPI (INCLUDE ILLNESS QUALITY, SEVERITY, DURATION, TIMING, CONTEXT, MODIFYING FACTORS, ASSOCIATED SIGNS AND SYMPTOMS)
Patient comes to ED after reportedly expressing suicidal thoughts. Has multiple ED visits for similar complaints, also states he drinks frequently. Upon being assessed by psychiatric staff, pt denied any suicidal thoughts. States, "I don't know" when asked why he made comments about jumping off of roof at his group residence. Retracted suicidal statements, requested to be discharged. Denied thoughts to harm others, or any a/v/h. Mood and affect subdued, but alert and spontaneous during interactions with staff. In good behavioral control. Pt A&O2-3. Unable to obtain/assess

## 2022-03-30 NOTE — BH INPATIENT PSYCHIATRY PROGRESS NOTE - NSICDXBHPRIMARYDX_PSY_ALL_CORE
no device
Paranoid schizophrenia   F20.0  

## 2022-04-05 NOTE — ED ADULT NURSE NOTE - THOUGHT CONTENT
Anticoagulation Clinic - Remote Progress Note  mdINR Home monitor  Testing frequency: weekly    Indication: [place] Hank Mechanical Aortic Valve  Referring Provider: Blas Blake [last appt 12/1/21  next appt 12/1/22]  Initial Warfarin Start Date: 3/24/2011  Goal INR: 2.5-3.5   Current Drug Interactions: levothyroxine, MVI, glucosamine- chondroitin, Vit C, co- Q10;  meloxicam  Bleed Risk: No hx of bleed per patient  Other: Lovenox bridge hx; of note patient has an artificial root     Diet: 3x week: 1 cup of brussels sprouts or broccoli weekly (12/30/2021)  Premier Protein (or Equate brand) meal replacement ~2x/week 2/15/22  Alcohol: Seldom  Tobacco: None  OTC Pain Medication: APAP    INR History:  Date 4/5 4/19 4/26 5/5 5/11 6/2 6/15 6/18 6/25 7/2 7/9 7/19 7/23 7/30   Total Weekly Dose 15mg 15mg 14mg 14mg 14mg 14mg 14mg 14mg 14mg 14mg 14mg 14mg 14mg 14mg   INR 2.6 3.9 3.9 2.7 3.0 3.6 2.7 2.9 2.9 2.6 2.9 3.1 2.5 2.3   Notes      decr GLV  recv'd 6/21; [facility]    incr GLV decr GLV     Date 8/6 8/13 8/20 8/27 9/3 9/10 9/17 9/24 10/1 10/8 10/15 10/22 10/29 11/5   Total WeeklyDose 14mg 15mg 15mg 14mg 14mg 15mg 14mg 14mg 14mg 14mg 14mg 14mg 15mg 16mg   INR 2.4 3.4 3.8 2.9 2.2 3.2 2.9 3.3 3.2 3.3 3.0 2.4 2.4 2.4   Notes decr GLV decr GLV    1xboost     call call 1x boost   incr VitK call 2x boost; call     Date 11/10 11/17 11/24 12/1 12/8 12/15 12/23 12/30 1/3/22 1/7 1/11 1/18 1/25 2/1   Total WeeklyDose 17mg 16mg 16mg 16mg 15mg 15 mg Pt did not call back 15mg 12 mg 13mg 15mg 15 mg 15 mg 15 mg   INR 3.7 3.3 3.9 4.0 2.6 3.4 4.0 4.9 3.6 2.4 3.3 2.8 2.7 2.9   Notes Dec GLV  Zero GLV call Red x1 call Less GLV   call  Less  Protein drink redx1  self held x1 (misdose)  Dec vit K fall, apap  Call    call       Date 2/8 2/15 2/22 3/1 3/8 3/15 3/22 3/29 4/5      Total WeeklyDose 15mg 14mg 14 mg 15 mg 15 mg 15 mg 14mg 13 mg 14 mg      INR 4.0 4.0 2.8 2.9 2.9 4.2 3.9 3.3 2.9      Notes Call; Dec GLV call Call; Mis-dose call  Call; no GLV  Inc GLV. Less protein, apap  redx1 call        Phone Interview:  Tablet Strength: 2mg  Patient Contact Info: [phone] (Mobile) *preferred*  [email or web address]  Estimated OOP cost: Will send if patient comes to [place]  Verbal Release Authorization signed on 4/7/21 -- may speak with Tammy Bai (friend: [phone]), Isamel Michele (brother: [phone])  Lab Contact Info: [facility] ([facility])  ** will call once monthly or if INR is out of range**    Patient Findings:  Positives:  Change in diet/appetite   Negatives:  Signs/symptoms of thrombosis, Signs/symptoms of bleeding, Laboratory test error suspected, Change in health, Change in alcohol use, Change in activity, Upcoming invasive procedure, Emergency department visit, Upcoming dental procedure, Missed doses, Extra doses, Change in medications, Hospital admission, Bruising, Other complaints   Comments:  Had only one protein drink this week but had two servings of broccoli. All other findings negative.     Plan:  1. INR is therapeutic today at 2.9 (goal 2.5-3.5). Instructed Ms. Michele to take continue warfarin 2 mg oral daily until recheck.    2. Repeat INR in one week, 4/12/22. May resume .warfnocall if therapeutic at next encounter.   3. Verbal information provided over the phone. Patient RBV dosing instructions, expresses understanding by teach back, and has no further questions at this time  4. Lucie Michele understands the importance of calling the [facility] if she notices any s/sx of bleeding, stroke, or abnormal bruising, if any changes are made to her medications or medication doses (Rx, OTC, herbal), or if any upcoming procedures are scheduled. Lucie Michele will likewise let us know if any other changes, questions, or concerns arise regarding anticoagulation therapy. she understands the importance of seeking medical attention immediately if she experiences any falls, vehicle accidents, or abnormal bleeding or  bruising. Lucie Michele voiced understanding of this information and confirms that she has the [facility]'s contact information. Otherwise, we will plan to contact the patient once monthly or if her INR is out of range.    Cristina Mcelroy CPhT  4/5/2022  09:30 EDT    I, Tyrone Hensley, PharmD, have reviewed the note in full and agree with the assessment and plan.  04/05/22  14:18 EDT        Suicidality

## 2022-04-26 NOTE — ED ADULT NURSE NOTE - SUICIDE RISK FACTORS
English
Perceived burden on family and others/Unable to engage in safety planning/Agitation/severe anxiety/Mood episode

## 2022-06-03 NOTE — ED PROVIDER NOTE - CLINICAL SUMMARY MEDICAL DECISION MAKING FREE TEXT BOX
No Pt with no abd pain, tolerating PO. No sign of toxicity or injury from PO bleach, and clinically unlikely that the ingestion happened. Pt medically cleared and will need inpt psych amdission

## 2022-07-13 ENCOUNTER — EMERGENCY (EMERGENCY)
Facility: HOSPITAL | Age: 62
LOS: 1 days | Discharge: HOME | End: 2022-07-13
Attending: EMERGENCY MEDICINE
Payer: MEDICARE

## 2022-07-13 VITALS
HEIGHT: 67 IN | RESPIRATION RATE: 18 BRPM | TEMPERATURE: 97 F | OXYGEN SATURATION: 93 % | SYSTOLIC BLOOD PRESSURE: 167 MMHG | HEART RATE: 79 BPM | DIASTOLIC BLOOD PRESSURE: 94 MMHG

## 2022-07-13 PROCEDURE — 99284 EMERGENCY DEPT VISIT MOD MDM: CPT | Mod: 25

## 2022-07-13 PROCEDURE — 96374 THER/PROPH/DIAG INJ IV PUSH: CPT

## 2022-07-13 PROCEDURE — 99284 EMERGENCY DEPT VISIT MOD MDM: CPT

## 2022-07-13 RX ORDER — IBUPROFEN 200 MG
1 TABLET ORAL
Qty: 9 | Refills: 0
Start: 2022-07-13 | End: 2022-07-15

## 2022-07-13 RX ORDER — MORPHINE SULFATE 50 MG/1
4 CAPSULE, EXTENDED RELEASE ORAL ONCE
Refills: 0 | Status: DISCONTINUED | OUTPATIENT
Start: 2022-07-13 | End: 2022-07-13

## 2022-07-13 RX ADMIN — MORPHINE SULFATE 4 MILLIGRAM(S): 50 CAPSULE, EXTENDED RELEASE ORAL at 15:14
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
## 2022-07-13 NOTE — ED PROVIDER NOTE - PATIENT PORTAL LINK FT
You can access the FollowMyHealth Patient Portal offered by [facility] by registering at the following website: [email or web address]. By joining [employer]’s FollowMyHealth portal, you will also be able to view your health information using other applications (apps) compatible with our system.

## 2022-07-14 NOTE — ED CDU PROVIDER INITIAL DAY NOTE - CONTEXT
Patient attended Phase 2 Cardiac Rehab Exercise Session. Further documentation will be completed in Cardiac Science/[employer] System and will be scanned into the medical record upon discharge.    
medication change

## 2022-09-02 NOTE — PSYCHIATRIC REHAB INITIAL EVALUATION - NSGHSEXORIENT_PSY_ALL_CORE
Please advise if ok to extend the patient's light duty restrictions until 9/23, since his podiatrist appointment changed.   Heterosexual

## 2022-10-13 NOTE — ED ADULT NURSE NOTE - NS TRANSFER PATIENT BELONGINGS
Internal Medicine Progress Note      Subjective     No acute events.      Objective     Current Facility-Administered Medications   Medication Dose Route Frequency Provider Last Rate Last Admin   • cefdinir (OMNICEF) capsule 300 mg  300 mg Oral Daily Mattie Castillo MD   300 mg at 10/11/22 1142   • hydroCORTisone (Solu-CORTEF) PF injection 20 mg  20 mg Intravenous Daily Mattie Castillo MD   20 mg at 10/11/22 1126   • hydroCORTisone (Solu-CORTEF) PF injection 10 mg  10 mg Intravenous QHS Mattie Castillo MD   10 mg at 10/11/22 2045   • diazePAM (VALIUM) injection 2 mg  2 mg Intravenous Q8H PRN Skip Ferraro   2 mg at 10/11/22 0136   • sodium chloride (PF) 0.9 % injection 10 mL  10 mL Injection PRN Adriano Watson DO       • sodium chloride (PF) 0.9 % injection 10 mL  10 mL Injection 2 times per day Adriano Watson DO   10 mL at 10/11/22 2045   • sodium citrate anticoagulant 4 % flush 3 mL  3 mL Intracatheter PRN Yesy Fam MD   3 mL at 10/08/22 1210   • sodium citrate anticoagulant 4 % flush 3 mL  3 mL Intracatheter PRN Yesy Fam MD   3 mL at 10/08/22 1210   • traMADol (ULTRAM) tablet 50 mg  50 mg Per NG tube Q6H PRN Mattie Castillo MD   50 mg at 10/11/22 1940   • sodium chloride (NORMAL SALINE) 0.9 % bolus 100-200 mL  100-200 mL Intravenous PRN Anisha Hart MD       • albumin human (SPA) 25 % injection 12.5 g  12.5 g Intravenous PRN Anisha Hart MD 50 mL/hr at 10/10/22 0659 Rate Verify at 10/10/22 0659   • sodium chloride 0.9 % flush bag 25 mL  25 mL Intravenous PRN Ulises Washington DO       • sodium chloride (PF) 0.9 % injection 2 mL  2 mL Intracatheter 2 times per day Ulises Washington DO   2 mL at 10/11/22 2045   • sodium chloride 0.9% infusion   Intravenous Continuous PRN Ulises Washington DO       • sodium chloride 0.9% infusion   Intravenous Continuous PRN Ulises Washington DO       • famotidine (PEPCID) injection 20 mg  20 mg Intravenous Daily Chris Thakkar, DO   20  mg at 10/11/22 1126   • heparin (porcine) 25,000 units/250 mL in dextrose 5 % infusion  1-30 Units/kg/hr (Dosing Weight) Intravenous Continuous Chris J Bijan, DO 17.8 mL/hr at 10/11/22 2040 13 Units/kg/hr at 10/11/22 2040   • heparin (porcine) injection 4,000 Units  4,000 Units Intravenous PRN Chris J Bijan, DO   4,000 Units at 10/08/22 0133   • heparin (porcine) injection 2,000 Units  2,000 Units Intravenous PRN Chris J Bijan, DO   2,000 Units at 10/09/22 1642         Last Recorded Vitals  Vitals with min/max:      Vital Last Value 24 Hour Range   Temperature 97.5 °F (36.4 °C) (10/11/22 2036) Temp  Min: 97.5 °F (36.4 °C)  Max: 98.8 °F (37.1 °C)   Pulse 94 (10/11/22 2046) Pulse  Min: 75  Max: 94   Respiratory 18 (10/11/22 2046) Resp  Min: 18  Max: 18   Non-Invasive  Blood Pressure 124/77 (10/11/22 2036) BP  Min: 109/77  Max: 125/75   Pulse Oximetry 94 % (10/11/22 2046) SpO2  Min: 89 %  Max: 97 %   Arterial   Blood Pressure   No data recorded      Body mass index is 56.9 kg/m².    Physical Exam  alert, awake, no acute distress, morbidly obese with a BMI of 56.6  HEENT: no pallor, EOMI, MAHAD, oral cavity wnl, throat wnl, NG tube in place  Neck: no JVD  Chest/Resp: B/L vesicular breathing, right chest dialysis catheter  CVS: S1 S2 N, no M/R/G, no edema  Abdomen: soft, nontender, BS + distention +  MKS: Shoulder,Knee and hip joint normal range of motion  CNS: alert,awake,  Cranial nerves intact  Motor- strenth b/l Ul and LL wnl  Sensation - intact  skin: no rash    Recen  Recent Results (from the past 24 hour(s))   Phosphorus    Collection Time: 10/11/22  5:41 AM   Result Value Ref Range    Phosphorus 3.5 2.4 - 4.7 mg/dL   Magnesium    Collection Time: 10/11/22  5:41 AM   Result Value Ref Range    Magnesium 1.9 1.7 - 2.4 mg/dL   Comprehensive Metabolic Panel    Collection Time: 10/11/22  5:41 AM   Result Value Ref Range    Fasting Status      Sodium 143 135 - 145 mmol/L    Potassium 3.0 (L) 3.4 - 5.1  mmol/L    Chloride 107 97 - 110 mmol/L    Carbon Dioxide 25 21 - 32 mmol/L    Anion Gap 14 7 - 19 mmol/L    Glucose 106 (H) 70 - 99 mg/dL    BUN 40 (H) 6 - 20 mg/dL    Creatinine 2.81 (H) 0.51 - 0.95 mg/dL    Glomerular Filtration Rate 19 (L) >=60    BUN/ Creatinine Ratio 14 7 - 25    Calcium 8.9 8.4 - 10.2 mg/dL    Bilirubin, Total 0.8 0.2 - 1.0 mg/dL    GOT/AST 19 <=37 Units/L    GPT/ALT 20 <64 Units/L    Alkaline Phosphatase 76 45 - 117 Units/L    Albumin 2.6 (L) 3.6 - 5.1 g/dL    Protein, Total 7.1 6.4 - 8.2 g/dL    Globulin 4.5 (H) 2.0 - 4.0 g/dL    A/G Ratio 0.6 (L) 1.0 - 2.4   Partial Thromboplastin Time    Collection Time: 10/11/22  5:41 AM   Result Value Ref Range    PTT 55 (H) 22 - 30 sec   CBC with Automated Differential (performable only)    Collection Time: 10/11/22  5:41 AM   Result Value Ref Range    WBC 10.7 4.2 - 11.0 K/mcL    RBC 3.76 (L) 4.00 - 5.20 mil/mcL    HGB 10.6 (L) 12.0 - 15.5 g/dL    HCT 34.4 (L) 36.0 - 46.5 %    MCV 91.5 78.0 - 100.0 fl    MCH 28.2 26.0 - 34.0 pg    MCHC 30.8 (L) 32.0 - 36.5 g/dL    RDW-CV 15.3 (H) 11.0 - 15.0 %    RDW-SD 50.3 (H) 39.0 - 50.0 fL     140 - 450 K/mcL    NRBC 0 <=0 /100 WBC    Neutrophil, Percent 83 %    Lymphocytes, Percent 13 %    Mono, Percent 4 %    Eosinophils, Percent 0 %    Basophils, Percent 0 %    Immature Granulocytes 0 %    Absolute Neutrophils 8.8 (H) 1.8 - 7.7 K/mcL    Absolute Lymphocytes 1.4 1.0 - 4.0 K/mcL    Absolute Monocytes 0.4 0.3 - 0.9 K/mcL    Absolute Eosinophils  0.0 0.0 - 0.5 K/mcL    Absolute Basophils 0.0 0.0 - 0.3 K/mcL    Absolute Immmature Granulocytes 0.0 0.0 - 0.2 K/mcL   t Results (from the past 24 hour(s))   Partial Thromboplastin Time    Collection Time: 10/10/22  1:35 AM   Result Value Ref Range    PTT 56 (H) 22 - 30 sec   Phosphorus    Collection Time: 10/10/22  8:19 AM   Result Value Ref Range    Phosphorus 3.8 2.4 - 4.7 mg/dL   Magnesium    Collection Time: 10/10/22  8:19 AM   Result Value Ref Range     Magnesium 1.9 1.7 - 2.4 mg/dL   Comprehensive Metabolic Panel    Collection Time: 10/10/22  8:19 AM   Result Value Ref Range    Fasting Status      Sodium 141 135 - 145 mmol/L    Potassium 2.9 (L) 3.4 - 5.1 mmol/L    Chloride 104 97 - 110 mmol/L    Carbon Dioxide 28 21 - 32 mmol/L    Anion Gap 12 7 - 19 mmol/L    Glucose 100 (H) 70 - 99 mg/dL    BUN 40 (H) 6 - 20 mg/dL    Creatinine 3.11 (H) 0.51 - 0.95 mg/dL    Glomerular Filtration Rate 17 (L) >=60    BUN/ Creatinine Ratio 13 7 - 25    Calcium 8.7 8.4 - 10.2 mg/dL    Bilirubin, Total 0.5 0.2 - 1.0 mg/dL    GOT/AST 14 <=37 Units/L    GPT/ALT 13 <64 Units/L    Alkaline Phosphatase 62 45 - 117 Units/L    Albumin 2.9 (L) 3.6 - 5.1 g/dL    Protein, Total 6.8 6.4 - 8.2 g/dL    Globulin 3.9 2.0 - 4.0 g/dL    A/G Ratio 0.7 (L) 1.0 - 2.4   CBC with Automated Differential (performable only)    Collection Time: 10/10/22  8:19 AM   Result Value Ref Range    WBC 8.1 4.2 - 11.0 K/mcL    RBC 3.61 (L) 4.00 - 5.20 mil/mcL    HGB 10.3 (L) 12.0 - 15.5 g/dL    HCT 33.4 (L) 36.0 - 46.5 %    MCV 92.5 78.0 - 100.0 fl    MCH 28.5 26.0 - 34.0 pg    MCHC 30.8 (L) 32.0 - 36.5 g/dL    RDW-CV 15.3 (H) 11.0 - 15.0 %    RDW-SD 51.2 (H) 39.0 - 50.0 fL     140 - 450 K/mcL    NRBC 0 <=0 /100 WBC    Neutrophil, Percent 75 %    Lymphocytes, Percent 19 %    Mono, Percent 4 %    Eosinophils, Percent 1 %    Basophils, Percent 0 %    Immature Granulocytes 1 %    Absolute Neutrophils 6.1 1.8 - 7.7 K/mcL    Absolute Lymphocytes 1.6 1.0 - 4.0 K/mcL    Absolute Monocytes 0.4 0.3 - 0.9 K/mcL    Absolute Eosinophils  0.1 0.0 - 0.5 K/mcL    Absolute Basophils 0.0 0.0 - 0.3 K/mcL    Absolute Immmature Granulocytes 0.0 0.0 - 0.2 K/mcL   Partial Thromboplastin Time    Collection Time: 10/10/22  8:19 AM   Result Value Ref Range    PTT 63 (H) 22 - 30 sec      Final Result   1.   Overall unchanged bowel distention of uncertain etiology.   2.   Moderate rectal stool burden.      Electronically Signed by: KATIA  MD MENA    Signed on: 10/11/2022 9:22 AM          XR Abdomen Series W Single View Chest   Final Result       Persistent gaseous distention of bowel loops throughout the abdomen.   Obstruction is not excluded. Continued follow-up recommended.         Electronically Signed by: PAIGE LAMB M.D.    Signed on: 10/10/2022 2:58 PM          XR Abdomen Series W Single View Chest   Final Result   1.  Nonspecific small bowel distention.      Electronically Signed by: NAZANIN CHANEY M.D.    Signed on: 10/9/2022 9:58 AM          XR CHEST POST TUBE OR LINE PLACEMENT 1 VIEW   Final Result   1. Nasogastric tube ends in the stomach.   2. There are opacities in left lung which are stable suggesting   atelectasis.      Electronically Signed by: YANG ALFARO M.D.    Signed on: 10/7/2022 6:07 PM          XR CHEST PA OR AP 1 VIEW   Final Result       No acute abnormality.      Electronically Signed by: ALISSON ASHLEY M.D.    Signed on: 10/7/2022 11:19 AM          CT ABDOMEN PELVIS WO CONTRAST   Final Result       Fluid distention of the stomach. Moderate fluid distention of small bowel.   Moderate gaseous distention of the colon. No definite evidence of bowel   obstruction. No acute inflammatory process.      Electronically Signed by: ALISSON ASHLEY M.D.    Signed on: 10/7/2022 11:25 AM          CT ENTEROGRAPHY W CONTRAST    (Results Pending)   XR Abdomen Series W Single View Chest    (Results Pending)   US VASC LOWER EXTREMITY VENOUS DUPLEX BILATERAL    (Results Pending)         Assessment/Plan  No problem-specific Assessment & Plan notes found for this encounter.    Principal Problem:    Abdominal pain    #Abdominal distention and ileus: Patient is currently NG tube to suction.  Profuse secretions from the NG tube.  GI is on consult.  Will follow further recommendations as per GI  #Patient has history of Crohn's disease currently on hydrocortisone  #Abdominal pain: We will give tramadol we will continue to monitor  #Fluid  and distention abdomen: Currently on Zosyn we will continue Zosyn  #Disposition: Upon discharge patient will be going to nursing home.     10/9  #Abdominal distention and ileus secondary to small bowel obstruction improving patient passed gas.  We will follow with GI  #History of DVT on heparin drip  #Patient refusing p.o. we will change hydrocortisone to IV  #ESRD on hemodialysis    10/10  #Ileus improved. NG tube clamped and will reassess. GI on board  #Urine culture + for E.coli, will DC zosyn and start on cefdinir for 5days  #H/o of DVT , hematology consult for anticoagulation recommendation    10/11  #Ileus improving  #History of DVT: Hematology consult appreciated.    10/12  #Small bowel obstruction: Improving, for KUB today  #Nutrition: Unable to take p.o. will need PPN  #PT OT eval for  #History of DVT: Currently on heparin drip we will continue the same as per hematology plan to change to apixaban when able to take p.o.  DVT Prophylaxis  heparin    Mattie Castillo MD  10/11/2022 11:34 PM         None

## 2022-10-26 NOTE — BH DISCHARGE NOTE NURSING/SOCIAL WORK/PSYCH REHAB - NSTOBACCONEVERSMOKERY/N_GEN_A
Pt presents this date for discussion of medication: Propranolol.   Pt was last seen on 7/6/21  Pt sees ob/gyn for [place] exam: last done 7/12/22 with Gladys Espinosa.     Hm due:   Hep B  Pap  Covid  Depression: 0/9  Flu shot: Pt declines this date    Patient would like communication of their results via:      [employer]    Cell Phone:   Telephone Information:   Mobile [phone]     Okay to leave a message containing results? Yes    Denies known Latex allergy or symptoms of Latex sensitivity.  Medications verified, no changes.     Yes

## 2022-12-21 NOTE — BH INPATIENT PSYCHIATRY DISCHARGE NOTE - NSDCRECOMMENDMEDICALFT_PSY_ALL_CORE
Upon discharge, recommend physical exam including EKG and metabolic screen with PCP on ongoing basis for preventative care and to maintain health and wellness. show

## 2023-03-23 NOTE — ED ADULT TRIAGE NOTE - INTERNATIONAL TRAVEL
----- Message from Chris Aviles MD sent at 3/23/2023  8:09 AM CDT -----  UTI, appears it was not treated. Please call in MacroBid 100 mg 1 po bid x 1 week #14. Thx.  
No
Private car

## 2023-04-12 NOTE — ED CDU PROVIDER INITIAL DAY NOTE - CROS ED ROS STATEMENT
Medical management Right foot hallux gangrene toe infection Preop Risk Stratification all other ROS negative except as per HPI

## 2023-05-24 NOTE — ED PROVIDER NOTE - CROS ED NEURO NEG
Ibuprofen, 600 mg, every 6 hours as needed for fever  Return to the ED if worse  Encourage fluids  
dizziness

## 2023-05-30 NOTE — ED ADULT NURSE NOTE - NS_BH TRG Q4YES_ED_ALL_ED
Bed in lowest position, wheels locked, appropriate side rails in place/Call bell, personal items and telephone in reach/Instruct patient to call for assistance before getting out of bed or chair/Non-slip footwear when patient is out of bed/[place] to call system/Physically safe environment - no spills, clutter or unnecessary equipment/Purposeful Proactive Rounding/Room/bathroom lighting operational, light cord in reach
Past 24 hrs

## 2023-05-30 NOTE — ED CDU PROVIDER SUBSEQUENT DAY NOTE - NSALCOHOLUSECOMMENT_GEN_ALL_CORE_FT
1Procedures  
URODYNAMICS REPORT    Multichannel Urodynamic Testing:       Indication: Incontinence with h/o pubovaginal sling       Uroflow:  Voided volume (ml): 435  Max flow rate (ml/s): 10.1  Time (s): 3.05  Pattern: intermittent  Cath PVR (ml): 10         Cystometry:  Prolapse stage: 1  POP reduced: N/A  Fill rate (ml/min): 50  First sensation (ml): 79  First urge (ml): 96  Strong urge (ml): 354  Cystometric capacity (ml): 504  Detrusor contraction (pos/neg/ml/cmH2O): neg  Detrusor contraction leak: neg Suppressible? N/A  Urodynamic stress incontinence (pos/neg/ml): pos @ 300 mL  Potential stress incontinence (pos/neg/ml): pos       Leak Point Pressure:  Bladder volume (ml): 300  Patient position (supine/sitting/standing): sitting  Valsalva LPP (pos/neg/cmH2O): pos. 76 cm H20  Cough LPP (pos/neg/cmH2O): neg       Pressure Flow Study:  Voided volume (ml): 564  Max Flow Rate (ml/s): 14  Detrusor void (yes/no): no  Pattern: Normal  Pdet at Peak Flow (cmH2O): 30  PVR (ml): 10     Impression:  - Stable bladder, no detrusor overactivity  - Abnormal sensation (increased)  - Normal capacity (> 300mL)  - Evidence of urinary stress incontinence  - Evidence of voiding dysfunction  - Normal flow pattern     Most likely diagnosis based on multichannel urodynamics:     Patient is a candidate for anti-incontinence procedure.  She had to lean forward and maneuver to initiate void.  Once void was initiated, normal contraction of the detrusor was noted.  Results d/w patient briefly.  She will need further counseling on her results and her options.  Discussed that urodynamic testing is not 100% accurate    WILLIAM Barrow MD  Pelvic Medicine and Reconstructive Surgery  Urogynecology  [facility] [place]      
reports drinking today one beer

## 2023-06-18 NOTE — ED PROVIDER NOTE - PMH
AM Hospitalist  Daily Progress Note    Patient Name: Marcos Jones   Date: 06/18/23  Hospital Day: Hospital Day: 4    Chief complaint:      SUBJECTIVE   No acute events overnight.  No pain or discomfort today.  Tolerating p.o. intake.     OBJECTIVE   Temp:  [97.3 °F (36.3 °C)-99.7 °F (37.6 °C)] 99.7 °F (37.6 °C)  Heart Rate:  [76-88] 83  Resp:  [16-18] 18  BP: (107-125)/(69-82) 125/82    Relevant Physical Exam:  General: not in acute distress, A&Ox4, speaking in full sentences, able to follow commands  Cardiac: regular rate  HEENT: Palpable superficial left-sided supraclavicular lymph node with mild tenderness to palpation  Resp: normal work of breathing, no wheezing or crackles in posterior lung fields  Abdomen: soft, not distended, no abdominal tenderndess to palpation  Extremities: no lower extremity edema    Labs over past 24hrs: All lab values were personally reviewed  135 103 14 108   4.3 26 1.72      12.5 9.9 234    29.7      Relevant new cultures over past 24hrs:  Blood culture  6/15: NGTD    HIV viral load: 465  CD4 count: 113    MRSA PCR: Negative  Hepatitis panel: In process  Legionella urine antigen: Negative    Quantiferon gold: Indeterminate, repeat pending  Blastomyces antigen EIA: in process  Mycobacterial culture, blood: In process    New images over past 24hrs:   All images were personally reviewed  None     ASSESSMENT & PLAN   Marcos Jones is a 60yo man with PMHx HIV/AIDs, sarcoidosis on hydroxychloroquine, CAD s/p stent, CKD2, hypertensive kidney disease, COPD who presented on 6/15/23 with shortness of breath, generalized weakness and fall at home.     Sepsis  Acute pneumonia  Sigmoid colon diverticulitis  Presented with tachycardia, tachypnea, leukocytosis. CT imaging noted significant for bilateral nodular densities predominantly in the right middle and lower lobes & sigmoid colon diverticulitis. Sepsis protocol initiated in the ED at 925.   -- Abdominal pain improving, monitor on  diet  -- IV zosyn & doxycycline  -- ID consulted  -- Blood cultures, urine strep pneumo, sputum cultures  -- TB rule out: quantiferon TB in process, sputum AFB x 3 needed     Diffuse lymphadenopathy  Lymphoma, likely  CT imaging on admission noted significant for diffuse lymphadenopathy including upper abdominal and retrocrural.  Pt also endorses night sweats, weight loss, malaise and fatigue.    -- IR consulted for tissue biopsy  -- Surgery also consulted for possible lymph node exicional biopsy  -- Oncology consulted    Pericardial effusion  Pleural effusion  Chronic diastolic heart failure  Seen on admission with rising proBNP to 637 from 159.  Imaging noted significant for small pericardial and bilateral pleural effusions. Appears euvolemic on exam.   --Cardiology consulted, unlikely acute CHF    HIV/AIDs  He has been adherent to daily biktarvy & bactrim. CD4, viral load pending  -- Resume biktarvy & bactrim    Bilateral femoral head osteonecrosis  Advanced bilateral hip OA  -- F/u outpatient   -- PT & OT    Abnormal CT head  CT head on admission noted significant for age-indeterminate infarct in the right parietal lobe.  Concern is for acute or subacute CVA.   -- Resume home aspirin, clopidogrel and atorvastatin   -- Neurology  -- brain MRI, MRI cervical spine     Acute kidney injury on CKD  Creatinine 1.57 on admission from a previous baseline of 0.97. Now improving after fluids  -- Nephrology following     Hyponatremia  Sodium 129 on admission.   -- Work-up including serum osmolality and TSH pending  -- Nephrology following. Possibly from SIADH, 1.2L fluid restriction     Chronic hypertension  -- Resume PTA clonidine, holding remainder of meds      Chronic COPD  Patient seen clinically stable with no evidence of acute exacerbation  --PRN nebs     History of coronary disease status post PCI and stent placement  No acute issues reported at this time.    --Resume home medical management including aspirin,  Anxiety    BPH (benign prostatic hyperplasia)    Elevated liver enzymes    GERD (gastroesophageal reflux disease)    HTN (hypertension)    Hyperlipidemia    Mood disorder    Paranoid schizophrenia    Psychosis clopidogrel and atorvastatin  --Per cardiology, start metoprolol    Checklist:  DVT prophylaxis/anticoagulation: SQH  Fluids: None  Nutrition: cardiac, fluid restriction  Special lines/tubes/quezada: none    Dispo:   PT & OT Pending    Communication:  PCP: Dylan Buck MD    Code Status:   FULL, discussed with pt on 6/16    Evelyn Garrison MD  [facility] Hospitalist  _____________________________________________________________________________    Attestation:  Patient requires inpatient/obs admission due to:     Medical coding:  More than 52 minutes were spent on this patient's care today. This includes the following:  reviewing notes prior to this encounter, current encounter notes, reviewing of vital signs/tests/images, performing history & physical exam, counseling and discussing with patient and family members, documentation of the health record, communication with other health providers, and coordination of care. This does not include time spent on other items of care such as smoking cessation counseling, prolonged care time, and or advanced care planning if applicable.

## 2023-07-14 NOTE — ED CDU PROVIDER INITIAL DAY NOTE - CROS ED GI ALL NEG
Patient: Tianna Farley Date: 2023   : 2005 Attending: Daljit Bello MD   18 year old female      Subjective: Seen in f/u, sleeps through exam    Review of Systems:  Pertinent items are noted in history of present illness     Reviewed Pertinent: Medications    Vital Last Value 24 Hour Range   Temperature 98 °F (36.7 °C) (23 0746) Temp  Min: 98 °F (36.7 °C)  Max: 98.6 °F (37 °C)   Pulse (!) 119 (23 0746) Pulse  Min: 119  Max: 123   Respiratory 18 (23 1931) Resp  Min: 18  Max: 18   Non-Invasive  Blood Pressure 120/77 (23 0746) BP  Min: 111/67  Max: 120/77   Pulse Oximetry 98 % (23 07) SpO2  Min: 93 %  Max: 98 %   Arterial   Blood Pressure   No data recorded     Vital Today Admit   Weight (!) 38.3 kg (84 lb 7 oz) (07/10/23 0500) Weight: (!) 35 kg (77 lb 2.6 oz) (23 1900)   Height N/A Height: 4' 9\" (144.8 cm) (23 2344)   BMI N/A BMI (Calculated): 17.22 (23 2347)     Weight over the past 48 Hours:  No data found.     Intake/Output:  Last Stool Occurrence: 1 (23 0800)    I/O this shift:  In: 1071 [NG/GT:1071]  Out: 650 [Urine:650]    I/O last 3 completed shifts:  In: -   Out: 1500 [Urine:1500]    Physical Exam:  Visit Vitals  /77 (BP Location: RUE - Right upper extremity, Patient Position: Semi-Gutierrez's)   Pulse (!) 119   Temp 98 °F (36.7 °C) (Axillary)   Resp 18   Ht 4' 9\" (1.448 m)   Wt (!) 38.3 kg (84 lb 7 oz)   SpO2 98%   BMI 18.46 kg/m²       General Appearance:    Alert, cooperative, no distress, appears stated age   Head:    Normocephalic, without obvious abnormality, atraumatic   Eyes:    PERRL, conjunctiva/corneas clear, EOM's intact, both eyes   Ears:    Normal external ear canals, both ears   Nose:   Nares normal, septum midline, mucosa normal, no drainage   Throat:   Lips, mucosa, and tongue normal; teeth and gums normal   Neck:   Supple, symmetrical, trachea midline, no adenopathy;        Back:        Lungs:     Scattered upper  airway rhonchi bilateral   Chest Wall:    No tenderness or deformity    Heart:    Regular rate and rhythm, S1 and S2 normal, no murmur, rub   or gallop   Breast Exam:     Abdomen:     Soft, non-tender, bowel sounds active all four quadrants,     no masses, no organomegaly   Genitalia:     Rectal:     Extremities:   Extremities normal, atraumatic, no cyanosis or edema   Pulses:   2+   Skin:   Skin color, texture, turgor normal, acneiform type rash over face/chest fading but now some over right lateral hip   Lymph nodes:   Cervical, supraclavicular nodes normal   Neurologic:          Laboratory Results:    Lab Results   Component Value Date    SODIUM 139 07/14/2023    POTASSIUM 3.5 07/14/2023    BUN 6 07/14/2023    CREATININE 0.27 (L) 07/14/2023    GLUCOSE 97 07/14/2023    HCT 35.2 (L) 07/14/2023    HGB 10.9 (L) 07/14/2023     07/14/2023    BILIRUBIN 0.4 07/14/2023    AST 21 07/14/2023    GPT 36 07/14/2023    ALKPT 225 (H) 07/14/2023    ALBUMIN 2.7 (L) 07/14/2023    WBC 6.8 07/14/2023         Urinalysis:    Lab Results   Component Value Date    USPG 1.022 07/10/2023    UWBC Small (A) 07/10/2023    URBC Negative 07/10/2023    UBILI Negative 07/10/2023    UPH 6.0 07/10/2023       Cultures: Reviewed   BC x 2 6/27 NG final  Ucx 6/27 with P. Mirabilis, pan S  MSSA/MRSA screen nares negative  6/29 sputum with C. Striatum  BC x 2 7/10 NGSF  Sputum cx 7/10 in progress  Chest wall skin cx in progress  Ucx 7/10 with >100K VRE  Respiratory viral panel negative    Imaging: Reviewed   CT head 7/10  IMPRESSION:      Limited study.     1.  Dysgenesis of the corpus callosum. There is prominent cerebellar and  cerebral volume loss.  2.  Mild paranasal sinus disease.     I, Attending Radiologist Stanislaw Alfred MD, have reviewed the images and  report and concur with these findings interpreted by Resident Radiologist,  Charles Pino MD.      Electronically Signed by: Stanislaw Alfred MD     Assessment:   Fever, suspect recurrent  aspiration given severity of neurologic disease +/- seizures, no s/s infection at this time  Acneiform rash, clinically not infectious  VRE UTI, mild    Plan:  Add NTF 100mg PT q12h x 7d  Deescalate isolation to contact  Continue supportive care, keep compulsive at rasing HOB at least 30 degrees    DR. Ramirez following tomorrow, I'm on 7/16 if questions or problems    Discussed with: Patient   negative...

## 2023-07-14 NOTE — ED ADULT NURSE NOTE - NSSUHOSCREENINGYN_ED_ALL_ED
Returned phone call to patient. Patient stated he would like to be off restrictions for work as he is doing fine. Writer explained to patient that he would need to speak with the provider before being cleared. Phone visit scheduled with ANABELL Early Monday, July 17th at 8:30 AM to go over the injury in order to clear the patient. Patient verbalized understanding and call ended.   Yes - the patient is able to be screened

## 2023-08-08 NOTE — ED PROVIDER NOTE - IV ALTEPLASE EXCL REL HIDDEN
show Metronidazole Counseling:  I discussed with the patient the risks of metronidazole including but not limited to seizures, nausea/vomiting, a metallic taste in the mouth, nausea/vomiting and severe allergy.

## 2023-09-12 NOTE — BH PATIENT PROFILE - NSDYSPHAGSECTTHREE_PSY_ALL_CORE
ACP Medicine Night Coverage [facility] Medicine Night Coverage    Notified by Radiologist, CTH with new L occipital hemorrhagic stroke in comparison to last CT. Discussed with on-call neurology resident, Dr. Jane Florez. Advised to hold ASA and Brilinta, and keep BP goal to be 140-160/90.   Repeat CTH to be ordered for 24 hours.    Seizure precautions in place.   Will monitor closely.   Will relay overnight events to AM team.      Elis Sierra PA-C  Department of Medicine   [id number] N/A

## 2023-10-06 NOTE — CONSULT NOTE ADULT - PROBLEM SELECTOR PROBLEM 3
October 6, 2023       Caesar Chavis  [address] [place] [address]      Dear Mr. Chavis,    Please review the enclosed prep instructions for your procedure with Mackenzie Nelson MD.    Procedure(s):  Colonoscopy     Date of Procedure: Monday, November 27, 2023    Time of Procedure: Someone from the hospital will call you 1-2 business days prior with your procedure and arrival times. If you have not received a call from the Prep Dept within 1 business day of your scheduled procedure, please call [phone] for your time of procedure and pre-procedure instructions.      IMPORTANT REQUIREMENTS  You are scheduled with Monitored Anesthesia Care (MAC) sedation and will need to have a legal adult (18 years or older) stay with you overnight the day of your procedure or your procedure will be canceled.      Location:  [address]  [id number] [phone]        : Open Access Scheduling Patient Line [phone]      Please read these instructions very carefully at least 7 days prior to your procedure.     If there are questions about these instructions, please call the office at [phone]. Prior to the procedure, the procedure center will be calling to go over your health history and medications to be taken on the day of the procedure. If you need to cancel or reschedule, please give the office a call within 3 days prior to the procedure.           PATIENT CHECKLIST     Blood Thinner(s) - Please contact the prescribing provider to get clearance to hold prior to procedure the following blood thinner(s):  None  Aspirin does not need to be held, please continue taking it.  Diabetics - Contact your primary physician or my office for instructions on your diabetic medications, including insulin. Check your blood sugars frequently the day you are on a clear liquid diet. In general, oral diabetic medications and regular insulin (NovoLog) are held on  the morning of the procedure.  Important Requirements - You will not be allowed to drive home after the procedure due to the sedation. Please confirm you have an escort to take you home following the procedure. You cannot take a taxi cab, Uber, Lyft, ride share, bus, or other public transport home by yourself. Patients without an appropriate ride home will be cancelled.    Additional Information:  Please be on time, as arriving late could result in your procedure being delayed, canceled, or rescheduled.      Colonoscopy Instructions - Split Nulytely/Golytely (or Generic)    7 Days prior to your procedure   your bowel prep at your pharmacy at least one week prior to your procedure in case there is a problem with coverage of the medication by your insurance.  If possible, try to avoid non-steroidal anti-inflammatory drugs (Ibuprofen, Advil, Motrin, Aleve, Naproxen etc.).   Stop taking oral iron supplements.  Multi-vitamins with iron - OK to continue.  Do not eat popcorn, seeds, nuts or whole kernel corn.     5 Days prior to your procedure  Do not eat products with Willet (a fat substitute found in [employer] and [employer] Fat-Free chips).    1 Day before your procedure  No solid food.  No alcohol.  Clear liquids ALL DAY. If you can see through it, you can drink it. Examples are clear broth or consommé, fruit juices without pulp (no orange or tomato), sports drinks (Gatorade, Propel etc.), Jell-O (no fruit added), coffee or tea (sweeteners are ok, no milk or cream), soda or non-alcoholic carbonated drinks, popsicles, water, and clear hard candies. Avoid red and purple colors.   It is important to try and stay hydrated during the day by drinking fluids. A solution such as Gatorade, or a similar sports drink, will help you to stay hydrated.  In the morning, mix the Nulytely prep with the flavor packet and one gallon of water, shake well to mix, and refrigerate to chill. Do not further dilute the prep in  any way.  At 5:00 PM, Drink 8 ounces of the prep solution every 10 minutes until ½ of the prep is completed. Place the other half in the refrigerator. Then drink 16 oz of a clear liquid.   You can still continue to be on the clear liquid diet while prepping.      Day of the procedure  No solid food. No alcohol.  6 hours prior to leaving your house drink the remaining portion of the solution. Drink 8 ounces every 10 minutes until the prep is completed. Then drink 16 oz of a clear liquid.  You can take your medications as instructed during phone call with procedure center staff, with a sip of water up to 4 hours prior to your scheduled procedure.   You should not drink or take anything by mouth 4 hours prior to your procedure.  Your stools should have a clear to see-through cloudy yellow appearance with no formed substance. If your stools are darker or have formed substance, please call the office and speak with a nurse who will get further instructions from your physician.  Please make sure to drink the full prep.    Prepping times and instructions can be altered depending on time of procedure.  Please call office and ask to speak to a nurse to discuss.    Frequently Asked Questions    What is a colonoscopy?   A colonoscopy is a procedure where we can examine your large intestine for abnormalities. We use an endoscope which is a flexible tube as thick as your finger, that has a camera and light at the tip     How long is the colon?   The average length of the colon is 4 to 5 feet.     Why do I need to take the preparation and clear liquid diet?  The most important part in a successful exam is the preparation. It is important to clean your colon completely prior to the exam. If there is still remaining stool in the colon, it can prolong your procedure, and we may not be able to visualize the entire colon and lesions could be missed. If you have a substantial amount of stool remaining, the procedure may be terminated,  and a repeat or alternative prep may be required.        For your information, studies have shown taking Nulytely the evening prior and morning of the procedure as 2 separate doses help improve the cleansing of the colon. Therefore, prepping times and instructions can be altered depending on time of procedure.    Is there an alternative to the Nulytely?  In the past, most preps were performed with Fleets Phospho-Soda. However recent studies have shown the risk of causing permanent kidney damage/failure. Due to the risk of permanent kidney damage with the Fleet Phospho-Soda, most gastroenterologists have stopped using it.     There are some alternatives to Nulytely called Moviprep and Suprep. They are less in volume you have to drink but could cost more than Nulytely. If you would like the alternative, then please contact my office.      If people have problems taking the prep at home, there is an alternative where you can take the entire preparation in the hospital the day of the procedure.     Can I take all my medications?  Most medications can be continued without problems.  If you have questions, please call the office. Blood thinners and anti-diabetic medications may need modification prior as detailed above.    What can I expect the day of the procedure?  You will arrive at the facility where you are scheduled. We have you arrive early since you will need to fill out your information. An IV will be placed so we can give you medications. I will talk to you and answer any questions you may have before the procedure. Once inside the procedure room, sedation will be given to help you relax.    You will usually lie on your left side. I will then advance the scope to the end of your large intestine. During the procedure it is normal to feel some pressure, bloating or cramping. Careful examination will be performed throughout your colon. The entire procedure takes approximately 30 minutes, however this can be  prolonged in complicated cases.    Once the procedure is complete, you will be brought to the recovery room until you are stable to leave. You should plan on being at the procedure site for 2 to 3 hours.     Will I be completely sedated for the procedure?  Choice of sedation - Moderate Intravenous OR Monitored Anesthesia Care, is based upon past medical history and current medication use. The doctor performing the procedure will make this decision. The goal is to keep you comfortable during the procedure.    If you have questions regarding sedation, please call the office to discuss further.  In most cases patients receive conscious sedation, meaning that they will be in a “twilight sleep”. They will breathe on their own and will be able to follow commands. Since most of the discomfort is with inserting the scope, this is when the heaviest sedation is used. It is not unusual for people to be awake for part of the exam. The goal of the sedation is to keep you as comfortable as possible.     Patients that have a history of taking chronic medications such as pain medication, anti-anxiety medications, or alcohol use may build up a tolerance to the sedation. This may make it difficult to fully sedate you for the procedure.    What if something abnormal is found during the colonoscopy?  Most polyps can be removed at the time of the colonoscopy. Biopsies, tissue samples, can be obtained during the exam.     What are polyps?  Polyps are abnormal growth of colon tissue. A majority are benign or non-cancerous. All polyps that are removed are sent to the lab for analysis. Some polyps are precancerous, which is why it is important to remove them while they are small and non-cancerous. Polyps can range from a couple of millimeters to a couple of cm.           How are the polyps removed?  The polyps can be removed by biopsy instruments. Some polyps are removed with a wire snare and cautery. Cautery produces an electrical current  to help burn and remove the polyps. You should not feel any pain with removal of the polyps. The polyps are then retrieved and sent to a lab to be analyzed.     What will happen after the procedure?  I will discuss the findings with you prior to discharge. You will receive a phone call or letter from the office within 10-14 business days with the results and recommendations. Your family history, your personal history, and the number/size and type of polyps found on most recent exam will determine when you may need a repeat colonoscopy.     Even if you feel alert after the procedure, your judgment and reflexes may be impaired the rest of the day. You should not drive, work heavy machinery, or perform any other task that requires your full attention.    You may have bloating and cramping after the exam. This usually is improved with passing of gas.    Normally you may resume a regular diet after the procedure is completed. If you are on a blood thinner, this may be held if a large polyp is removed. You will be provided with clear instructions regarding when to resume your blood thinner medications.    Why do I need someone to accompany me to the exam?  Because you are given a sedative, you need someone you know to drive you home after the exam. If you are taking a bus, taxi or van service a responsible adult you know must accompany you. If this is a problem, the colonoscopy can be attempted without any sedation, or inpatient observation may be recommended.     What are the complications of the exam?  Colonoscopies are relatively safe, however complications can occur. Some, but not all, of the risk are discussed below.  Some patients may have an adverse reaction with the sedation or complications from heart and lung disease. There is also the risk of causing bleeding and perforation (poking a hole in the colon). If these complications do occur, they may need surgical treatment rarely. There is also a risk of missed  lesions.     After the procedure, if you have any abdominal pain, fever, chills, or rectal bleeding it is important to notify me or seek immediate medical attention.  It is important to know that bleeding can occur even several days after the procedure.             INSURANCE COVERAGE REGARDING PAYMENT  FOR YOUR COLONOSCOPY        Colon Cancer is the second leading cause of death among cancers, per the American Cancer Society. It is preventable. Early detection is the key. Your doctor will determine which tests need to be done for prevention and/or treatment. However, colonoscopies can be performed for several reasons:     Screening To screen for any problems within the colon. In this case, the patient is not symptomatic and does not have a personal history of previous colon cancer/condition or abnormal findings. Billed as screening.   Surveillance To monitor for a previously diagnosed colon condition (such as polyps) or when performed at more frequent intervals than every ten years because the patient has a personal/family history of colonic polyps or colon cancer. Billed as diagnostic.   Diagnostic Patient with symptoms, used to evaluate the colon. Billed as diagnostic.       If during a screening colonoscopy, our physician finds an abnormality, performs a biopsy or polypectomy (removal of polyp), your insurance company may consider the procedure to be a diagnostic exam and no longer a screening procedure.     Every insurance company is different. We encourage you to call your insurance company regarding plan benefits. Generally, screening benefits and diagnostic benefits may be paid at different levels. Charges associated with anesthesia or type of facility may also be processed differently. This varies with each insurance company, so we want you to be aware of this prior to your procedure. You do not have to call your insurance company if you have Medicare. For an estimate of potential charges, you may call the  [facility] Patient Contact Center at [phone], option 5.     The authorization staff at [facility] will contact your insurance company to check precertification requirements for your colonoscopy. However, precertification, which serves as notification is never a guarantee of payment. If you have questions regarding precertification for your procedure, please contact your insurance company.   Gastroesophageal reflux disease without esophagitis

## 2023-10-18 NOTE — ED ADULT NURSE NOTE - NS ED NOTE  TALK SOMEONE YN
Initiate Treatment: Triamcinolone cream PRN Detail Level: Zone Plan: Discussed treatment options with patient: Dupixent vs. rinvoq/Cibinqo. Patient would like to think about it and let us know.  He wants to research these options.\\n\\nPatient reports being seen previously by multiple dermatologists who did biopsy rash. Would like to have records faxed to our office. He also stated that he has done steroid injections and clobetasol cream for treatment. Discussed treatment options with patient. Samples Given: Opzelura No

## 2023-11-09 NOTE — ED BEHAVIORAL HEALTH ASSESSMENT NOTE - PSYCHIATRIC ISSUES AND PLAN (INCLUDE STANDING AND PRN MEDICATION)
Received refill request for protonix 40mg. Per Dr. Perez from recent EGD \"You can reduce the pantoprazole to 20mg (over the counter); take daily for 2 weeks, then drop to every other day and then stop\"    Called patient and relayed Dr. Ramirez recommendation. Relayed understanding.    to be determined by inpatient team

## 2023-11-27 NOTE — DISCHARGE NOTE BEHAVIORAL HEALTH - NSTOBACCONEVERSMOKERY/N_GEN_A
Left message for patient to remind them of the date, time and location of their upcoming procedure.  Patient was resent paperwork through [facility]    Yes

## 2023-11-28 ENCOUNTER — INPATIENT (INPATIENT)
Facility: HOSPITAL | Age: 63
LOS: 0 days | Discharge: ANOTHER HOSPITAL | End: 2023-11-29
Attending: PSYCHIATRY & NEUROLOGY | Admitting: PSYCHIATRY & NEUROLOGY

## 2023-11-28 ENCOUNTER — EMERGENCY (EMERGENCY)
Facility: HOSPITAL | Age: 63
LOS: 1 days | Discharge: OTHER INSTITUTION | End: 2023-11-28
Attending: STUDENT IN AN ORGANIZED HEALTH CARE EDUCATION/TRAINING PROGRAM
Payer: MEDICARE

## 2023-11-28 VITALS
DIASTOLIC BLOOD PRESSURE: 92 MMHG | OXYGEN SATURATION: 96 % | WEIGHT: 164.91 LBS | TEMPERATURE: 97 F | SYSTOLIC BLOOD PRESSURE: 182 MMHG | HEART RATE: 67 BPM | RESPIRATION RATE: 16 BRPM | HEIGHT: 66 IN

## 2023-11-28 VITALS
TEMPERATURE: 97 F | HEART RATE: 54 BPM | DIASTOLIC BLOOD PRESSURE: 77 MMHG | RESPIRATION RATE: 18 BRPM | OXYGEN SATURATION: 97 % | SYSTOLIC BLOOD PRESSURE: 154 MMHG

## 2023-11-28 DIAGNOSIS — F32.9 MAJOR DEPRESSIVE DISORDER, SINGLE EPISODE, UNSPECIFIED: ICD-10-CM

## 2023-11-28 DIAGNOSIS — F19.10 OTHER PSYCHOACTIVE SUBSTANCE ABUSE, UNCOMPLICATED: ICD-10-CM

## 2023-11-28 DIAGNOSIS — F20.0 PARANOID SCHIZOPHRENIA: ICD-10-CM

## 2023-11-28 LAB
ALBUMIN SERPL ELPH-MCNC: 4.3 G/DL — SIGNIFICANT CHANGE UP (ref 3.3–5.2)
ALBUMIN SERPL ELPH-MCNC: 4.3 G/DL — SIGNIFICANT CHANGE UP (ref 3.3–5.2)
ALP SERPL-CCNC: 148 U/L — HIGH (ref 40–120)
ALP SERPL-CCNC: 148 U/L — HIGH (ref 40–120)
ALT FLD-CCNC: 24 U/L — SIGNIFICANT CHANGE UP
ALT FLD-CCNC: 24 U/L — SIGNIFICANT CHANGE UP
AMPHET UR-MCNC: NEGATIVE — SIGNIFICANT CHANGE UP
AMPHET UR-MCNC: NEGATIVE — SIGNIFICANT CHANGE UP
ANION GAP SERPL CALC-SCNC: 10 MMOL/L — SIGNIFICANT CHANGE UP (ref 5–17)
ANION GAP SERPL CALC-SCNC: 10 MMOL/L — SIGNIFICANT CHANGE UP (ref 5–17)
APAP SERPL-MCNC: <3 UG/ML — LOW (ref 10–26)
APAP SERPL-MCNC: <3 UG/ML — LOW (ref 10–26)
APPEARANCE UR: CLEAR — SIGNIFICANT CHANGE UP
APPEARANCE UR: CLEAR — SIGNIFICANT CHANGE UP
AST SERPL-CCNC: 29 U/L — SIGNIFICANT CHANGE UP
AST SERPL-CCNC: 29 U/L — SIGNIFICANT CHANGE UP
BARBITURATES UR SCN-MCNC: NEGATIVE — SIGNIFICANT CHANGE UP
BARBITURATES UR SCN-MCNC: NEGATIVE — SIGNIFICANT CHANGE UP
BASOPHILS # BLD AUTO: 0.04 K/UL — SIGNIFICANT CHANGE UP (ref 0–0.2)
BASOPHILS # BLD AUTO: 0.04 K/UL — SIGNIFICANT CHANGE UP (ref 0–0.2)
BASOPHILS NFR BLD AUTO: 0.5 % — SIGNIFICANT CHANGE UP (ref 0–2)
BASOPHILS NFR BLD AUTO: 0.5 % — SIGNIFICANT CHANGE UP (ref 0–2)
BENZODIAZ UR-MCNC: NEGATIVE — SIGNIFICANT CHANGE UP
BENZODIAZ UR-MCNC: NEGATIVE — SIGNIFICANT CHANGE UP
BILIRUB SERPL-MCNC: 0.2 MG/DL — LOW (ref 0.4–2)
BILIRUB SERPL-MCNC: 0.2 MG/DL — LOW (ref 0.4–2)
BILIRUB UR-MCNC: NEGATIVE — SIGNIFICANT CHANGE UP
BILIRUB UR-MCNC: NEGATIVE — SIGNIFICANT CHANGE UP
BUN SERPL-MCNC: 15.5 MG/DL — SIGNIFICANT CHANGE UP (ref 8–20)
BUN SERPL-MCNC: 15.5 MG/DL — SIGNIFICANT CHANGE UP (ref 8–20)
CALCIUM SERPL-MCNC: 9.7 MG/DL — SIGNIFICANT CHANGE UP (ref 8.4–10.5)
CALCIUM SERPL-MCNC: 9.7 MG/DL — SIGNIFICANT CHANGE UP (ref 8.4–10.5)
CHLORIDE SERPL-SCNC: 102 MMOL/L — SIGNIFICANT CHANGE UP (ref 96–108)
CHLORIDE SERPL-SCNC: 102 MMOL/L — SIGNIFICANT CHANGE UP (ref 96–108)
CO2 SERPL-SCNC: 26 MMOL/L — SIGNIFICANT CHANGE UP (ref 22–29)
CO2 SERPL-SCNC: 26 MMOL/L — SIGNIFICANT CHANGE UP (ref 22–29)
COCAINE METAB.OTHER UR-MCNC: NEGATIVE — SIGNIFICANT CHANGE UP
COCAINE METAB.OTHER UR-MCNC: NEGATIVE — SIGNIFICANT CHANGE UP
COLOR SPEC: YELLOW — SIGNIFICANT CHANGE UP
COLOR SPEC: YELLOW — SIGNIFICANT CHANGE UP
CREAT SERPL-MCNC: 1.13 MG/DL — SIGNIFICANT CHANGE UP (ref 0.5–1.3)
CREAT SERPL-MCNC: 1.13 MG/DL — SIGNIFICANT CHANGE UP (ref 0.5–1.3)
DIFF PNL FLD: NEGATIVE — SIGNIFICANT CHANGE UP
DIFF PNL FLD: NEGATIVE — SIGNIFICANT CHANGE UP
EGFR: 73 ML/MIN/1.73M2 — SIGNIFICANT CHANGE UP
EGFR: 73 ML/MIN/1.73M2 — SIGNIFICANT CHANGE UP
EOSINOPHIL # BLD AUTO: 0.1 K/UL — SIGNIFICANT CHANGE UP (ref 0–0.5)
EOSINOPHIL # BLD AUTO: 0.1 K/UL — SIGNIFICANT CHANGE UP (ref 0–0.5)
EOSINOPHIL NFR BLD AUTO: 1.3 % — SIGNIFICANT CHANGE UP (ref 0–6)
EOSINOPHIL NFR BLD AUTO: 1.3 % — SIGNIFICANT CHANGE UP (ref 0–6)
ETHANOL SERPL-MCNC: <10 MG/DL — SIGNIFICANT CHANGE UP (ref 0–9)
ETHANOL SERPL-MCNC: <10 MG/DL — SIGNIFICANT CHANGE UP (ref 0–9)
FLUAV AG NPH QL: SIGNIFICANT CHANGE UP
FLUAV AG NPH QL: SIGNIFICANT CHANGE UP
FLUBV AG NPH QL: SIGNIFICANT CHANGE UP
FLUBV AG NPH QL: SIGNIFICANT CHANGE UP
GLUCOSE SERPL-MCNC: 80 MG/DL — SIGNIFICANT CHANGE UP (ref 70–99)
GLUCOSE SERPL-MCNC: 80 MG/DL — SIGNIFICANT CHANGE UP (ref 70–99)
GLUCOSE UR QL: NEGATIVE MG/DL — SIGNIFICANT CHANGE UP
GLUCOSE UR QL: NEGATIVE MG/DL — SIGNIFICANT CHANGE UP
HCT VFR BLD CALC: 40.8 % — SIGNIFICANT CHANGE UP (ref 39–50)
HCT VFR BLD CALC: 40.8 % — SIGNIFICANT CHANGE UP (ref 39–50)
HGB BLD-MCNC: 14.2 G/DL — SIGNIFICANT CHANGE UP (ref 13–17)
HGB BLD-MCNC: 14.2 G/DL — SIGNIFICANT CHANGE UP (ref 13–17)
HIV 1 & 2 AB SERPL IA.RAPID: SIGNIFICANT CHANGE UP
HIV 1 & 2 AB SERPL IA.RAPID: SIGNIFICANT CHANGE UP
IMM GRANULOCYTES NFR BLD AUTO: 0.3 % — SIGNIFICANT CHANGE UP (ref 0–0.9)
IMM GRANULOCYTES NFR BLD AUTO: 0.3 % — SIGNIFICANT CHANGE UP (ref 0–0.9)
KETONES UR-MCNC: NEGATIVE MG/DL — SIGNIFICANT CHANGE UP
KETONES UR-MCNC: NEGATIVE MG/DL — SIGNIFICANT CHANGE UP
LEUKOCYTE ESTERASE UR-ACNC: NEGATIVE — SIGNIFICANT CHANGE UP
LEUKOCYTE ESTERASE UR-ACNC: NEGATIVE — SIGNIFICANT CHANGE UP
LYMPHOCYTES # BLD AUTO: 1.59 K/UL — SIGNIFICANT CHANGE UP (ref 1–3.3)
LYMPHOCYTES # BLD AUTO: 1.59 K/UL — SIGNIFICANT CHANGE UP (ref 1–3.3)
LYMPHOCYTES # BLD AUTO: 19.9 % — SIGNIFICANT CHANGE UP (ref 13–44)
LYMPHOCYTES # BLD AUTO: 19.9 % — SIGNIFICANT CHANGE UP (ref 13–44)
MCHC RBC-ENTMCNC: 31.3 PG — SIGNIFICANT CHANGE UP (ref 27–34)
MCHC RBC-ENTMCNC: 31.3 PG — SIGNIFICANT CHANGE UP (ref 27–34)
MCHC RBC-ENTMCNC: 34.8 GM/DL — SIGNIFICANT CHANGE UP (ref 32–36)
MCHC RBC-ENTMCNC: 34.8 GM/DL — SIGNIFICANT CHANGE UP (ref 32–36)
MCV RBC AUTO: 90.1 FL — SIGNIFICANT CHANGE UP (ref 80–100)
MCV RBC AUTO: 90.1 FL — SIGNIFICANT CHANGE UP (ref 80–100)
METHADONE UR-MCNC: NEGATIVE — SIGNIFICANT CHANGE UP
METHADONE UR-MCNC: NEGATIVE — SIGNIFICANT CHANGE UP
MONOCYTES # BLD AUTO: 0.72 K/UL — SIGNIFICANT CHANGE UP (ref 0–0.9)
MONOCYTES # BLD AUTO: 0.72 K/UL — SIGNIFICANT CHANGE UP (ref 0–0.9)
MONOCYTES NFR BLD AUTO: 9 % — SIGNIFICANT CHANGE UP (ref 2–14)
MONOCYTES NFR BLD AUTO: 9 % — SIGNIFICANT CHANGE UP (ref 2–14)
NEUTROPHILS # BLD AUTO: 5.52 K/UL — SIGNIFICANT CHANGE UP (ref 1.8–7.4)
NEUTROPHILS # BLD AUTO: 5.52 K/UL — SIGNIFICANT CHANGE UP (ref 1.8–7.4)
NEUTROPHILS NFR BLD AUTO: 69 % — SIGNIFICANT CHANGE UP (ref 43–77)
NEUTROPHILS NFR BLD AUTO: 69 % — SIGNIFICANT CHANGE UP (ref 43–77)
NITRITE UR-MCNC: NEGATIVE — SIGNIFICANT CHANGE UP
NITRITE UR-MCNC: NEGATIVE — SIGNIFICANT CHANGE UP
OPIATES UR-MCNC: NEGATIVE — SIGNIFICANT CHANGE UP
OPIATES UR-MCNC: NEGATIVE — SIGNIFICANT CHANGE UP
PCP SPEC-MCNC: SIGNIFICANT CHANGE UP
PCP SPEC-MCNC: SIGNIFICANT CHANGE UP
PCP UR-MCNC: NEGATIVE — SIGNIFICANT CHANGE UP
PCP UR-MCNC: NEGATIVE — SIGNIFICANT CHANGE UP
PH UR: 6 — SIGNIFICANT CHANGE UP (ref 5–8)
PH UR: 6 — SIGNIFICANT CHANGE UP (ref 5–8)
PLATELET # BLD AUTO: 246 K/UL — SIGNIFICANT CHANGE UP (ref 150–400)
PLATELET # BLD AUTO: 246 K/UL — SIGNIFICANT CHANGE UP (ref 150–400)
POTASSIUM SERPL-MCNC: 4.3 MMOL/L — SIGNIFICANT CHANGE UP (ref 3.5–5.3)
POTASSIUM SERPL-MCNC: 4.3 MMOL/L — SIGNIFICANT CHANGE UP (ref 3.5–5.3)
POTASSIUM SERPL-SCNC: 4.3 MMOL/L — SIGNIFICANT CHANGE UP (ref 3.5–5.3)
POTASSIUM SERPL-SCNC: 4.3 MMOL/L — SIGNIFICANT CHANGE UP (ref 3.5–5.3)
PROT SERPL-MCNC: 7.1 G/DL — SIGNIFICANT CHANGE UP (ref 6.6–8.7)
PROT SERPL-MCNC: 7.1 G/DL — SIGNIFICANT CHANGE UP (ref 6.6–8.7)
PROT UR-MCNC: SIGNIFICANT CHANGE UP MG/DL
PROT UR-MCNC: SIGNIFICANT CHANGE UP MG/DL
RBC # BLD: 4.53 M/UL — SIGNIFICANT CHANGE UP (ref 4.2–5.8)
RBC # BLD: 4.53 M/UL — SIGNIFICANT CHANGE UP (ref 4.2–5.8)
RBC # FLD: 12.6 % — SIGNIFICANT CHANGE UP (ref 10.3–14.5)
RBC # FLD: 12.6 % — SIGNIFICANT CHANGE UP (ref 10.3–14.5)
RSV RNA NPH QL NAA+NON-PROBE: SIGNIFICANT CHANGE UP
RSV RNA NPH QL NAA+NON-PROBE: SIGNIFICANT CHANGE UP
SALICYLATES SERPL-MCNC: <0.6 MG/DL — LOW (ref 10–20)
SALICYLATES SERPL-MCNC: <0.6 MG/DL — LOW (ref 10–20)
SARS-COV-2 RNA SPEC QL NAA+PROBE: SIGNIFICANT CHANGE UP
SARS-COV-2 RNA SPEC QL NAA+PROBE: SIGNIFICANT CHANGE UP
SODIUM SERPL-SCNC: 138 MMOL/L — SIGNIFICANT CHANGE UP (ref 135–145)
SODIUM SERPL-SCNC: 138 MMOL/L — SIGNIFICANT CHANGE UP (ref 135–145)
SP GR SPEC: 1.01 — SIGNIFICANT CHANGE UP (ref 1–1.03)
SP GR SPEC: 1.01 — SIGNIFICANT CHANGE UP (ref 1–1.03)
THC UR QL: NEGATIVE — SIGNIFICANT CHANGE UP
THC UR QL: NEGATIVE — SIGNIFICANT CHANGE UP
UROBILINOGEN FLD QL: 0.2 MG/DL — SIGNIFICANT CHANGE UP (ref 0.2–1)
UROBILINOGEN FLD QL: 0.2 MG/DL — SIGNIFICANT CHANGE UP (ref 0.2–1)
WBC # BLD: 7.99 K/UL — SIGNIFICANT CHANGE UP (ref 3.8–10.5)
WBC # BLD: 7.99 K/UL — SIGNIFICANT CHANGE UP (ref 3.8–10.5)
WBC # FLD AUTO: 7.99 K/UL — SIGNIFICANT CHANGE UP (ref 3.8–10.5)
WBC # FLD AUTO: 7.99 K/UL — SIGNIFICANT CHANGE UP (ref 3.8–10.5)

## 2023-11-28 PROCEDURE — 90792 PSYCH DIAG EVAL W/MED SRVCS: CPT

## 2023-11-28 PROCEDURE — 80053 COMPREHEN METABOLIC PANEL: CPT

## 2023-11-28 PROCEDURE — 93005 ELECTROCARDIOGRAM TRACING: CPT

## 2023-11-28 PROCEDURE — 93010 ELECTROCARDIOGRAM REPORT: CPT

## 2023-11-28 PROCEDURE — 87637 SARSCOV2&INF A&B&RSV AMP PRB: CPT

## 2023-11-28 PROCEDURE — 81003 URINALYSIS AUTO W/O SCOPE: CPT

## 2023-11-28 PROCEDURE — 86703 HIV-1/HIV-2 1 RESULT ANTBDY: CPT

## 2023-11-28 PROCEDURE — 85025 COMPLETE CBC W/AUTO DIFF WBC: CPT

## 2023-11-28 PROCEDURE — 99284 EMERGENCY DEPT VISIT MOD MDM: CPT | Mod: 25

## 2023-11-28 PROCEDURE — 36415 COLL VENOUS BLD VENIPUNCTURE: CPT

## 2023-11-28 PROCEDURE — G0378: CPT

## 2023-11-28 PROCEDURE — 99223 1ST HOSP IP/OBS HIGH 75: CPT

## 2023-11-28 PROCEDURE — 80307 DRUG TEST PRSMV CHEM ANLYZR: CPT

## 2023-11-28 RX ORDER — TAMSULOSIN HYDROCHLORIDE 0.4 MG/1
0.4 CAPSULE ORAL AT BEDTIME
Refills: 0 | Status: DISCONTINUED | OUTPATIENT
Start: 2023-11-28 | End: 2023-12-05

## 2023-11-28 RX ORDER — MIRTAZAPINE 45 MG/1
30 TABLET, ORALLY DISINTEGRATING ORAL AT BEDTIME
Refills: 0 | Status: DISCONTINUED | OUTPATIENT
Start: 2023-11-28 | End: 2023-12-05

## 2023-11-28 RX ORDER — FOLIC ACID 0.8 MG
1 TABLET ORAL DAILY
Refills: 0 | Status: DISCONTINUED | OUTPATIENT
Start: 2023-11-28 | End: 2023-12-05

## 2023-11-28 RX ORDER — QUETIAPINE FUMARATE 200 MG/1
250 TABLET, FILM COATED ORAL AT BEDTIME
Refills: 0 | Status: DISCONTINUED | OUTPATIENT
Start: 2023-11-28 | End: 2023-12-05

## 2023-11-28 RX ORDER — THIAMINE MONONITRATE (VIT B1) 100 MG
100 TABLET ORAL DAILY
Refills: 0 | Status: DISCONTINUED | OUTPATIENT
Start: 2023-11-28 | End: 2023-12-05

## 2023-11-28 RX ORDER — METOPROLOL TARTRATE 50 MG
25 TABLET ORAL DAILY
Refills: 0 | Status: DISCONTINUED | OUTPATIENT
Start: 2023-11-28 | End: 2023-12-05

## 2023-11-28 RX ORDER — ATORVASTATIN CALCIUM 80 MG/1
20 TABLET, FILM COATED ORAL AT BEDTIME
Refills: 0 | Status: DISCONTINUED | OUTPATIENT
Start: 2023-11-28 | End: 2023-12-05

## 2023-11-28 RX ORDER — ESCITALOPRAM OXALATE 10 MG/1
20 TABLET, FILM COATED ORAL DAILY
Refills: 0 | Status: DISCONTINUED | OUTPATIENT
Start: 2023-11-29 | End: 2023-12-05

## 2023-11-28 RX ORDER — LANOLIN ALCOHOL/MO/W.PET/CERES
3 CREAM (GRAM) TOPICAL AT BEDTIME
Refills: 0 | Status: DISCONTINUED | OUTPATIENT
Start: 2023-11-28 | End: 2023-12-05

## 2023-11-28 RX ADMIN — QUETIAPINE FUMARATE 250 MILLIGRAM(S): 200 TABLET, FILM COATED ORAL at 21:24

## 2023-11-28 RX ADMIN — ATORVASTATIN CALCIUM 20 MILLIGRAM(S): 80 TABLET, FILM COATED ORAL at 21:25

## 2023-11-28 RX ADMIN — Medication 1 TABLET(S): at 15:34

## 2023-11-28 RX ADMIN — MIRTAZAPINE 30 MILLIGRAM(S): 45 TABLET, ORALLY DISINTEGRATING ORAL at 21:24

## 2023-11-28 RX ADMIN — Medication 1 MILLIGRAM(S): at 15:34

## 2023-11-28 RX ADMIN — TAMSULOSIN HYDROCHLORIDE 0.4 MILLIGRAM(S): 0.4 CAPSULE ORAL at 21:25

## 2023-11-28 RX ADMIN — Medication 100 MILLIGRAM(S): at 15:34

## 2023-11-28 RX ADMIN — Medication 3 MILLIGRAM(S): at 21:25

## 2023-11-28 RX ADMIN — Medication 25 MILLIGRAM(S): at 15:34

## 2023-11-28 NOTE — CHART NOTE - NSCHARTNOTEFT_GEN_A_CORE
Cr: p/c to [facility] intake (Dat) pt accepted at [address] 9.13 status. No auth needed pt mcare /mcaid/[facility] mcare recipient. [facility] transport will be called asap. Cr: p/c to [facility] intake (Dat) pt accepted at [address] 9.13 status. No auth needed pt mcare /mcaid/[facility] mcare recipient. [facility] transport will be called asap. SWNote: p/c to [facility] intake (Dat) pt accepted at [address] 9.13 status. No auth needed pt mcare /mcaid/[facility] mcare recipient. [facility] transport will be called asap.  17:18 p/c to [facility] transport(elizabet) macie arranged (first available ) . Package/NEAF/billing letter  given to RN (Socrates) .No other SW needs reported at this moment.

## 2023-11-28 NOTE — ED ADULT TRIAGE NOTE - ACCOMPANIED BY
----- Message from Skyler Haynes sent at 2/9/2022 10:20 AM EST -----  Subject: Message to Provider    QUESTIONS  Information for Provider? Charlotte's mom Alex, needs to schedule a skin TB   test for Gus Hutchins. She is supposed to start school next week and needs this   shot and copy of her physical from May 2021.  ---------------------------------------------------------------------------  --------------  [address]  What is the best way for the office to contact you? OK to leave message on   voicemail  Preferred Call Back Phone Number? [phone]  ---------------------------------------------------------------------------  --------------  SCRIPT ANSWERS  Relationship to Patient? Parent  Representative Name? Zeeshan Palacio   Patient is under 25 and the Parent has custody? Yes  Additional information verified (besides Name and Date of Birth)?  Phone   Number
EMT/paramedic

## 2023-11-28 NOTE — ED BEHAVIORAL HEALTH ASSESSMENT NOTE - SUMMARY
Patient is a 62 yo M with hx of paranoid schizophrenia BIBEMS, called by himself because he was having thoughts of killing himself. The patient believes that he has AIDS and states he would rather die than live with AIDS, so he stated he wanted to kill himself by jumping in front of the [place]. Patient has been having suicidal thoughts for 8 months. He also states he has been feeling depressed because he thinks he has AIDS and because he is unhappy at his adult home. History of psychiatric hospitalization in September 2021 due to suicidal thoughts and a suicidal attempt 12 years ago. Patient says he takes Zyprexa for his paranoia and is compliant but states that he doesn't think it is working, which he partially attributes to his drinking and smoking habits. Patient drinks hard liquor and chain-smokes cigarettes (3 packs/day) as well as smokes cigars. Denies recent marijuana or cocaine use but admits to use in the past. Last drink was this morning at 8:00 am. Patient is a 64 yo M with hx of paranoid schizophrenia BIBEMS, called by himself because he was having thoughts of killing himself.  Expresses some vague paranoia in addition to lasting negative mood as well as other depressive symptoms including at times thoughts of self harm. Recent alcohol use (no h/o complicated withdrawal) though BAL unremarkable.  Will restart home meds (confirms as per hpi) and re assess, attempt to obtain additional collateral for

## 2023-11-28 NOTE — ED BEHAVIORAL HEALTH ASSESSMENT NOTE - OTHER
[facility] Other residents of home oddly related partial not absent

## 2023-11-28 NOTE — ED ADULT TRIAGE NOTE - CHIEF COMPLAINT QUOTE
pt c/o suicidal thoughts x months, states today he thought about jumping in front of a train. states compliant with zyprexa

## 2023-11-28 NOTE — ED PROVIDER NOTE - CLINICAL SUMMARY MEDICAL DECISION MAKING FREE TEXT BOX
C3-year-old male with schizophrenia presenting with thoughts that he has AIDS.  Patient does not have HIV.  Medically cleared for inpatient admission for SI

## 2023-11-28 NOTE — ED ADULT NURSE REASSESSMENT NOTE - NS ED NURSE REASSESS COMMENT FT1
pt was accepted for transfer to [facility]. Report was given to Annemarie HERNANDEZ. pt was educated about plan to transfer and successfully able to teach back.

## 2023-11-28 NOTE — ED PROVIDER NOTE - PHYSICAL EXAMINATION
Gen: NAD, AOx3, disheveled  Head: NCAT  HEENT: EOMI, oral mucosa moist, normal conjunctiva, neck supple  Lung: CTAB, no respiratory distress  CV: rrr, no murmur, Normal perfusion  Abd: soft, NTND  MSK: No edema, no visible deformities  Neuro: No focal neurologic deficits  Skin: No rash   Psych: flat affect

## 2023-11-28 NOTE — ED BEHAVIORAL HEALTH ASSESSMENT NOTE - PSYCHIATRIC ISSUES AND PLAN (INCLUDE STANDING AND PRN MEDICATION)
paranoid schizophrenia; continue Zyprexa paranoid schizophrenia; continue home medications (as per hpi)

## 2023-11-28 NOTE — ED CDU PROVIDER DISPOSITION NOTE - CLINICAL COURSE
Patient with a history of schizophrenia presenting with depression and SI.  Medically cleared will transfer. ~ history of alcohol abuse no sign of withdrawal on evaluation CIWA placed as needed

## 2023-11-28 NOTE — ED ADULT NURSE NOTE - HPI (INCLUDE ILLNESS QUALITY, SEVERITY, DURATION, TIMING, CONTEXT, MODIFYING FACTORS, ASSOCIATED SIGNS AND SYMPTOMS)
hx of schizophrenia, c/o depression with suicidal ideations with possible delusion that he has HIV. pt with plan to jump in front of a train. pt also reports lack of appetite and sleeping for long periods. pt contracts for safety in  at this time. Pt went directly to room and went to sleep.

## 2023-11-28 NOTE — ED ADULT NURSE REASSESSMENT NOTE - NS ED NURSE REASSESS COMMENT FT1
pt noted to have a pustule (black head) approx 2-3 mm in diameter to the left temple. pt reports that its been there for approx 1 week.

## 2023-11-28 NOTE — CHART NOTE - NSCHARTNOTEFT_GEN_A_CORE
SERENA Note: Notified by  provider that the plan is to transfer pt for IP psychiatric care. met with pt, pleasant and cooperative. Mood depressed. Pt agreeable to the plan and feels he needs tx to " feel better". + hx of psych admissions, [facility] and [facility] listed. Referral made to [facility]. Spoke with Makayla in admissions [phone].Bed available and chart being reviewed.

## 2023-11-28 NOTE — ED ADULT NURSE REASSESSMENT NOTE - NS ED NURSE REASSESS COMMENT FT1
pt received from Jonatan GREENFIELD noted pt sleeping in bed at this time, chest rise and fall visible RR even unlabored no acute issues noted. Pt is pending  for transfer to [facility], pt aware of transfer. Fall and safety precautions in place pt received from Jonatan GREENFIELD noted pt sleeping in bed at this time, chest rise and fall visible RR even unlabored no acute issues noted, VSS on RA as charted. Pt is pending  for transfer to [facility], pt aware of transfer. Fall and safety precautions in place

## 2023-11-28 NOTE — ED PROVIDER NOTE - OBJECTIVE STATEMENT
63-year-old male with schizophrenia on Zyprexa hypertension hyperlipidemia.  States he is slightly paranoid that he has AIDS though he has no history of IV drug use sexual intercourse without protection recently no weight loss no night sweats noted a lump on the side of his head and that is what he was concerned about.  States he wants to step in front of a train and kill himself.  No fevers no chills no cough no chest pain no shortness of breath no abdominal pain

## 2023-11-28 NOTE — ED ADULT NURSE NOTE - CAS EDN DISCHARGE INTERVENTIONS
Emergency Department    [address] [facility] [address]    Phone:  [phone]    Fax:  [phone]                                       Angeli Jacobson   MRN: [id number]    Department:   Emergency Department   Date of Visit:  5/5/2018           Patient Information     Date Of Birth          1951        Your diagnoses for this visit were:     Bruising     Anemia, unspecified type        You were seen by Janel Price MD.      Follow-up Information     Follow up with Sepideh Burris PA-C. Schedule an appointment as soon as possible for a visit in 2 days.    Specialty:  Physician Assistant    Contact information:    [address]  [facility] [address] [phone]          Follow up with  Emergency Department.    Specialty:  EMERGENCY MEDICINE    Why:  As needed, If symptoms worsen    Contact information:    [address] [facility] [address] [phone]        Discharge Instructions       Follow up with your primary doctor tomorrow. On 5/1 your hgb was 12.3, then dropped to 9.5 and 10.0.  On 5/2 in was 8.5.  Today it was 7.9.    24 Hour Appointment Hotline       To make an appointment at any [facility], call [phone] ([phone]). If you don't have a family doctor or clinic, we will help you find one. [facility] clinics are conveniently located to serve the needs of you and your family.             Review of your medicines      Our records show that you are taking the medicines listed below. If these are incorrect, please call your family doctor or clinic.        Dose / Directions Last dose taken    alendronate 70 MG tablet   Commonly known as:  FOSAMAX   Quantity:  12 tablet        Take 1 tablet (70 mg) by mouth with 8oz water every 7 days 30 minutes before breakfast and remain upright during this time.   Refills:  3        aspirin 81 MG tablet   Dose:  1 tablet        Take 1 tablet by mouth daily.   Refills:  3        buPROPion 300 MG  24 hr tablet   Commonly known as:  WELLBUTRIN XL   Dose:  300 mg   Quantity:  90 tablet        Take 1 tablet (300 mg) by mouth every morning   Refills:  3        ezetimibe 10 MG tablet   Commonly known as:  ZETIA   Dose:  5 mg   Quantity:  90 tablet        Take 0.5 tablets (5 mg) by mouth daily   Refills:  1        fluticasone 50 MCG/ACT spray   Commonly known as:  FLONASE   Dose:  1-2 spray   Quantity:  1 Bottle        Spray 1-2 sprays into both nostrils daily   Refills:  11        Multi-vitamin Tabs tablet   Quantity:  30   Generic drug:  multivitamin, therapeutic with minerals        1 TABLET DAILY   Refills:  prn        order for DME   Quantity:  2 Device        Equipment being ordered: lymphedema bandages   Refills:  1        order for DME   Quantity:  1 each        Equipment being ordered: Left Thigh High Compression Stocking, 550 CCL.3   Refills:  99        oxyCODONE IR 5 MG tablet   Commonly known as:  ROXICODONE   Dose:  5-10 mg   Quantity:  14 tablet        Take 1-2 tablets (5-10 mg) by mouth every 4 hours as needed for moderate to severe pain   Refills:  0        * rivaroxaban ANTICOAGULANT 15 MG Tabs tablet   Commonly known as:  XARELTO   Dose:  15 mg   Quantity:  41 tablet        Take 1 tablet (15 mg) by mouth 2 times daily (with meals)   Refills:  0        * rivaroxaban ANTICOAGULANT 20 MG Tabs tablet   Commonly known as:  XARELTO   Dose:  20 mg   Quantity:  90 tablet        Take 1 tablet (20 mg) by mouth daily (with dinner) Start after completing 15 mg tablets   Refills:  1        rosuvastatin 40 MG tablet   Commonly known as:  CRESTOR   Dose:  40 mg   Quantity:  90 tablet        Take 1 tablet (40 mg) by mouth At Bedtime   Refills:  3        * Notice:  This list has 2 medication(s) that are the same as other medications prescribed for you. Read the directions carefully, and ask your doctor or other care provider to review them with you.            Procedures and tests performed during your visit      Basic metabolic panel    CBC with platelets differential    US Lower Extremity Arterial Duplex Right    US Lower Extremity Venous Duplex Right      Orders Needing Specimen Collection     None      Pending Results     No orders found from 5/3/2018 to 5/6/2018.            Pending Culture Results     No orders found from 5/3/2018 to 5/6/2018.            Pending Results Instructions     If you had any lab results that were not finalized at the time of your Discharge, you can call the ED Lab Result RN at [phone]. You will be contacted by this team for any positive Lab results or changes in treatment. The nurses are available 7 days a week from 10A to 6:30P.  You can leave a message 24 hours per day and they will return your call.        Test Results From Your Hospital Stay        5/5/2018  8:13 PM      Component Results     Component Value Ref Range & Units Status    WBC 6.4 4.0 - 11.0 10e9/L Final    RBC Count 2.58 (L) 3.8 - 5.2 10e12/L Final    Hemoglobin 7.9 (L) 11.7 - 15.7 g/dL Final    Hematocrit 24.0 (L) 35.0 - 47.0 % Final    MCV 93 78 - 100 fl Final    MCH 30.6 26.5 - 33.0 pg Final    MCHC 32.9 31.5 - 36.5 g/dL Final    RDW 13.9 10.0 - 15.0 % Final    Platelet Count 239 150 - 450 10e9/L Final    Diff Method Automated Method  Final    % Neutrophils 55.9 % Final    % Lymphocytes 33.0 % Final    % Monocytes 8.2 % Final    % Eosinophils 2.2 % Final    % Basophils 0.5 % Final    % Immature Granulocytes 0.2 % Final    Nucleated RBCs 0 0 /100 Final    Absolute Neutrophil 3.6 1.6 - 8.3 10e9/L Final    Absolute Lymphocytes 2.1 0.8 - 5.3 10e9/L Final    Absolute Monocytes 0.5 0.0 - 1.3 10e9/L Final    Absolute Eosinophils 0.1 0.0 - 0.7 10e9/L Final    Absolute Basophils 0.0 0.0 - 0.2 10e9/L Final    Abs Immature Granulocytes 0.0 0 - 0.4 10e9/L Final    Absolute Nucleated RBC 0.0  Final         5/5/2018  8:06 PM      Component Results     Component Value Ref Range & Units Status    Sodium 143 133 - 144 mmol/L Final     Potassium 3.4 3.4 - 5.3 mmol/L Final    Chloride 107 94 - 109 mmol/L Final    Carbon Dioxide 29 20 - 32 mmol/L Final    Anion Gap 7 3 - 14 mmol/L Final    Glucose 97 70 - 99 mg/dL Final    Urea Nitrogen 13 7 - 30 mg/dL Final    Creatinine 0.65 0.52 - 1.04 mg/dL Final    GFR Estimate >90 >60 mL/min/1.7m2 Final    Non  GFR Calc    GFR Estimate If Black >90 >60 mL/min/1.7m2 Final    African American GFR Calc    Calcium 9.0 8.5 - 10.1 mg/dL Final         5/5/2018  8:39 PM      Narrative     US LOWER EXTREMITY ARTERIAL DUPLEX RIGHT  5/5/2018 8:28 PM     HISTORY:  pain ,recent access, bruising eval for psuedoaneurysm;     COMPARISON: None.    FINDINGS:  The right groin region is unremarkable. No pseudoaneurysm  is identified. No hematoma is seen.    BRENDON DAVID MD         5/5/2018  8:31 PM      Narrative     VENOUS ULTRASOUND RIGHT LEG  5/5/2018 8:28 PM     HISTORY: swelling ,recent vascular procedure;     COMPARISON: None.    FINDINGS:  Examination of the deep veins with graded compression and  color flow Doppler with spectral wave form analysis shows no evidence  of thrombus in the common femoral vein, femoral vein, popliteal vein  or calf veins.  There is no venous insufficiency.        Impression     IMPRESSION: No evidence of deep venous thrombosis.    BRENDON DAVID MD                Clinical Quality Measure: Blood Pressure Screening     Your blood pressure was checked while you were in the emergency department today. The last reading we obtained was  BP: 122/66 . Please read the guidelines below about what these numbers mean and what you should do about them.  If your systolic blood pressure (the top number) is less than 120 and your diastolic blood pressure (the bottom number) is less than 80, then your blood pressure is normal. There is nothing more that you need to do about it.  If your systolic blood pressure (the top number) is 120-139 or your diastolic blood pressure (the bottom number) is 80-89,  your blood pressure may be higher than it should be. You should have your blood pressure rechecked within a year by a primary care provider.  If your systolic blood pressure (the top number) is 140 or greater or your diastolic blood pressure (the bottom number) is 90 or greater, you may have high blood pressure. High blood pressure is treatable, but if left untreated over time it can put you at risk for heart attack, stroke, or kidney failure. You should have your blood pressure rechecked by a primary care provider within the next 4 weeks.  If your provider in the emergency department today gave you specific instructions to follow-up with your doctor or provider even sooner than that, you should follow that instruction and not wait for up to 4 weeks for your follow-up visit.        Thank you for choosing [facility]       Thank you for choosing [facility] for your care. Our goal is always to provide you with excellent care. Hearing back from our patients is one way we can continue to improve our services. Please take a few minutes to complete the written survey that you may receive in the mail after you visit with us. Thank you!        [email or web address] Information     [facility] gives you secure access to your electronic health record. If you see a primary care provider, you can also send messages to your care team and make appointments. If you have questions, please call your primary care clinic.  If you do not have a primary care provider, please call [phone] and they will assist you.        Care EveryWhere ID     This is your Care EveryWhere ID. This could be used by other organizations to access your [facility] medical records  [id number]        Equal Access to Services     KERON OCASIO : Hadii corazon Camp, merrill lieberman, radames jensen. So [facility] [phone].    ATENCIÓN: Si habla español, tiene a carver disposición servicios gratuitos de asistencia  salima Hargrovevicky al [phone].    We comply with applicable federal civil rights laws and [place] laws. We do not discriminate on the basis of race, color, national origin, age, disability, sex, sexual orientation, or gender identity.            After Visit Summary       This is your record. Keep this with you and show to your community pharmacist(s) and doctor(s) at your next visit.                   No IV access
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
## 2023-11-28 NOTE — ED BEHAVIORAL HEALTH ASSESSMENT NOTE - RISK ASSESSMENT
RFs: suicidal thoughts, depressed mood, delusions, alcohol/tobacco abuse RFs: suicidal thoughts, depressed mood, delusions, alcohol/tobacco abuse    PFs: domiciled, insightful, help seeking

## 2023-11-28 NOTE — ED BEHAVIORAL HEALTH ASSESSMENT NOTE - DESCRIPTION
ICU Vital Signs Last 24 Hrs  T(C): 36.7 (28 Nov 2023 11:50), Max: 36.7 (28 Nov 2023 11:50)  T(F): 98.1 (28 Nov 2023 11:50), Max: 98.1 (28 Nov 2023 11:50)  HR: 57 (28 Nov 2023 11:50) (57 - 67)  BP: 165/89 (28 Nov 2023 11:50) (165/89 - 182/92)  BP(mean): --  ABP: --  ABP(mean): --  RR: 18 (28 Nov 2023 11:50) (16 - 18)  SpO2: 98% (28 Nov 2023 11:50) (96% - 98%)    O2 Parameters below as of 28 Nov 2023 11:50  Patient On (Oxygen Delivery Method): room air Hypertension, Hyperlipidemia domiciled in an adult home, unemployed on SSI and SSD

## 2023-11-28 NOTE — ED BEHAVIORAL HEALTH ASSESSMENT NOTE - NSSUICPROTFACT_PSY_ALL_CORE
Ability to cope with stress/Other Positive therapeutic relationships/Ability to cope with stress/Other

## 2023-11-28 NOTE — ED BEHAVIORAL HEALTH ASSESSMENT NOTE - SUBSTANCE ISSUES AND PLAN (INCLUDE STANDING AND PRN MEDICATION)
assess CIWA and determine need for detox protocol assess CIWA and determine need for detox protocol as per primary team, though no h/o complicated withdrawal

## 2023-11-28 NOTE — ED ADULT NURSE NOTE - ED STAT RN HANDOFF DETAILS
HR report given by Jonatan to receiving RN, pt stable VSS on RA no acute distress or issues noted. Belongings sent with EMS report given to [place] WALSH pt transported without issues fall and safety precautions in place

## 2023-11-29 ENCOUNTER — EMERGENCY (EMERGENCY)
Facility: HOSPITAL | Age: 63
LOS: 1 days | Discharge: HOME | End: 2023-11-29
Attending: EMERGENCY MEDICINE
Payer: MEDICARE

## 2023-11-29 VITALS
OXYGEN SATURATION: 98 % | TEMPERATURE: 98 F | DIASTOLIC BLOOD PRESSURE: 79 MMHG | HEART RATE: 83 BPM | RESPIRATION RATE: 16 BRPM | SYSTOLIC BLOOD PRESSURE: 145 MMHG | HEIGHT: 66 IN

## 2023-11-29 LAB
ETHANOL SERPL-MCNC: <10 MG/DL — SIGNIFICANT CHANGE UP (ref 0–9)
ETHANOL SERPL-MCNC: <10 MG/DL — SIGNIFICANT CHANGE UP (ref 0–9)
SARS-COV-2 RNA SPEC QL NAA+PROBE: SIGNIFICANT CHANGE UP
SARS-COV-2 RNA SPEC QL NAA+PROBE: SIGNIFICANT CHANGE UP

## 2023-11-29 PROCEDURE — 99223 1ST HOSP IP/OBS HIGH 75: CPT

## 2023-11-29 PROCEDURE — 90792 PSYCH DIAG EVAL W/MED SRVCS: CPT

## 2023-11-29 RX ORDER — ESCITALOPRAM OXALATE 10 MG/1
20 TABLET, FILM COATED ORAL DAILY
Refills: 0 | Status: DISCONTINUED | OUTPATIENT
Start: 2023-11-29 | End: 2023-12-06

## 2023-11-29 RX ORDER — TAMSULOSIN HYDROCHLORIDE 0.4 MG/1
0.4 CAPSULE ORAL AT BEDTIME
Refills: 0 | Status: DISCONTINUED | OUTPATIENT
Start: 2023-11-29 | End: 2023-12-06

## 2023-11-29 RX ORDER — METOPROLOL TARTRATE 50 MG
25 TABLET ORAL DAILY
Refills: 0 | Status: DISCONTINUED | OUTPATIENT
Start: 2023-11-29 | End: 2023-12-06

## 2023-11-29 RX ORDER — LANOLIN ALCOHOL/MO/W.PET/CERES
3 CREAM (GRAM) TOPICAL AT BEDTIME
Refills: 0 | Status: DISCONTINUED | OUTPATIENT
Start: 2023-11-29 | End: 2023-12-06

## 2023-11-29 RX ORDER — QUETIAPINE FUMARATE 200 MG/1
250 TABLET, FILM COATED ORAL AT BEDTIME
Refills: 0 | Status: DISCONTINUED | OUTPATIENT
Start: 2023-11-29 | End: 2023-12-06

## 2023-11-29 RX ORDER — ATORVASTATIN CALCIUM 80 MG/1
20 TABLET, FILM COATED ORAL AT BEDTIME
Refills: 0 | Status: DISCONTINUED | OUTPATIENT
Start: 2023-11-29 | End: 2023-12-06

## 2023-11-29 RX ORDER — MIRTAZAPINE 45 MG/1
30 TABLET, ORALLY DISINTEGRATING ORAL AT BEDTIME
Refills: 0 | Status: DISCONTINUED | OUTPATIENT
Start: 2023-11-29 | End: 2023-12-06

## 2023-11-29 RX ADMIN — ESCITALOPRAM OXALATE 20 MILLIGRAM(S): 10 TABLET, FILM COATED ORAL at 12:39

## 2023-11-29 RX ADMIN — TAMSULOSIN HYDROCHLORIDE 0.4 MILLIGRAM(S): 0.4 CAPSULE ORAL at 21:23

## 2023-11-29 RX ADMIN — Medication 3 MILLIGRAM(S): at 21:22

## 2023-11-29 RX ADMIN — QUETIAPINE FUMARATE 250 MILLIGRAM(S): 200 TABLET, FILM COATED ORAL at 21:23

## 2023-11-29 RX ADMIN — ATORVASTATIN CALCIUM 20 MILLIGRAM(S): 80 TABLET, FILM COATED ORAL at 21:22

## 2023-11-29 RX ADMIN — Medication 25 MILLIGRAM(S): at 06:45

## 2023-11-29 RX ADMIN — MIRTAZAPINE 30 MILLIGRAM(S): 45 TABLET, ORALLY DISINTEGRATING ORAL at 21:22

## 2023-11-29 NOTE — ED ADULT NURSE NOTE - OBJECTIVE STATEMENT
patient d/c from [facility] locked behavioral health unit at 21:15 and brought to [facility] for voluntary admission for SI. upon arrival to [facility] he told providers that he was drinking ETOH. no alcohol on breath, states he drank alcohol at 8am yesterday 11/28/23. patient asking if he can get the help he needs and some sleep upon arrival. calm and cooperative during assessment, remains in yellow gown for patient safety.

## 2023-11-29 NOTE — ED BEHAVIORAL HEALTH ASSESSMENT NOTE - SUMMARY
Patient is a 62 yo M with hx of paranoid schizophrenia BIBEMS, called by himself because he was having thoughts of killing himself.  Has been expressing  vague paranoia in addition to lasting negative mood as well as other depressive symptoms including at times thoughts of self harm recently.  Collateral informant with acute safety concerns due to worsening depression and expressed suicidality.  Restarted on home medications. Would benefit from inpatient psychiatric admission for assurance of safety, further stabilization, medication management, and diagnostic clarification.

## 2023-11-29 NOTE — ED ADULT NURSE NOTE - CHIEF COMPLAINT QUOTE
patient d/c from [facility] locked behavioral health unit at 21:15 and brought to [facility] where he told providers that he was drinking ETOH. EMS states patient SBP upon arrival to [facility] was 165 and was told it was hypertensive. no alcohol on breath, states he drank alcohol at 8am yesterday 11/28/23. not hypertensive in triage. remains in yellow gown for safety.

## 2023-11-29 NOTE — ED PROVIDER NOTE - OBJECTIVE STATEMENT
63y M w/ hx schizophrenia, HTN, HLD, presents for medical evaluation. Pt was just transferred from this ED to [facility] earlier tonight for depression/SI. Upon arrival at [facility], pt stated that he had been drinking alcohol earlier today. Was then sent back to [facility] for further evaluation. Pt clarifies that he last drank alcohol at 8 AM yesterday prior to coming to the ED initially. Denies other complaints. 63y M w/ hx schizophrenia, HTN, HLD, presents for medical evaluation. Pt was just transferred from this ED to [facility] earlier tonight for depression/SI. Upon arrival at [facility], pt stated that he had been drinking alcohol earlier today. Was also noted to have elevated BP of 165 systolic. Was then sent back to [facility] for further evaluation. Pt clarifies that he last drank alcohol at 8 AM yesterday prior to coming to the ED initially. Denies other complaints.

## 2023-11-29 NOTE — ED BEHAVIORAL HEALTH ASSESSMENT NOTE - RISK ASSESSMENT
RFs: suicidal thoughts, depressed mood, delusions, alcohol/tobacco abuse    PFs: domiciled, insightful, help seeking

## 2023-11-29 NOTE — ED CDU PROVIDER INITIAL DAY NOTE - CLINICAL SUMMARY MEDICAL DECISION MAKING FREE TEXT BOX
63y M w w/ hx paranoid schizophrenia, HTN, presents from [facility] for medical evaluation. Pt was just transferred to [facility] earlier tonight for depression/SI; was sent back to [facility] due to concern of alcohol withdrawal. Pt stable in the ED with CIWA of 0. Will continue on CIWA protocol. Home meds reordered. Medically cleared. Pending possible new voluntary psych placement.

## 2023-11-29 NOTE — ED PROVIDER NOTE - CLINICAL SUMMARY MEDICAL DECISION MAKING FREE TEXT BOX
63y M w w/ hx schizophrenia, HTN, presents from [facility] for medical evaluation. Pt was just transferred to [facility] earlier tonight for depression/SI; was sent back to [facility] due to concern of intoxication and elevated BP. Repeat BAL <10. BP improved at this time. Pt medically stable for transfer back to [facility]. 63y M w w/ hx schizophrenia, HTN, presents from [facility] for medical evaluation. Pt was just transferred to [facility] earlier tonight for depression/SI; was sent back to [facility] due to concern of intoxication and elevated BP. Repeat BAL <10. BP improved at this time. Pt without signs of alcohol withdrawal in the ED. No longer has spot available at [facility]. Will hold in the ED pending possible new placement.

## 2023-11-29 NOTE — ED BEHAVIORAL HEALTH ASSESSMENT NOTE - OTHER
[facility] oddly related partial not absent tbd Other residents of home

## 2023-11-29 NOTE — ED ADULT TRIAGE NOTE - CHIEF COMPLAINT QUOTE
patient d/c from [facility] locked behavioral health unit at 21:15 and brought to [facility] where he told providers that he was drinking ETOH. EMS states patient SBP upon arrival to [facility] was 165 and was told it was hypertensive. no alcohol on breath, states he drank alcohol at 8am yesterday 11/28/23. not hypertensive in triage. remains in yellow gown for safety.

## 2023-11-29 NOTE — ED PROVIDER NOTE - CARE PLAN
Principal Discharge DX:	Encounter for general medical examination   1 Principal Discharge DX:	Paranoid schizophrenia

## 2023-11-29 NOTE — ED BEHAVIORAL HEALTH ASSESSMENT NOTE - SELF INJURIOUS BEHAVIOR WITHOUT SUICIDAL INTENT:
----- Message from Dia De sent at 1/4/2022 11:10 AM CST -----  Regarding: Mild UTI  [facility] in [place] would be best.  Thank you!    Dia De   
Script for macrobid faxed to keon hassan  
None known
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
## 2023-11-29 NOTE — ED ADULT NURSE REASSESSMENT NOTE - NSFALLUNIVINTERV_ED_ALL_ED
Bed/Stretcher in lowest position, wheels locked, appropriate side rails in place/Call bell, personal items and telephone in reach/Instruct patient to call for assistance before getting out of bed/chair/stretcher/Non-slip footwear applied when patient is off stretcher/[place] to call system/Physically safe environment - no spills, clutter or unnecessary equipment/Purposeful proactive rounding/Room/bathroom lighting operational, light cord in reach
110

## 2023-11-29 NOTE — ED PROVIDER NOTE - PROGRESS NOTE DETAILS
Mario: Spoke with staff at [facility] -- they expressed concern that pt might be withdrawing from alcohol given elevated BP. They no longer have a bed available. Pt stable in the ED without signs of active withdrawal at this time. Will continue to monitor.

## 2023-11-29 NOTE — ED PROVIDER NOTE - COVID-19 ORDERING FACILITY
Subjective:       Patient ID: Tyrese Givens is a 23 y.o. female.    Chief Complaint:  Postpartum Care (Pt acknowledged scant vaginal bleeding, not currently pumping, acknowledged issues with PPD but is not taking Celexa as prescribed. Pt wishes to discuss bc options. Has not resumed sex. /)      History of Present Illness  HPI  Patient reports Vistaril is helping at night.  However she has persistent anxiety and postpartum depression.  She feels her current medication is not working.    GYN & OB History  Patient's last menstrual period was 10/26/2021.   Date of Last Pap: No result found    OB History    Para Term  AB Living   3 3   2 0 2   SAB IAB Ectopic Multiple Live Births         0 2      # Outcome Date GA Lbr Richar/2nd Weight Sex Delivery Anes PTL Lv   3  07/10/22 28w0d  1.134 kg (2 lb 8 oz) F Vag-Spont None  SHAHIDA   2 Para 21 37w0d  2.892 kg (6 lb 6 oz) F Vag-Forceps Spinal N SHAHIDA   1   33w0d   M Vag-Spont          Review of Systems  Review of Systems    continue depressive symptoms     Objective:    Physical Exam     Normal bimanual exam   Assessment:     Postpartum visit, postpartum depression        Plan:      Counseled patient to continue with Celexa at this time.  Will also add Wellbutrin.  Plan for 2 week follow-up.     
[facility]

## 2023-11-29 NOTE — ED PROVIDER NOTE - NS ED ROS FT
Constitutional: no fever  CV: no chest pain  Resp: no cough, no shortness of breath  GI: no abdominal pain, no vomiting, no diarrhea  : no dysuria  MSK: no joint pain  Neuro: no headache

## 2023-11-29 NOTE — ED CDU PROVIDER INITIAL DAY NOTE - OBJECTIVE STATEMENT
63y M w/ hx schizophrenia, HTN, HLD, presents for medical evaluation. Pt was just transferred from this ED to [facility] earlier tonight for depression/SI. Upon arrival at [facility], staff was concerned about possible alcohol withdrawal and sent him back to [facility]. Pt denying any complaints.

## 2023-11-29 NOTE — ED BEHAVIORAL HEALTH ASSESSMENT NOTE - SUBSTANCE ISSUES AND PLAN (INCLUDE STANDING AND PRN MEDICATION)
no h/o complicated withdrawal; can continue to monitor for cravings/withdrawal, management as per primary team

## 2023-11-29 NOTE — ED ADULT NURSE REASSESSMENT NOTE - NSFALLRISKASMTTYPE_ED_ALL_ED
Subjective   Patient ID: Juan is a 70 year old male.    Chief Complaint   Patient presents with   • Hospital F/U   • Office Visit           HPI    Post hospital visit  DOA 1/24/21  DOD 1/24/21  Fall from ceiling and no LOC  Landed on back and had stitches on his right hand and left rib fracture  On norco  Caused constipation  Took colace, stopped meds, took enema then had good BM  Still feels having small hard BM  Taking probiotics daily  S/p stitches removal     Also having a lot og back and neck pain  Doing home exercises  Getting better  Taking tylenol arthritis for pain  It helps him sleep     S/p endocrine eval  HBA1C 7.1%  Worsening protein in urine    Taking lisinopril 20 mg daily    Was scheduled for prostate biopsy with   Didn't like him  Wants a different urology.      Patient's medications, allergies, past medical, surgical, social and family histories were reviewed and updated as appropriate.    Past Medical History:   Diagnosis Date   • Malignant neoplasm (CMS/HCC)        Past Surgical History:   Procedure Laterality Date   • Back surgery     • Knee surgery Left    • Nephrectomy      partial nephrectomy, benign tumor. at the time of pancreatic surgery   • Pancreatectomy      pancreatic cancer, tail removed. [facility]   • Pr catheter, ablation     • Splenectomy, total     • Tonsillectomy         Social History     Tobacco Use   • Smoking status: Former Smoker   • Smokeless tobacco: Never Used   • Tobacco comment: stopped 30 years ago   Substance Use Topics   • Alcohol use: No     Frequency: Never   • Drug use: No       Family History   Problem Relation Age of Onset   • Cancer Mother         ovarian    • Myocardial Infarction Mother    • Cancer, Endometrial Mother    • Cancer Father 60        colon cancer   • Multiple Sclerosis Maternal Uncle    • Cancer, Kidney Brother    • Cancer, Esophageal Brother        Admission on 01/24/2021, Discharged on 01/24/2021   Component Date Value Ref Range Status    • Sodium 01/24/2021 139  135 - 145 mmol/L Final   • Potassium 01/24/2021 3.9  3.4 - 5.1 mmol/L Final   • Chloride 01/24/2021 105  98 - 107 mmol/L Final   • Carbon Dioxide 01/24/2021 28  21 - 32 mmol/L Final   • Anion Gap 01/24/2021 10  10 - 20 mmol/L Final   • Glucose 01/24/2021 169* 65 - 99 mg/dL Final   • BUN 01/24/2021 14  6 - 20 mg/dL Final   • Creatinine 01/24/2021 1.00* 0.51 - 0.95 mg/dL Final   • Glomerular Filtration Rate 01/24/2021 64* >90 mL/min/1.73m2 Final   • BUN/ Creatinine Ratio 01/24/2021 14  7 - 25 Final   • Calcium 01/24/2021 9.1  8.4 - 10.2 mg/dL Final   • Bilirubin, Total 01/24/2021 0.5  0.2 - 1.0 mg/dL Final   • GOT/AST 01/24/2021 26  <=37 Units/L Final   • GPT/ALT 01/24/2021 27  <64 Units/L Final   • Alkaline Phosphatase 01/24/2021 80  45 - 117 Units/L Final   • Albumin 01/24/2021 4.1  3.6 - 5.1 g/dL Final   • Protein, Total 01/24/2021 7.4  6.4 - 8.2 g/dL Final   • Globulin 01/24/2021 3.3  2.0 - 4.0 g/dL Final   • A/G Ratio 01/24/2021 1.2  1.0 - 2.4 Final   • PTT 01/24/2021 25  22 - 30 sec Final   • Prothrombin Time 01/24/2021 12.7* 9.7 - 11.8 sec Final   • INR 01/24/2021 1.2  <=5.0 Final   • ABO/RH(D) 01/24/2021 B Rh Positive   Final   • ABO/RH(D) 01/24/2021 B Rh Positive   Final   • ANTIBODY SCREEN 01/24/2021 Negative   Final   • TYPE AND SCREEN EXPIRATION DATE 01/24/2021 01/27/2021 23:59   Final   • Alcohol 01/24/2021 None Detected  None Detected mg/dL Final   • WBC 01/24/2021 11.3* 4.2 - 11.0 K/mcL Final   • RBC 01/24/2021 5.21* 4.00 - 5.20 mil/mcL Final   • HGB 01/24/2021 15.7* 12.0 - 15.5 g/dL Final   • HCT 01/24/2021 47.5* 36.0 - 46.5 % Final   • MCV 01/24/2021 91.2  78.0 - 100.0 fl Final   • MCH 01/24/2021 30.1  26.0 - 34.0 pg Final   • MCHC 01/24/2021 33.1  32.0 - 36.5 g/dL Final   • RDW-CV 01/24/2021 15.0  11.0 - 15.0 % Final   • RDW-SD 01/24/2021 49.9  39.0 - 50.0 fL Final   • PLT 01/24/2021 362  140 - 450 K/mcL Final   • NRBC 01/24/2021 0  <=0 /100 WBC Final   • Neutrophil,  Percent 01/24/2021 62  % Final   • Lymphocytes, Percent 01/24/2021 20  % Final   • Mono, Percent 01/24/2021 13  % Final   • Eosinophils, Percent 01/24/2021 3  % Final   • Basophils, Percent 01/24/2021 1  % Final   • Immature Granulocytes 01/24/2021 1  % Final   • Absolute Neutrophils 01/24/2021 7.0  1.8 - 7.7 K/mcL Final   • Absolute Lymphocytes 01/24/2021 2.3  1.0 - 4.0 K/mcL Final   • Absolute Monocytes 01/24/2021 1.5* 0.3 - 0.9 K/mcL Final   • Absolute Eosinophils  01/24/2021 0.3  0.0 - 0.5 K/mcL Final   • Absolute Basophils 01/24/2021 0.1  0.0 - 0.3 K/mcL Final   • Absolute Immmature Granulocytes 01/24/2021 0.1  0.0 - 0.2 K/mcL Final   • Extra Tube 01/24/2021 Hold for Add Ons   Final   • Extra Tube 01/24/2021 Hold for Add Ons   Final   • Extra Tube 01/24/2021 Hold for Add Ons   Final   • GLUCOSE, BEDSIDE - POINT OF CARE 01/24/2021 140* 70 - 99 mg/dL Final   • SARS-CoV-2 by PCR 01/24/2021 Not Detected  Not Detected / Detected / Inhibitor Present Final   • Isolation Guidelines 01/24/2021    Final                    Value:This result contains rich text formatting which cannot be displayed here.   • Procedural Notes 01/24/2021    Final                    Value:This result contains rich text formatting which cannot be displayed here.   Lab Services on 01/19/2021   Component Date Value Ref Range Status   • Fasting Status 01/19/2021 4  Hours Final   • Sodium 01/19/2021 140  135 - 145 mmol/L Final   • Potassium 01/19/2021 4.6  3.4 - 5.1 mmol/L Final   • Chloride 01/19/2021 101  98 - 107 mmol/L Final   • Carbon Dioxide 01/19/2021 28  21 - 32 mmol/L Final   • Anion Gap 01/19/2021 16  10 - 20 mmol/L Final   • Glucose 01/19/2021 160* 65 - 99 mg/dL Final   • BUN 01/19/2021 13  6 - 20 mg/dL Final   • Creatinine 01/19/2021 0.75  0.67 - 1.17 mg/dL Final   • Glomerular Filtration Rate 01/19/2021 >90  >90 mL/min/1.73m2 Final   • BUN/ Creatinine Ratio 01/19/2021 17  7 - 25 Final   • Calcium 01/19/2021 9.8  8.4 - 10.2 mg/dL Final    • Bilirubin, Total 01/19/2021 0.5  0.2 - 1.0 mg/dL Final   • GOT/AST 01/19/2021 19  <=37 Units/L Final   • GPT/ALT 01/19/2021 27  <64 Units/L Final   • Alkaline Phosphatase 01/19/2021 81  45 - 117 Units/L Final   • Albumin 01/19/2021 4.2  3.6 - 5.1 g/dL Final   • Protein, Total 01/19/2021 7.8  6.4 - 8.2 g/dL Final   • Globulin 01/19/2021 3.6  2.0 - 4.0 g/dL Final   • A/G Ratio 01/19/2021 1.2  1.0 - 2.4 Final   • Hemoglobin A1C 01/19/2021 7.1* 4.5 - 5.6 % Final   • Fasting Status 01/19/2021 4  Hours Final   • Cholesterol 01/19/2021 164  <=199 mg/dL Final   • Triglycerides 01/19/2021 71  <=149 mg/dL Final   • HDL 01/19/2021 68  >=40 mg/dL Final   • LDL 01/19/2021 82  <=129 mg/dL Final   • Non-HDL Cholesterol 01/19/2021 96  mg/dL Final   • Cholesterol/ HDL Ratio 01/19/2021 2.4  <=4.4 Final   • Microalbumin, Urine 01/19/2021 9.09  mg/dL Final   • Creatinine, Urine 01/19/2021 35.60  mg/dL Final   • Microalbumin/ Creatinine Ratio 01/19/2021 255.3* <=29.0 mg/g Final     Result Information    Date and Time: Exam End: 1/24/2021 15:46 Status: Final result Provider Status: Open   Priority: STAT          Study Result    EXAM:  CT ABDOMEN PELVIS W CONTRAST, CT HEAD WO CONTRAST, CT CERVICAL SPINE  WO CONTRAST, CT LUMBAR SPINE 2D REFORMATTED, CT THORACIC SPINE WO CONTRAST     CT HEAD WO CONTRAST,      CLINICAL INDICATION:  70 years OLD Male WITH HISTORY OF pain after fall.     COMPARISON: None available.       TECHNIQUE: 2.5 mm noncontrast axial images  obtained.  CT dose reduction  technique was utilized.     FINDINGS: No evidence of acute intracranial hemorrhage. No hydrocephalus.   Within the limits of this noncontrast study, There is no evidence of  intracranial mass, mass effect, or midline shift.  No extra-axial fluid  collections are seen.  Mild generalized atrophy.  Mild chronic small vessel  ischemic changes.     The gray-white matter differentiation is intact.      No acute calvarial fracture is seen.       The mastoid  air cells are clear.  Small polypoidal mucosal thickening in  the left maxillary sinus seen.     IMPRESSION:    IMPRESSION:     No evidence of acute intracranial hemorrhage, hydrocephalus, or midline  shift.  Mild generalized atrophy.  Mild chronic small vessel ischemic  changes.              CT CERVICAL SPINE WO CONTRAST, CT LUMBAR SPINE 2D REFORMATTED, CT THORACIC  SPINE WO CONTRAST     CLINICAL INDICATION:  70 years OLD, Male, WITH HISTORY OF back pain. .      COMPARISON:  None available.     TECHNIQUE:  2.5 mm noncontrast axial images of the cervical, thoracic and  lumbar spine are obtained. Subsequently, sagittal and coronal reformats  were generated.  CT dose reduction technique was utilized.     FINDINGS: Nonspecific straightening the cervical spine.  There is normal  alignment.  Predental space is normal  Vertebral body height is maintained.   Disc degenerative changes at C4/C5, C5/C6 and C6/C7.  Multilevel disc  degenerative changes in the thoracic spine seen.  Subtle endplate  deformities.  Partial fusion also noted.  Mild sacroiliac joint arthritic  changes.  Disc degenerative changes in the lower lumbar spine noted.   Subtle endplate deformities also noted.  No acute fracture.    Mild central and mild bilateral foraminal stenosis at C4/C5, C5/C6 and  C6/C7.  Multilevel foraminal stenosis and foraminal stenosis noted.  No prevertebral soft tissue swelling.    Internal carotid vascular calcifications.  Coronary artery calcifications.     IMPRESSION:     No acute fracture or malalignment.  Multilevel degenerative changes.                    CT ABDOMEN AND PELVIS WITH INTRAVENOUS CONTRAST     CLINICAL INDICATION:  70 years Male ,pain after fall.     COMPARISON:  None available.     TECHNIQUE: Following the administration of 100 mL of Omnipaque intravenous  contrast and oral contrast, axial sections were obtained from the lung  bases to the symphysis pubis.  Subsequently sagittal and coronal reformats  were  generated. CT dose reduction technique was utilized.     FINDINGS:  Limited evaluation of the lung bases reveal dependent interstitial  opacities.  Coronary artery calcifications.  Small pericardial effusion.     No focal lesions in the liver seen.  No calcified gallstone.  No biliary  duct dilatation.  The spleen is surgically absent.  Left adrenal is  thickened.  No right adrenal mass.  No focal lesions in the pancreas seen.   Surgical clip near the tail of the pancreas seen.  No pancreatic duct  dilatation.  No hydronephrosis.  Mild bilateral perinephric stranding.   Heterogeneous enhancement of the left kidney.  Ureters are not dilated.   Mild bladder wall thickening.  Prostate gland is mildly enlarged.     No retroperitoneal lymphadenopathy seen.  Small fat density lesion in the  left anterior abdomen on image 87 may represent sequela of trauma.  Few  scattered mesenteric lymph nodes are seen.     Mild wall thickening of the stomach.  No definite small bowel obstruction.   Moderate colonic stool.  The appendix is normal.  Minimal left colonic and  sigmoid diverticulosis.  No colonic obstruction is seen.     No free fluid in the pelvis seen.  Small fat-containing right inguinal  hernia.  Mild bilateral hip degenerative changes.  Degenerative changes in  the lower lumbar spine seen.     IMPRESSION:  No CT evidence of acute intra-abdominal trauma.                          Study Result    PROCEDURE:  XR HAND 2 VIEWS RIGHT     History: fall, right hand trauma     COMPARISON: None.     TECHNIQUE:   2 views of the right hand.     FINDINGS:        There is mild diffuse decreased bone mineralization.  There is a  questionable thin linear lucency through the base of the 4th metacarpal,  which may represent a nondisplaced fracture.  Remaining osseous structures  are intact.  Carpal rows are preserved.  There are mild degenerative  changes at the distal interphalangeal joints.  Vascular calcifications  are  noted.     IMPRESSION:      Questionable linear lucency at the base of the 4th metacarpal which may  represent a nondisplaced fracture versus overlying shadows.  An oblique  view of the right hand can be performed to further evaluate.      Electronically Signed by: MARCIE LOZANO MD   Signed on: 1/24/2021 2:15 PM           )  Current Outpatient Medications   Medication Sig   • gabapentin (NEURONTIN) 300 MG capsule Take 1 capsule by mouth nightly.   • lisinopril (ZESTRIL) 30 MG tablet Take 1 tablet by mouth daily.   • glipiZIDE (GLUCOTROL) 10 MG tablet Take 1 tablet by mouth every 12 hours.   • metoPROLOL succinate (TOPROL-XL) 50 MG 24 hr tablet Take 1 tablet by mouth daily.   • docusate sodium (COLACE) 100 MG capsule Take 100 mg by mouth daily.   • tamsulosin (FLOMAX) 0.4 MG Cap TAKE 1 CAPSULE BY MOUTH ONCE DAILY AFTER A MEAL   • blood glucose (FREESTYLE LITE) test strip USE 1 STRIP TO CHECK GLUCOSE TWICE DAILY DX:E11.65   • metformin (GLUCOPHAGE) 1000 MG tablet Take 1 tablet by mouth twice daily   • pravastatin (PRAVACHOL) 20 MG tablet TAKE 1 TABLET BY MOUTH AT BEDTIME   • famotidine (PEPCID) 40 MG tablet Take 1 tablet by mouth nightly.   • Insulin Pen Needle 31G X 5 MM Misc Use to inject insulin 1 time daily. Remove needle cover(s) to expose needle before injecting.   • insulin glargine (Basaglar KwikPen) 100 UNIT/ML pen-injector Inject beneath skin, 12 units   • empagliflozin-linaGLIPtin 10-5 MG tablet Take by mouth daily.    • Bacillus Coagulans-Inulin (ALIGN PREBIOTIC-PROBIOTIC PO) Take by mouth daily.   • apixaBAN (ELIQUIS) 5 MG Tab Take 5 mg by mouth every 12 hours.   • Glucosamine-Chondroit-Vit C-Mn (GLUCOSAMINE 1500 COMPLEX) Cap Take 1,500 mg by mouth daily.   • coenzyme Q10 100 MG capsule Take 100 mg by mouth daily.   • saccharomyces boulardii (FLORASTOR) 250 MG capsule Take 250 mg by mouth daily.   • Multiple Vitamins-Minerals (CENTRUM SILVER 50+MEN) Tab Take by mouth daily.   • enoxaparin (LOVENOX)  80 MG/0.8ML injectable solution    • HYDROcodone-acetaminophen (Norco) 5-325 MG per tablet Take 1 tablet by mouth every 8 hours as needed for Pain (severe pain).   • baclofen (LIORESAL) 10 MG tablet Take 1 tablet by mouth nightly.   • acetaminophen (TYLENOL) 650 MG CR tablet Take 650 mg by mouth.   • diclofenac (VOLTAREN) 1 % gel Apply 2 g topically 4 times daily as needed (pain). Apply to the hands only     No current facility-administered medications for this visit.        ALLERGIES:  No Known Allergies    Review of Systems   Constitutional:        Slowly gaining weight   Respiratory: Negative for cough and shortness of breath.    Cardiovascular:        Chest wall pain   Gastrointestinal: Negative for blood in stool.   Genitourinary:        Off-and-on urinary issues with problems with stream and starting urination   Musculoskeletal: Positive for back pain and neck pain. Negative for arthralgias.       Objective    Visit Vitals  BP (!) 170/74   Pulse 79   Temp 97.4 °F (36.3 °C)   Resp 15   Ht 6' 2.5\" (1.892 m)   Wt 84.8 kg (187 lb)   SpO2 97%   BMI 23.69 kg/m²       Physical Exam  Vitals signs and nursing note reviewed.   Constitutional:       General: He is not in acute distress.     Appearance: Normal appearance. He is normal weight. He is not ill-appearing or toxic-appearing.   HENT:      Head: Normocephalic and atraumatic.      Right Ear: Tympanic membrane normal.      Left Ear: Tympanic membrane normal.      Mouth/Throat:      Mouth: Mucous membranes are moist.   Eyes:      Conjunctiva/sclera: Conjunctivae normal.      Pupils: Pupils are equal, round, and reactive to light.   Neck:      Vascular: No carotid bruit.   Cardiovascular:      Rate and Rhythm: Regular rhythm. Tachycardia present.      Pulses: Normal pulses.      Heart sounds: No murmur.   Pulmonary:      Effort: Pulmonary effort is normal. No respiratory distress.      Breath sounds: Normal breath sounds.   Abdominal:      General: Abdomen is flat.       Palpations: Abdomen is soft.      Tenderness: There is no abdominal tenderness.   Musculoskeletal:      Cervical back: He exhibits decreased range of motion and spasm. He exhibits no bony tenderness.      Comments: Left-sided rib tenderness anterior axillary line along 6, 7 th rib.  Significant spasm of bilateral trapezius, right worse than left       Lymphadenopathy:      Cervical: No cervical adenopathy.   Skin:     General: Skin is warm and dry.      Comments: Healing multiple lacerations on the right middle ring and little finger, some irregular with protruding skin around the laceration   Neurological:      Mental Status: He is alert and oriented to person, place, and time.   Psychiatric:         Mood and Affect: Mood and affect normal. Affect is not angry.         Speech: Speech normal.         Behavior: Behavior is not agitated.         Thought Content: Thought content normal.         Judgment: Judgment normal.         Assessment   Problem List Items Addressed This Visit        Nervous    PMR (polymyalgia rheumatica) (CMS/HCC)     So far well controlled. Sees rheumatologist  Off of steroids.         Relevant Medications    gabapentin (NEURONTIN) 300 MG capsule       Circulatory    Longstanding persistent atrial fibrillation (CMS/HCC)     Monitor: The patient's chronic cardiac arrhythmia is Stable.  Evaluation: No diagnostic tests required today.  Assessment/Treatment:  Refer to specialist per encounter note.  Condition will be reassessed per specialist managing the condition         Relevant Medications    enoxaparin (LOVENOX) 80 MG/0.8ML injectable solution    lisinopril (ZESTRIL) 30 MG tablet    Benign essential hypertension     Hypertension uncontrolled.  Increase lisinopril to 30 mg daily  Medication use effects and side effects discussed  Follow-up with me in 4 to 5 weeks for blood pressure check.  Monitor blood pressure at home.         Relevant Medications    enoxaparin (LOVENOX) 80 MG/0.8ML  Routine Reassessment injectable solution    lisinopril (ZESTRIL) 30 MG tablet       Digestive    Acute gastritis without hemorrhage     S/p EGD last year followed by enteroscopy.  H. pylori test negative.  Needs repeat EGD in 1 year.  Advised to continue taking famotidine to avoid gastritis due to chronic anticoagulation.  Advised to follow-up with Dr. Plaza.  Advised to make appointment now         Relevant Orders    SERVICE TO GASTROENTEROLOGY       Urinary    Benign prostatic hyperplasia with urinary hesitancy     Would like a different urology referral.  Referred to Dr. Aguilera         Relevant Orders    SERVICE TO UROLOGY       Musculoskeletal    Lumbar disc prolapse with compression radiculopathy     On gabapentin 300 mg at night.  Reports improvement.  Continue current medication.  New dose of medication sent to the pharmacy.  Medication side effects reviewed.         Relevant Medications    gabapentin (NEURONTIN) 300 MG capsule    HYDROcodone-acetaminophen (Norco) 5-325 MG per tablet    Other Relevant Orders    SERVICE TO PHYSICAL THERAPY    Cervical strain     Most likely related to fall, currently uncontrolled  Cause, course and management discussed  Short course of muscle relaxers given.  Medication use effects and side effects discussed.  Take Tylenol as needed.  Cannot prescribe NSAIDs.  Home exercises given   referral for physical therapy         Relevant Medications    baclofen (LIORESAL) 10 MG tablet    Other Relevant Orders    SERVICE TO PHYSICAL THERAPY    RESOLVED: Closed fracture of one rib of left side with routine healing     ER records reviewed.  Norco renewed after reviewing [place]  website.  Advised to use cautiously.         Relevant Medications    HYDROcodone-acetaminophen (Norco) 5-325 MG per tablet       Oncologic    Malignant neoplasm of pancreatic duct (CMS/HCC)     S/p surgery about 15 years ago.  Last 2 scans in July 2019 and summer 2020 are all negative for any recurrence.             Other     Hospital discharge follow-up - Primary     All hospital records reviewed and discussed with patient.  All in hospital discharge medications reconciled with patient's current medication                    Patient was advised to continue to follow CDC guideline for social distancing due to ongoing COVID-19 pandemic.  Medical compliance with plan discussed and risks of non-compliance reviewed.    Patient education completed on disease process, etiology & prognosis.    Patient expresses understanding of the plan.    Refer to orders.    Return to clinic as clinically indicated as discussed with patient who verbalized understanding of & agreement with the plan.      Instructions provided as documented in the AVS.  Medication use,effects and side effects discussed in detail with patient.  The patient indicated understanding of the diagnosis and agreed with the plan of care.    [This note used Dragon technology. Transcription errors are not uncommon and may not have been corrected prior to electronically signing the note. Should you find these errors, please consult the clinician for interpretation (or apply common sense adjustment when safe and appropriate).]

## 2023-11-29 NOTE — ED CDU PROVIDER INITIAL DAY NOTE - PHYSICAL EXAMINATION
Constitutional: Awake, alert, in no acute distress  Eyes: no scleral icterus  HENT: normocephalic, atraumatic, moist oral mucosa  Neck: supple  CV: RRR, no murmur  Pulm: non-labored respirations, CTAB  Abdomen: soft, non-tender, non-distended  Extremities: no edema, no deformity  Skin: no rash, no jaundice  Neuro: AAOx3, moving all extremities equally, no tremors

## 2023-11-29 NOTE — ED BEHAVIORAL HEALTH NOTE - BEHAVIORAL HEALTH NOTE
Assumed pt care @0421 pt stable a&o x4able to make needs known. Pt back from [facility] NAD noted VSS on RA pt denies SI/HI/AVH at this time deneis pain or other complaints. RR even unlabored no acute distress at this time. Pt calm and cooperative with all care, warm blankets provided fall and safety precautions in place

## 2023-11-30 VITALS
TEMPERATURE: 98 F | HEART RATE: 64 BPM | DIASTOLIC BLOOD PRESSURE: 78 MMHG | RESPIRATION RATE: 18 BRPM | OXYGEN SATURATION: 97 % | SYSTOLIC BLOOD PRESSURE: 132 MMHG

## 2023-11-30 PROCEDURE — 99285 EMERGENCY DEPT VISIT HI MDM: CPT

## 2023-11-30 PROCEDURE — 99213 OFFICE O/P EST LOW 20 MIN: CPT

## 2023-11-30 PROCEDURE — 87635 SARS-COV-2 COVID-19 AMP PRB: CPT

## 2023-11-30 PROCEDURE — 80307 DRUG TEST PRSMV CHEM ANLYZR: CPT

## 2023-11-30 PROCEDURE — G0378: CPT

## 2023-11-30 PROCEDURE — 36415 COLL VENOUS BLD VENIPUNCTURE: CPT

## 2023-11-30 PROCEDURE — 99238 HOSP IP/OBS DSCHRG MGMT 30/<: CPT

## 2023-11-30 RX ADMIN — ESCITALOPRAM OXALATE 20 MILLIGRAM(S): 10 TABLET, FILM COATED ORAL at 12:05

## 2023-11-30 RX ADMIN — Medication 25 MILLIGRAM(S): at 05:46

## 2023-11-30 NOTE — ED CDU PROVIDER DISPOSITION NOTE - CLINICAL COURSE
pt wit schizophrenia with improvement of  symptoms while in ed; pt to follow up as outpatient with psychiatry

## 2023-11-30 NOTE — ED ADULT NURSE REASSESSMENT NOTE - NS ED NURSE REASSESS COMMENT FT1
Assumed patient care at 7:00 PM. Patient in bed sleeping, patient respond to tactile and verbal stimuli, patient refused to talk, patient safety maintained.
Patient oriented to staff and educated about plan of care.  Patient assessed for any withdrawal symptoms.  Patient denies symptoms of withdrawal and CIWA was 1.  Patient endorses feeling paranoid and requesting to continue to sleep.
Patient remained in bed sleeping, safety maintained.
Patient was able to sleep the night. CIWA remain the same. No change in his status. He complied with medication, complied with nursing care, no signs of withdraw, safety maintained.
Report given to MARY Pandya in .
Patient ate 100% of his meals.  Patient has been withdrawn to his bed.  No complaint of withdrawal.  CIWA is 1.  Patient out of room to the bathroom as needed.  Safety of patient maintained.
Assumed care of patient at 07:15.  Patient resting in bed appears to be sleeping with no distress and regular nonlabored breathing.  Safety of patient maintained.
Assumed care of patient at 07:15.  Patient resting in bed appears to be sleeping with no distress and regular nonlabored breathing noted.  Safety of patient maintained.
Patient reviewed discharge plans and provided a copy of same.  Patient denies suicidal or homicidal ideations.  Patient agrees to return to local ED or call 911 if symptoms are worse or thoughts to harm self or others develop.
pt sleeping/resting in no acute distress compliant with staff requests and medications administered. pt has made no attempts to harm himself or others.

## 2023-11-30 NOTE — ED BEHAVIORAL HEALTH PROGRESS NOTE - RISK ASSESSMENT
RFs: alcohol/tobacco abuse, history of suicidal ideation    PFs: domiciled, insightful, help seeking, resolution of symptoms during stay in emergency room

## 2023-11-30 NOTE — ED ADULT NURSE REASSESSMENT NOTE - GENERAL PATIENT STATE
comfortable appearance/cooperative
comfortable appearance/cooperative
comfortable appearance/resting/sleeping
comfortable appearance/resting/sleeping

## 2023-11-30 NOTE — ED CDU PROVIDER DISPOSITION NOTE - PATIENT PORTAL LINK FT
You can access the FollowMyHealth Patient Portal offered by [facility] by registering at the following website: [email or web address]. By joining [employer]’s FollowMyHealth portal, you will also be able to view your health information using other applications (apps) compatible with our system.

## 2023-11-30 NOTE — ED CDU PROVIDER SUBSEQUENT DAY NOTE - CLINICAL SUMMARY MEDICAL DECISION MAKING FREE TEXT BOX
63y M w w/ hx paranoid schizophrenia, HTN, presents from [facility] for medical evaluation. Pt was just transferred to [facility] earlier tonight for depression/SI; was sent back to [facility] due to concern of alcohol withdrawal. Pt stable in the ED with CIWA of 0. Continue on CIWA protocol. Medically cleared. Pending new voluntary psych placement.

## 2023-11-30 NOTE — ED BEHAVIORAL HEALTH PROGRESS NOTE - DETAILS:
He states today that he is feeling better after staying in emergency room; no longer with depressed mood or suicidality. He states that he does not want to go to the hospital, states that he would rather go home and follow up with NP. Also states "I wanted a break from my residence; I never wanted to kill my self." Denies suicidal ideation/HI/AVH; denies paranoia, is not internally preoccupied.

## 2023-11-30 NOTE — ED BEHAVIORAL HEALTH PROGRESS NOTE - CASE SUMMARY/FORMULATION (CLEARLY DOCUMENT RATIONALE FOR DISPOSITION CHANGE)
He states today that he is feeling better after staying in emergency room; no longer with depressed mood or suicidality. He states that he does not want to go to the hospital, states that he would rather go home and follow up with NP. Also states "I wanted a break from my residence; I never wanted to kill my self." Denies suicidal ideation/HI/AVH; denies paranoia, is not internally preoccupied. He has good support in form of adult residence, is visited by provider at his residence. At this time, does not represent acute danger to self or others, would benefit from continuing outpatient follow up.

## 2023-11-30 NOTE — ED BEHAVIORAL HEALTH PROGRESS NOTE - NSBHMSELANG_PSY_A_CORE
Patient is a 54-year-old male with a history of type 2 diabetes mellitus, on metformin, hypertension, history of nephrolithiasis here for evaluation of cute onset right-sided flank pain that started about 1 hour prior to arrival.  Patient states it feels like a kidney stone.  Pain is radiating to his right abdomen and right groin.  He reports severe nausea but no vomiting.  Denies any fevers or chills.  He has been able to urinate.  Denies any dysuria.  Patient denies any prior lithotripsy or intervention for his stones.    VS noted  Gen:  uncomfortable, writhing in pain  HEENT: EOMI, mmm  Lungs: CTAB/L no C/ W /R   CVS: RRR   Abd; Soft,  TTP to right abdomen, right CVA tenderness, no left CVA tenderness  Ext: no edema  Skin: no rash  Neuro AAOx3 non focal clear speech  a/p:  acute onset right flank pain, right abdominal pain–patient likely has a kidney stone.  He states symptoms are exactly the same as prior. Will give pain medications, check labs, urinalysis, CT abdomen pelvis.  - Sloan NAPOLES No abnormalities noted

## 2023-11-30 NOTE — ED BEHAVIORAL HEALTH PROGRESS NOTE - SUMMARY
Patient is a 64 yo M with hx of paranoid schizophrenia BIBEMS, called by himself because he was having thoughts of killing himself.  Has been expressing  vague paranoia in addition to lasting negative mood as well as other depressive symptoms including at times thoughts of self harm recently.  Collateral informant with acute safety concerns due to worsening depression and expressed suicidality.  Restarted on home medications. Would benefit from inpatient psychiatric admission for assurance of safety, further stabilization, medication management, and diagnostic clarification.

## 2023-11-30 NOTE — CHART NOTE - NSCHARTNOTEFT_GEN_A_CORE
SERENA Note:   Plan for patient is to go to inpatient psych unit for mental health treatment. SERENA student followed up with [facility], [facility], [facility], and [facility]. All had no adult male beds available for today. SERENA student called [facility] [phone] and they informed this writer that they have an open bed for an adult male available for today. SERENA student was told by [facility] to fax over patient's chart to #[phone]. SERENA faxed over patient's chartfor MD review. SERENA will follow  Note written by SERENA Student Verito Meadows.
SERENA Note: Pt was seen again by psychiatry and cleared for discharge back to his adult home, [place]. Called and spoke with  Noe [phone]. he is aware pt will be returning today and stated [facility] uses [facility]. Pt is followed for MH tx by the NP that makes house visits. Pt up and about in the ED, stating he is " feeling better" and wants to go home. SERENA faxed current meds given and  clearance note for his records at the home fax:[phone].

## 2023-11-30 NOTE — ED ADULT NURSE REASSESSMENT NOTE - COMFORT CARE
darkened lights
darkened lights
meal provided/plan of care explained
meal provided/plan of care explained

## 2023-11-30 NOTE — ED BEHAVIORAL HEALTH PROGRESS NOTE - SAFETY PLAN ADDT'L DETAILS
Education provided regarding environmental safety / lethal means restriction/Provision of National Suicide Prevention Lifeline 2-469-412-TALK (2829)

## 2023-11-30 NOTE — ED ADULT NURSE REASSESSMENT NOTE - STATUS
awaiting transfer, no change
awaiting consult
awaiting discharge, no change
awaiting transfer, no change

## 2023-12-13 NOTE — BH CHART NOTE - NSEVENTNOTEFT_PSY_ALL_CORE
On arrival to the unit from [facility] ED.    Patient seen with PA and ADN present. EMS also present during interview and assessment.   During arrival with EMS patient reports feeling dizzy and states having concerns about his health. Pt denies chest pain, SOB, visual changes, confusion. Patient reports being chronic drinker and states he has been drinking for the last two days. He reports drinking today (before going to ED at [facility]) at least two glasses of liquor but unclear how much he has been drinking as patient is a poor historian and is unable to provide further detail.     Patient noted to present elevated 's/80's with HR 80-90's. Patient continued to endorse feeling dizzy and sensation of passing out. During assessment, noted patient presented severe anxiety and tremors in upper and lower extremities. Rest of PE was unremarkable. Discussed with DEREK, PA and Dr. Wills patient was not stable to be admitted to the unit due to concern of ETOH withdrawal.     Discussed with EMS who decided patient will return to [facility] ED.        On arrival to the unit from [facility] ED.    Patient seen with PA and ADN present. EMS also present during interview and assessment.   During arrival with EMS patient reports feeling dizzy and states having concerns about his health. Pt denies chest pain, SOB, visual changes, confusion. Patient reports being chronic drinker and states he has been drinking for the last two days. He reports drinking today (before going to ED at [facility]) at least two glasses of liquor but unclear how much he has been drinking as patient is a poor historian and is unable to provide further detail.     Patient noted to present elevated 's/80's with HR 80-90's. Patient continued to endorse feeling dizzy and sensation of passing out. During assessment, noted patient presented severe anxiety and tremors in upper and lower extremities. Rest of PE was unremarkable. Discussed with DEREK, PA and Dr. Wills patient was not stable to be admitted to the unit due to concern of ETOH withdrawal.     Discussed with EMS who decided patient will return to [facility] ED.

## 2024-01-02 NOTE — ED PROVIDER NOTE - NS ED MD EM SELECTION
Caller: Ailin MAKI    Relationship: Emergency Contact    Best call back number: [phone]    What is the best time to reach you: ANYTIME    Who are you requesting to speak with (clinical staff, provider,  specific staff member):       What was the call regarding: DAUGHTER CALLED IN REGARDING PAPERWORK FOR [employer] [facility]. PLEASE CALL BACK ASAP    Is it okay if the provider responds through MyChart:        [id number] Comprehensive

## 2024-01-08 NOTE — ED BEHAVIORAL HEALTH ASSESSMENT NOTE - NS ED BHA REVIEW OF ED CHART VITAL SIGNS REVIEWED
[FreeTextEntry1] :    64 year old gentleman presenting for ablation of symptomatic paroxysmal atrial fibrillation. He has had symptomatic paroxysmal AF, with high burden of PAF and atrial flutter on recent event monitor (9%, episodes over 8hours). His symptoms have recently been increasing in frequency and duration. Medical therapy has been limited by sinus bradycardia with resting heart rates to 40s bpm. He is maintained on Xarelto and endorses good compliance. He now presents for follow up s/p AF ablation ( PVI, CTI line) on 7/14/23.  Holter worn 10/9/23-10/22/23 revealed SR with multiple brief episodes of PAT- no sustained events ( longest 14 beats in duration). AVG HR 76 bpm, min 76, max 142.   Today, MR. Spence reports he has overall been feeling well since procedure. Denies symptoms of sustained AF as prior to ablation but did not brief palpitations lasting only a few seconds which began to improve about 3 months post ablation. As in past expressed desire to avoid lifelong AC.   ECG reveals SB 54 bpm.   Recommendation:   Mr. Spence reports he has been feeling well since last follow up. Denies sustained arrhythmia recurrence. Reports brief palpitations lasting seconds only with external monitor revealing brief PAT. We discussed PAT and management options, he reports symptoms have reduced since 3 month post ablation kim so will continue present management. If symptoms were more bothersome, could trial low dose BB as AVG HR acceptable on recent external monitoring.  -[id number], with strong desire to avoid life long AC. Risks/benefits of long term AC discussed and shared decision made to stop Xarelto. Discussed if sustained AF again noted in future or increased [id number] in future then resuming AC to be considered. Home surveillance options including smart watch discussed, wishes to defer at this time. -To repeat holter in 5 months with EP follow up in 6 or sooner PRN.  Marily PANDEY-C  [EKG obtained to assist in diagnosis and management of assessed problem(s)] : EKG obtained to assist in diagnosis and management of assessed problem(s) Yes

## 2024-03-14 NOTE — ED BEHAVIORAL HEALTH ASSESSMENT NOTE - OTHER PAST PSYCHIATRIC HISTORY (INCLUDE DETAILS REGARDING ONSET, COURSE OF ILLNESS, INPATIENT/OUTPATIENT TREATMENT)
Statement Selected Multiple prior hospitalizations (pt reports seven).  Most recently discharged from [facility].

## 2024-03-21 NOTE — ED BEHAVIORAL HEALTH ASSESSMENT NOTE - CURRENT PLAN:
PHYSICAL / OCCUPATIONAL THERAPY - DAILY TREATMENT NOTE     Patient Name: Liset Costa    Date: 3/21/2024    : 1986  Insurance: Payor:  EAST / Plan:  EAST / Product Type: *No Product type* /      Patient  verified Yes     Visit #   Current / Total 16 24   Time   In / Out 9:08 9:30   Pain   In / Out 0 0   Subjective Functional Status/Changes: \"I can put my bra on like normal now, that was my number one goal!\"  Patient states overall her shoulder is doing very well, she continues to perform her daily HEP.   Changes to:  Allergies, Med Hx, Sx Hx?   no       TREATMENT AREA =  Right shoulder pain [M25.511]  Left shoulder pain [M25.512]    OBJECTIVE    Therapeutic Procedures:  Tx Min Billable or 1:1 Min (if diff from Tx Min) Procedure, Rationale, Specifics   8  92283 Therapeutic Exercise (timed):  increase ROM, strength, coordination, balance, and proprioception to improve patient's ability to progress to PLOF and address remaining functional goals. (see flow sheet as applicable)    Details if applicable:       14  21446 Therapeutic Activity (timed):  use of dynamic activities replicating functional movements to increase ROM, strength, coordination, balance, and proprioception in order to improve patient's ability to progress to PLOF and address remaining functional goals.  (see flow sheet as applicable)    Details if applicable:     22  [facility] Totals Reminder: bill using total billable min of TIMED therapeutic procedures (example: do not include dry needle or estim unattended, both untimed codes, in totals to left)  8-22 min = 1 unit; 23-37 min = 2 units; 38-52 min = 3 units; 53-67 min = 4 units; 68-82 min = 5 units   Total Total     TOTAL TREATMENT TIME:        22     [x]  Patient Education billed concurrently with other procedures   [x] Review HEP    [] Progressed/Changed HEP, detail:    [] Other detail:       Objective Information/Functional Measures/Assessment    Patient has completed 16 visits  Yes None known

## 2024-04-10 NOTE — ED ADULT NURSE NOTE - NS ED NURSE TRANSFER FORMS DT
Riri Ortiz was here to discuss upcoming left Total Knee Replacement.  Symptoms continue to be moderate to severely disabling, exam un-changed from prior.  The risks, benefits and alternatives were discussed in detail again.  she understands, will sign an informed consent and are wishing to proceed.    Greater than 50 % of the time was spent counseling the patient and/or family. The total amount of time spent was 15 minutes.  
known Latex allergy or symptoms of Latex sensitivity.  Medications reviewed and updated.  Medical and surgical history reviewed and updated.  ALLERGIES:  Celebrex [celecoxib], Hibiclens, Hydrocodone, Lisinopril, Percocet [oxycodone-acetaminophen], Ciclesonide, Latex   (environmental), Levaquin, Olopatadine, Tylenol with codeine, and Calgon nora chg [chloraprep one step]  Colin Carroll MD  Preferred pharmacy verified  Social History     Tobacco Use   Smoking Status Never   Smokeless Tobacco Never     Riri Ortiz is a 65 year old female presenting for follow-up bilateral knees.  Left worse than right.      
17-Jan-2020 10:46
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
## 2024-08-14 NOTE — ED BEHAVIORAL HEALTH ASSESSMENT NOTE - AXIS III
Pt brought in by ems for weakness and not feeling well. Pt states she walks with a walker at home and has not been able to get up and walk today. Pt is on home O2 due to copd. Pt denies N/V/D or fevers. Pt states she just feels weak   HTN
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
## 2024-11-07 NOTE — ED ADULT NURSE NOTE - PMH
Sent mychart   Anxiety    BPH (benign prostatic hyperplasia)    Elevated liver enzymes    GERD (gastroesophageal reflux disease)    HTN (hypertension)    Hyperlipidemia    Mood disorder    Paranoid schizophrenia    Psychosis

## 2024-11-07 NOTE — ED BEHAVIORAL HEALTH ASSESSMENT NOTE - DANGER TO SELF; MENTAL ILLNESS EXPECTED TO RESPOND TO INPATIENT CARE
Like I said, she is on such a small dose that I doubt this is the cause of it.  Patient is scheduled to see me for follow-up for reevaluation on 12/2/2024 and we can do gene testing for medication at that time or patient can come in even for sooner appointment to see me.   Suicidal ideation with plan/means

## 2024-11-09 NOTE — PROGRESS NOTE BEHAVIORAL HEALTH - OTHER
furtive, wary  with easy startle reaction PAST SURGICAL HISTORY:  No significant past surgical history in clipped, rushed frightened spurts " I'm still paranoid." still paranoid and apprehensive appearing paranoid, persecutory delusional thinking, impoverished

## 2024-12-11 NOTE — PROGRESS NOTE BEHAVIORAL HEALTH - PROBLEM SELECTOR PLAN 2
SW/WING Discharge Plan  Anticipate that patient will be psychiatrically and medically cleared for discharge today. Patient’s discharge destination is Home.  Pt has follow up appointments scheduled at [facility]/has outpatient providers. All follow up appointments are also documented on patient's discharge paperwork. Pt continues to deny current SI/HI or A/V hallucinations.  Pt to be picked up by [employer]. Patient/family aware and agreeable to discharge plan.  Discharge plan communicated to MD, RN, and NATHANIEL.     1. Pt encouraged to maintain sobriety after DC from hospital  2.Pt encouraged to attend therapy groups as tolerated with focus on substance use d/o treatment options  3.To continue to recommend AA/ Sponsor after DC from hospital when pt clinically stable  4. Buspar increased to 10  mg po tid (anxiety)

## 2024-12-12 NOTE — BH DISCHARGE NOTE NURSING/SOCIAL WORK/PSYCH REHAB - NSDCSUICIDEPREP_PSY_ALL_CORE
Name: KINGSTON MUNOZ    ### Patient Details    ### Related interaction  [facility] IP Care Transitions (Call 1 (02D PD)) ([email or web address])    ### Questions     Question 1   Fall Risk   Have you fallen recently or are you concerned about falling? Please press 1 for yes or press 2 for no.   Fall Risk (Issue Panel: [place] Clinical Intervention, Issue Alert: [place] Clinical Alert)    ### Required Interventions and Feedback     Call Status         Call Status:     Attempt 1 , Answered   Clinical Concerns - Issues         Clinical Concerns - Issues List:     Pain    Comments:     Patient continues to have persistent back pain following her fall worse with standing, walking and getting up. Pain described as aching.  Daughter says her mom takes Tylenol for pain but does not always remember to take it and her current medications are bubble wrapped.  Family has scheduled regular check ins to help monitor her pain and offer tylenol or other assistance needed.  No other symptoms reported.        Bibi says patient has follow up on 12/16 with Pcp office. She is asking about lidocaine patch.   Did discuss could try over the counter lidocaine gel or patches such as Salonpas which is 4% lidocaine vs prescription 5%. She is wondering if a prescription could be considered.   Advised can send message to Pcp office however may recommended office visit first.  Bbii verbalized understanding.     Clinical Concerns - Actions Taken         Clinical Concerns - Actions List:     Discussed Quality of Care Concerns , CT RN Followed Up With the Provider      Fall Risk - Issues         Fall Risk - Issues List:     Do You Use a Cane or Walker to Get Around?     Comments:     Spoke with daughter Bibi, she says that Kingston continues to have lower back pain after her fall worse with standing and walking and getting up from seated position.  She says that she was also having dizziness when getting up but  did not repot this today when she got up.   Bibi says that she has not had any further falls and work up of the back after her fall was negative.    She says she does use her walker and has started home care services.     Bibi says that Natasha lives alone but they are checking on her daily and frequently.    Bibi says they have all help needed at present no further needs at this time.   Encouraged to continue fall precautions, instructions per PT, and make slow position changes.    Fall Risk - Actions Taken         Comments:     Patient enrolled in home care services, fall precautions reinforced.     No

## 2025-02-18 NOTE — ED ADULT NURSE REASSESSMENT NOTE - GENERAL PATIENT STATE
comfortable appearance/resting/sleeping
resting/sleeping
comfortable appearance/cooperative
comfortable appearance/resting/sleeping
anxious/cooperative
Patient/Caregiver provided printed discharge information.

## 2025-02-23 NOTE — ED ADULT NURSE NOTE - SUICIDE SCREENING QUESTION 1
Gen: Alert, NAD  CV: RRR, normal S1, S2, no m/r/g  Resp: Non labored respirations, Lungs CTA b/l, no wheezes, rales, rhonchi  Abdomen: Soft, nondistended, nontender  Extremities: Moving all extremities, no edema
No

## 2025-06-11 NOTE — BH SOCIAL WORK INITIAL PSYCHOSOCIAL EVALUATION - NSBHCOGDIS_PSY_ALL_CORE
Type of Form Received: Order (catheter)    Form Received (Date) 6/11/25   Form Filled out Yes, date 6/11/25   Placed in provider folder Yes       Received Completed forms Yes   Faxed Forms Faxed To: [facility]  Fax Number: [phone]   Sent to HIM (Date) 6/11/25       
Unable to answer (specify)

## 2025-06-18 NOTE — ED BEHAVIORAL HEALTH ASSESSMENT NOTE - NS ED BHA ED COURSE PSYCHIATRIC MEDICATION GIVEN
Yes, but may be cheaper than repatha   I did estimate cost and its showing 494$ which pt would probable not be able to afford. Please advise     Her insurance covers the repatha but until she meets her deductible, they wont fill for free. But pt is unable to meet deductible so anything will most likely cost out of pocket.    None

## 2025-07-29 NOTE — ED CDU PROVIDER INITIAL DAY NOTE - HEME/LYMPH NEGATIVE STATEMENT, MLM
Peripheral Block    Pre-sedation assessment completed: 7/29/2025 9:47 AM    Patient reassessed immediately prior to procedure    Patient location during procedure: pre-op  Start time: 7/29/2025 9:59 AM  Stop time: 7/29/2025 10:01 AM  Reason for block: at surgeon's request and post-op pain management  Performed by  CRNA/CAA: Cirilo Joyce CRNA  Preanesthetic Checklist  Completed: patient identified, IV checked, site marked, risks and benefits discussed, surgical consent, monitors and equipment checked, pre-op evaluation and timeout performed  Prep:  Pt Position: supine  Sterile barriers:cap, gloves, mask and washed/disinfected hands  Prep: ChloraPrep  Patient monitoring: blood pressure monitoring, continuous pulse oximetry and EKG  Procedure    Sedation: yes  Performed under: local infiltration  Guidance:ultrasound guided    ULTRASOUND INTERPRETATION.  Using ultrasound guidance a 21 G gauge needle was placed in close proximity to the nerve, at which point, under ultrasound guidance anesthetic was injected in the area of the nerve and spread of the anesthesia was seen on ultrasound in close proximity thereto.  There were no abnormalities seen on ultrasound; a digital image was taken; and the patient tolerated the procedure with no complications. Images:still images obtained, printed/placed on chart    Laterality:right  Block Type:ALEJANDRO  Injection Technique:single-shot  Needle Type:echogenic  Needle Gauge:21 G  Resistance on Injection: none    Medications Used: bupivacaine PF (MARCAINE) injection 0.25% - Injection   10 mL - 7/29/2025 10:01:00 AM      Post Assessment  Injection Assessment: negative aspiration for heme, no paresthesia on injection and incremental injection  Patient Tolerance:comfortable throughout block  Complications:no  Performed by: Cirilo Joyce CRNA             no anemia, no easy bruising, no jaundice, no swollen lymph nodes.